# Patient Record
Sex: MALE | Race: WHITE | NOT HISPANIC OR LATINO | Employment: PART TIME | URBAN - METROPOLITAN AREA
[De-identification: names, ages, dates, MRNs, and addresses within clinical notes are randomized per-mention and may not be internally consistent; named-entity substitution may affect disease eponyms.]

---

## 2017-01-04 ENCOUNTER — PATIENT MESSAGE (OUTPATIENT)
Dept: FAMILY MEDICINE | Facility: CLINIC | Age: 67
End: 2017-01-04

## 2017-01-04 ENCOUNTER — LAB VISIT (OUTPATIENT)
Dept: LAB | Facility: HOSPITAL | Age: 67
End: 2017-01-04
Attending: FAMILY MEDICINE
Payer: MEDICARE

## 2017-01-04 DIAGNOSIS — R60.0 LOCALIZED EDEMA: ICD-10-CM

## 2017-01-04 DIAGNOSIS — Z12.5 PROSTATE CANCER SCREENING: ICD-10-CM

## 2017-01-04 DIAGNOSIS — I83.029 VENOUS STASIS ULCERS OF BOTH LOWER EXTREMITIES: ICD-10-CM

## 2017-01-04 DIAGNOSIS — E11.9 CONTROLLED TYPE 2 DIABETES MELLITUS WITHOUT COMPLICATION, WITHOUT LONG-TERM CURRENT USE OF INSULIN: ICD-10-CM

## 2017-01-04 DIAGNOSIS — L97.919 VENOUS STASIS ULCERS OF BOTH LOWER EXTREMITIES: ICD-10-CM

## 2017-01-04 DIAGNOSIS — L97.929 VENOUS STASIS ULCERS OF BOTH LOWER EXTREMITIES: ICD-10-CM

## 2017-01-04 DIAGNOSIS — I10 GOOD HYPERTENSION CONTROL: ICD-10-CM

## 2017-01-04 DIAGNOSIS — E78.2 MIXED HYPERLIPIDEMIA: ICD-10-CM

## 2017-01-04 DIAGNOSIS — I83.019 VENOUS STASIS ULCERS OF BOTH LOWER EXTREMITIES: ICD-10-CM

## 2017-01-04 LAB
ALBUMIN SERPL BCP-MCNC: 3 G/DL
ALP SERPL-CCNC: 64 U/L
ALT SERPL W/O P-5'-P-CCNC: 19 U/L
ANION GAP SERPL CALC-SCNC: 6 MMOL/L
AST SERPL-CCNC: 21 U/L
BASOPHILS # BLD AUTO: 0.05 K/UL
BASOPHILS NFR BLD: 0.6 %
BILIRUB SERPL-MCNC: 0.5 MG/DL
BUN SERPL-MCNC: 28 MG/DL
CALCIUM SERPL-MCNC: 9.1 MG/DL
CHLORIDE SERPL-SCNC: 102 MMOL/L
CHOLEST/HDLC SERPL: 4 {RATIO}
CO2 SERPL-SCNC: 29 MMOL/L
COMPLEXED PSA SERPL-MCNC: 0.81 NG/ML
CREAT SERPL-MCNC: 1.2 MG/DL
DIFFERENTIAL METHOD: ABNORMAL
EOSINOPHIL # BLD AUTO: 0.4 K/UL
EOSINOPHIL NFR BLD: 4.8 %
ERYTHROCYTE [DISTWIDTH] IN BLOOD BY AUTOMATED COUNT: 14.1 %
EST. GFR  (AFRICAN AMERICAN): >60 ML/MIN/1.73 M^2
EST. GFR  (NON AFRICAN AMERICAN): >60 ML/MIN/1.73 M^2
GLUCOSE SERPL-MCNC: 111 MG/DL
HCT VFR BLD AUTO: 40.8 %
HDL/CHOLESTEROL RATIO: 25 %
HDLC SERPL-MCNC: 168 MG/DL
HDLC SERPL-MCNC: 42 MG/DL
HGB BLD-MCNC: 13.1 G/DL
LDLC SERPL CALC-MCNC: 108.2 MG/DL
LYMPHOCYTES # BLD AUTO: 1.4 K/UL
LYMPHOCYTES NFR BLD: 15.7 %
MCH RBC QN AUTO: 28.7 PG
MCHC RBC AUTO-ENTMCNC: 32.1 %
MCV RBC AUTO: 89 FL
MONOCYTES # BLD AUTO: 0.9 K/UL
MONOCYTES NFR BLD: 9.9 %
NEUTROPHILS # BLD AUTO: 6.2 K/UL
NEUTROPHILS NFR BLD: 68.8 %
NONHDLC SERPL-MCNC: 126 MG/DL
PLATELET # BLD AUTO: 268 K/UL
PMV BLD AUTO: 10 FL
POTASSIUM SERPL-SCNC: 5 MMOL/L
PROT SERPL-MCNC: 7.3 G/DL
RBC # BLD AUTO: 4.57 M/UL
SODIUM SERPL-SCNC: 137 MMOL/L
TRIGL SERPL-MCNC: 89 MG/DL
WBC # BLD AUTO: 9.02 K/UL

## 2017-01-04 PROCEDURE — 80053 COMPREHEN METABOLIC PANEL: CPT

## 2017-01-04 PROCEDURE — 80061 LIPID PANEL: CPT

## 2017-01-04 PROCEDURE — 83036 HEMOGLOBIN GLYCOSYLATED A1C: CPT

## 2017-01-04 PROCEDURE — 85025 COMPLETE CBC W/AUTO DIFF WBC: CPT

## 2017-01-04 PROCEDURE — 36415 COLL VENOUS BLD VENIPUNCTURE: CPT | Mod: PO

## 2017-01-04 PROCEDURE — 84153 ASSAY OF PSA TOTAL: CPT

## 2017-01-05 ENCOUNTER — PATIENT MESSAGE (OUTPATIENT)
Dept: FAMILY MEDICINE | Facility: CLINIC | Age: 67
End: 2017-01-05

## 2017-01-05 LAB
ESTIMATED AVG GLUCOSE: 131 MG/DL
HBA1C MFR BLD HPLC: 6.2 %

## 2017-01-11 ENCOUNTER — DOCUMENTATION ONLY (OUTPATIENT)
Dept: FAMILY MEDICINE | Facility: CLINIC | Age: 67
End: 2017-01-11

## 2017-01-11 NOTE — PROGRESS NOTES
Pre-Visit Chart Review  For Appointment Scheduled on 1-13-17    Health Maintenance Due   Topic Date Due    Pneumococcal (65+) (2 of 2 - PCV13) 10/18/2014    Eye Exam  02/18/2016    Colonoscopy  05/02/2016    Influenza Vaccine  08/01/2016    Foot Exam  01/13/2017

## 2017-01-13 ENCOUNTER — OFFICE VISIT (OUTPATIENT)
Dept: FAMILY MEDICINE | Facility: CLINIC | Age: 67
End: 2017-01-13
Payer: MEDICARE

## 2017-01-13 VITALS
DIASTOLIC BLOOD PRESSURE: 70 MMHG | SYSTOLIC BLOOD PRESSURE: 134 MMHG | RESPIRATION RATE: 20 BRPM | TEMPERATURE: 98 F | HEIGHT: 71 IN | HEART RATE: 70 BPM | OXYGEN SATURATION: 98 % | BODY MASS INDEX: 44.1 KG/M2 | WEIGHT: 315 LBS

## 2017-01-13 DIAGNOSIS — Z23 IMMUNIZATION DUE: ICD-10-CM

## 2017-01-13 DIAGNOSIS — I87.2 STASIS DERMATITIS OF BOTH LEGS: ICD-10-CM

## 2017-01-13 DIAGNOSIS — L97.309 VENOUS ULCER OF ANKLE, UNSPECIFIED LATERALITY: Primary | ICD-10-CM

## 2017-01-13 DIAGNOSIS — D64.9 ANEMIA, UNSPECIFIED TYPE: ICD-10-CM

## 2017-01-13 DIAGNOSIS — E11.9 CONTROLLED TYPE 2 DIABETES MELLITUS WITHOUT COMPLICATION, UNSPECIFIED LONG TERM INSULIN USE STATUS: ICD-10-CM

## 2017-01-13 DIAGNOSIS — I83.003 VENOUS ULCER OF ANKLE, UNSPECIFIED LATERALITY: Primary | ICD-10-CM

## 2017-01-13 DIAGNOSIS — F32.A DEPRESSION, UNSPECIFIED DEPRESSION TYPE: ICD-10-CM

## 2017-01-13 DIAGNOSIS — I83.003 VENOUS ULCER OF ANKLE, UNSPECIFIED LATERALITY: ICD-10-CM

## 2017-01-13 DIAGNOSIS — L97.309 VENOUS ULCER OF ANKLE, UNSPECIFIED LATERALITY: ICD-10-CM

## 2017-01-13 PROCEDURE — 99213 OFFICE O/P EST LOW 20 MIN: CPT | Mod: PBBFAC,PO,25 | Performed by: FAMILY MEDICINE

## 2017-01-13 PROCEDURE — 99214 OFFICE O/P EST MOD 30 MIN: CPT | Mod: S$PBB,,, | Performed by: FAMILY MEDICINE

## 2017-01-13 PROCEDURE — 99999 PR PBB SHADOW E&M-EST. PATIENT-LVL III: CPT | Mod: PBBFAC,,, | Performed by: FAMILY MEDICINE

## 2017-01-13 PROCEDURE — 90670 PCV13 VACCINE IM: CPT | Mod: PBBFAC,PO | Performed by: FAMILY MEDICINE

## 2017-01-13 RX ORDER — CIPROFLOXACIN 500 MG/1
500 TABLET ORAL 2 TIMES DAILY
Qty: 20 TABLET | Refills: 0 | Status: SHIPPED | OUTPATIENT
Start: 2017-01-13 | End: 2017-02-07

## 2017-01-13 RX ORDER — SPIRONOLACTONE 50 MG/1
50 TABLET, FILM COATED ORAL DAILY
Qty: 90 TABLET | Refills: 1 | Status: SHIPPED | OUTPATIENT
Start: 2017-01-13 | End: 2018-06-26 | Stop reason: SDUPTHER

## 2017-01-13 RX ORDER — BUPROPION HYDROCHLORIDE 300 MG/1
300 TABLET ORAL DAILY
Qty: 90 TABLET | Refills: 1 | Status: SHIPPED | OUTPATIENT
Start: 2017-01-13 | End: 2017-08-31

## 2017-01-13 RX ORDER — METOPROLOL SUCCINATE 100 MG/1
100 TABLET, EXTENDED RELEASE ORAL DAILY
COMMUNITY
End: 2017-04-13 | Stop reason: SDUPTHER

## 2017-01-13 RX ORDER — CIPROFLOXACIN 500 MG/1
500 TABLET ORAL 2 TIMES DAILY
Qty: 120 TABLET | Refills: 0 | Status: SHIPPED | OUTPATIENT
Start: 2017-01-13 | End: 2017-01-13 | Stop reason: SDUPTHER

## 2017-01-13 NOTE — MR AVS SNAPSHOT
[facility]  [address]  [place] [address]  Phone: [phone]  Fax: [phone]                  Lukas Chance   2017 9:20 AM   Office Visit    Description:  Male : 1950   Provider:  Solitario Mendez MD   Department:  [facility]           Reason for Visit     Medication Refill           Diagnoses this Visit        Comments    Venous ulcer of ankle, unspecified laterality    -  Primary     Stasis dermatitis of both legs         Depression, unspecified depression type         Controlled type 2 diabetes mellitus without complication, unspecified long term insulin use status         Anemia, unspecified type         Immunization due                To Do List           Future Appointments        Provider Department Dept Phone    4/10/2017 10:15 AM LAB, [facility] - Lab [phone]    2017 8:40 AM Solitario Mendez MD [facility] [phone]      Goals (5 Years of Data)     None      Follow-Up and Disposition     Return in about 3 months (around 2017) for diabetic and lab recheck one week before.    Follow-up and Disposition History       These Medications        Disp Refills Start End    ciprofloxacin HCl (CIPRO) 500 MG tablet 120 tablet 0 2017    Take 1 tablet (500 mg total) by mouth 2 (two) times daily. - Oral    Pharmacy: [address] [place] - [address] [facility] Ph #: [phone]       spironolactone (ALDACTONE) 50 MG tablet 90 tablet 1 2017    Take 1 tablet (50 mg total) by mouth once daily. - Oral    Pharmacy: [facility] [place] - [address] [facility] Ph #: [phone]       buPROPion (WELLBUTRIN XL) 300 MG 24 hr tablet 90 tablet 1 2017    Take 1 tablet (300 mg total) by mouth once daily. - Oral    Pharmacy: [facility] [place] - [address] [facility] Ph #: [phone]         [facility]     [facility] - 24/7 Assistance  Registered nurses in the [facility] provide clinical advisement, health education, appointment booking, and other advisory services.  Call for this free service at [phone].             Medications           CHANGE how you are taking these medications     Start Taking Instead of    spironolactone (ALDACTONE) 50 MG tablet spironolactone (ALDACTONE) 25 MG tablet    Dosage:  Take 1 tablet (50 mg total) by mouth once daily. Dosage:  Take 1 tablet (25 mg total) by mouth once daily.    Reason for Change:  Reorder     buPROPion (WELLBUTRIN XL) 300 MG 24 hr tablet buPROPion (WELLBUTRIN XL) 150 MG TB24 tablet  (Previously Discontinued)    Dosage:  Take 1 tablet (300 mg total) by mouth once daily. Dosage:  Take 2 tablets (300 mg total) by mouth once daily.    Reason for Change:  Patient no longer taking            Verify that the below list of medications is an accurate representation of the medications you are currently taking.  If none reported, the list may be blank. If incorrect, please contact your healthcare provider. Carry this list with you in case of emergency.           Current Medications     acetaminophen (TYLENOL EXTRA STRENGTH) 500 MG tablet Take 500 mg by mouth once a week.    hydrochlorothiazide (HYDRODIURIL) 25 MG tablet Take 1 tablet (25 mg total) by mouth once daily.    lisinopril (PRINIVIL,ZESTRIL) 40 MG tablet TAKE 1 TABLET ONE TIME DAILY    metoprolol succinate (TOPROL-XL) 100 MG 24 hr tablet Take 100 mg by mouth once daily.    multivitamin (THERAGRAN) per tablet Take 1 tablet by mouth once daily.    silver sulfADIAZINE 1% (SILVADENE) 1 % cream Apply topically daily as needed.     spironolactone (ALDACTONE) 50 MG tablet Take 1 tablet (50 mg total) by mouth once daily.    apixaban 5 mg Tab Take 1 tablet (5 mg total) by mouth 2 (two) times daily. Take two twice a day for one week then one twice a day    buPROPion (WELLBUTRIN XL)  "300 MG 24 hr tablet Take 1 tablet (300 mg total) by mouth once daily.    ciprofloxacin HCl (CIPRO) 500 MG tablet Take 1 tablet (500 mg total) by mouth 2 (two) times daily.    lorcaserin (BELVIQ) 10 mg Tab Take 10 mg by mouth 2 (two) times daily before meals.    metformin (GLUCOPHAGE) 500 MG tablet Take 1 tablet (500 mg total) by mouth daily with breakfast.    metoprolol succinate (TOPROL-XL) 100 MG 24 hr tablet Take 1 tablet (100 mg total) by mouth once daily.           Clinical Reference Information           Vital Signs - Last Recorded  Most recent update: 1/13/2017  9:36 AM by Maribeth Meeks MA    BP Pulse Temp Resp    134/70 (BP Location: Right arm, Patient Position: Sitting, BP Method: Manual) 70 97.8 °F (36.6 °C) (Oral) 20    Ht Wt SpO2 BMI    5' 11" (1.803 m) (!) 180.2 kg (397 lb 4.3 oz) 98% 55.41 kg/m2      Blood Pressure          Most Recent Value    BP  134/70      Allergies as of 1/13/2017     Bactroban [Mupirocin Calcium]      Immunizations Administered on Date of Encounter - 1/13/2017     Name Date Dose VIS Date Route    Pneumococcal Conjugate - 13 Valent 1/13/2017 0.5 mL 11/5/2015 Intramuscular      Orders Placed During Today's Visit      Normal Orders This Visit    Pneumococcal Conjugate Vaccine (13 Valent) (IM)     Future Labs/Procedures Expected by Expires    Basic metabolic panel  1/13/2017 1/14/2018    CBC auto differential  1/13/2017 1/14/2018    Hemoglobin A1c  1/13/2017 1/14/2018      Instructions      Controlling High Blood Pressure  High blood pressure (hypertension) is often called the silent killer. This is because many people who have it dont know it. High blood pressure is defined as 140/90 mm Hg or higher. Know your blood pressure and remember to check it regularly. Doing so can save your life. Here are some things you can do to help control your blood pressure.    Choose heart-healthy foods  · Select low-salt, low-fat foods. Limit sodium intake to 2,400 mg per day or the amount " suggested by your healthcare provider.  · Limit canned, dried, cured, packaged, and fast foods. These can contain a lot of salt.  · Eat 8 to 10 servings of fruits and vegetables every day.  · Choose lean meats, fish, or chicken.  · Eat whole-grain pasta, brown rice, and beans.  · Eat 2 to 3 servings of low-fat or fat-free dairy products.  · Ask your doctor about the DASH eating plan. This plan helps reduce blood pressure.  · When you go to a restaurant, ask that your meal be prepared with no added salt.  Maintain a healthy weight  · Ask your healthcare provider how many calories to eat a day. Then stick to that number.  · Ask your healthcare provider what weight range is healthiest for you. If you are overweight, a weight loss of only 3% to 5% of your body weight can help lower blood pressure. Generally, a good weight loss goal is to lose 10% of your body weight in a year.  · Limit snacks and sweets.  · Get regular exercise.  Get up and get active  · Choose activities you enjoy. Find ones you can do with friends or family. This includes bicycling, dancing, walking, and jogging.  · Park farther away from building entrances.  · Use stairs instead of the elevator.  · When you can, walk or bike instead of driving.  · [place] leaves, garden, or do household repairs.  · Be active at a moderate to vigorous level of physical activity for at least 40 minutes for a minimum of 3 to 4 days a week.   Manage stress  · Make time to relax and enjoy life. Find time to laugh.  · Communicate your concerns with your loved ones and your healthcare provider.  · Visit with family and friends, and keep up with hobbies.  Limit alcohol and quit smoking  · Men should have no more than 2 drinks per day.  · Women should have no more than 1 drink per day.  · Talk with your healthcare provider about quitting smoking. Smoking significantly increases your risk for heart disease and stroke. Ask your healthcare provider about community smoking cessation  programs and other options.  Medicines  If lifestyle changes arent enough, your healthcare provider may prescribe high blood pressure medicine. Take all medicines as prescribed. If you have any questions about your medicines, ask your healthcare provider before stopping or changing them.   © 1792-0578 [employer]. [address] [place] [address], [place] [address]. All rights reserved. This information is not intended as a substitute for professional medical care. Always follow your healthcare professional's instructions.

## 2017-01-13 NOTE — PATIENT INSTRUCTIONS
Controlling High Blood Pressure  High blood pressure (hypertension) is often called the silent killer. This is because many people who have it dont know it. High blood pressure is defined as 140/90 mm Hg or higher. Know your blood pressure and remember to check it regularly. Doing so can save your life. Here are some things you can do to help control your blood pressure.    Choose heart-healthy foods  · Select low-salt, low-fat foods. Limit sodium intake to 2,400 mg per day or the amount suggested by your healthcare provider.  · Limit canned, dried, cured, packaged, and fast foods. These can contain a lot of salt.  · Eat 8 to 10 servings of fruits and vegetables every day.  · Choose lean meats, fish, or chicken.  · Eat whole-grain pasta, brown rice, and beans.  · Eat 2 to 3 servings of low-fat or fat-free dairy products.  · Ask your doctor about the DASH eating plan. This plan helps reduce blood pressure.  · When you go to a restaurant, ask that your meal be prepared with no added salt.  Maintain a healthy weight  · Ask your healthcare provider how many calories to eat a day. Then stick to that number.  · Ask your healthcare provider what weight range is healthiest for you. If you are overweight, a weight loss of only 3% to 5% of your body weight can help lower blood pressure. Generally, a good weight loss goal is to lose 10% of your body weight in a year.  · Limit snacks and sweets.  · Get regular exercise.  Get up and get active  · Choose activities you enjoy. Find ones you can do with friends or family. This includes bicycling, dancing, walking, and jogging.  · Park farther away from building entrances.  · Use stairs instead of the elevator.  · When you can, walk or bike instead of driving.  · [place] leaves, garden, or do household repairs.  · Be active at a moderate to vigorous level of physical activity for at least 40 minutes for a minimum of 3 to 4 days a week.   Manage stress  · Make time to relax and enjoy  life. Find time to laugh.  · Communicate your concerns with your loved ones and your healthcare provider.  · Visit with family and friends, and keep up with hobbies.  Limit alcohol and quit smoking  · Men should have no more than 2 drinks per day.  · Women should have no more than 1 drink per day.  · Talk with your healthcare provider about quitting smoking. Smoking significantly increases your risk for heart disease and stroke. Ask your healthcare provider about community smoking cessation programs and other options.  Medicines  If lifestyle changes arent enough, your healthcare provider may prescribe high blood pressure medicine. Take all medicines as prescribed. If you have any questions about your medicines, ask your healthcare provider before stopping or changing them.   © 9132-3308 [employer] [place]. [address] [place] [address], [place] [address]. All rights reserved. This information is not intended as a substitute for professional medical care. Always follow your healthcare professional's instructions.

## 2017-01-13 NOTE — PROGRESS NOTES
Subjective:       Patient ID: Lukas Chance is a 66 y.o. male.    Chief Complaint: Medication Refill    HPI Comments: 66 year old male in for follow up of diabetes and chronic venous stasis ulcers of both legs.  His ulcers are weeping again and flare up of erythema but no fever or chills.  His cmp and a1c indicated no problems but potassium was upper end of normal on no potassium supplements.    Past Medical History:    Anticoagulant long-term use                     3/1/2013      Antithrombin 3 deficiency                                     Cellulitis                                                      Comment:lower legs, venous stasis    Coronary artery disease                                       Diabetes mellitus type 2 in obese               5/15/2013     Hypertension                                                  Obesity                                                       LAKHWINDER (obstructive sleep apnea)                                 Pulmonary embolism                                            Past Surgical History:    RADIOFREQUENCY ABLATION                          7/2015          Comment:bilateral     TONSILLECTOMY                                                  MULTIPLE TOOTH EXTRACTIONS                                     COLONOSCOPY                                      5/2/2011        Comment:Dr. Miller, 9 polyps, recheck 5 year    Pneumococcal (65+)(2 of 2 - PCV13) due on 10/18/2014  Eye Exam due on 02/18/2016  Colonoscopy due on 05/02/2016-DECLINES AT THIS TIME  Foot Exam due on 01/13/2017      Medication Refill   Associated symptoms include congestion, coughing and a rash. Pertinent negatives include no chest pain.     Review of Systems   HENT: Positive for congestion, postnasal drip and rhinorrhea.    Respiratory: Positive for cough. Negative for chest tightness and shortness of breath.    Cardiovascular: Positive for leg swelling. Negative for chest pain and palpitations.   Skin: Positive  for color change, rash and wound.   Psychiatric/Behavioral: Positive for dysphoric mood (he has been irritable lately and feels he needs to go back on wellbutrin).       Objective:      Physical Exam   Constitutional: He appears well-developed. No distress.   Good blood pressure control  Patient is morbidly obese with bmi of 55.4.   Weight is increased by 11.6lb since last visit of 10/13/16.     Cardiovascular:   Pulses:       Dorsalis pedis pulses are 1+ on the right side, and 1+ on the left side.        Posterior tibial pulses are 1+ on the right side, and 1+ on the left side.   Musculoskeletal: He exhibits edema (3+ edema both legs).        Right foot: There is normal range of motion and no deformity.        Left foot: There is normal range of motion and no deformity.   Feet:   Right Foot:   Protective Sensation: 10 sites tested. 10 sites sensed.   Skin Integrity: Positive for callus and dry skin. Negative for ulcer, blister, skin breakdown, erythema or warmth.   Left Foot:   Protective Sensation: 10 sites tested. 10 sites sensed.   Skin Integrity: Positive for callus and dry skin. Negative for ulcer, blister, skin breakdown, erythema or warmth.   Skin: He is not diaphoretic. There is erythema.        Psychiatric: He has a normal mood and affect. His behavior is normal. Judgment and thought content normal.   Nursing note and vitals reviewed.      Assessment:       1. Venous ulcer of ankle, unspecified laterality    2. Stasis dermatitis of both legs    3. Depression, unspecified depression type    4. Controlled type 2 diabetes mellitus without complication, unspecified long term insulin use status    5. Anemia, unspecified type    6. Immunization due        Plan:       1. Venous ulcer of ankle, unspecified laterality  Increase spironolactone to 50mg daily and recheck lab in three months with office visit  - ciprofloxacin HCl (CIPRO) 500 MG tablet; Take 1 tablet (500 mg total) by mouth 2 (two) times daily.   Dispense: 120 tablet; Refill: 0  - spironolactone (ALDACTONE) 50 MG tablet; Take 1 tablet (50 mg total) by mouth once daily.  Dispense: 90 tablet; Refill: 1    2. Stasis dermatitis of both legs  - spironolactone (ALDACTONE) 50 MG tablet; Take 1 tablet (50 mg total) by mouth once daily.  Dispense: 90 tablet; Refill: 1    3. Depression, unspecified depression type  Resume wellbutrin as before  - buPROPion (WELLBUTRIN XL) 300 MG 24 hr tablet; Take 1 tablet (300 mg total) by mouth once daily.  Dispense: 90 tablet; Refill: 1    4. Controlled type 2 diabetes mellitus without complication, unspecified long term insulin use status  Lab Results   Component Value Date    HGBA1C 6.2 01/04/2017       - Basic metabolic panel; Future  - Hemoglobin A1c; Future    5. Anemia, unspecified type  followup in three months with lab  - CBC auto differential; Future    6. Immunization due  given  - Pneumococcal Conjugate Vaccine (13 Valent) (IM)

## 2017-01-13 NOTE — TELEPHONE ENCOUNTER
----- Message from RT Jax sent at 1/13/2017 10:03 AM CST -----  Contact: [place] [phone] [facility]  [place] [phone] [facility], requesting clarification on the pt's medication that was e scribed, thanks.

## 2017-01-26 ENCOUNTER — TELEPHONE (OUTPATIENT)
Dept: FAMILY MEDICINE | Facility: CLINIC | Age: 67
End: 2017-01-26

## 2017-01-26 ENCOUNTER — PATIENT MESSAGE (OUTPATIENT)
Dept: FAMILY MEDICINE | Facility: CLINIC | Age: 67
End: 2017-01-26

## 2017-01-26 DIAGNOSIS — I83.003 VENOUS ULCER OF ANKLE, UNSPECIFIED LATERALITY: ICD-10-CM

## 2017-01-26 DIAGNOSIS — L97.309 VENOUS ULCER OF ANKLE, UNSPECIFIED LATERALITY: ICD-10-CM

## 2017-01-26 RX ORDER — CIPROFLOXACIN 500 MG/1
TABLET ORAL
Qty: 20 TABLET | Refills: 0 | OUTPATIENT
Start: 2017-01-26

## 2017-01-26 NOTE — TELEPHONE ENCOUNTER
----- Message from Erick Gill sent at 1/26/2017  4:29 PM CST -----  Patient needs a refill on Ziprofloxacin 500 mg [facility].  Please call patient at [phone] if you have any questions. Thanks!

## 2017-02-02 DIAGNOSIS — L97.309 VENOUS ULCER OF ANKLE, UNSPECIFIED LATERALITY: ICD-10-CM

## 2017-02-02 DIAGNOSIS — I83.003 VENOUS ULCER OF ANKLE, UNSPECIFIED LATERALITY: ICD-10-CM

## 2017-02-03 ENCOUNTER — TELEPHONE (OUTPATIENT)
Dept: FAMILY MEDICINE | Facility: CLINIC | Age: 67
End: 2017-02-03

## 2017-02-03 RX ORDER — CIPROFLOXACIN 500 MG/1
TABLET ORAL
Qty: 20 TABLET | Refills: 0 | OUTPATIENT
Start: 2017-02-03

## 2017-02-03 NOTE — TELEPHONE ENCOUNTER
----- Message from Maribeth Hernandez sent at 2/3/2017  1:39 PM CST -----  Contact:  call //[phone]    Calling   To   Get a  Refill cipro 500  Mg /bactrium   Cream// please call     [facility] [address]  [place] [address]  Phone: [phone] Fax: [phone]

## 2017-02-07 ENCOUNTER — OFFICE VISIT (OUTPATIENT)
Dept: FAMILY MEDICINE | Facility: CLINIC | Age: 67
End: 2017-02-07
Payer: MEDICARE

## 2017-02-07 ENCOUNTER — DOCUMENTATION ONLY (OUTPATIENT)
Dept: FAMILY MEDICINE | Facility: CLINIC | Age: 67
End: 2017-02-07

## 2017-02-07 VITALS
WEIGHT: 315 LBS | SYSTOLIC BLOOD PRESSURE: 135 MMHG | BODY MASS INDEX: 44.1 KG/M2 | DIASTOLIC BLOOD PRESSURE: 75 MMHG | HEART RATE: 72 BPM | OXYGEN SATURATION: 98 % | TEMPERATURE: 98 F | HEIGHT: 71 IN | RESPIRATION RATE: 20 BRPM

## 2017-02-07 DIAGNOSIS — I87.2 STASIS DERMATITIS OF BOTH LEGS: ICD-10-CM

## 2017-02-07 DIAGNOSIS — L03.119 CELLULITIS OF LOWER EXTREMITY, UNSPECIFIED LATERALITY: Primary | ICD-10-CM

## 2017-02-07 PROCEDURE — 99213 OFFICE O/P EST LOW 20 MIN: CPT | Mod: S$PBB,,, | Performed by: FAMILY MEDICINE

## 2017-02-07 PROCEDURE — 99999 PR PBB SHADOW E&M-EST. PATIENT-LVL III: CPT | Mod: PBBFAC,,, | Performed by: FAMILY MEDICINE

## 2017-02-07 PROCEDURE — 99213 OFFICE O/P EST LOW 20 MIN: CPT | Mod: PBBFAC,PO | Performed by: FAMILY MEDICINE

## 2017-02-07 RX ORDER — MUPIROCIN 20 MG/G
OINTMENT TOPICAL
COMMUNITY
Start: 2017-02-02 | End: 2017-04-17

## 2017-02-07 RX ORDER — LEVOFLOXACIN 750 MG/1
750 TABLET ORAL DAILY
Qty: 30 TABLET | Refills: 0 | Status: SHIPPED | OUTPATIENT
Start: 2017-02-07 | End: 2017-02-10

## 2017-02-07 NOTE — PROGRESS NOTES
Pre-Visit Chart Review  For Appointment Scheduled on 2-7-17    Health Maintenance Due   Topic Date Due    Eye Exam  02/18/2016    Colonoscopy  05/02/2016

## 2017-02-07 NOTE — PROGRESS NOTES
Subjective:       Patient ID: Lukas Chance is a 67 y.o. male.    Chief Complaint: Medication Refill    HPI Comments: 67 year old male with antithrombin three deficiency, blood clots and history of pulmonary embolism with chronic stasis dermatitis and recurrent cellulitis of both legs comes in for recheck.  He was using cipro for cellulitis and it was working well for a time but he had a flare up and went to 500mg tid running out quicker.  He is out of it now, still using silvadene and the wounds are starting to weep and flare again.    Past Medical History:    Anticoagulant long-term use                     3/1/2013      Antithrombin 3 deficiency                                     Cellulitis                                                      Comment:lower legs, venous stasis    Coronary artery disease                                       Diabetes mellitus type 2 in obese               5/15/2013     Hypertension                                                  Obesity                                                       LAKHWINDER (obstructive sleep apnea)                                 Pulmonary embolism                                        \    Past Surgical History:    RADIOFREQUENCY ABLATION                          7/2015          Comment:bilateral     TONSILLECTOMY                                                  MULTIPLE TOOTH EXTRACTIONS                                     COLONOSCOPY                                      5/2/2011        Comment:Dr. Miller, 9 polyps, recheck 5 year        Medication Refill   Pertinent negatives include no chills or fever.     Review of Systems   Constitutional: Negative for chills and fever.   Skin: Positive for color change and wound.       Objective:      Physical Exam   Constitutional: He appears well-developed. No distress.   Good blood pressure control  Patient is morbidly obese with bmi of 54.6.   Weight is decreased by 6.1lb since last visit of 1/13/17.     Skin:  He is not diaphoretic.        Nursing note and vitals reviewed.      Assessment:       1. Cellulitis of lower extremity, unspecified laterality    2. Stasis dermatitis of both legs        Plan:       1. Cellulitis of lower extremity, unspecified laterality  - levoFLOXacin (LEVAQUIN) 750 MG tablet; Take 1 tablet (750 mg total) by mouth once daily.  Dispense: 30 tablet; Refill: 0    2. Stasis dermatitis of both legs

## 2017-02-10 ENCOUNTER — TELEPHONE (OUTPATIENT)
Dept: FAMILY MEDICINE | Facility: CLINIC | Age: 67
End: 2017-02-10

## 2017-02-10 DIAGNOSIS — L03.119 CELLULITIS OF LOWER EXTREMITY, UNSPECIFIED LATERALITY: ICD-10-CM

## 2017-02-10 RX ORDER — CIPROFLOXACIN 750 MG/1
750 TABLET, FILM COATED ORAL 2 TIMES DAILY
Qty: 60 TABLET | Refills: 0 | Status: SHIPPED | OUTPATIENT
Start: 2017-02-10 | End: 2017-03-12

## 2017-02-10 NOTE — TELEPHONE ENCOUNTER
Patient says the cellulitis of lower legs is not responding to the Levaquin- draining and swelling-afebrile-he is asking for change to Cipro.

## 2017-02-10 NOTE — TELEPHONE ENCOUNTER
----- Message from Gina Mace sent at 2/10/2017 10:14 AM CST -----  Contact: Lukas  Was given an antibiotic last week. Wants to see if he can get medication prior to this time. Please call back [phone] office and cell [phone].     [facility] [address] [place] - [address]   [place] [address]  Phone: [phone] Fax: [phone]    Thank you.!!

## 2017-04-10 ENCOUNTER — LAB VISIT (OUTPATIENT)
Dept: LAB | Facility: HOSPITAL | Age: 67
End: 2017-04-10
Attending: FAMILY MEDICINE
Payer: MEDICARE

## 2017-04-10 DIAGNOSIS — D64.9 ANEMIA, UNSPECIFIED TYPE: ICD-10-CM

## 2017-04-10 DIAGNOSIS — E11.9 CONTROLLED TYPE 2 DIABETES MELLITUS WITHOUT COMPLICATION, UNSPECIFIED LONG TERM INSULIN USE STATUS: ICD-10-CM

## 2017-04-10 LAB
ANION GAP SERPL CALC-SCNC: 10 MMOL/L
BASOPHILS # BLD AUTO: 0.06 K/UL
BASOPHILS NFR BLD: 0.5 %
BUN SERPL-MCNC: 39 MG/DL
CALCIUM SERPL-MCNC: 9 MG/DL
CHLORIDE SERPL-SCNC: 103 MMOL/L
CO2 SERPL-SCNC: 25 MMOL/L
CREAT SERPL-MCNC: 1.5 MG/DL
DIFFERENTIAL METHOD: ABNORMAL
EOSINOPHIL # BLD AUTO: 0.4 K/UL
EOSINOPHIL NFR BLD: 3.3 %
ERYTHROCYTE [DISTWIDTH] IN BLOOD BY AUTOMATED COUNT: 14.7 %
EST. GFR  (AFRICAN AMERICAN): 54.1 ML/MIN/1.73 M^2
EST. GFR  (NON AFRICAN AMERICAN): 46.8 ML/MIN/1.73 M^2
GLUCOSE SERPL-MCNC: 108 MG/DL
HCT VFR BLD AUTO: 37.3 %
HGB BLD-MCNC: 12.2 G/DL
LYMPHOCYTES # BLD AUTO: 2.2 K/UL
LYMPHOCYTES NFR BLD: 18.9 %
MCH RBC QN AUTO: 29.6 PG
MCHC RBC AUTO-ENTMCNC: 32.7 %
MCV RBC AUTO: 91 FL
MONOCYTES # BLD AUTO: 1.1 K/UL
MONOCYTES NFR BLD: 9.7 %
NEUTROPHILS # BLD AUTO: 7.9 K/UL
NEUTROPHILS NFR BLD: 67.3 %
PLATELET # BLD AUTO: 258 K/UL
PMV BLD AUTO: 10.3 FL
POTASSIUM SERPL-SCNC: 4.2 MMOL/L
RBC # BLD AUTO: 4.12 M/UL
SODIUM SERPL-SCNC: 138 MMOL/L
WBC # BLD AUTO: 11.7 K/UL

## 2017-04-10 PROCEDURE — 85025 COMPLETE CBC W/AUTO DIFF WBC: CPT

## 2017-04-10 PROCEDURE — 36415 COLL VENOUS BLD VENIPUNCTURE: CPT | Mod: PO

## 2017-04-10 PROCEDURE — 83036 HEMOGLOBIN GLYCOSYLATED A1C: CPT

## 2017-04-10 PROCEDURE — 80048 BASIC METABOLIC PNL TOTAL CA: CPT

## 2017-04-11 ENCOUNTER — PATIENT MESSAGE (OUTPATIENT)
Dept: FAMILY MEDICINE | Facility: CLINIC | Age: 67
End: 2017-04-11

## 2017-04-11 DIAGNOSIS — N18.3 CKD (CHRONIC KIDNEY DISEASE), STAGE 3 (MODERATE): Primary | ICD-10-CM

## 2017-04-11 LAB
ESTIMATED AVG GLUCOSE: 131 MG/DL
HBA1C MFR BLD HPLC: 6.2 %

## 2017-04-12 ENCOUNTER — DOCUMENTATION ONLY (OUTPATIENT)
Dept: FAMILY MEDICINE | Facility: CLINIC | Age: 67
End: 2017-04-12

## 2017-04-12 NOTE — PROGRESS NOTES
Pre-Visit Chart Review  For Appointment Scheduled on 4-17-17    Health Maintenance Due   Topic Date Due    TETANUS VACCINE  01/24/1966    Zoster Vaccine  01/24/2008    Eye Exam  02/18/2016    Colonoscopy  05/02/2016

## 2017-04-13 DIAGNOSIS — I10 ESSENTIAL HYPERTENSION: ICD-10-CM

## 2017-04-13 RX ORDER — METOPROLOL SUCCINATE 100 MG/1
TABLET, EXTENDED RELEASE ORAL
Qty: 90 TABLET | Refills: 3 | Status: SHIPPED | OUTPATIENT
Start: 2017-04-13 | End: 2017-04-17 | Stop reason: SDUPTHER

## 2017-04-17 ENCOUNTER — OFFICE VISIT (OUTPATIENT)
Dept: FAMILY MEDICINE | Facility: CLINIC | Age: 67
End: 2017-04-17
Payer: MEDICARE

## 2017-04-17 VITALS
DIASTOLIC BLOOD PRESSURE: 60 MMHG | OXYGEN SATURATION: 99 % | SYSTOLIC BLOOD PRESSURE: 118 MMHG | BODY MASS INDEX: 44.1 KG/M2 | HEIGHT: 71 IN | HEART RATE: 97 BPM | RESPIRATION RATE: 16 BRPM | TEMPERATURE: 98 F | WEIGHT: 315 LBS

## 2017-04-17 DIAGNOSIS — I10 ESSENTIAL HYPERTENSION: ICD-10-CM

## 2017-04-17 DIAGNOSIS — I15.2 HYPERTENSION ASSOCIATED WITH DIABETES: ICD-10-CM

## 2017-04-17 DIAGNOSIS — L97.909 VENOUS STASIS ULCER: Primary | ICD-10-CM

## 2017-04-17 DIAGNOSIS — I10 GOOD HYPERTENSION CONTROL: ICD-10-CM

## 2017-04-17 DIAGNOSIS — I83.009 VENOUS STASIS ULCER: Primary | ICD-10-CM

## 2017-04-17 DIAGNOSIS — E11.40 TYPE 2 DIABETES, CONTROLLED, WITH NEUROPATHY: ICD-10-CM

## 2017-04-17 DIAGNOSIS — E11.59 HYPERTENSION ASSOCIATED WITH DIABETES: ICD-10-CM

## 2017-04-17 PROCEDURE — 99214 OFFICE O/P EST MOD 30 MIN: CPT | Mod: PBBFAC,PO | Performed by: FAMILY MEDICINE

## 2017-04-17 PROCEDURE — 99999 PR PBB SHADOW E&M-EST. PATIENT-LVL IV: CPT | Mod: PBBFAC,,, | Performed by: FAMILY MEDICINE

## 2017-04-17 PROCEDURE — 99214 OFFICE O/P EST MOD 30 MIN: CPT | Mod: S$PBB,,, | Performed by: FAMILY MEDICINE

## 2017-04-17 RX ORDER — METOPROLOL SUCCINATE 100 MG/1
100 TABLET, EXTENDED RELEASE ORAL DAILY
Qty: 90 TABLET | Refills: 3 | Status: SHIPPED | OUTPATIENT
Start: 2017-04-17 | End: 2018-05-22 | Stop reason: SDUPTHER

## 2017-04-17 RX ORDER — MUPIROCIN 20 MG/G
OINTMENT TOPICAL 2 TIMES DAILY
Qty: 90 G | Refills: 11 | Status: SHIPPED | OUTPATIENT
Start: 2017-04-17 | End: 2017-08-31 | Stop reason: ALTCHOICE

## 2017-04-17 NOTE — MR AVS SNAPSHOT
[facility]  [address] [place] [address]  Phone: [phone]  Fax: [phone]                  Lukas Cahnce   2017 8:40 AM   Office Visit    Description:  Male : 1950   Provider:  Solitario Mendez MD   Department:  [facility] [facility]           Reason for Visit     Follow-up           Diagnoses this Visit        Comments    Venous stasis ulcer    -  Primary     Essential hypertension         Type 2 diabetes, controlled, with neuropathy         Good hypertension control         Hypertension associated with diabetes         BMI 50.0-59.9, adult                To Do List           Future Appointments        Provider Department Dept Phone    2017 8:30 AM LAB, GEORGINA SAT [facility] - Lab [phone]    2017 1:20 PM Kendrick Mckoy MD [facility] General Surgery [phone]      Goals (5 Years of Data)     None       These Medications        Disp Refills Start End    metoprolol succinate (TOPROL-XL) 100 MG 24 hr tablet 90 tablet 3 2017     Take 1 tablet (100 mg total) by mouth once daily. - Oral    Pharmacy: [address] [place] Ph #: [phone]       mupirocin (BACTROBAN) 2 % ointment 90 g 11 2017     Apply topically 2 (two) times daily. - Topical (Top)    Pharmacy: [address] [place] Ph #: [phone]         [facility]     [facility] Nurse Care Line -  Assistance  Unless otherwise directed by your provider, please contact [facility], our nurse care line that is available for  assistance.     Registered nurses in the [facility] provide: appointment scheduling, clinical advisement, health education, and other advisory services.  Call: [phone] (toll free)               Medications           CHANGE how you are taking these medications     Start Taking Instead of    metoprolol succinate (TOPROL-XL)  100 MG 24 hr tablet metoprolol succinate (TOPROL-XL) 100 MG 24 hr tablet    Dosage:  Take 1 tablet (100 mg total) by mouth once daily. Dosage:  TAKE ONE TABLET BY MOUTH ONCE DAILY    Reason for Change:  Reorder     mupirocin (BACTROBAN) 2 % ointment mupirocin (BACTROBAN) 2 % ointment    Dosage:  Apply topically 2 (two) times daily.     Reason for Change:  Patient no longer taking       STOP taking these medications     apixaban 5 mg Tab Take 1 tablet (5 mg total) by mouth 2 (two) times daily. Take two twice a day for one week then one twice a day    lorcaserin (BELVIQ) 10 mg Tab Take 10 mg by mouth 2 (two) times daily before meals.    silver sulfADIAZINE 1% (SILVADENE) 1 % cream Apply topically daily as needed.            Verify that the below list of medications is an accurate representation of the medications you are currently taking.  If none reported, the list may be blank. If incorrect, please contact your healthcare provider. Carry this list with you in case of emergency.           Current Medications     acetaminophen (TYLENOL EXTRA STRENGTH) 500 MG tablet Take 500 mg by mouth once a week.    buPROPion (WELLBUTRIN XL) 300 MG 24 hr tablet Take 1 tablet (300 mg total) by mouth once daily.    hydrochlorothiazide (HYDRODIURIL) 25 MG tablet Take 1 tablet (25 mg total) by mouth once daily.    lisinopril (PRINIVIL,ZESTRIL) 40 MG tablet TAKE 1 TABLET ONE TIME DAILY    metoprolol succinate (TOPROL-XL) 100 MG 24 hr tablet Take 1 tablet (100 mg total) by mouth once daily.    multivitamin (THERAGRAN) per tablet Take 1 tablet by mouth once daily.    spironolactone (ALDACTONE) 50 MG tablet Take 1 tablet (50 mg total) by mouth once daily.    mupirocin (BACTROBAN) 2 % ointment Apply topically 2 (two) times daily.           Clinical Reference Information           Your Vitals Were     BP Pulse Temp Resp    118/60 (BP Location: Left arm, Patient Position: Sitting, BP Method: Manual) 97 98.3 °F (36.8 °C) (Oral) 16    Height  "Weight SpO2 BMI    5' 11" (1.803 m) 180.1 kg (397 lb 0.8 oz) 99% 55.38 kg/m2      Blood Pressure          Most Recent Value    BP  118/60 [faint]      Allergies as of 4/17/2017     Bactroban [Mupirocin Calcium]      Immunizations Administered on Date of Encounter - 4/17/2017     None      Orders Placed During Today's Visit      Normal Orders This Visit    Ambulatory referral to General Surgery       Instructions      Weight Management: Getting Started  Healthy bodies come in all shapes and sizes. Not all bodies are made to be thin. For some people, a healthy weight is higher than the average weight listed on weight charts. Your healthcare provider can help you decide on a healthy weight for you.    Reasons to lose weight  Losing weight can help with some health problems, such as high blood pressure, heart disease, diabetes, sleep apnea, and arthritis. You may also feel more energy.  Set your long-term goal  Your goal doesn't even have to be a specific weight. You may decide on a fitness goal (such as being able to walk 10 miles a week), or a health goal (such as lowering your blood pressure). Choose a goal that is measurable and reasonable, so you know when you've reached it. A goal of reaching a BMI of less than 25 is not always reasonable (or possible).   Make an action plan  Habits dont change overnight. Setting your goals too high can leave you feeling discouraged if you cant reach them. Be realistic. Choose one or two small changes you can make now. Set an action plan for how you are going to make these changes. When you can stick to this plan, keep making a few more small changes. Taking small steps will help you stay on the path to success.  Track your progress  Write down your goals. Then, keep a daily record of your progress. Write down what you eat and how active you are. This record lets you look back on how much youve done. It may also help when youre feeling frustrated. Reward yourself for success. " Even if you dont reach every goal, give yourself credit for what you do get done.  Get support  Encouragement from others can help make losing weight easier. Ask your family members and friends for support. They may even want to join you. Also look to your healthcare provider, registered dietitian, and  for help. Your local hospital can give you more information about nutrition, exercise, and weight loss.  Date Last Reviewed: 1/31/2016 © 2000-2016 [employer]. [address] [place] [address]. All rights reserved. This information is not intended as a substitute for professional medical care. Always follow your healthcare professional's instructions.        Controlling High Blood Pressure  High blood pressure (hypertension) is often called the silent killer. This is because many people who have it dont know it. High blood pressure is defined as 140/90 mm Hg or higher. Know your blood pressure and remember to check it regularly. Doing so can save your life. Here are some things you can do to help control your blood pressure.    Choose heart-healthy foods  · Select low-salt, low-fat foods. Limit sodium intake to 2,400 mg per day or the amount suggested by your healthcare provider.  · Limit canned, dried, cured, packaged, and fast foods. These can contain a lot of salt.  · Eat 8 to 10 servings of fruits and vegetables every day.  · Choose lean meats, fish, or chicken.  · Eat whole-grain pasta, brown rice, and beans.  · Eat 2 to 3 servings of low-fat or fat-free dairy products.  · Ask your doctor about the DASH eating plan. This plan helps reduce blood pressure.  · When you go to a restaurant, ask that your meal be prepared with no added salt.  Maintain a healthy weight  · Ask your healthcare provider how many calories to eat a day. Then stick to that number.  · Ask your healthcare provider what weight range is healthiest for you. If you are overweight, a weight loss of only  3% to 5% of your body weight can help lower blood pressure. Generally, a good weight loss goal is to lose 10% of your body weight in a year.  · Limit snacks and sweets.  · Get regular exercise.  Get up and get active  · Choose activities you enjoy. Find ones you can do with friends or family. This includes bicycling, dancing, walking, and jogging.  · Park farther away from building entrances.  · Use stairs instead of the elevator.  · When you can, walk or bike instead of driving.  · [place] leaves, garden, or do household repairs.  · Be active at a moderate to vigorous level of physical activity for at least 40 minutes for a minimum of 3 to 4 days a week.   Manage stress  · Make time to relax and enjoy life. Find time to laugh.  · Communicate your concerns with your loved ones and your healthcare provider.  · Visit with family and friends, and keep up with hobbies.  Limit alcohol and quit smoking  · Men should have no more than 2 drinks per day.  · Women should have no more than 1 drink per day.  · Talk with your healthcare provider about quitting smoking. Smoking significantly increases your risk for heart disease and stroke. Ask your healthcare provider about community smoking cessation programs and other options.  Medicines  If lifestyle changes arent enough, your healthcare provider may prescribe high blood pressure medicine. Take all medicines as prescribed. If you have any questions about your medicines, ask your healthcare provider before stopping or changing them.   Date Last Reviewed: 4/27/2016 © 2000-2016 [employer]. [address] [place] [address], [place] [address]. All rights reserved. This information is not intended as a substitute for professional medical care. Always follow your healthcare professional's instructions.             Language Assistance Services     ATTENTION: Language assistance services are available, free of charge. Please call [phone].      ATENCIÓN: Hayley oneil  tiene a rivera disposición servicios gratuitos de asistencia lingüística. Llyosvany al [phone].     KEHINDE Ý: N?u b?n nói Ti?ng Vi?t, có các d?ch v? h? tr? ngôn ng? mi?n phí dành cho b?n. G?i s? [phone].         [facility] complies with applicable Federal civil rights laws and does not discriminate on the basis of race, color, national origin, age, disability, or sex.

## 2017-04-17 NOTE — PATIENT INSTRUCTIONS
Weight Management: Getting Started  Healthy bodies come in all shapes and sizes. Not all bodies are made to be thin. For some people, a healthy weight is higher than the average weight listed on weight charts. Your healthcare provider can help you decide on a healthy weight for you.    Reasons to lose weight  Losing weight can help with some health problems, such as high blood pressure, heart disease, diabetes, sleep apnea, and arthritis. You may also feel more energy.  Set your long-term goal  Your goal doesn't even have to be a specific weight. You may decide on a fitness goal (such as being able to walk 10 miles a week), or a health goal (such as lowering your blood pressure). Choose a goal that is measurable and reasonable, so you know when you've reached it. A goal of reaching a BMI of less than 25 is not always reasonable (or possible).   Make an action plan  Habits dont change overnight. Setting your goals too high can leave you feeling discouraged if you cant reach them. Be realistic. Choose one or two small changes you can make now. Set an action plan for how you are going to make these changes. When you can stick to this plan, keep making a few more small changes. Taking small steps will help you stay on the path to success.  Track your progress  Write down your goals. Then, keep a daily record of your progress. Write down what you eat and how active you are. This record lets you look back on how much youve done. It may also help when youre feeling frustrated. Reward yourself for success. Even if you dont reach every goal, give yourself credit for what you do get done.  Get support  Encouragement from others can help make losing weight easier. Ask your family members and friends for support. They may even want to join you. Also look to your healthcare provider, registered dietitian, and  for help. Your local hospital can give you more information about nutrition, exercise, and  weight loss.  Date Last Reviewed: 1/31/2016 © 2000-2016 [employer]. [address] [place] [address], [place] [address]. All rights reserved. This information is not intended as a substitute for professional medical care. Always follow your healthcare professional's instructions.        Controlling High Blood Pressure  High blood pressure (hypertension) is often called the silent killer. This is because many people who have it dont know it. High blood pressure is defined as 140/90 mm Hg or higher. Know your blood pressure and remember to check it regularly. Doing so can save your life. Here are some things you can do to help control your blood pressure.    Choose heart-healthy foods  · Select low-salt, low-fat foods. Limit sodium intake to 2,400 mg per day or the amount suggested by your healthcare provider.  · Limit canned, dried, cured, packaged, and fast foods. These can contain a lot of salt.  · Eat 8 to 10 servings of fruits and vegetables every day.  · Choose lean meats, fish, or chicken.  · Eat whole-grain pasta, brown rice, and beans.  · Eat 2 to 3 servings of low-fat or fat-free dairy products.  · Ask your doctor about the DASH eating plan. This plan helps reduce blood pressure.  · When you go to a restaurant, ask that your meal be prepared with no added salt.  Maintain a healthy weight  · Ask your healthcare provider how many calories to eat a day. Then stick to that number.  · Ask your healthcare provider what weight range is healthiest for you. If you are overweight, a weight loss of only 3% to 5% of your body weight can help lower blood pressure. Generally, a good weight loss goal is to lose 10% of your body weight in a year.  · Limit snacks and sweets.  · Get regular exercise.  Get up and get active  · Choose activities you enjoy. Find ones you can do with friends or family. This includes bicycling, dancing, walking, and jogging.  · Park farther away from building entrances.  · Use stairs  instead of the elevator.  · When you can, walk or bike instead of driving.  · [place] leaves, garden, or do household repairs.  · Be active at a moderate to vigorous level of physical activity for at least 40 minutes for a minimum of 3 to 4 days a week.   Manage stress  · Make time to relax and enjoy life. Find time to laugh.  · Communicate your concerns with your loved ones and your healthcare provider.  · Visit with family and friends, and keep up with hobbies.  Limit alcohol and quit smoking  · Men should have no more than 2 drinks per day.  · Women should have no more than 1 drink per day.  · Talk with your healthcare provider about quitting smoking. Smoking significantly increases your risk for heart disease and stroke. Ask your healthcare provider about community smoking cessation programs and other options.  Medicines  If lifestyle changes arent enough, your healthcare provider may prescribe high blood pressure medicine. Take all medicines as prescribed. If you have any questions about your medicines, ask your healthcare provider before stopping or changing them.   Date Last Reviewed: 4/27/2016 © 2000-2016 [employer]. [address] [place] [address], [place] [address]. All rights reserved. This information is not intended as a substitute for professional medical care. Always follow your healthcare professional's instructions.

## 2017-04-17 NOTE — PROGRESS NOTES
Subjective:       Patient ID: Lukas Chance is a 67 y.o. male.    Chief Complaint: Follow-up (lab prior )    HPI Comments: 67 year old male presents for follow up of stasis ulcers of the legs.  He is not using compression dressings and edema has worsened.  He stopped using silvadene because it started to burn him.      He had decreased renal function on last lab, aldactone was reduced with a recheck planned next week.    Past Medical History:  3/1/2013: Anticoagulant long-term use  No date: Antithrombin 3 deficiency  No date: Cellulitis      Comment: lower legs, venous stasis  No date: Coronary artery disease  5/15/2013: Diabetes mellitus type 2 in obese  No date: Hypertension  No date: Obesity  No date: LAKHWINDER (obstructive sleep apnea)  No date: Pulmonary embolism    Past Surgical History:  5/2/2011: COLONOSCOPY      Comment: Dr. Miller, 9 polyps, recheck 5 year  No date: MULTIPLE TOOTH EXTRACTIONS  7/2015: RADIOFREQUENCY ABLATION      Comment: bilateral   No date: TONSILLECTOMY      Current Outpatient Prescriptions:     acetaminophen (TYLENOL EXTRA STRENGTH) 500 MG tablet, Take 500 mg by mouth once a week., Disp: , Rfl:     buPROPion (WELLBUTRIN XL) 300 MG 24 hr tablet, Take 1 tablet (300 mg total) by mouth once daily., Disp: 90 tablet, Rfl: 1    hydrochlorothiazide (HYDRODIURIL) 25 MG tablet, Take 1 tablet (25 mg total) by mouth once daily., Disp: 90 tablet, Rfl: 1    lisinopril (PRINIVIL,ZESTRIL) 40 MG tablet, TAKE 1 TABLET ONE TIME DAILY, Disp: 90 tablet, Rfl: 3    metoprolol succinate (TOPROL-XL) 100 MG 24 hr tablet, Take 1 tablet (100 mg total) by mouth once daily., Disp: 90 tablet, Rfl: 3    multivitamin (THERAGRAN) per tablet, Take 1 tablet by mouth once daily., Disp: , Rfl:     spironolactone (ALDACTONE) 50 MG tablet, Take 1 tablet (50 mg total) by mouth once daily. (Patient taking differently: Take 50 mg by mouth every Mon, Wed, Fri. ), Disp: 90 tablet, Rfl: 1    mupirocin (BACTROBAN) 2 %  ointment, Apply topically 2 (two) times daily., Disp: 90 g, Rfl: 11    TETANUS VACCINE due on 01/24/1966-DECLINES  Zoster Vaccine due on 01/24/2008-DECLINES  Eye Exam due on 02/18/2016  Colonoscopy due on 05/02/2016      Review of Systems   Respiratory: Negative for chest tightness and shortness of breath.    Cardiovascular: Positive for leg swelling. Negative for chest pain.   Skin: Positive for color change and wound.       Objective:      Physical Exam   Constitutional: He appears well-developed. No distress.   Good blood pressure control  Patient is morbidly obese with bmi of 55.4.   Weight is increased by 6lb since last visit of 2/7/17.     Skin: He is not diaphoretic.        Nursing note and vitals reviewed.      Assessment:       1. Venous stasis ulcer    2. Essential hypertension    3. Type 2 diabetes, controlled, with neuropathy    4. Good hypertension control    5. Hypertension associated with diabetes    6. BMI 50.0-59.9, adult        Plan:       1. Essential hypertension  No changes needed  - metoprolol succinate (TOPROL-XL) 100 MG 24 hr tablet; Take 1 tablet (100 mg total) by mouth once daily.  Dispense: 90 tablet; Refill: 3    2. Venous stasis ulcer  Looks like will need debridement, consult Dr. Mckoy  - mupirocin (BACTROBAN) 2 % ointment; Apply topically 2 (two) times daily.  Dispense: 90 g; Refill: 11  - Ambulatory referral to General Surgery    3. Type 2 diabetes, controlled, with neuropathy  Lab Results   Component Value Date    HGBA1C 6.2 04/10/2017         4. Good hypertension control  No changes needed    5. Hypertension associated with diabetes    6. BMI 50.0-59.9, adult         Patient readiness: acceptance and barriers:none    During the course of the visit the patient was educated and counseled about the following:     Diabetes:  Discussed general issues about diabetes pathophysiology and management.  Discussed foot care.  Reminded to get yearly retinal exam.  Hypertension:   Medication: no  change.  Dietary sodium restriction.  Regular aerobic exercise.  Obesity:   General weight loss/lifestyle modification strategies discussed (elicit support from others; identify saboteurs; non-food rewards, etc).  Informal exercise measures discussed, e.g. taking stairs instead of elevator.  Regular aerobic exercise program discussed.    Goals: Diabetes: Maintain Hemoglobin A1C below 7, Hypertension: Reduce Blood Pressure and Obesity: Reduce calorie intake and BMI    Did patient meet goals/outcomes: No    The following self management tools provided: blood pressure log  blood glucose log  excercise log    Patient Instructions (the written plan) was given to the patient/family.     Time spent with patient: 30 minutes

## 2017-04-26 ENCOUNTER — LAB VISIT (OUTPATIENT)
Dept: LAB | Facility: HOSPITAL | Age: 67
End: 2017-04-26
Attending: FAMILY MEDICINE
Payer: MEDICARE

## 2017-04-26 DIAGNOSIS — N18.3 CKD (CHRONIC KIDNEY DISEASE), STAGE 3 (MODERATE): ICD-10-CM

## 2017-04-26 LAB
ANION GAP SERPL CALC-SCNC: 11 MMOL/L
BUN SERPL-MCNC: 32 MG/DL
CALCIUM SERPL-MCNC: 9.3 MG/DL
CHLORIDE SERPL-SCNC: 102 MMOL/L
CO2 SERPL-SCNC: 23 MMOL/L
CREAT SERPL-MCNC: 1.5 MG/DL
EST. GFR  (AFRICAN AMERICAN): 54.1 ML/MIN/1.73 M^2
EST. GFR  (NON AFRICAN AMERICAN): 46.8 ML/MIN/1.73 M^2
GLUCOSE SERPL-MCNC: 170 MG/DL
POTASSIUM SERPL-SCNC: 4.5 MMOL/L
SODIUM SERPL-SCNC: 136 MMOL/L

## 2017-04-26 PROCEDURE — 80048 BASIC METABOLIC PNL TOTAL CA: CPT

## 2017-04-26 PROCEDURE — 36415 COLL VENOUS BLD VENIPUNCTURE: CPT | Mod: PO

## 2017-04-27 ENCOUNTER — PATIENT MESSAGE (OUTPATIENT)
Dept: FAMILY MEDICINE | Facility: CLINIC | Age: 67
End: 2017-04-27

## 2017-04-28 ENCOUNTER — TELEPHONE (OUTPATIENT)
Dept: FAMILY MEDICINE | Facility: CLINIC | Age: 67
End: 2017-04-28

## 2017-04-28 DIAGNOSIS — N18.9 CKD (CHRONIC KIDNEY DISEASE), UNSPECIFIED STAGE: Primary | ICD-10-CM

## 2017-05-09 ENCOUNTER — TELEPHONE (OUTPATIENT)
Dept: FAMILY MEDICINE | Facility: CLINIC | Age: 67
End: 2017-05-09

## 2017-05-09 DIAGNOSIS — I83.009 VENOUS STASIS ULCER: Primary | ICD-10-CM

## 2017-05-09 DIAGNOSIS — L97.909 VENOUS STASIS ULCER: Primary | ICD-10-CM

## 2017-05-09 NOTE — TELEPHONE ENCOUNTER
----- Message from Suzanna Key LPN sent at 5/9/2017  4:44 PM CDT -----  Dr Mckoy reviewed his record and feels that he does not have anything to offer this patient.  He thinks he would be best served at a specialty wound care center, either [facility] or [facility].

## 2017-05-10 ENCOUNTER — TELEPHONE (OUTPATIENT)
Dept: FAMILY MEDICINE | Facility: CLINIC | Age: 67
End: 2017-05-10

## 2017-05-10 ENCOUNTER — PATIENT MESSAGE (OUTPATIENT)
Dept: SURGERY | Facility: CLINIC | Age: 67
End: 2017-05-10

## 2017-05-10 NOTE — TELEPHONE ENCOUNTER
Left msg on home number 5/10 to call office-left message on cell 5/9- appt with dr. Mckoy 5/12 cancelled-patient needs to be referred to [facility] wound care for stasis ulcers.

## 2017-05-11 NOTE — TELEPHONE ENCOUNTER
Spoke to patient-orders for wound care faxed to [facility]. They will call him to schedule.

## 2017-05-30 DIAGNOSIS — I10 ESSENTIAL HYPERTENSION: ICD-10-CM

## 2017-05-30 RX ORDER — HYDROCHLOROTHIAZIDE 25 MG/1
25 TABLET ORAL DAILY
Qty: 90 TABLET | Refills: 1 | Status: SHIPPED | OUTPATIENT
Start: 2017-05-30 | End: 2017-08-31 | Stop reason: SDUPTHER

## 2017-05-30 NOTE — TELEPHONE ENCOUNTER
----- Message from Stan Combs sent at 5/30/2017  3:43 PM CDT -----  Contact: self   Patient need a refill onhydrochlorothiazide  please send [facility], any questions please call back at [phone] (home)     [facility] [place] [address] [place] [address]  [address] [address]  Phone: [phone] Fax: [phone]

## 2017-06-09 ENCOUNTER — LAB VISIT (OUTPATIENT)
Dept: LAB | Facility: HOSPITAL | Age: 67
End: 2017-06-09
Attending: INTERNAL MEDICINE
Payer: MEDICARE

## 2017-06-09 ENCOUNTER — PATIENT MESSAGE (OUTPATIENT)
Dept: FAMILY MEDICINE | Facility: CLINIC | Age: 67
End: 2017-06-09

## 2017-06-09 DIAGNOSIS — N18.9 CKD (CHRONIC KIDNEY DISEASE), UNSPECIFIED STAGE: ICD-10-CM

## 2017-06-09 DIAGNOSIS — L97.921 NON-PRESSURE CHRONIC ULCER LEFT LOWER LEG, LIMITED TO BREAKDOWN SKIN: Primary | ICD-10-CM

## 2017-06-09 LAB
ANION GAP SERPL CALC-SCNC: 9 MMOL/L
BUN SERPL-MCNC: 32 MG/DL
CALCIUM SERPL-MCNC: 9.4 MG/DL
CHLORIDE SERPL-SCNC: 103 MMOL/L
CO2 SERPL-SCNC: 27 MMOL/L
CREAT SERPL-MCNC: 1.4 MG/DL
EST. GFR  (AFRICAN AMERICAN): 58.8 ML/MIN/1.73 M^2
EST. GFR  (NON AFRICAN AMERICAN): 50.9 ML/MIN/1.73 M^2
GLUCOSE SERPL-MCNC: 109 MG/DL
POTASSIUM SERPL-SCNC: 4.9 MMOL/L
SODIUM SERPL-SCNC: 139 MMOL/L

## 2017-06-09 PROCEDURE — 80048 BASIC METABOLIC PNL TOTAL CA: CPT

## 2017-06-09 PROCEDURE — 36415 COLL VENOUS BLD VENIPUNCTURE: CPT | Mod: PO

## 2017-08-21 ENCOUNTER — TELEPHONE (OUTPATIENT)
Dept: FAMILY MEDICINE | Facility: CLINIC | Age: 67
End: 2017-08-21

## 2017-08-21 NOTE — TELEPHONE ENCOUNTER
----- Message from Caitie Mirza sent at 8/21/2017 11:08 AM CDT -----  Contact: patient  Patient called to advise that he was in the hospital at [facility] by [facility] and requesting  to request records from the hospital please call back at [phone] to discuss. Thanks,

## 2017-08-25 ENCOUNTER — DOCUMENTATION ONLY (OUTPATIENT)
Dept: FAMILY MEDICINE | Facility: CLINIC | Age: 67
End: 2017-08-25

## 2017-08-25 NOTE — PROGRESS NOTES
Pre-Visit Chart Review  For Appointment Scheduled on 8-31-17    Health Maintenance Due   Topic Date Due    TETANUS VACCINE  01/24/1966    Zoster Vaccine  01/24/2008    Eye Exam  02/18/2016    Colonoscopy  05/02/2016    Influenza Vaccine  08/01/2017

## 2017-08-31 ENCOUNTER — OFFICE VISIT (OUTPATIENT)
Dept: FAMILY MEDICINE | Facility: CLINIC | Age: 67
End: 2017-08-31
Payer: MEDICARE

## 2017-08-31 ENCOUNTER — LAB VISIT (OUTPATIENT)
Dept: LAB | Facility: HOSPITAL | Age: 67
End: 2017-08-31
Attending: FAMILY MEDICINE
Payer: MEDICARE

## 2017-08-31 VITALS
OXYGEN SATURATION: 96 % | HEIGHT: 71 IN | DIASTOLIC BLOOD PRESSURE: 60 MMHG | RESPIRATION RATE: 20 BRPM | HEART RATE: 96 BPM | BODY MASS INDEX: 44.1 KG/M2 | WEIGHT: 315 LBS | SYSTOLIC BLOOD PRESSURE: 104 MMHG | TEMPERATURE: 98 F

## 2017-08-31 DIAGNOSIS — N17.9 ACUTE KIDNEY INJURY: ICD-10-CM

## 2017-08-31 DIAGNOSIS — D68.59 PROTEIN C DEFICIENCY: ICD-10-CM

## 2017-08-31 DIAGNOSIS — I10 GOOD HYPERTENSION CONTROL: ICD-10-CM

## 2017-08-31 DIAGNOSIS — L03.116 CELLULITIS AND ABSCESS OF LEFT LEG: ICD-10-CM

## 2017-08-31 DIAGNOSIS — D68.59 ANTITHROMBIN 3 DEFICIENCY: ICD-10-CM

## 2017-08-31 DIAGNOSIS — E11.8 TYPE 2 DIABETES MELLITUS WITH COMPLICATION, UNSPECIFIED LONG TERM INSULIN USE STATUS: ICD-10-CM

## 2017-08-31 DIAGNOSIS — Z79.01 ANTICOAGULANT LONG-TERM USE: ICD-10-CM

## 2017-08-31 DIAGNOSIS — L02.416 CELLULITIS AND ABSCESS OF LEFT LEG: ICD-10-CM

## 2017-08-31 DIAGNOSIS — I82.4Z2 ACUTE DEEP VEIN THROMBOSIS (DVT) OF DISTAL END OF LEFT LOWER EXTREMITY: Primary | ICD-10-CM

## 2017-08-31 DIAGNOSIS — I10 ESSENTIAL HYPERTENSION: ICD-10-CM

## 2017-08-31 LAB
ANION GAP SERPL CALC-SCNC: 9 MMOL/L
BUN SERPL-MCNC: 37 MG/DL
CALCIUM SERPL-MCNC: 9.1 MG/DL
CHLORIDE SERPL-SCNC: 104 MMOL/L
CO2 SERPL-SCNC: 23 MMOL/L
CREAT SERPL-MCNC: 1.5 MG/DL
EST. GFR  (AFRICAN AMERICAN): 54.1 ML/MIN/1.73 M^2
EST. GFR  (NON AFRICAN AMERICAN): 46.8 ML/MIN/1.73 M^2
ESTIMATED AVG GLUCOSE: 126 MG/DL
GLUCOSE SERPL-MCNC: 121 MG/DL
HBA1C MFR BLD HPLC: 6 %
POTASSIUM SERPL-SCNC: 4.8 MMOL/L
SODIUM SERPL-SCNC: 136 MMOL/L

## 2017-08-31 PROCEDURE — 4010F ACE/ARB THERAPY RXD/TAKEN: CPT | Mod: ,,, | Performed by: FAMILY MEDICINE

## 2017-08-31 PROCEDURE — 99999 PR PBB SHADOW E&M-EST. PATIENT-LVL III: CPT | Mod: PBBFAC,,, | Performed by: FAMILY MEDICINE

## 2017-08-31 PROCEDURE — 3078F DIAST BP <80 MM HG: CPT | Mod: ,,, | Performed by: FAMILY MEDICINE

## 2017-08-31 PROCEDURE — 3044F HG A1C LEVEL LT 7.0%: CPT | Mod: ,,, | Performed by: FAMILY MEDICINE

## 2017-08-31 PROCEDURE — 99213 OFFICE O/P EST LOW 20 MIN: CPT | Mod: PBBFAC,PO | Performed by: FAMILY MEDICINE

## 2017-08-31 PROCEDURE — 3074F SYST BP LT 130 MM HG: CPT | Mod: ,,, | Performed by: FAMILY MEDICINE

## 2017-08-31 PROCEDURE — 36415 COLL VENOUS BLD VENIPUNCTURE: CPT | Mod: PO

## 2017-08-31 PROCEDURE — 1159F MED LIST DOCD IN RCRD: CPT | Mod: ,,, | Performed by: FAMILY MEDICINE

## 2017-08-31 PROCEDURE — 99214 OFFICE O/P EST MOD 30 MIN: CPT | Mod: S$PBB,,, | Performed by: FAMILY MEDICINE

## 2017-08-31 PROCEDURE — 80048 BASIC METABOLIC PNL TOTAL CA: CPT

## 2017-08-31 PROCEDURE — 1125F AMNT PAIN NOTED PAIN PRSNT: CPT | Mod: ,,, | Performed by: FAMILY MEDICINE

## 2017-08-31 PROCEDURE — 83036 HEMOGLOBIN GLYCOSYLATED A1C: CPT

## 2017-08-31 RX ORDER — LISINOPRIL 40 MG/1
TABLET ORAL
Qty: 90 TABLET | Refills: 3 | Status: SHIPPED | OUTPATIENT
Start: 2017-08-31 | End: 2018-11-02 | Stop reason: SDUPTHER

## 2017-08-31 RX ORDER — TRAMADOL HYDROCHLORIDE 50 MG/1
50 TABLET ORAL
COMMUNITY
Start: 2017-06-19 | End: 2017-08-31 | Stop reason: ALTCHOICE

## 2017-08-31 RX ORDER — ACETAMINOPHEN AND CODEINE PHOSPHATE 300; 30 MG/1; MG/1
1 TABLET ORAL DAILY PRN
COMMUNITY
Start: 2017-06-19 | End: 2019-01-22

## 2017-08-31 RX ORDER — APIXABAN 5 MG/1
5 TABLET, FILM COATED ORAL 2 TIMES DAILY
Qty: 180 TABLET | Refills: 1 | Status: SHIPPED | OUTPATIENT
Start: 2017-08-31 | End: 2018-03-21 | Stop reason: SDUPTHER

## 2017-08-31 RX ORDER — APIXABAN 5 MG/1
TABLET, FILM COATED ORAL
Refills: 0 | COMMUNITY
Start: 2017-08-17 | End: 2017-08-31 | Stop reason: SDUPTHER

## 2017-08-31 RX ORDER — L. ACIDOPHILUS/L.BULGARICUS 1MM CELL
TABLET ORAL
Refills: 0 | COMMUNITY
Start: 2017-08-17 | End: 2017-08-31 | Stop reason: ALTCHOICE

## 2017-08-31 RX ORDER — HYDROCHLOROTHIAZIDE 25 MG/1
25 TABLET ORAL DAILY
Qty: 90 TABLET | Refills: 1 | Status: SHIPPED | OUTPATIENT
Start: 2017-08-31 | End: 2018-06-06 | Stop reason: SDUPTHER

## 2017-08-31 RX ORDER — FLUCONAZOLE 100 MG/1
TABLET ORAL
COMMUNITY
Start: 2017-06-05 | End: 2017-08-31 | Stop reason: ALTCHOICE

## 2017-08-31 NOTE — PROGRESS NOTES
Subjective:       Patient ID: Lukas Chance is a 67 y.o. male.    Chief Complaint: Hospital Follow Up ([facility] dvt)    67-year-old male with a history of antithrombin III deficiency, protein C deficiency, long-term use of anticoagulation and remote history of pulmonary embolism is under the care of wound care at [facility] for chronic stasis ulcer and intermittent cellulitis of the left lower extremity.  He has had venous ablations on 2 occasions at least but was found to have increased swelling on her recent visit August 14.  On suspicion of deep vein thrombosis and ultrasound was done which confirmed a nonocclusive popliteal thrombosis.  He was sent to the emergency room where diagnosis of cellulitis of the leg was also found as well as acute kidney injury and decreased renal function.  He was admitted to [facility] from August 14-17 and was seen by nephrology, wound care, and Dr. Betancur for infectious disease.  There was a report of a Pseudomonas infection but apparently there was some conflict whether this was a surface colonization or not.  He is back in wound care and was in just this morning with a fresh dressing applied now.  The swelling in the leg is improving although not back to baseline and he has no tenderness in the calf or popliteal fossa.  He was having some shortness of breath and apparently testing was done for pulmonary embolism although I have not been able to obtain any of the imaging records from the admission as of yet.  Shortness of breath has improved now.  He is coming due for an A1c and we need to do a recheck on his renal function so we'll get them both today.  He is due for a colonoscopy once to defer that for later date and he has an upcoming appointment with Dr. aHrris in vascular surgery to consider further treatment of the chronic stasis.  He will need to be on anticoagulation continuously for the rest of his life and prefers the apixaban over warfarin    Past Medical History:  3/1/2013:  Anticoagulant long-term use  No date: Antithrombin 3 deficiency  No date: Cellulitis      Comment: lower legs, venous stasis  No date: Coronary artery disease  5/15/2013: Diabetes mellitus type 2 in obese  No date: Hypertension  No date: Obesity  No date: LAKHWINDER (obstructive sleep apnea)  No date: Pulmonary embolism    Past Surgical History:  5/2/2011: COLONOSCOPY      Comment: Dr. Miller, 9 polyps, recheck 5 year  No date: MULTIPLE TOOTH EXTRACTIONS  7/2015: RADIOFREQUENCY ABLATION      Comment: bilateral   No date: TONSILLECTOMY        Review of Systems   Constitutional: Negative for chills and fever.   Respiratory: Negative for chest tightness and shortness of breath.    Cardiovascular: Positive for leg swelling. Negative for chest pain and palpitations.   Endocrine: Negative for polydipsia and polyuria.   Skin: Positive for wound.       Objective:      Physical Exam   Constitutional: He is oriented to person, place, and time. He appears well-developed. No distress.   Good blood pressure control  Morbidly obese with a BMI of 53.9.  He is down 10.4 pounds from his last visit with me April 17 of 2017   Cardiovascular: Normal rate, regular rhythm and normal heart sounds.  Exam reveals no gallop and no friction rub.    No murmur heard.  Pulmonary/Chest: Effort normal and breath sounds normal. No respiratory distress. He has no wheezes. He has no rales. He exhibits no tenderness.   Musculoskeletal: He exhibits edema. He exhibits no tenderness (No popliteal or calf tenderness on the left leg).   Neurological: He is alert and oriented to person, place, and time.   Skin: He is not diaphoretic.   Dressing was not removed   Nursing note and vitals reviewed.      Assessment:       1. Acute deep vein thrombosis (DVT) of distal end of left lower extremity    2. Cellulitis and abscess of left leg    3. Acute kidney injury    4. Type 2 diabetes mellitus with complication, unspecified long term insulin use status    5. Good  hypertension control    6. Essential hypertension    7. Antithrombin 3 deficiency    8. Anticoagulant long-term use    9. Protein C deficiency        Plan:       1. Acute deep vein thrombosis (DVT) of distal end of left lower extremity  Improving  - ELIQUIS 5 mg Tab; Take 1 tablet (5 mg total) by mouth 2 (two) times daily.  Dispense: 180 tablet; Refill: 1    2. Cellulitis and abscess of left leg  Improving under the care of wound care and Dr. Sanchez    3. Acute kidney injury  Improved at hospital discharge will check stability today  - Basic metabolic panel; Future    4. Type 2 diabetes mellitus with complication, unspecified long term insulin use status  Lab Results   Component Value Date    HGBA1C 6.2 04/10/2017       - Basic metabolic panel; Future  - Hemoglobin A1c; Future    5. Good hypertension control  No changes needed    6. Essential hypertension  - hydrochlorothiazide (HYDRODIURIL) 25 MG tablet; Take 1 tablet (25 mg total) by mouth once daily.  Dispense: 90 tablet; Refill: 1  - lisinopril (PRINIVIL,ZESTRIL) 40 MG tablet; TAKE 1 TABLET ONE TIME DAILY  Dispense: 90 tablet; Refill: 3    7. Antithrombin 3 deficiency  Require lifelong anticoagulation    8. Anticoagulant long-term use    9. Protein C deficiency         Patient readiness: acceptance and barriers:none    During the course of the visit the patient was educated and counseled about the following:     Diabetes:  Discussed general issues about diabetes pathophysiology and management.  Hypertension:   Medication: no change.  Dietary sodium restriction.  Regular aerobic exercise.  Obesity:   General weight loss/lifestyle modification strategies discussed (elicit support from others; identify saboteurs; non-food rewards, etc).  Informal exercise measures discussed, e.g. taking stairs instead of elevator.  Regular aerobic exercise program discussed.    Goals: Diabetes: Maintain Hemoglobin A1C below 7, Hypertension: Reduce Blood Pressure and Obesity:  Reduce calorie intake and BMI    Did patient meet goals/outcomes: Yes    The following self management tools provided: blood pressure log  blood glucose log  excercise log    Patient Instructions (the written plan) was given to the patient/family.     Time spent with patient: 30 minutes

## 2017-08-31 NOTE — PATIENT INSTRUCTIONS
Weight Management: Getting Started  Healthy bodies come in all shapes and sizes. Not all bodies are made to be thin. For some people, a healthy weight is higher than the average weight listed on weight charts. Your healthcare provider can help you decide on a healthy weight for you.    Reasons to lose weight  Losing weight can help with some health problems, such as high blood pressure, heart disease, diabetes, sleep apnea, and arthritis. You may also feel more energy.  Set your long-term goal  Your goal doesn't even have to be a specific weight. You may decide on a fitness goal (such as being able to walk 10 miles a week), or a health goal (such as lowering your blood pressure). Choose a goal that is measurable and reasonable, so you know when you've reached it. A goal of reaching a BMI of less than 25 is not always reasonable (or possible).   Make an action plan  Habits dont change overnight. Setting your goals too high can leave you feeling discouraged if you cant reach them. Be realistic. Choose one or two small changes you can make now. Set an action plan for how you are going to make these changes. When you can stick to this plan, keep making a few more small changes. Taking small steps will help you stay on the path to success.  Track your progress  Write down your goals. Then, keep a daily record of your progress. Write down what you eat and how active you are. This record lets you look back on how much youve done. It may also help when youre feeling frustrated. Reward yourself for success. Even if you dont reach every goal, give yourself credit for what you do get done.  Get support  Encouragement from others can help make losing weight easier. Ask your family members and friends for support. They may even want to join you. Also look to your healthcare provider, registered dietitian, and  for help. Your local hospital can give you more information about nutrition, exercise, and  weight loss.  Date Last Reviewed: 1/31/2016  © 5863-6802 [employer], [facility]. [address] [place] [address], [place] [address]. All rights reserved. This information is not intended as a substitute for professional medical care. Always follow your healthcare professional's instructions.

## 2017-09-01 ENCOUNTER — PATIENT MESSAGE (OUTPATIENT)
Dept: FAMILY MEDICINE | Facility: CLINIC | Age: 67
End: 2017-09-01

## 2018-01-05 DIAGNOSIS — E11.9 TYPE 2 DIABETES MELLITUS WITHOUT COMPLICATION: ICD-10-CM

## 2018-01-15 ENCOUNTER — TELEPHONE (OUTPATIENT)
Dept: FAMILY MEDICINE | Facility: CLINIC | Age: 68
End: 2018-01-15

## 2018-01-15 NOTE — TELEPHONE ENCOUNTER
----- Message from Siddhartha MICHAEL Claudiadean sent at 1/15/2018  9:53 AM CST -----  Contact: same  Patient called in and wanted to see if a Rx could be called in for him for his head & chest congestion along with his cough.  Patient stated mucus is clear and not yellow.        [facility] [address]  [address] [place] [address]  Phone: [phone] Fax: [phone]    Patient call back number is [phone]

## 2018-03-21 DIAGNOSIS — I82.4Z2 ACUTE DEEP VEIN THROMBOSIS (DVT) OF DISTAL END OF LEFT LOWER EXTREMITY: ICD-10-CM

## 2018-03-21 RX ORDER — APIXABAN 5 MG/1
TABLET, FILM COATED ORAL
Qty: 180 TABLET | Refills: 1 | Status: SHIPPED | OUTPATIENT
Start: 2018-03-21 | End: 2018-09-17

## 2018-03-22 DIAGNOSIS — E11.9 TYPE 2 DIABETES MELLITUS WITHOUT COMPLICATION: ICD-10-CM

## 2018-03-29 ENCOUNTER — TELEPHONE (OUTPATIENT)
Dept: FAMILY MEDICINE | Facility: CLINIC | Age: 68
End: 2018-03-29

## 2018-03-29 NOTE — TELEPHONE ENCOUNTER
----- Message from Kristen Lincoln sent at 3/29/2018  8:00 AM CDT -----  Rahel with [employer] support program at [phone], Ref# [id number], Calling for the patient to get a tier execptation Request [facility]  [phone], for the Rx  Eliquis. Please advise. corina.

## 2018-05-22 DIAGNOSIS — I10 ESSENTIAL HYPERTENSION: ICD-10-CM

## 2018-05-22 RX ORDER — METOPROLOL SUCCINATE 100 MG/1
TABLET, EXTENDED RELEASE ORAL
Qty: 90 TABLET | Refills: 0 | Status: SHIPPED | OUTPATIENT
Start: 2018-05-22 | End: 2018-09-04 | Stop reason: SDUPTHER

## 2018-06-04 ENCOUNTER — TELEPHONE (OUTPATIENT)
Dept: FAMILY MEDICINE | Facility: CLINIC | Age: 68
End: 2018-06-04

## 2018-06-04 NOTE — TELEPHONE ENCOUNTER
----- Message from Joy Brewster sent at 6/4/2018  9:29 AM CDT -----  Contact: Self  Patient scheduled a f/u appt with Dr Mendez on 6/26 and is requesting that you order his labs so he can have them done prior to see the doctor.  Call back patient so he knows they are in the system, #[phone].  Thank you!

## 2018-06-06 DIAGNOSIS — I10 ESSENTIAL HYPERTENSION: ICD-10-CM

## 2018-06-06 RX ORDER — HYDROCHLOROTHIAZIDE 25 MG/1
TABLET ORAL
Qty: 90 TABLET | Refills: 0 | Status: SHIPPED | OUTPATIENT
Start: 2018-06-06 | End: 2018-06-26 | Stop reason: ALTCHOICE

## 2018-06-26 ENCOUNTER — LAB VISIT (OUTPATIENT)
Dept: LAB | Facility: HOSPITAL | Age: 68
End: 2018-06-26
Attending: FAMILY MEDICINE
Payer: MEDICARE

## 2018-06-26 ENCOUNTER — OFFICE VISIT (OUTPATIENT)
Dept: FAMILY MEDICINE | Facility: CLINIC | Age: 68
End: 2018-06-26
Attending: FAMILY MEDICINE
Payer: MEDICARE

## 2018-06-26 VITALS
RESPIRATION RATE: 17 BRPM | WEIGHT: 315 LBS | HEIGHT: 71 IN | OXYGEN SATURATION: 96 % | DIASTOLIC BLOOD PRESSURE: 68 MMHG | BODY MASS INDEX: 44.1 KG/M2 | HEART RATE: 78 BPM | SYSTOLIC BLOOD PRESSURE: 128 MMHG | TEMPERATURE: 98 F

## 2018-06-26 DIAGNOSIS — E11.49 DIABETES MELLITUS TYPE 2 WITH NEUROLOGICAL MANIFESTATIONS: ICD-10-CM

## 2018-06-26 DIAGNOSIS — E11.69 COMBINED HYPERLIPIDEMIA ASSOCIATED WITH TYPE 2 DIABETES MELLITUS: ICD-10-CM

## 2018-06-26 DIAGNOSIS — E78.2 COMBINED HYPERLIPIDEMIA ASSOCIATED WITH TYPE 2 DIABETES MELLITUS: ICD-10-CM

## 2018-06-26 DIAGNOSIS — I25.10 CORONARY ARTERY DISEASE, OCCLUSIVE: ICD-10-CM

## 2018-06-26 DIAGNOSIS — Z12.5 PROSTATE CANCER SCREENING: ICD-10-CM

## 2018-06-26 DIAGNOSIS — E11.59 HYPERTENSION ASSOCIATED WITH DIABETES: ICD-10-CM

## 2018-06-26 DIAGNOSIS — I15.2 HYPERTENSION ASSOCIATED WITH DIABETES: ICD-10-CM

## 2018-06-26 DIAGNOSIS — Z79.01 ANTICOAGULANT LONG-TERM USE: ICD-10-CM

## 2018-06-26 DIAGNOSIS — I87.2 VENOUS STASIS ULCER OF ANKLE WITH FAT LAYER EXPOSED WITHOUT VARICOSE VEINS, UNSPECIFIED LATERALITY: Primary | ICD-10-CM

## 2018-06-26 DIAGNOSIS — L97.309 VENOUS ULCER OF ANKLE, UNSPECIFIED LATERALITY: ICD-10-CM

## 2018-06-26 DIAGNOSIS — E66.01 MORBID OBESITY WITH BMI OF 50.0-59.9, ADULT: ICD-10-CM

## 2018-06-26 DIAGNOSIS — I87.2 STASIS DERMATITIS OF BOTH LEGS: ICD-10-CM

## 2018-06-26 DIAGNOSIS — G47.30 SLEEP APNEA, UNSPECIFIED TYPE: ICD-10-CM

## 2018-06-26 DIAGNOSIS — Z86.711 HISTORY OF PULMONARY EMBOLISM: ICD-10-CM

## 2018-06-26 DIAGNOSIS — I83.003 VENOUS ULCER OF ANKLE, UNSPECIFIED LATERALITY: ICD-10-CM

## 2018-06-26 DIAGNOSIS — D68.59 ANTITHROMBIN 3 DEFICIENCY: ICD-10-CM

## 2018-06-26 DIAGNOSIS — L97.302 VENOUS STASIS ULCER OF ANKLE WITH FAT LAYER EXPOSED WITHOUT VARICOSE VEINS, UNSPECIFIED LATERALITY: Primary | ICD-10-CM

## 2018-06-26 LAB
ALBUMIN SERPL BCP-MCNC: 3.1 G/DL
ALP SERPL-CCNC: 61 U/L
ALT SERPL W/O P-5'-P-CCNC: 17 U/L
ANION GAP SERPL CALC-SCNC: 6 MMOL/L
AST SERPL-CCNC: 19 U/L
BASOPHILS # BLD AUTO: 0.08 K/UL
BASOPHILS NFR BLD: 0.9 %
BILIRUB SERPL-MCNC: 0.4 MG/DL
BUN SERPL-MCNC: 42 MG/DL
CALCIUM SERPL-MCNC: 9.1 MG/DL
CHLORIDE SERPL-SCNC: 103 MMOL/L
CHOLEST SERPL-MCNC: 162 MG/DL
CHOLEST/HDLC SERPL: 4.3 {RATIO}
CO2 SERPL-SCNC: 29 MMOL/L
COMPLEXED PSA SERPL-MCNC: 1.5 NG/ML
CREAT SERPL-MCNC: 1.6 MG/DL
DIFFERENTIAL METHOD: ABNORMAL
EOSINOPHIL # BLD AUTO: 0.3 K/UL
EOSINOPHIL NFR BLD: 3.2 %
ERYTHROCYTE [DISTWIDTH] IN BLOOD BY AUTOMATED COUNT: 13.1 %
EST. GFR  (AFRICAN AMERICAN): 49.7 ML/MIN/1.73 M^2
EST. GFR  (NON AFRICAN AMERICAN): 43 ML/MIN/1.73 M^2
ESTIMATED AVG GLUCOSE: 123 MG/DL
GLUCOSE SERPL-MCNC: 92 MG/DL
HBA1C MFR BLD HPLC: 5.9 %
HCT VFR BLD AUTO: 40.7 %
HDLC SERPL-MCNC: 38 MG/DL
HDLC SERPL: 23.5 %
HGB BLD-MCNC: 12.9 G/DL
IMM GRANULOCYTES # BLD AUTO: 0.03 K/UL
IMM GRANULOCYTES NFR BLD AUTO: 0.3 %
LDLC SERPL CALC-MCNC: 105.4 MG/DL
LYMPHOCYTES # BLD AUTO: 1.5 K/UL
LYMPHOCYTES NFR BLD: 16.8 %
MCH RBC QN AUTO: 29.9 PG
MCHC RBC AUTO-ENTMCNC: 31.7 G/DL
MCV RBC AUTO: 94 FL
MONOCYTES # BLD AUTO: 1 K/UL
MONOCYTES NFR BLD: 11.2 %
NEUTROPHILS # BLD AUTO: 6.1 K/UL
NEUTROPHILS NFR BLD: 67.6 %
NONHDLC SERPL-MCNC: 124 MG/DL
NRBC BLD-RTO: 0 /100 WBC
PLATELET # BLD AUTO: 252 K/UL
PMV BLD AUTO: 10.5 FL
POTASSIUM SERPL-SCNC: 4.5 MMOL/L
PROT SERPL-MCNC: 7.2 G/DL
RBC # BLD AUTO: 4.31 M/UL
SODIUM SERPL-SCNC: 138 MMOL/L
TRIGL SERPL-MCNC: 93 MG/DL
WBC # BLD AUTO: 9.04 K/UL

## 2018-06-26 PROCEDURE — 36415 COLL VENOUS BLD VENIPUNCTURE: CPT | Mod: PO

## 2018-06-26 PROCEDURE — 99213 OFFICE O/P EST LOW 20 MIN: CPT | Mod: PBBFAC,PO | Performed by: FAMILY MEDICINE

## 2018-06-26 PROCEDURE — 80061 LIPID PANEL: CPT

## 2018-06-26 PROCEDURE — 84153 ASSAY OF PSA TOTAL: CPT

## 2018-06-26 PROCEDURE — 80053 COMPREHEN METABOLIC PANEL: CPT

## 2018-06-26 PROCEDURE — 99214 OFFICE O/P EST MOD 30 MIN: CPT | Mod: S$PBB,,, | Performed by: FAMILY MEDICINE

## 2018-06-26 PROCEDURE — 99999 PR PBB SHADOW E&M-EST. PATIENT-LVL III: CPT | Mod: PBBFAC,,, | Performed by: FAMILY MEDICINE

## 2018-06-26 PROCEDURE — 83036 HEMOGLOBIN GLYCOSYLATED A1C: CPT

## 2018-06-26 PROCEDURE — 85025 COMPLETE CBC W/AUTO DIFF WBC: CPT

## 2018-06-26 RX ORDER — SILVER SULFADIAZINE 10 G/1000G
CREAM TOPICAL DAILY
Qty: 85 G | Refills: 5 | Status: SHIPPED | OUTPATIENT
Start: 2018-06-26 | End: 2019-01-22

## 2018-06-26 RX ORDER — FUROSEMIDE 20 MG/1
20 TABLET ORAL DAILY
Qty: 90 TABLET | Refills: 1 | Status: ON HOLD | OUTPATIENT
Start: 2018-06-26 | End: 2019-03-01 | Stop reason: HOSPADM

## 2018-06-26 RX ORDER — FUROSEMIDE 20 MG/1
20 TABLET ORAL DAILY
COMMUNITY
End: 2018-06-26 | Stop reason: SDUPTHER

## 2018-06-26 RX ORDER — SPIRONOLACTONE 50 MG/1
50 TABLET, FILM COATED ORAL DAILY
Qty: 90 TABLET | Refills: 1 | Status: SHIPPED | OUTPATIENT
Start: 2018-06-26 | End: 2018-07-02 | Stop reason: SDUPTHER

## 2018-06-26 NOTE — PROGRESS NOTES
Subjective:       Patient ID: Lukas Chance is a 68 y.o. male.    Chief Complaint: Follow-up    68-year-old male coming in for follow-up of multiple medical problems.  He has a history of antithrombin III deficiency and protein C insufficiency and is required to remain on long-term anticoagulation currently using Eliquis.  He has coronary artery disease, peripheral arterial disease, hypertension, type 2 diabetes, a history of pulmonary embolism and obstructive sleep apnea not currently using CPAP.  He has chronic stasis dermatitis with chronic ulcerations chiefly of the left leg but occasionally on the right.  He has been under the care of Dr. Sanchez for long-term currently seeing her about every 3 weeks.  His wounds are closing slowly but he continues to have problems with stasis dermatitis and edema.  Dr. Harris did several procedures trying to improve his venous insufficiency but without any long-term benefit.  He was discussing doing an angiogram and possibly stents but the patient appears to have adequate inflow and venous procedures have not been of any use.  Currently he is taking HCTZ 25 daily, Lasix 20 mg daily, high-dose lisinopril 40 mg daily and metoprolol.  He has been on Aldactone in the past which did seem to help his chronic edema and stasis problems but he is no longer using it.  He is unclear of why it was discontinued, I think he just ran out of it.  Dr. Sanchez has him on a potassium supplement.    Past Medical History:  3/1/2013: Anticoagulant long-term use  No date: Antithrombin 3 deficiency  No date: Cellulitis      Comment: lower legs, venous stasis  No date: Coronary artery disease  5/15/2013: Diabetes mellitus type 2 in obese  No date: Hypertension  No date: Obesity  No date: LAKHWINDER (obstructive sleep apnea)  No date: Pulmonary embolism    Past Surgical History:  5/2/2011: COLONOSCOPY      Comment: Dr. Miller, 9 polyps, recheck 5 year  No date: MULTIPLE TOOTH EXTRACTIONS  7/2015:  RADIOFREQUENCY ABLATION      Comment: bilateral   No date: TONSILLECTOMY    Review of patient's family history indicates:  Problem: Heart disease      Relation: Mother       Age of Onset: (Not Specified)   Problem: Heart disease      Relation: Father       Age of Onset: (Not Specified)   Problem: Gallbladder disease      Relation: Sister       Age of Onset: (Not Specified)   Problem: Heart disease      Relation: Brother       Age of Onset: (Not Specified)   Problem: Stroke      Relation: Brother       Age of Onset: (Not Specified)   Problem: Emphysema      Relation: Maternal Uncle       Age of Onset: (Not Specified)     Social History    Marital status:              Spouse name:                       Years of education:                 Number of children:               Social History Main Topics    Smoking status: Former Smoker                                                                Packs/day: 1.00      Years: 20.00          Quit date: 1/3/2002    Smokeless tobacco: Never Used                        Alcohol use: Yes           0.6 oz/week       Cans of beer: 1 per week    Drug use: No              Sexual activity: Yes               Partners with: Female      Current Outpatient Prescriptions on File Prior to Visit:  acetaminophen (TYLENOL EXTRA STRENGTH) 500 MG tablet, Take 500 mg by mouth 2 (two) times daily as needed. , Disp: , Rfl:   acetaminophen-codeine 300-30mg (TYLENOL #3) 300-30 mg Tab, Take 1 tablet by mouth daily as needed. , Disp: , Rfl:   ELIQUIS 5 mg Tab, TAKE ONE TABLET BY MOUTH TWICE DAILY, Disp: 180 tablet, Rfl: 1  lisinopril (PRINIVIL,ZESTRIL) 40 MG tablet, TAKE 1 TABLET ONE TIME DAILY, Disp: 90 tablet, Rfl: 3  metoprolol succinate (TOPROL-XL) 100 MG 24 hr tablet, TAKE ONE TABLET BY MOUTH ONCE DAILY, Disp: 90 tablet, Rfl: 0  multivitamin (THERAGRAN) per tablet, Take 1 tablet by mouth once daily., Disp: , Rfl:   (DISCONTINUED) hydroCHLOROthiazide (HYDRODIURIL) 25 MG tablet, TAKE ONE  TABLET BY MOUTH ONCE DAILY, Disp: 90 tablet, Rfl: 0  (DISCONTINUED) spironolactone (ALDACTONE) 50 MG tablet, Take 1 tablet (50 mg total) by mouth once daily. (Patient taking differently: Take 50 mg by mouth every Mon, Wed, Fri. ), Disp: 90 tablet, Rfl: 1    No current facility-administered medications on file prior to visit.     High Dose Statin due on 01/24/1969  Zoster Vaccine due on 01/24/2008  Colonoscopy due on 05/02/2016  Lipid Panel due on 01/04/2018  Foot Exam due on 01/13/2018  Hemoglobin A1c due on 02/28/2018  Eye Exam due on 07/05/2018        Review of Systems   Constitutional: Negative for activity change, chills, fatigue and fever.   HENT: Negative for congestion, ear pain, rhinorrhea and sore throat.    Eyes: Negative for visual disturbance.   Respiratory: Negative for cough, chest tightness and shortness of breath.    Cardiovascular: Positive for leg swelling. Negative for chest pain and palpitations.   Gastrointestinal: Negative for abdominal pain, blood in stool, constipation, diarrhea, nausea and vomiting.   Genitourinary: Negative for difficulty urinating, dysuria and hematuria.   Musculoskeletal: Negative for arthralgias and neck pain.   Skin: Positive for color change and wound. Negative for pallor and rash.   Neurological: Negative for dizziness and headaches.   Psychiatric/Behavioral: Negative for dysphoric mood and sleep disturbance. The patient is not nervous/anxious.        Objective:      Physical Exam   Constitutional: He is oriented to person, place, and time. He appears well-developed. No distress.   Good blood pressure control  Morbidly obese with a BMI of 50 4. 70s of 5.2 pounds from his last visit with me August 31, 2017   HENT:   Head: Normocephalic and atraumatic.   Right Ear: External ear normal.   Left Ear: External ear normal.   Nose: Nose normal.   Mouth/Throat: Oropharynx is clear and moist. No oropharyngeal exudate.   Eyes: Conjunctivae and EOM are normal. Pupils are equal,  round, and reactive to light. No scleral icterus.   Neck: Normal range of motion. Neck supple. No JVD present. No tracheal deviation present. No thyromegaly present.   Cardiovascular: Normal rate, regular rhythm and normal heart sounds.  Exam reveals no gallop and no friction rub.    No murmur heard.  Pulses:       Dorsalis pedis pulses are 2+ on the right side, and 2+ on the left side.        Posterior tibial pulses are 2+ on the right side, and 2+ on the left side.   Carotid pulses 2+ no bruit   Pulmonary/Chest: Effort normal and breath sounds normal. No respiratory distress. He has no wheezes. He has no rales. He exhibits no tenderness.   Abdominal: Soft. Bowel sounds are normal. He exhibits no distension and no mass. There is no tenderness. There is no rebound and no guarding.   Musculoskeletal: Normal range of motion. He exhibits edema (2+ edema both legs to above). He exhibits no tenderness (No cords or calf tenderness) or deformity.        Right foot: There is normal range of motion and no deformity.        Left foot: There is normal range of motion and no deformity.   Feet:   Right Foot:   Protective Sensation: 8 sites tested. 8 sites sensed.   Skin Integrity: Positive for callus. Negative for ulcer, blister, skin breakdown, erythema, warmth or dry skin.   Left Foot:   Protective Sensation: 8 sites tested. 8 sites sensed.   Skin Integrity: Positive for callus. Negative for ulcer, blister, skin breakdown, erythema, warmth or dry skin.   Lymphadenopathy:     He has no cervical adenopathy.   Neurological: He is alert and oriented to person, place, and time. He has normal reflexes. No cranial nerve deficit.   Proprioception intact to all 10 toes   Skin: Skin is warm and dry. Capillary refill takes less than 2 seconds. No rash noted. He is not diaphoretic. There is erythema.        Psychiatric: He has a normal mood and affect. His behavior is normal. Judgment and thought content normal.   Nursing note and vitals  reviewed.      Assessment:       1. Venous stasis ulcer of ankle with fat layer exposed without varicose veins, unspecified laterality    2. Diabetes mellitus type 2 with neurological manifestations    3. Combined hyperlipidemia associated with type 2 diabetes mellitus    4. Hypertension associated with diabetes    5. Coronary artery disease, occlusive    6. Antithrombin 3 deficiency    7. Anticoagulant long-term use    8. History of pulmonary embolism    9. Sleep apnea, unspecified type    10. Prostate cancer screening    11. Stasis dermatitis of both legs    12. Venous ulcer of ankle, unspecified laterality    13. Morbid obesity with BMI of 50.0-59.9, adult        Plan:       1. Venous stasis ulcer of ankle with fat layer exposed without varicose veins, unspecified laterality  Slowly improving under care of wound care team and Dr. Sancehz    2. Diabetes mellitus type 2 with neurological manifestations  - Comprehensive metabolic panel; Future  - Lipid panel; Future  - Hemoglobin A1c; Future    3. Combined hyperlipidemia associated with type 2 diabetes mellitus  Declines statins to date.  Will reassess with lab today  - Comprehensive metabolic panel; Future  - Lipid panel; Future    4. Hypertension associated with diabetes  See below  - Comprehensive metabolic panel; Future  - CBC auto differential; Future    5. Coronary artery disease, occlusive  Asymptomatic    6. Antithrombin 3 deficiency  On Eliquis    7. Anticoagulant long-term use    8. History of pulmonary embolism  Asymptomatic on eloquence long-term    9. Sleep apnea, unspecified type  Not using CPAP by choice    10. Prostate cancer screening  - PSA, Screening; Future    11. Stasis dermatitis of both legs  - spironolactone (ALDACTONE) 50 MG tablet; Take 1 tablet (50 mg total) by mouth once daily.  Dispense: 90 tablet; Refill: 1    12. Venous ulcer of ankle, unspecified laterality  - spironolactone (ALDACTONE) 50 MG tablet; Take 1 tablet (50 mg total) by  mouth once daily.  Dispense: 90 tablet; Refill: 1  - silver sulfADIAZINE 1% (SILVADENE) 1 % cream; Apply topically once daily.  Dispense: 85 g; Refill: 5    13. Morbid obesity with BMI of 50.0-59.9, adult         Patient readiness: acceptance and barriers:readiness    During the course of the visit the patient was educated and counseled about the following:     Diabetes:  Addressed ADA diet.  Encouraged aerobic exercise.  Discussed foot care.  Reminded to get yearly retinal exam.  Hypertension:   Medication: Discontinue hydrochlorothiazide and resume spironolactone 50 mg daily.  Discontinue potassium.  Recheck blood pressure, edema, and will get labs to recheck potassium level in 4 weeks.  Dietary sodium restriction.  Regular aerobic exercise.  Obesity:   General weight loss/lifestyle modification strategies discussed (elicit support from others; identify saboteurs; non-food rewards, etc).  Informal exercise measures discussed, e.g. taking stairs instead of elevator.  Regular aerobic exercise program discussed.    Goals: Diabetes: Maintain Hemoglobin A1C below 7, Hypertension: Reduce Blood Pressure and Obesity: Reduce calorie intake and BMI    Did patient meet goals/outcomes: Yes    The following self management tools provided: blood pressure log  blood glucose log  excercise log    Patient Instructions (the written plan) was given to the patient/family.     Time spent with patient: 45 minutes

## 2018-06-28 ENCOUNTER — TELEPHONE (OUTPATIENT)
Dept: FAMILY MEDICINE | Facility: CLINIC | Age: 68
End: 2018-06-28

## 2018-06-28 DIAGNOSIS — E11.40 TYPE 2 DIABETES, CONTROLLED, WITH NEUROPATHY: ICD-10-CM

## 2018-06-28 DIAGNOSIS — E78.5 HYPERLIPIDEMIA, UNSPECIFIED HYPERLIPIDEMIA TYPE: Primary | ICD-10-CM

## 2018-06-28 RX ORDER — ROSUVASTATIN CALCIUM 20 MG/1
20 TABLET, COATED ORAL DAILY
Qty: 90 TABLET | Refills: 3 | Status: SHIPPED | OUTPATIENT
Start: 2018-06-28 | End: 2019-01-22

## 2018-06-28 NOTE — TELEPHONE ENCOUNTER
Patient agrees to begin a statin medication. Pharmacy verified.  [facility].       ----- Message from Solitario Mendez MD sent at 6/26/2018  7:51 PM CDT -----  Chemistry panel demonstrates stable chronic kidney disease with good blood sugar control and a low albumin that may be contributing to his edema.  The PSA looks normal.  Blood count is mildly anemic but stable.    His cholesterol levels are fair but his LDL is well above goal of less than 70.  Recommend he start on a statin.    Results sent by email

## 2018-07-02 DIAGNOSIS — I87.2 STASIS DERMATITIS OF BOTH LEGS: ICD-10-CM

## 2018-07-02 DIAGNOSIS — I83.003 VENOUS ULCER OF ANKLE, UNSPECIFIED LATERALITY: ICD-10-CM

## 2018-07-02 DIAGNOSIS — L97.309 VENOUS ULCER OF ANKLE, UNSPECIFIED LATERALITY: ICD-10-CM

## 2018-07-02 RX ORDER — SPIRONOLACTONE 50 MG/1
50 TABLET, FILM COATED ORAL DAILY
Qty: 30 TABLET | Refills: 1 | Status: SHIPPED | OUTPATIENT
Start: 2018-07-02 | End: 2018-07-17 | Stop reason: SDUPTHER

## 2018-07-02 NOTE — TELEPHONE ENCOUNTER
----- Message from Elsi Baron sent at 6/30/2018 11:39 AM CDT -----  Contact: [phone]  Patient requesting a refill on spironolactone 50mg tab, completely out.      Patient will be using   ][facility] [address]  [address] [place] [address]  Phone: [phone] Fax: [phone]    Please call patient at [phone]. Thanks!

## 2018-07-12 DIAGNOSIS — E11.8 TYPE 2 DIABETES MELLITUS WITH COMPLICATION, WITHOUT LONG-TERM CURRENT USE OF INSULIN: Primary | ICD-10-CM

## 2018-07-17 ENCOUNTER — LAB VISIT (OUTPATIENT)
Dept: LAB | Facility: HOSPITAL | Age: 68
End: 2018-07-17
Attending: FAMILY MEDICINE
Payer: MEDICARE

## 2018-07-17 ENCOUNTER — OFFICE VISIT (OUTPATIENT)
Dept: FAMILY MEDICINE | Facility: CLINIC | Age: 68
End: 2018-07-17
Attending: FAMILY MEDICINE
Payer: MEDICARE

## 2018-07-17 VITALS
RESPIRATION RATE: 17 BRPM | BODY MASS INDEX: 44.1 KG/M2 | SYSTOLIC BLOOD PRESSURE: 132 MMHG | HEIGHT: 71 IN | HEART RATE: 76 BPM | DIASTOLIC BLOOD PRESSURE: 66 MMHG | TEMPERATURE: 98 F | WEIGHT: 315 LBS | OXYGEN SATURATION: 96 %

## 2018-07-17 DIAGNOSIS — E11.49 DIABETES MELLITUS TYPE 2 WITH NEUROLOGICAL MANIFESTATIONS: ICD-10-CM

## 2018-07-17 DIAGNOSIS — I87.332 STASIS DERMATITIS OF LEFT LOWER EXTREMITY WITH VENOUS ULCER DUE TO CHRONIC PERIPHERAL VENOUS HYPERTENSION: Primary | ICD-10-CM

## 2018-07-17 DIAGNOSIS — I87.2 STASIS DERMATITIS OF BOTH LEGS: ICD-10-CM

## 2018-07-17 DIAGNOSIS — I83.003 VENOUS ULCER OF ANKLE, UNSPECIFIED LATERALITY: ICD-10-CM

## 2018-07-17 DIAGNOSIS — I10 GOOD HYPERTENSION CONTROL: ICD-10-CM

## 2018-07-17 DIAGNOSIS — L97.309 VENOUS ULCER OF ANKLE, UNSPECIFIED LATERALITY: ICD-10-CM

## 2018-07-17 DIAGNOSIS — E66.01 MORBID OBESITY WITH BMI OF 50.0-59.9, ADULT: ICD-10-CM

## 2018-07-17 LAB
ANION GAP SERPL CALC-SCNC: 9 MMOL/L
BUN SERPL-MCNC: 31 MG/DL
CALCIUM SERPL-MCNC: 9.1 MG/DL
CHLORIDE SERPL-SCNC: 99 MMOL/L
CO2 SERPL-SCNC: 26 MMOL/L
CREAT SERPL-MCNC: 1.5 MG/DL
EST. GFR  (AFRICAN AMERICAN): 53.8 ML/MIN/1.73 M^2
EST. GFR  (NON AFRICAN AMERICAN): 46.5 ML/MIN/1.73 M^2
GLUCOSE SERPL-MCNC: 111 MG/DL
POTASSIUM SERPL-SCNC: 4.9 MMOL/L
SODIUM SERPL-SCNC: 134 MMOL/L

## 2018-07-17 PROCEDURE — 36415 COLL VENOUS BLD VENIPUNCTURE: CPT | Mod: PO

## 2018-07-17 PROCEDURE — 99999 PR PBB SHADOW E&M-EST. PATIENT-LVL IV: CPT | Mod: PBBFAC,,, | Performed by: FAMILY MEDICINE

## 2018-07-17 PROCEDURE — 80048 BASIC METABOLIC PNL TOTAL CA: CPT

## 2018-07-17 PROCEDURE — 99214 OFFICE O/P EST MOD 30 MIN: CPT | Mod: S$PBB,,, | Performed by: FAMILY MEDICINE

## 2018-07-17 PROCEDURE — 99214 OFFICE O/P EST MOD 30 MIN: CPT | Mod: PBBFAC,PO | Performed by: FAMILY MEDICINE

## 2018-07-17 RX ORDER — AMOXICILLIN 875 MG/1
875 TABLET, FILM COATED ORAL 2 TIMES DAILY
Qty: 60 TABLET | Refills: 0 | Status: SHIPPED | OUTPATIENT
Start: 2018-07-17 | End: 2019-01-31 | Stop reason: SDUPTHER

## 2018-07-17 RX ORDER — SPIRONOLACTONE 100 MG/1
100 TABLET, FILM COATED ORAL DAILY
Qty: 30 TABLET | Refills: 1 | Status: SHIPPED | OUTPATIENT
Start: 2018-07-17 | End: 2018-12-07 | Stop reason: SDUPTHER

## 2018-07-17 NOTE — PROGRESS NOTES
Subjective:       Patient ID: Lukas Chance is a 68 y.o. male.    Chief Complaint: Follow-up (3 week follow up)    68-year-old male chronic venous insufficiency, stasis dermatitis, and chronic left sided venous ulcers comes in for follow-up.  He missed his follow-up with wound care at [facility] yesterday when they canceled the appointment and have not rescheduled but otherwise he's been following with him still about every 3 weeks.  He reports he did have some improvement in the edema when starting on the spironolactone but its limited on the 50 mg.  He noted improvement when he took a course of amoxicillin and Cipro for unknown reasons.  The edema actually improved while on the antibiotics and then recurred afterwards.  I cannot explain that.  He has a history of coronary disease, diabetes, hypertension, hyperlipidemia, saddle pulmonary embolism status post treatment with antithrombotic's, sleep apnea, antithrombin III and protein C deficiencies and the stasis dermatitis.  He has had 2 venous vascular procedures done by Dr. Harris neither of which gave him any lasting relief.  They have been discussing doing an angiogram and possibly arterial stents but have not done so yet.  He is due for a colonoscopy and declines to do it until after his wife has hers.  He is due for a diabetic eye exam and is due for the zoster vaccine which is on short supply now.    Past Medical History:  3/1/2013: Anticoagulant long-term use  No date: Antithrombin 3 deficiency  No date: Cellulitis      Comment: lower legs, venous stasis  No date: Coronary artery disease  5/15/2013: Diabetes mellitus type 2 in obese  No date: Hypertension  No date: Obesity  No date: LAKHWINDER (obstructive sleep apnea)  No date: Pulmonary embolism    Past Surgical History:  5/2/2011: COLONOSCOPY      Comment: Dr. Miller, 9 polyps, recheck 5 year  No date: MULTIPLE TOOTH EXTRACTIONS  7/2015: RADIOFREQUENCY ABLATION      Comment: bilateral   No date:  TONSILLECTOMY    Current Outpatient Prescriptions on File Prior to Visit:  acetaminophen (TYLENOL EXTRA STRENGTH) 500 MG tablet, Take 500 mg by mouth 2 (two) times daily as needed. , Disp: , Rfl:   acetaminophen-codeine 300-30mg (TYLENOL #3) 300-30 mg Tab, Take 1 tablet by mouth daily as needed. , Disp: , Rfl:   ELIQUIS 5 mg Tab, TAKE ONE TABLET BY MOUTH TWICE DAILY, Disp: 180 tablet, Rfl: 1  furosemide (LASIX) 20 MG tablet, Take 1 tablet (20 mg total) by mouth once daily., Disp: 90 tablet, Rfl: 1  lisinopril (PRINIVIL,ZESTRIL) 40 MG tablet, TAKE 1 TABLET ONE TIME DAILY, Disp: 90 tablet, Rfl: 3  metoprolol succinate (TOPROL-XL) 100 MG 24 hr tablet, TAKE ONE TABLET BY MOUTH ONCE DAILY, Disp: 90 tablet, Rfl: 0  multivitamin (THERAGRAN) per tablet, Take 1 tablet by mouth once daily., Disp: , Rfl:   rosuvastatin (CRESTOR) 20 MG tablet, Take 1 tablet (20 mg total) by mouth once daily., Disp: 90 tablet, Rfl: 3  silver sulfADIAZINE 1% (SILVADENE) 1 % cream, Apply topically once daily., Disp: 85 g, Rfl: 5  (DISCONTINUED) spironolactone (ALDACTONE) 50 MG tablet, Take 1 tablet (50 mg total) by mouth once daily., Disp: 30 tablet, Rfl: 1    No current facility-administered medications on file prior to visit.           Review of Systems   Constitutional: Negative for chills and fever.   Respiratory: Negative for chest tightness and shortness of breath.    Cardiovascular: Negative for chest pain and palpitations.   Skin: Positive for color change, rash and wound.       Objective:      Physical Exam   Constitutional: He is oriented to person, place, and time. He appears well-developed. No distress.   Good blood pressure control  Morbidly obesity with a BMI of 55.1 weight is up 3.2 pounds from his June 26, 2018 visit   Neurological: He is alert and oriented to person, place, and time.   Skin: Skin is warm and dry. He is not diaphoretic. There is erythema.        Psychiatric: He has a normal mood and affect. His behavior is normal.  Judgment and thought content normal.   Nursing note and vitals reviewed.      Assessment:       1. Stasis dermatitis of left lower extremity with venous ulcer due to chronic peripheral venous hypertension    2. Stasis dermatitis of both legs    3. Venous ulcer of ankle, unspecified laterality    4. Diabetes mellitus type 2 with neurological manifestations    5. Good hypertension control    6. Morbid obesity with BMI of 50.0-59.9, adult        Plan:       1. Stasis dermatitis of left lower extremity with venous ulcer due to chronic peripheral venous hypertension  Check BMP and if potassium and kidney function are satisfactory we will increase spironolactone to 100 mg daily.  Will give extended course of amoxicillin with the malodorous changes  - amoxicillin (AMOXIL) 875 MG tablet; Take 1 tablet (875 mg total) by mouth 2 (two) times daily.  Dispense: 60 tablet; Refill: 0    2. Stasis dermatitis of both legs  - spironolactone (ALDACTONE) 100 MG tablet; Take 1 tablet (100 mg total) by mouth once daily.  Dispense: 30 tablet; Refill: 1    3. Venous ulcer of ankle, unspecified laterality  Followed by Dr. Harris, failed 2 attempts at venous radiofrequency ablation  - Basic metabolic panel; Future  - spironolactone (ALDACTONE) 100 MG tablet; Take 1 tablet (100 mg total) by mouth once daily.  Dispense: 30 tablet; Refill: 1    4. Diabetes mellitus type 2 with neurological manifestations  Lab Results   Component Value Date    HGBA1C 5.9 (H) 06/26/2018     Well-controlled, repeat diabetic check in December    5. Good hypertension control  Hypertension well controlled, no changes needed    6. Morbid obesity with BMI of 50.0-59.9, adult

## 2018-07-30 ENCOUNTER — TELEPHONE (OUTPATIENT)
Dept: FAMILY MEDICINE | Facility: CLINIC | Age: 68
End: 2018-07-30

## 2018-09-04 DIAGNOSIS — I10 ESSENTIAL HYPERTENSION: ICD-10-CM

## 2018-09-04 RX ORDER — METOPROLOL SUCCINATE 100 MG/1
TABLET, EXTENDED RELEASE ORAL
Qty: 90 TABLET | Refills: 2 | Status: SHIPPED | OUTPATIENT
Start: 2018-09-04 | End: 2019-01-22 | Stop reason: SINTOL

## 2018-09-16 DIAGNOSIS — I82.4Z2 ACUTE DEEP VEIN THROMBOSIS (DVT) OF DISTAL END OF LEFT LOWER EXTREMITY: ICD-10-CM

## 2018-09-17 RX ORDER — APIXABAN 5 MG/1
TABLET, FILM COATED ORAL
Qty: 180 TABLET | Refills: 2 | Status: SHIPPED | OUTPATIENT
Start: 2018-09-17 | End: 2019-01-22 | Stop reason: SDUPTHER

## 2018-11-02 DIAGNOSIS — I10 ESSENTIAL HYPERTENSION: ICD-10-CM

## 2018-11-02 RX ORDER — LISINOPRIL 40 MG/1
TABLET ORAL
Qty: 90 TABLET | Refills: 1 | Status: SHIPPED | OUTPATIENT
Start: 2018-11-02 | End: 2019-01-22 | Stop reason: ALTCHOICE

## 2018-12-07 DIAGNOSIS — I87.2 STASIS DERMATITIS OF BOTH LEGS: ICD-10-CM

## 2018-12-07 DIAGNOSIS — L97.309 VENOUS ULCER OF ANKLE, UNSPECIFIED LATERALITY: ICD-10-CM

## 2018-12-07 DIAGNOSIS — I83.003 VENOUS ULCER OF ANKLE, UNSPECIFIED LATERALITY: ICD-10-CM

## 2018-12-07 RX ORDER — SPIRONOLACTONE 100 MG/1
100 TABLET, FILM COATED ORAL DAILY
Qty: 90 TABLET | Refills: 0 | Status: SHIPPED | OUTPATIENT
Start: 2018-12-07 | End: 2019-01-22 | Stop reason: SDUPTHER

## 2019-01-03 DIAGNOSIS — E11.9 TYPE 2 DIABETES MELLITUS WITHOUT COMPLICATION: ICD-10-CM

## 2019-01-17 ENCOUNTER — DOCUMENTATION ONLY (OUTPATIENT)
Dept: FAMILY MEDICINE | Facility: CLINIC | Age: 69
End: 2019-01-17

## 2019-01-17 NOTE — PROGRESS NOTES
Pre-Visit Chart Review  For Appointment Scheduled on 1-22-19    Health Maintenance Due   Topic Date Due    Zoster Vaccine  01/24/2008    Colonoscopy  05/02/2016    Eye Exam  07/05/2018    Influenza Vaccine  08/01/2018    Hemoglobin A1c  12/26/2018

## 2019-01-21 ENCOUNTER — LAB VISIT (OUTPATIENT)
Dept: LAB | Facility: HOSPITAL | Age: 69
End: 2019-01-21
Attending: FAMILY MEDICINE
Payer: MEDICARE

## 2019-01-21 DIAGNOSIS — E11.9 TYPE 2 DIABETES MELLITUS WITHOUT COMPLICATION: ICD-10-CM

## 2019-01-21 LAB
ESTIMATED AVG GLUCOSE: 120 MG/DL
HBA1C MFR BLD HPLC: 5.8 %

## 2019-01-21 PROCEDURE — 36415 COLL VENOUS BLD VENIPUNCTURE: CPT | Mod: PO

## 2019-01-21 PROCEDURE — 83036 HEMOGLOBIN GLYCOSYLATED A1C: CPT

## 2019-01-22 ENCOUNTER — OFFICE VISIT (OUTPATIENT)
Dept: FAMILY MEDICINE | Facility: CLINIC | Age: 69
End: 2019-01-22
Attending: FAMILY MEDICINE
Payer: MEDICARE

## 2019-01-22 ENCOUNTER — LAB VISIT (OUTPATIENT)
Dept: LAB | Facility: HOSPITAL | Age: 69
End: 2019-01-22
Attending: FAMILY MEDICINE
Payer: MEDICARE

## 2019-01-22 VITALS
SYSTOLIC BLOOD PRESSURE: 100 MMHG | TEMPERATURE: 98 F | BODY MASS INDEX: 44.1 KG/M2 | WEIGHT: 315 LBS | HEART RATE: 81 BPM | DIASTOLIC BLOOD PRESSURE: 66 MMHG | OXYGEN SATURATION: 99 % | RESPIRATION RATE: 20 BRPM | HEIGHT: 71 IN

## 2019-01-22 DIAGNOSIS — D68.9 COAGULOPATHY: ICD-10-CM

## 2019-01-22 DIAGNOSIS — E78.5 HYPERLIPIDEMIA, UNSPECIFIED HYPERLIPIDEMIA TYPE: ICD-10-CM

## 2019-01-22 DIAGNOSIS — L97.909 VENOUS STASIS ULCER, UNSPECIFIED SITE, UNSPECIFIED ULCER STAGE, UNSPECIFIED WHETHER VARICOSE VEINS PRESENT: Primary | ICD-10-CM

## 2019-01-22 DIAGNOSIS — E11.59 HYPERTENSION ASSOCIATED WITH DIABETES: ICD-10-CM

## 2019-01-22 DIAGNOSIS — I15.2 HYPERTENSION ASSOCIATED WITH DIABETES: ICD-10-CM

## 2019-01-22 DIAGNOSIS — D68.59 PROTEIN C DEFICIENCY: ICD-10-CM

## 2019-01-22 DIAGNOSIS — I10 HYPERTENSION, ESSENTIAL: ICD-10-CM

## 2019-01-22 DIAGNOSIS — E11.40 TYPE 2 DIABETES, CONTROLLED, WITH NEUROPATHY: ICD-10-CM

## 2019-01-22 DIAGNOSIS — I83.009 VENOUS STASIS ULCER, UNSPECIFIED SITE, UNSPECIFIED ULCER STAGE, UNSPECIFIED WHETHER VARICOSE VEINS PRESENT: Primary | ICD-10-CM

## 2019-01-22 DIAGNOSIS — E66.01 MORBID OBESITY WITH BMI OF 50.0-59.9, ADULT: ICD-10-CM

## 2019-01-22 DIAGNOSIS — D68.59 ANTITHROMBIN 3 DEFICIENCY: ICD-10-CM

## 2019-01-22 DIAGNOSIS — E78.2 COMBINED HYPERLIPIDEMIA ASSOCIATED WITH TYPE 2 DIABETES MELLITUS: ICD-10-CM

## 2019-01-22 DIAGNOSIS — E11.69 COMBINED HYPERLIPIDEMIA ASSOCIATED WITH TYPE 2 DIABETES MELLITUS: ICD-10-CM

## 2019-01-22 DIAGNOSIS — I87.2 STASIS DERMATITIS OF BOTH LEGS: ICD-10-CM

## 2019-01-22 LAB
ANION GAP SERPL CALC-SCNC: 8 MMOL/L
BUN SERPL-MCNC: 33 MG/DL
CALCIUM SERPL-MCNC: 9.5 MG/DL
CHLORIDE SERPL-SCNC: 102 MMOL/L
CO2 SERPL-SCNC: 23 MMOL/L
CREAT SERPL-MCNC: 1.4 MG/DL
EST. GFR  (AFRICAN AMERICAN): 58.4 ML/MIN/1.73 M^2
EST. GFR  (NON AFRICAN AMERICAN): 50.5 ML/MIN/1.73 M^2
GLUCOSE SERPL-MCNC: 81 MG/DL
POTASSIUM SERPL-SCNC: 5.2 MMOL/L
SODIUM SERPL-SCNC: 133 MMOL/L

## 2019-01-22 PROCEDURE — 99215 OFFICE O/P EST HI 40 MIN: CPT | Mod: PBBFAC,PO | Performed by: FAMILY MEDICINE

## 2019-01-22 PROCEDURE — 99214 PR OFFICE/OUTPT VISIT, EST, LEVL IV, 30-39 MIN: ICD-10-PCS | Mod: S$PBB,,, | Performed by: FAMILY MEDICINE

## 2019-01-22 PROCEDURE — 99214 OFFICE O/P EST MOD 30 MIN: CPT | Mod: S$PBB,,, | Performed by: FAMILY MEDICINE

## 2019-01-22 PROCEDURE — 36415 COLL VENOUS BLD VENIPUNCTURE: CPT | Mod: PO

## 2019-01-22 PROCEDURE — 99999 PR PBB SHADOW E&M-EST. PATIENT-LVL V: ICD-10-PCS | Mod: PBBFAC,,, | Performed by: FAMILY MEDICINE

## 2019-01-22 PROCEDURE — 80048 BASIC METABOLIC PNL TOTAL CA: CPT

## 2019-01-22 PROCEDURE — 99999 PR PBB SHADOW E&M-EST. PATIENT-LVL V: CPT | Mod: PBBFAC,,, | Performed by: FAMILY MEDICINE

## 2019-01-22 RX ORDER — APIXABAN 5 MG/1
5 TABLET, FILM COATED ORAL 2 TIMES DAILY
Qty: 180 TABLET | Refills: 3 | Status: SHIPPED | OUTPATIENT
Start: 2019-01-22 | End: 2019-12-03 | Stop reason: SDUPTHER

## 2019-01-22 RX ORDER — AMOXICILLIN 875 MG/1
875 TABLET, FILM COATED ORAL 2 TIMES DAILY
COMMUNITY
Start: 2018-07-17 | End: 2019-01-22 | Stop reason: SDUPTHER

## 2019-01-22 RX ORDER — AMOXICILLIN 875 MG/1
875 TABLET, FILM COATED ORAL 2 TIMES DAILY
Qty: 60 TABLET | Refills: 1 | Status: SHIPPED | OUTPATIENT
Start: 2019-01-22 | End: 2019-02-25 | Stop reason: CLARIF

## 2019-01-22 RX ORDER — LISINOPRIL AND HYDROCHLOROTHIAZIDE 20; 25 MG/1; MG/1
1 TABLET ORAL DAILY
Qty: 90 TABLET | Refills: 1 | Status: ON HOLD | OUTPATIENT
Start: 2019-01-22 | End: 2019-03-01 | Stop reason: HOSPADM

## 2019-01-22 RX ORDER — SPIRONOLACTONE 100 MG/1
100 TABLET, FILM COATED ORAL DAILY
Qty: 90 TABLET | Refills: 1 | Status: ON HOLD | OUTPATIENT
Start: 2019-01-22 | End: 2019-03-01 | Stop reason: HOSPADM

## 2019-01-22 RX ORDER — ROSUVASTATIN CALCIUM 20 MG/1
20 TABLET, COATED ORAL DAILY
Qty: 90 TABLET | Refills: 3 | Status: ON HOLD
Start: 2019-01-22 | End: 2019-03-01 | Stop reason: HOSPADM

## 2019-01-22 NOTE — PATIENT INSTRUCTIONS
Weight Management: Getting Started  Healthy bodies come in all shapes and sizes. Not all bodies are made to be thin. For some people, a healthy weight is higher than the average weight listed on weight charts. Your healthcare provider can help you decide on a healthy weight for you.    Reasons to lose weight  Losing weight can help with some health problems, such as high blood pressure, heart disease, diabetes, sleep apnea, and arthritis. You may also feel more energy.  Set your long-term goal  Your goal doesn't even have to be a specific weight. You may decide on a fitness goal (such as being able to walk 10 miles a week), or a health goal (such as lowering your blood pressure). Choose a goal that is measurable and reasonable, so you know when you've reached it. A goal of reaching a BMI of less than 25 is not always reasonable (or possible).   Make an action plan  Habits dont change overnight. Setting your goals too high can leave you feeling discouraged if you cant reach them. Be realistic. Choose one or two small changes you can make now. Set an action plan for how you are going to make these changes. When you can stick to this plan, keep making a few more small changes. Taking small steps will help you stay on the path to success.  Track your progress  Write down your goals. Then, keep a daily record of your progress. Write down what you eat and how active you are. This record lets you look back on how much youve done. It may also help when youre feeling frustrated. Reward yourself for success. Even if you dont reach every goal, give yourself credit for what you do get done.  Get support  Encouragement from others can help make losing weight easier. Ask your family members and friends for support. They may even want to join you. Also look to your healthcare provider, registered dietitian, and  for help. Your local hospital can give you more information about nutrition, exercise, and  weight loss.  Date Last Reviewed: 1/31/2016 © 2000-2017 [employer]. [address] [place] [address], [place] [address]. All rights reserved. This information is not intended as a substitute for professional medical care. Always follow your healthcare professional's instructions.        Walking for Fitness  Fitness walking has something for everyone, even people who are already fit. Walking is one of the safest ways to condition your body aerobically. It can boost energy, help you lose weight, and reduce stress.    Physical benefits  · Walking strengthens your heart and lungs, and tones your muscles.  · When walking, your feet land with less impact than in other sports. This reduces chances of muscle, bone, and joint injury.  · Regular walking improves your cholesterol levels and lowers your risk of heart disease. And it helps you control your blood sugar if you have diabetes.  · Walking is a weight-bearing activity, which helps maintain bone density. This can help prevent osteoporosis.  Personal rewards  · Taking walks can help you relax and manage stress. And fitness walking may make you feel better about yourself.  · Walking can help you sleep better at night and make you less likely to be depressed.  · Regular walking may help maintain your memory as you get older.  · Walking is a great way to spend extra time with friends and family members. Be sure to invite your dog along!  Q&A about fitness walking  Q: Will walking keep me fit?  A: Yes. Regular walking at the right pace gives you all the benefits of other aerobic activities, such as jogging and swimming.  Q: Will walking help me lose weight and keep it off?  A: Yes. Per mile, walking can burn as many calories as jogging. Your health care provider can help work walking into your weight-loss plan.  Q: Is walking safe for my health?  A: Yes. Walking is safe if you have high blood pressure, diabetes, heart disease, or other conditions. Talk to your  healthcare provider before you start.  Date Last Reviewed: 4/1/2017  © 3377-1959 [employer]. [address] [place] [address], [place] [address]. All rights reserved. This information is not intended as a substitute for professional medical care. Always follow your healthcare professional's instructions.

## 2019-01-31 DIAGNOSIS — I87.332 STASIS DERMATITIS OF LEFT LOWER EXTREMITY WITH VENOUS ULCER DUE TO CHRONIC PERIPHERAL VENOUS HYPERTENSION: ICD-10-CM

## 2019-01-31 RX ORDER — AMOXICILLIN 875 MG/1
TABLET, FILM COATED ORAL
Qty: 60 TABLET | Refills: 0 | Status: SHIPPED | OUTPATIENT
Start: 2019-01-31 | End: 2019-02-25 | Stop reason: CLARIF

## 2019-02-25 ENCOUNTER — HOSPITAL ENCOUNTER (INPATIENT)
Facility: HOSPITAL | Age: 69
LOS: 4 days | Discharge: HOME | DRG: 871 | End: 2019-03-01
Attending: EMERGENCY MEDICINE | Admitting: INTERNAL MEDICINE
Payer: MEDICARE

## 2019-02-25 DIAGNOSIS — I15.2 HYPERTENSION ASSOCIATED WITH DIABETES: ICD-10-CM

## 2019-02-25 DIAGNOSIS — D68.9 COAGULOPATHY: ICD-10-CM

## 2019-02-25 DIAGNOSIS — A41.9 SEPSIS: ICD-10-CM

## 2019-02-25 DIAGNOSIS — E11.59 HYPERTENSION ASSOCIATED WITH DIABETES: ICD-10-CM

## 2019-02-25 DIAGNOSIS — E11.69 COMBINED HYPERLIPIDEMIA ASSOCIATED WITH TYPE 2 DIABETES MELLITUS: ICD-10-CM

## 2019-02-25 DIAGNOSIS — I25.10 CORONARY ARTERY DISEASE, ANGINA PRESENCE UNSPECIFIED, UNSPECIFIED VESSEL OR LESION TYPE, UNSPECIFIED WHETHER NATIVE OR TRANSPLANTED HEART: ICD-10-CM

## 2019-02-25 DIAGNOSIS — R79.89 ELEVATED TROPONIN: ICD-10-CM

## 2019-02-25 DIAGNOSIS — I87.2 VENOUS (PERIPHERAL) INSUFFICIENCY: Primary | ICD-10-CM

## 2019-02-25 DIAGNOSIS — E78.2 COMBINED HYPERLIPIDEMIA ASSOCIATED WITH TYPE 2 DIABETES MELLITUS: ICD-10-CM

## 2019-02-25 DIAGNOSIS — N17.9 ACUTE RENAL FAILURE, UNSPECIFIED ACUTE RENAL FAILURE TYPE: ICD-10-CM

## 2019-02-25 DIAGNOSIS — G47.30 SLEEP APNEA, UNSPECIFIED TYPE: ICD-10-CM

## 2019-02-25 PROBLEM — R74.8 ABNORMAL LIVER ENZYMES: Status: ACTIVE | Noted: 2019-02-25

## 2019-02-25 PROBLEM — L03.116 LEFT LEG CELLULITIS: Status: ACTIVE | Noted: 2019-02-25

## 2019-02-25 PROBLEM — R65.20 SEVERE SEPSIS: Status: ACTIVE | Noted: 2019-02-25

## 2019-02-25 LAB
ALBUMIN SERPL BCP-MCNC: 2.9 G/DL
ALP SERPL-CCNC: 58 U/L
ALT SERPL W/O P-5'-P-CCNC: 130 U/L
ANION GAP SERPL CALC-SCNC: 11 MMOL/L
AST SERPL-CCNC: 610 U/L
BASOPHILS # BLD AUTO: 0 K/UL
BASOPHILS NFR BLD: 0.1 %
BILIRUB SERPL-MCNC: 0.5 MG/DL
BUN SERPL-MCNC: 87 MG/DL
CALCIUM SERPL-MCNC: 9.2 MG/DL
CHLORIDE SERPL-SCNC: 99 MMOL/L
CK MB SERPL-MCNC: 38.1 NG/ML
CK MB SERPL-RTO: 0.1 %
CK SERPL-CCNC: ABNORMAL U/L
CO2 SERPL-SCNC: 17 MMOL/L
CREAT SERPL-MCNC: 3.4 MG/DL
DIFFERENTIAL METHOD: ABNORMAL
EOSINOPHIL # BLD AUTO: 0 K/UL
EOSINOPHIL NFR BLD: 0 %
ERYTHROCYTE [DISTWIDTH] IN BLOOD BY AUTOMATED COUNT: 13.9 %
EST. GFR  (AFRICAN AMERICAN): 20 ML/MIN/1.73 M^2
EST. GFR  (NON AFRICAN AMERICAN): 17 ML/MIN/1.73 M^2
GLUCOSE SERPL-MCNC: 93 MG/DL
HCT VFR BLD AUTO: 36.3 %
HGB BLD-MCNC: 12.1 G/DL
LACTATE SERPL-SCNC: 1.1 MMOL/L
LACTATE SERPL-SCNC: 1.3 MMOL/L
LYMPHOCYTES # BLD AUTO: 1.2 K/UL
LYMPHOCYTES NFR BLD: 5.3 %
MCH RBC QN AUTO: 30 PG
MCHC RBC AUTO-ENTMCNC: 33.3 G/DL
MCV RBC AUTO: 90 FL
MONOCYTES # BLD AUTO: 1.4 K/UL
MONOCYTES NFR BLD: 6 %
NEUTROPHILS # BLD AUTO: 20.4 K/UL
NEUTROPHILS NFR BLD: 88.6 %
PLATELET # BLD AUTO: 246 K/UL
PMV BLD AUTO: 8.4 FL
POTASSIUM SERPL-SCNC: 5.3 MMOL/L
PROT SERPL-MCNC: 7.5 G/DL
RBC # BLD AUTO: 4.03 M/UL
SODIUM SERPL-SCNC: 127 MMOL/L
TROPONIN I SERPL DL<=0.01 NG/ML-MCNC: 2.32 NG/ML
TSH SERPL DL<=0.005 MIU/L-ACNC: 1.1 UIU/ML
WBC # BLD AUTO: 23 K/UL

## 2019-02-25 PROCEDURE — 25000003 PHARM REV CODE 250: Performed by: NURSE PRACTITIONER

## 2019-02-25 PROCEDURE — 96365 THER/PROPH/DIAG IV INF INIT: CPT

## 2019-02-25 PROCEDURE — 84484 ASSAY OF TROPONIN QUANT: CPT

## 2019-02-25 PROCEDURE — 80074 ACUTE HEPATITIS PANEL: CPT

## 2019-02-25 PROCEDURE — 25000003 PHARM REV CODE 250: Performed by: INTERNAL MEDICINE

## 2019-02-25 PROCEDURE — 25000003 PHARM REV CODE 250: Performed by: EMERGENCY MEDICINE

## 2019-02-25 PROCEDURE — 82553 CREATINE MB FRACTION: CPT

## 2019-02-25 PROCEDURE — 12000002 HC ACUTE/MED SURGE SEMI-PRIVATE ROOM

## 2019-02-25 PROCEDURE — 36415 COLL VENOUS BLD VENIPUNCTURE: CPT

## 2019-02-25 PROCEDURE — 80053 COMPREHEN METABOLIC PANEL: CPT

## 2019-02-25 PROCEDURE — 85025 COMPLETE CBC W/AUTO DIFF WBC: CPT

## 2019-02-25 PROCEDURE — 63600175 PHARM REV CODE 636 W HCPCS: Performed by: INTERNAL MEDICINE

## 2019-02-25 PROCEDURE — 83605 ASSAY OF LACTIC ACID: CPT

## 2019-02-25 PROCEDURE — 99285 EMERGENCY DEPT VISIT HI MDM: CPT | Mod: 25

## 2019-02-25 PROCEDURE — 87040 BLOOD CULTURE FOR BACTERIA: CPT | Mod: 59

## 2019-02-25 PROCEDURE — 82550 ASSAY OF CK (CPK): CPT

## 2019-02-25 PROCEDURE — 84443 ASSAY THYROID STIM HORMONE: CPT

## 2019-02-25 PROCEDURE — G0378 HOSPITAL OBSERVATION PER HR: HCPCS

## 2019-02-25 PROCEDURE — 96366 THER/PROPH/DIAG IV INF ADDON: CPT

## 2019-02-25 PROCEDURE — 63600175 PHARM REV CODE 636 W HCPCS: Performed by: EMERGENCY MEDICINE

## 2019-02-25 RX ORDER — SODIUM CHLORIDE 9 MG/ML
INJECTION, SOLUTION INTRAVENOUS CONTINUOUS
Status: DISCONTINUED | OUTPATIENT
Start: 2019-02-25 | End: 2019-03-01 | Stop reason: HOSPADM

## 2019-02-25 RX ORDER — ENOXAPARIN SODIUM 100 MG/ML
40 INJECTION SUBCUTANEOUS EVERY 24 HOURS
Status: CANCELLED | OUTPATIENT
Start: 2019-02-25

## 2019-02-25 RX ORDER — ACETAMINOPHEN 500 MG
1000 TABLET ORAL EVERY 6 HOURS PRN
Status: DISCONTINUED | OUTPATIENT
Start: 2019-02-25 | End: 2019-03-01 | Stop reason: HOSPADM

## 2019-02-25 RX ORDER — PANTOPRAZOLE SODIUM 40 MG/1
40 TABLET, DELAYED RELEASE ORAL DAILY
Status: DISCONTINUED | OUTPATIENT
Start: 2019-02-26 | End: 2019-03-01 | Stop reason: HOSPADM

## 2019-02-25 RX ORDER — ONDANSETRON 2 MG/ML
4 INJECTION INTRAMUSCULAR; INTRAVENOUS EVERY 8 HOURS PRN
Status: DISCONTINUED | OUTPATIENT
Start: 2019-02-25 | End: 2019-03-01 | Stop reason: HOSPADM

## 2019-02-25 RX ORDER — HYDROCODONE BITARTRATE AND ACETAMINOPHEN 5; 325 MG/1; MG/1
1 TABLET ORAL EVERY 6 HOURS PRN
Status: DISCONTINUED | OUTPATIENT
Start: 2019-02-25 | End: 2019-03-01 | Stop reason: HOSPADM

## 2019-02-25 RX ORDER — SODIUM CHLORIDE 0.9 % (FLUSH) 0.9 %
5 SYRINGE (ML) INJECTION
Status: DISCONTINUED | OUTPATIENT
Start: 2019-02-25 | End: 2019-03-01 | Stop reason: HOSPADM

## 2019-02-25 RX ORDER — ACETAMINOPHEN 325 MG/1
650 TABLET ORAL EVERY 6 HOURS PRN
Status: CANCELLED | OUTPATIENT
Start: 2019-02-25

## 2019-02-25 RX ORDER — AMOXICILLIN 250 MG
1 CAPSULE ORAL DAILY PRN
Status: DISCONTINUED | OUTPATIENT
Start: 2019-02-25 | End: 2019-03-01 | Stop reason: HOSPADM

## 2019-02-25 RX ADMIN — VANCOMYCIN HYDROCHLORIDE 2000 MG: 1 INJECTION, POWDER, LYOPHILIZED, FOR SOLUTION INTRAVENOUS at 05:02

## 2019-02-25 RX ADMIN — HYDROCODONE BITARTRATE AND ACETAMINOPHEN 1 TABLET: 5; 325 TABLET ORAL at 10:02

## 2019-02-25 RX ADMIN — SODIUM CHLORIDE 1000 ML: 0.9 INJECTION, SOLUTION INTRAVENOUS at 06:02

## 2019-02-25 RX ADMIN — CEFTAROLINE FOSAMIL 400 MG: 400 POWDER, FOR SOLUTION INTRAVENOUS at 09:02

## 2019-02-25 RX ADMIN — APIXABAN 2.5 MG: 2.5 TABLET, FILM COATED ORAL at 09:02

## 2019-02-25 RX ADMIN — SODIUM CHLORIDE: 0.9 INJECTION, SOLUTION INTRAVENOUS at 09:02

## 2019-02-25 NOTE — ED NOTES
"Presents to the ER with c/o weakness that started a few days ago. Patient reports a total of 3 falls secondary to bilateral lower extremity weakness. Patient states that he has difficulty sitting up and standing secondary to complaint. Denies hitting head with fall. Patient has a ulceration to left lower extremity that patient reports "Has been there for 4 years, but has worsened recently." Left lower extremity is + for erythema and warmth. Denies any fever at home and is afebrile upon arrival to the ED. Patient denies any decreased appetite. Mucous membranes are pink and moist. Skin is warm, dry and intact. Lungs are clear bilaterally, respirations are regular and unlabored. Denies cough, congestion, rhinorrhea or SOB. Patient has a large habitus  BS active x4, no tenderness with palpation, abd is soft and not distended. Denies any appetite or activity change. S1S2, capillary refill is < 2 seconds. Denies dysuria, difficulty urinating, frequency, numbness, tingling or weakness. JARRED LOMELIS    "

## 2019-02-25 NOTE — ED PROVIDER NOTES
"Encounter Date: 2/25/2019    SCRIBE #1 NOTE: I, Gifty Sneed, am scribing for, and in the presence of, Dr. Ramires.       History     Chief Complaint   Patient presents with    Weakness     x 2 days / recent falls     Dizziness     2/25/2019  4:31 PM     The patient is a 69 y.o. male  who presents with weakness. Patient c/o gradual onset of generalized weakness for the past couple of days. He reports he is not having room spinning dizziness but reports he is "just weak." He is having trouble sitting, standing or moving due to his weakness. Pt states he fell a few times due to the weakness in both legs. He denies any injuries to the legs but reports an area of redness to the left leg. He also reports increasing shortness of breath since onset. Patient is on eliquis. He lives with his wife. No cp, cough, headaches, extremity weakness, numbness, abdominal pain, nausea, vomiting, diarrhea, runny nose, sneezing, chills or fevers. Pt has chronic back pain and states his back is hurting currently. PMHx of HTN, DM, PE, CAD, LAKHWINDER and antithrombin 3 deficiency. SHx of radiofrequency ablation, colonoscopy.  No diplopia no difficulty speaking or swallowing.      The history is provided by the patient.     Review of patient's allergies indicates:   Allergen Reactions    Bactroban [mupirocin calcium] Other (See Comments)     Burning feeling on open sore     Metoprolol Other (See Comments)     dizzy     Past Medical History:   Diagnosis Date    Anticoagulant long-term use 3/1/2013    Antithrombin 3 deficiency     Cellulitis     lower legs, venous stasis    Coronary artery disease     Diabetes mellitus type 2 in obese 5/15/2013    Hypertension     Obesity     LAKHWINDER (obstructive sleep apnea)     Pulmonary embolism      Past Surgical History:   Procedure Laterality Date    COLONOSCOPY  5/2/2011    Dr. Miller, 9 polyps, recheck 5 year    MULTIPLE TOOTH EXTRACTIONS      RADIOFREQUENCY ABLATION  7/2015    bilateral     " TONSILLECTOMY       Family History   Problem Relation Age of Onset    Heart disease Mother     Heart disease Father     Gallbladder disease Sister     Heart disease Brother     Stroke Brother     Emphysema Maternal Uncle      Social History     Tobacco Use    Smoking status: Former Smoker     Packs/day: 1.00     Years: 20.00     Pack years: 20.00     Last attempt to quit: 1/3/2002     Years since quittin.1    Smokeless tobacco: Never Used   Substance Use Topics    Alcohol use: Yes     Alcohol/week: 0.6 oz     Types: 1 Cans of beer per week    Drug use: No     Review of Systems   Constitutional: Negative for chills and fever.   HENT: Negative for congestion and rhinorrhea.    Respiratory: Positive for shortness of breath. Negative for cough.    Cardiovascular: Negative for chest pain.   Gastrointestinal: Negative for abdominal pain, diarrhea and nausea.   Musculoskeletal: Positive for back pain (chronic).   Skin: Positive for color change (redness to lower left leg).   Neurological: Positive for weakness. Negative for numbness and headaches.   All other systems reviewed and are negative.      Physical Exam     Initial Vitals [19 1616]   BP Pulse Resp Temp SpO2   (!) 92/45 83 (!) 28 97.7 °F (36.5 °C) 100 %      MAP       --         Physical Exam    Nursing note and vitals reviewed.  Constitutional: He appears well-developed and well-nourished. He is not diaphoretic. He is Obese .  Non-toxic appearance. He does not have a sickly appearance. He does not appear ill. No distress.   Morbidly obese.  No distress not ill-appearing   HENT:   Head: Normocephalic and atraumatic.   Mouth/Throat: Mucous membranes are normal.   Eyes: EOM are normal. Pupils are equal, round, and reactive to light.   Neck: Normal range of motion.   Cardiovascular: Normal rate, regular rhythm, normal heart sounds and intact distal pulses. Exam reveals no gallop and no friction rub.    No murmur heard.  Pulmonary/Chest: Breath  sounds normal. He has no wheezes. He has no rhonchi. He has no rales.   Abdominal: Soft. He exhibits no distension. There is no tenderness.   Musculoskeletal: Normal range of motion. He exhibits no edema.   Neurological: He is alert and oriented to person, place, and time. He has normal strength.   Normal finger to nose bilaterally  No nystagmus  Able to stand very briefly   Skin: Skin is dry. There is erythema.   Large venous stasis ulcer outside of the lower left leg which is 15 x 15 CM. TTP, erythema and warmth to the superior aspect of the left lower leg extending almost to the knee.   Psychiatric: He has a normal mood and affect.         ED Course   Critical Care  Date/Time: 2/26/2019 7:32 AM  Performed by: Orlando Ramires MD  Authorized by: Tracy Carvajal MD   Direct patient critical care time: 25 minutes  Additional history critical care time: 9 minutes  Ordering / reviewing critical care time: 12 minutes  Documentation critical care time: 10 minutes  Consulting other physicians critical care time: 4 minutes  Consult with family critical care time: 5 minutes  Total critical care time (exclusive of procedural time) : 65 minutes  Critical care was necessary to treat or prevent imminent or life-threatening deterioration of the following conditions: sepsis, renal failure and dehydration.  Critical care was time spent personally by me on the following activities: review of old charts, pulse oximetry, ordering and review of laboratory studies, obtaining history from patient or surrogate, evaluation of patient's response to treatment, examination of patient, ordering and performing treatments and interventions, ordering and review of radiographic studies and re-evaluation of patient's condition.        Labs Reviewed - No data to display       Imaging Results    None          Medical Decision Making:   History:   Old Medical Records: I decided to obtain old medical records.  Clinical Tests:   Lab Tests: Reviewed and  Ordered            Scribe Attestation:   Scribe #1: I performed the above scribed service and the documentation accurately describes the services I performed. I attest to the accuracy of the note.    I, Dr. Ramires, personally performed the services described in this documentation. All medical record entries made by the scribe were at my direction and in my presence.  I have reviewed the chart and agree that the record reflects my personal performance and is accurate and complete.7:33 AM 02/26/2019            ED Course as of Feb 26 0731   Mon Feb 25, 2019   1624 BP: (!) 92/45 [EF]   1625 Temp: 97.7 °F (36.5 °C) [EF]   1625 Temp src: Oral [EF]   1625 Pulse: 83 [EF]   1625 Resp: (!) 28 [EF]   1625 SpO2: 100 % [EF]   1751 WBC: (!) 23.00 [EF]   1753 Sepsis orders added on due to wbc  [EF]   1811 Sodium: (!) 127 [EF]   1811 Potassium: (!) 5.3 [EF]   1811 Creatinine: (!) 3.4 [EF]   1833 Dr kendrick to admit  [EF]      ED Course User Index  [EF] Orlando Ramires MD     Clinical Impression:   No diagnosis found.              69-year-old male morbidly obese presents for general malaise.  During physical exam a large venous stasis ulcer was noticed with surrounding erythema consistent with cellulitis.  Patient meets sepsis criteria with leukocytosis tachypnea and source.  He meet severe sepsis criteria due to kidney dysfunction.  Patient was given IV fluids, lactic acid normal. Antibiotics ordered.  Admitted to Hospital Medicine.  Blood pressure improving in the ER after IV fluids.  BUN to creatinine ratio suggests significant dehydration.                   Orlando Ramires MD  02/26/19 [id number]

## 2019-02-25 NOTE — ED NOTES
Lab is aware that patient has decided to obtain blood cultures. Patient has been updated on change in plan of care. Wife is at the bedside without any needs.

## 2019-02-26 PROBLEM — I87.2 VENOUS INSUFFICIENCY OF LOWER EXTREMITY: Status: ACTIVE | Noted: 2019-02-26

## 2019-02-26 LAB
ALBUMIN SERPL BCP-MCNC: 2.5 G/DL
ALP SERPL-CCNC: 57 U/L
ALT SERPL W/O P-5'-P-CCNC: 141 U/L
AMORPH CRY URNS QL MICRO: ABNORMAL
ANION GAP SERPL CALC-SCNC: 10 MMOL/L
AST SERPL-CCNC: 618 U/L
BACTERIA #/AREA URNS HPF: ABNORMAL /HPF
BASOPHILS # BLD AUTO: 0.1 K/UL
BASOPHILS NFR BLD: 0.3 %
BILIRUB SERPL-MCNC: 0.4 MG/DL
BILIRUB UR QL STRIP: NEGATIVE
BUN SERPL-MCNC: 82 MG/DL
CALCIUM SERPL-MCNC: 8.5 MG/DL
CHLORIDE SERPL-SCNC: 102 MMOL/L
CHOLEST SERPL-MCNC: 128 MG/DL
CHOLEST/HDLC SERPL: 4.1 {RATIO}
CK MB SERPL-MCNC: 21.7 NG/ML
CK MB SERPL-MCNC: 33.1 NG/ML
CK MB SERPL-RTO: 0.1 %
CK MB SERPL-RTO: 0.1 %
CK SERPL-CCNC: ABNORMAL U/L
CK SERPL-CCNC: ABNORMAL U/L
CLARITY UR: CLEAR
CO2 SERPL-SCNC: 16 MMOL/L
COLOR UR: YELLOW
CREAT SERPL-MCNC: 2.8 MG/DL
CREAT UR-MCNC: 101 MG/DL
DIFFERENTIAL METHOD: ABNORMAL
EOSINOPHIL # BLD AUTO: 0 K/UL
EOSINOPHIL NFR BLD: 0.1 %
ERYTHROCYTE [DISTWIDTH] IN BLOOD BY AUTOMATED COUNT: 13.8 %
EST. GFR  (AFRICAN AMERICAN): 25 ML/MIN/1.73 M^2
EST. GFR  (NON AFRICAN AMERICAN): 22 ML/MIN/1.73 M^2
GLUCOSE SERPL-MCNC: 103 MG/DL
GLUCOSE UR QL STRIP: NEGATIVE
GRAN CASTS #/AREA URNS LPF: 7 /LPF
HAV IGM SERPL QL IA: NEGATIVE
HBV CORE IGM SERPL QL IA: NEGATIVE
HBV SURFACE AG SERPL QL IA: NEGATIVE
HCT VFR BLD AUTO: 34.2 %
HCV AB SERPL QL IA: NEGATIVE
HDLC SERPL-MCNC: 31 MG/DL
HDLC SERPL: 24.2 %
HGB BLD-MCNC: 11.4 G/DL
HGB UR QL STRIP: ABNORMAL
HYALINE CASTS #/AREA URNS LPF: 1 /LPF
INR PPP: 1.1
KETONES UR QL STRIP: NEGATIVE
LDLC SERPL CALC-MCNC: 78.4 MG/DL
LEUKOCYTE ESTERASE UR QL STRIP: NEGATIVE
LYMPHOCYTES # BLD AUTO: 1.1 K/UL
LYMPHOCYTES NFR BLD: 6.3 %
MAGNESIUM SERPL-MCNC: 2.2 MG/DL
MAGNESIUM SERPL-MCNC: 2.2 MG/DL
MCH RBC QN AUTO: 30 PG
MCHC RBC AUTO-ENTMCNC: 33.5 G/DL
MCV RBC AUTO: 90 FL
MICROSCOPIC COMMENT: ABNORMAL
MONOCYTES # BLD AUTO: 1 K/UL
MONOCYTES NFR BLD: 5.3 %
NEUTROPHILS # BLD AUTO: 15.9 K/UL
NEUTROPHILS NFR BLD: 88 %
NITRITE UR QL STRIP: NEGATIVE
NONHDLC SERPL-MCNC: 97 MG/DL
PH UR STRIP: 6 [PH] (ref 5–8)
PHOSPHATE SERPL-MCNC: 4.2 MG/DL
PLATELET # BLD AUTO: 220 K/UL
PMV BLD AUTO: 8.5 FL
POTASSIUM SERPL-SCNC: 4.6 MMOL/L
POTASSIUM UR-SCNC: 23 MMOL/L
PROT SERPL-MCNC: 6.5 G/DL
PROT UR QL STRIP: ABNORMAL
PROT UR-MCNC: 27 MG/DL
PROTHROMBIN TIME: 11 SEC
RBC # BLD AUTO: 3.81 M/UL
RBC #/AREA URNS HPF: 7 /HPF (ref 0–4)
SODIUM SERPL-SCNC: 128 MMOL/L
SODIUM UR-SCNC: 51 MMOL/L
SP GR UR STRIP: 1.02 (ref 1–1.03)
SQUAMOUS #/AREA URNS HPF: 3 /HPF
TRIGL SERPL-MCNC: 93 MG/DL
TROPONIN I SERPL DL<=0.01 NG/ML-MCNC: 0.89 NG/ML
TROPONIN I SERPL DL<=0.01 NG/ML-MCNC: 1.03 NG/ML
TROPONIN I SERPL DL<=0.01 NG/ML-MCNC: 1.63 NG/ML
URN SPEC COLLECT METH UR: ABNORMAL
UROBILINOGEN UR STRIP-ACNC: NEGATIVE EU/DL
WBC # BLD AUTO: 18.1 K/UL
WBC #/AREA URNS HPF: 4 /HPF (ref 0–5)

## 2019-02-26 PROCEDURE — 12000002 HC ACUTE/MED SURGE SEMI-PRIVATE ROOM

## 2019-02-26 PROCEDURE — 80061 LIPID PANEL: CPT

## 2019-02-26 PROCEDURE — 80053 COMPREHEN METABOLIC PANEL: CPT

## 2019-02-26 PROCEDURE — 25000003 PHARM REV CODE 250: Performed by: EMERGENCY MEDICINE

## 2019-02-26 PROCEDURE — 25000003 PHARM REV CODE 250: Performed by: NURSE PRACTITIONER

## 2019-02-26 PROCEDURE — 84133 ASSAY OF URINE POTASSIUM: CPT

## 2019-02-26 PROCEDURE — 84100 ASSAY OF PHOSPHORUS: CPT

## 2019-02-26 PROCEDURE — 82550 ASSAY OF CK (CPK): CPT | Mod: 91

## 2019-02-26 PROCEDURE — 99222 PR INITIAL HOSPITAL CARE,LEVL II: ICD-10-PCS | Mod: ,,, | Performed by: INTERNAL MEDICINE

## 2019-02-26 PROCEDURE — 99222 1ST HOSP IP/OBS MODERATE 55: CPT | Mod: ,,, | Performed by: INTERNAL MEDICINE

## 2019-02-26 PROCEDURE — 84166 PROTEIN E-PHORESIS/URINE/CSF: CPT | Mod: 26,,, | Performed by: PATHOLOGY

## 2019-02-26 PROCEDURE — 25500020 PHARM REV CODE 255: Performed by: SPECIALIST

## 2019-02-26 PROCEDURE — 84166 PROTEIN E-PHORESIS/URINE/CSF: CPT

## 2019-02-26 PROCEDURE — 83735 ASSAY OF MAGNESIUM: CPT

## 2019-02-26 PROCEDURE — 85025 COMPLETE CBC W/AUTO DIFF WBC: CPT

## 2019-02-26 PROCEDURE — 82570 ASSAY OF URINE CREATININE: CPT

## 2019-02-26 PROCEDURE — 84166 PATHOLOGIST INTERPRETATION UPE: ICD-10-PCS | Mod: 26,,, | Performed by: PATHOLOGY

## 2019-02-26 PROCEDURE — 84156 ASSAY OF PROTEIN URINE: CPT

## 2019-02-26 PROCEDURE — 63600175 PHARM REV CODE 636 W HCPCS: Performed by: INTERNAL MEDICINE

## 2019-02-26 PROCEDURE — 81000 URINALYSIS NONAUTO W/SCOPE: CPT

## 2019-02-26 PROCEDURE — 82553 CREATINE MB FRACTION: CPT | Mod: 91

## 2019-02-26 PROCEDURE — 85610 PROTHROMBIN TIME: CPT

## 2019-02-26 PROCEDURE — 25000003 PHARM REV CODE 250: Performed by: INTERNAL MEDICINE

## 2019-02-26 PROCEDURE — 93005 ELECTROCARDIOGRAM TRACING: CPT

## 2019-02-26 PROCEDURE — 84484 ASSAY OF TROPONIN QUANT: CPT | Mod: 91

## 2019-02-26 PROCEDURE — 36415 COLL VENOUS BLD VENIPUNCTURE: CPT

## 2019-02-26 PROCEDURE — 84300 ASSAY OF URINE SODIUM: CPT

## 2019-02-26 RX ADMIN — SODIUM CHLORIDE: 0.9 INJECTION, SOLUTION INTRAVENOUS at 06:02

## 2019-02-26 RX ADMIN — PANTOPRAZOLE SODIUM 40 MG: 40 TABLET, DELAYED RELEASE ORAL at 09:02

## 2019-02-26 RX ADMIN — APIXABAN 2.5 MG: 2.5 TABLET, FILM COATED ORAL at 08:02

## 2019-02-26 RX ADMIN — CEFTAROLINE FOSAMIL 400 MG: 400 POWDER, FOR SOLUTION INTRAVENOUS at 09:02

## 2019-02-26 RX ADMIN — HYDROCODONE BITARTRATE AND ACETAMINOPHEN 1 TABLET: 5; 325 TABLET ORAL at 09:02

## 2019-02-26 RX ADMIN — SULFUR HEXAFLUORIDE 2.4 ML: KIT at 03:02

## 2019-02-26 RX ADMIN — CEFTAROLINE FOSAMIL 400 MG: 400 POWDER, FOR SOLUTION INTRAVENOUS at 08:02

## 2019-02-26 RX ADMIN — APIXABAN 2.5 MG: 2.5 TABLET, FILM COATED ORAL at 09:02

## 2019-02-26 NOTE — CONSULTS
[facility]     CC: Elevated transaminases    HPI 69 y.o. male with elevated transaminases, severe, recent onset, with normal bilirubin and no evidence of compromised hepatic synthetic function, occurring after episode of marked hypotension, with no other alleviating/exacerbating factors.  Patient apparently has a new diagnosis of cirrhosis by imaging, likely secondary to POWERS with morbid obesity.  He denies abdominal pain or emesis.  No history of EGD.  He states that he has had colonoscopy in the past.  He was initially admitted for bilateral LE cellulitis and had episode of hypotension with dizziness and weakness.  He did not have LOC.  His family was present at bedside and they relate that he has not had previous diagnosis of cirrhosis.    Past Medical History:   Diagnosis Date    Anticoagulant long-term use 3/1/2013    Antithrombin 3 deficiency     Cellulitis     lower legs, venous stasis    Coronary artery disease     Diabetes mellitus type 2 in obese 5/15/2013    Hypertension     Obesity     LAKHWINDER (obstructive sleep apnea)     Pulmonary embolism        Past Surgical History:   Procedure Laterality Date    COLONOSCOPY  2011    Dr. Miller, 9 polyps, recheck 5 year    MULTIPLE TOOTH EXTRACTIONS      RADIOFREQUENCY ABLATION  2015    bilateral     TONSILLECTOMY         Social History     Tobacco Use    Smoking status: Former Smoker     Packs/day: 1.00     Years: 20.00     Pack years: 20.00     Last attempt to quit: 1/3/2002     Years since quittin.1    Smokeless tobacco: Never Used   Substance Use Topics    Alcohol use: Yes     Alcohol/week: 0.6 oz     Types: 1 Cans of beer per week     Comment: seldom    Drug use: No       Family History   Problem Relation Age of Onset    Heart disease Mother     Heart disease Father     Gallbladder disease Sister     Heart disease Brother     Stroke Brother     Emphysema Maternal Uncle        Review of Systems  General ROS: negative  "for - chills, fever or weight loss  Psychological ROS: negative for - hallucination, depression or suicidal ideation  Ophthalmic ROS: negative for - blurry vision, photophobia or eye pain  ENT ROS: negative for - epistaxis, sore throat or rhinorrhea  Respiratory ROS: no cough, shortness of breath, or wheezing  Cardiovascular ROS: no chest pain or dyspnea on exertion  Gastrointestinal ROS: no abdominal pain or bleeding  Genito-Urinary ROS: no dysuria, trouble voiding, or hematuria  Musculoskeletal ROS: negative for - arthralgia, myalgia, + weakness but now resolved  + bilateral LE edema  Neurological ROS: no syncope or seizures; no ataxia  Dermatological ROS: negative for pruritis, rash and jaundice    Physical Examination  BP (!) 115/56 (BP Location: Right arm, Patient Position: Lying)   Pulse 74   Temp 96.4 °F (35.8 °C) (Oral)   Resp 20   Ht 5' 11" (1.803 m)   Wt (!) 175.5 kg (386 lb 14.5 oz)   SpO2 96%   BMI 53.96 kg/m²   General appearance: alert, cooperative, no distress  HENT: Normocephalic, atraumatic, neck symmetrical, no nasal discharge   Eyes: conjunctivae/corneas clear, PERRL, EOM's intact, sclera anicteric  Lungs: clear to auscultation bilaterally, no dullness to percussion bilaterally, symmetric expansion, breathing unlabored  Heart: regular rate and rhythm without rub; no displacement of the PMI   Abdomen: morbidly obese + BS  Extremities: extremities symmetric; no clubbing, cyanosis, + 4+ bilateral LE edema with erythema  Integument: Skin color, texture, turgor normal; no rashes; hair distrubution normal, no jaundice except where noted above  Neurologic: Alert and oriented X 3, no focal sensory or motor neurologic deficits  Psychiatric: no pressured speech; normal affect; no evidence of impaired cognition, no anxiety/depression     Labs:  Lab Results   Component Value Date    WBC 18.10 (H) 02/26/2019    HGB 11.4 (L) 02/26/2019    HCT 34.2 (L) 02/26/2019    MCV 90 02/26/2019     02/26/2019 "       CMP  Sodium   Date Value Ref Range Status   02/26/2019 128 (L) 136 - 145 mmol/L Final     Potassium   Date Value Ref Range Status   02/26/2019 4.6 3.5 - 5.1 mmol/L Final     Chloride   Date Value Ref Range Status   02/26/2019 102 95 - 110 mmol/L Final     CO2   Date Value Ref Range Status   02/26/2019 16 (L) 23 - 29 mmol/L Final     Glucose   Date Value Ref Range Status   02/26/2019 103 70 - 110 mg/dL Final     BUN, Bld   Date Value Ref Range Status   02/26/2019 82 (H) 8 - 23 mg/dL Final     Creatinine   Date Value Ref Range Status   02/26/2019 2.8 (H) 0.5 - 1.4 mg/dL Final   02/18/2013 1.0 0.5 - 1.4 mg/dL Final     Calcium   Date Value Ref Range Status   02/26/2019 8.5 (L) 8.7 - 10.5 mg/dL Final   02/18/2013 8.9 8.7 - 10.5 mg/dL Final     Total Protein   Date Value Ref Range Status   02/26/2019 6.5 6.0 - 8.4 g/dL Final     Albumin   Date Value Ref Range Status   02/26/2019 2.5 (L) 3.5 - 5.2 g/dL Final     Total Bilirubin   Date Value Ref Range Status   02/26/2019 0.4 0.1 - 1.0 mg/dL Final     Comment:     For infants and newborns, interpretation of results should be based  on gestational age, weight and in agreement with clinical  observations.  Premature Infant recommended reference ranges:  Up to 24 hours.............<8.0 mg/dL  Up to 48 hours............<12.0 mg/dL  3-5 days..................<15.0 mg/dL  6-29 days.................<15.0 mg/dL       Alkaline Phosphatase   Date Value Ref Range Status   02/26/2019 57 55 - 135 U/L Final   02/14/2013 65 55 - 135 U/L Final     AST   Date Value Ref Range Status   02/26/2019 618 (H) 10 - 40 U/L Final   02/14/2013 24 10 - 40 U/L Final     ALT   Date Value Ref Range Status   02/26/2019 141 (H) 10 - 44 U/L Final     Anion Gap   Date Value Ref Range Status   02/26/2019 10 8 - 16 mmol/L Final   02/18/2013 10 5 - 15 meq/L Final     eGFR if    Date Value Ref Range Status   02/26/2019 25 (A) >60 mL/min/1.73 m^2 Final     eGFR if non    Date  Value Ref Range Status   02/26/2019 22 (A) >60 mL/min/1.73 m^2 Final     Comment:     Calculation used to obtain the estimated glomerular filtration  rate (eGFR) is the CKD-EPI equation.          Lab Results   Component Value Date    INR 1.1 02/26/2019    INR 2.6 04/21/2016    INR 2.3 04/04/2016         Imaging:  Ultrasound was independently visualized and reviewed by me and showed cirrhosis.    I have personally reviewed these images    Further history was obtained from the patient's family who was present throughout the interview and states that he has no known history of liver disease.  History is otherwise as above in the HPI.       Case discussed in person with Dr. Carvajal who feels presentation of liver enzymes due to shock liver.      Assessment:   1.  Elevated transaminases  2.  Cirrhosis, likely secondary to POWERS  3.  Morbid obesity  4.  Bilateral LE cellulitis  5.  Likely shock liver - improving    Plan:  1.  Monitor INR  2.  Trend LFTs  3.  Continue current care per primary team.  4.  Will need follow up after discharge for EGD for variceal screening and will need HCC screening q6 months with U/S and AFP.  5.  Will follow.  6.  Further recommendations to follow after above.  7.  Communication will be sent to the referring provider, Dr. Carvajal regarding my assessment and plan on this patient via EPIC.      Hitesh Herrera MD  [facility]  [address], [address]  [place] [address]  Office: [phone]  Fax: [phone]

## 2019-02-26 NOTE — PLAN OF CARE
The pt stated that he lives at home with his wife. He denies HH and stated that he has a home cane. He uses [facility]. Pt verified his PCP as Dr. Mendez and insurance as Medicare and [employer]. I educated the pt on the blue discharge folder and wrote my name and number on the pts white board. Eusebia Bhakta, [facility]     02/26/19 1205   Discharge Assessment   Assessment Type Discharge Planning Assessment   Confirmed/corrected address and phone number on facesheet? Yes   Assessment information obtained from? Patient   Communicated expected length of stay with patient/caregiver no   Prior to hospitilization cognitive status: Alert/Oriented   Prior to hospitalization functional status: Independent   Current cognitive status: Alert/Oriented   Current Functional Status: Independent   Lives With spouse   Able to Return to Prior Arrangements yes   Is patient able to care for self after discharge? Yes   Who are your caregiver(s) and their phone number(s)? Spouse Max Chance [phone]   Readmission Within the Last 30 Days no previous admission in last 30 days   Patient currently being followed by outpatient case management? No   Patient currently receives any other outside agency services? No   Equipment Currently Used at Home cane, straight   Do you have any problems affording any of your prescribed medications? No   Is the patient taking medications as prescribed? yes   Does the patient have transportation home? Yes   Transportation Anticipated family or friend will provide   Does the patient receive services at the [facility]? No   Discharge Plan A Home with family   Discharge Plan B Home   DME Needed Upon Discharge  none   Patient/Family in Agreement with Plan yes

## 2019-02-26 NOTE — PLAN OF CARE
Problem: Adult Inpatient Plan of Care  Goal: Patient-Specific Goal (Individualization)  POC reviewed with pt who verbalized understanding, pt AAOX4, PIV 22G to right upper arm, site clean dry and intact no redness or swelling noted, IVF as ordered, remains afebrile during shift, ryder in place to gravity, remains free of falls/injury during shift, pain controlled with pain meds, bed in low and locked position with side rails X2, safety maintained, personal items and call light in reach at bedside, will cont to monitor for safety

## 2019-02-26 NOTE — PROGRESS NOTES
Progress Note  Hospital Medicine  Patient Name:Lukas Chance  MRN:  [id number]  Patient Class: IP- Inpatient  Admit Date: 2/25/2019  Length of Stay: 1 days  Expected Discharge Date:   Attending Physician: Tracy Carvajal MD  Primary Care Provider:  Solitario Mendez MD    SUBJECTIVE:     Principal Problem: Severe sepsis  Initial history of present illness: Patient is a 69 y.o. male admitted to Hospitalist Service from [facility] with complaint of generalized weakness and frequent falls for 3 days. Patient reportedly has past medical history significant for morbid obesity, history of recurrent leg cellulitis/venous stasis, diabetes mellitus type 2, hypertension, history of pulmonary embolism (anti thrombin 3 deficiency, on Eliquis) and history of obstructive sleep apnea.  Patient presented to the emergency room for progressively worsening generalized weakness for 2-3 days.  Patient feels dizzy upon getting up.  Patient is not able to get up or sit due to generalized weakness.  Left leg appears to be red and hot.  No recent fevers or chills cough or shortness of breath reported patient reports compliance with Eliquis and other medications patient also reports he suffers with chronic low back pain. Patient denied chest pain, shortness of breath, abdominal pain, nausea, vomiting, headache, vision changes, focal neuro-deficits, cough or fever.    PMH/PSH/SH/FH/Meds: reviewed.    Symptoms/Review of Systems:  Afebrile, feeling better with intravenous fluid hydration, significant seepage noted from left lower extremity ulceration/cellulitis site. No shortness of breath, cough, chest pain or headache, fever or abdominal pain.     Diet:  Adequate intake.    Activity level:  Up with assist  Pain:  Patient reports no pain.       OBJECTIVE:   Vital Signs (Most Recent):      Temp: 98 °F (36.7 °C) (02/26/19 0717)  Pulse: 72 (02/26/19 0717)  Resp: (!) 22 (02/26/19 0717)  BP: 132/60 (02/26/19 0717)  SpO2: 99 %  (02/26/19 0717)       Vital Signs Range (Last 24H):  Temp:  [97.7 °F (36.5 °C)-98.8 °F (37.1 °C)]   Pulse:  [72-83]   Resp:  [20-28]   BP: ()/(45-61)   SpO2:  [94 %-100 %]     Weight: (!) 175.5 kg (386 lb 14.5 oz)  Body mass index is 53.96 kg/m².    Intake/Output Summary (Last 24 hours) at 2/26/2019 0934  Last data filed at 2/26/2019 0600  Gross per 24 hour   Intake 1453.75 ml   Output 500 ml   Net 953.75 ml     Physical Examination:  General appearance: well developed, appears stated age, morbidly obese male  Head: normocephalic, atraumatic  Eyes:  conjunctivae/corneas clear. PERRL.  Nose: Nares normal. Septum midline.  Throat: lips, mucosa, and tongue normal; teeth and gums normal, no throat erythema.  Neck: supple, symmetrical, trachea midline, no JVD and thyroid not enlarged, symmetric, no tenderness/mass/nodules  Lungs:  clear to auscultation bilaterally and normal respiratory effort  Chest wall: no tenderness  Heart: regular rate and rhythm, S1, S2 normal, no murmur, click, rub or gallop  Abdomen: soft, non-tender non-distented; bowel sounds normal; no masses,  no organomegaly  Extremities: no cyanosis, clubbing or edema. Large venous stasis ulcer outside the left lower leg.  Left lower leg is red hot and tender, [place] sign equivocal bilaterally.  Pulses: 2+ and symmetric  Skin: Skin color, texture, turgor normal. No rashes or lesions.  Lymph nodes: Cervical, supraclavicular, and axillary nodes normal.  Neurologic: Normal strength and tone. No focal numbness or weakness. CNII-XII intact.     CBC:  Recent Labs   Lab 02/25/19  1720 02/26/19  0152   WBC 23.00* 18.10*   RBC 4.03* 3.81*   HGB 12.1* 11.4*   HCT 36.3* 34.2*    220   MCV 90 90   MCH 30.0 30.0   MCHC 33.3 33.5   BMP  Recent Labs   Lab 02/25/19  1720 02/26/19  0152   GLU 93 103   * 128*   K 5.3* 4.6   CL 99 102   CO2 17* 16*   BUN 87* 82*   CREATININE 3.4* 2.8*   CALCIUM 9.2 8.5*   MG  --  2.2  2.2      Diagnostic  Results:  Microbiology Results (last 7 days)     Procedure Component Value Units Date/Time    Blood culture [[id number]] Collected:  02/25/19 1758    Order Status:  Completed Specimen:  Blood from Antecubital, Left Updated:  02/26/19 0515     Blood Culture, Routine No Growth to date    Narrative:       Aerobic and anaerobic    Blood culture [[id number]] Collected:  02/25/19 1812    Order Status:  Completed Specimen:  Blood from Antecubital, Right Updated:  02/26/19 0515     Blood Culture, Routine No Growth to date    Narrative:       Aerobic and anaerobic         Ultrasound abdomen:  Nodular hepatic contour raising consideration for cirrhosis.  Cholelithiasis.    Left leg venous Doppler ultrasound:  No evidence of deep venous thrombosis in the left lower extremity. Left inguinal lymphadenopathy.    Chest x-ray:  No acute process.    Assessment/Plan:      *Severe sepsis [A41.9, R65.20]  Likely due to infected venous ulcer of left lower extremity and cellulitis.  This patient does have evidence of infective focus  My overall impression is severe sepsis.  Antibiotics given- start intravenous Teflaro.  Lactate level within normal limits  Organ dysfunction indicated by acute kidney injury and abnormal liver enzymes  Source- left lower extremity infected venous stasis ulcer/cellulitis.  Use IV pressor agent to maintain map above 65 mm of mercury if needed.      Yes    Abnormal liver enzymes [R74.8]  Possible cirrhosis of liver, likely non steatohepatitis related  Possible ischemic hepatitis versus POWERS.  Trend LFTs,  follow acute hepatitis panel, avoid Tylenol based medications and hold statin therapy.  US abdomen results reviewed.  Will consult gastroenterologist for opinion.  Check PT and INR to assess liver synthetic function.      Yes    Antithrombin 3 deficiency [D68.59]   Yes    Protein C deficiency [D68.59]  On Eliquis, adjust does due to renal impairment.      Yes    Combined hyperlipidemia associated with type  2 diabetes mellitus [E11.69, E78.2]  Hold statin therapy and monitor liver enzymes.      Yes    Coronary artery disease [I25.10]  Elevated troponin level  Hold all antihypertensive agents.  Tele monitor.  Obtain 2D echocardiogram and consult cardiologist.      Yes    Hypertension associated with diabetes [E11.59, I10]  Hold all antihypertensive and diuretic therapy agents.      Yes    Sleep apnea [G47.30]  Use CPAP as needed.      Yes    ARF (acute renal failure) [N17.9] - improving  Acute tubular necrosis  Acute rhabdomyolysis  Continue IVF hydration. Trend total CPK level.  Hold all antihypertensive agents and diuretic therapy agents.  Follow renal panel and electrolytes closely.  Consult Nephrology.  No hydronephrosis or obstructive uropathy reported on abdominal ultrasound.  Adjust renal dose medications for creatinine clearance 10-50cc/min.  Avoid NSAIDs, Galaviz-II inhibitors, ACE-I, Angiotensin Receptor Blockers, or Aminoglycosides.         Discussed with family member.  Patient encouraged to get out of bed and increase ambulation.    VTE Risk Mitigation (From admission, onward)        Ordered     apixaban tablet 2.5 mg  2 times daily      02/25/19 1954     IP VTE HIGH RISK PATIENT  Once      02/25/19 1954        Tracy Carvajal MD  Department of Hospital Medicine   [facility]

## 2019-02-26 NOTE — PLAN OF CARE
Problem: Diabetes Comorbidity  Goal: Blood Glucose Level Within Desired Range  Blood glucose controlled

## 2019-02-26 NOTE — H&P
PCP: Solitario Mendez MD    History & Physical    Chief Complaint:  Generalized weakness and frequent falls    History of Present Illness:  Patient is a 69 y.o. male admitted to Hospitalist Service from [facility] Emergency Room with complaint of generalized weakness and frequent falls for 3 days. Patient reportedly has past medical history significant for morbid obesity, history of recurrent leg cellulitis/venous stasis, diabetes mellitus type 2, hypertension, history of pulmonary embolism (anti thrombin 3 deficiency, on Eliquis) and history of obstructive sleep apnea.  Patient presented to the emergency room for progressively worsening generalized weakness for 2-3 days.  Patient feels dizzy upon getting up.  Patient is not able to get up or sit due to generalized weakness.  Left leg appears to be red and hot.  No recent fevers or chills cough or shortness of breath reported patient reports compliance with Eliquis and other medications patient also reports he suffers with chronic low back pain. Patient denied chest pain, shortness of breath, abdominal pain, nausea, vomiting, headache, vision changes, focal neuro-deficits, cough or fever.    Past Medical History:   Diagnosis Date    Anticoagulant long-term use 3/1/2013    Antithrombin 3 deficiency     Cellulitis     lower legs, venous stasis    Coronary artery disease     Diabetes mellitus type 2 in obese 5/15/2013    Hypertension     Obesity     LAKHWINDER (obstructive sleep apnea)     Pulmonary embolism      Past Surgical History:   Procedure Laterality Date    COLONOSCOPY  5/2/2011    Dr. Miller, 9 polyps, recheck 5 year    MULTIPLE TOOTH EXTRACTIONS      RADIOFREQUENCY ABLATION  7/2015    bilateral     TONSILLECTOMY       Family History   Problem Relation Age of Onset    Heart disease Mother     Heart disease Father     Gallbladder disease Sister     Heart disease Brother     Stroke Brother     Emphysema Maternal Uncle      Social  History     Tobacco Use    Smoking status: Former Smoker     Packs/day: 1.00     Years: 20.00     Pack years: 20.00     Last attempt to quit: 1/3/2002     Years since quittin.1    Smokeless tobacco: Never Used   Substance Use Topics    Alcohol use: Yes     Alcohol/week: 0.6 oz     Types: 1 Cans of beer per week    Drug use: No      Review of patient's allergies indicates:   Allergen Reactions    Bactroban [mupirocin calcium] Other (See Comments)     Burning feeling on open sore     Metoprolol Other (See Comments)     dizzy       (Not in a hospital admission)  Review of Systems:  Constitutional: no fever or chills  Eyes: no visual changes  Ears, nose, mouth, throat, and face: no nasal congestion or sore throat  Respiratory: no cough + chronic shorness of breath  Cardiovascular: no chest pain or palpitations  Gastrointestinal: no nausea or vomiting, no abdominal pain or change in bowel habits  Genitourinary: no hematuria or dysuria  Integument/breast: no rash or pruritis  Hematologic/lymphatic: no easy bruising or lymphadenopathy  Musculoskeletal:+ chronic low back pain, left leg swelling and redness  Neurological: no seizures or tremors.  Behavioral/Psych: no auditory or visual hallucinations  Endocrine: no heat or cold intolerance     OBJECTIVE:     Vital Signs (Most Recent)  Temp: 97.7 °F (36.5 °C) (19)  Pulse: 78 (19 180)  Resp: (!) 28 (19)  BP: (!) 101/54 (19 180)  SpO2: 100 % (19)    Physical Exam:  General appearance: well developed, appears stated age, morbidly obese male  Head: normocephalic, atraumatic  Eyes:  conjunctivae/corneas clear. PERRL.  Nose: Nares normal. Septum midline.  Throat: lips, mucosa, and tongue normal; teeth and gums normal, no throat erythema.  Neck: supple, symmetrical, trachea midline, no JVD and thyroid not enlarged, symmetric, no tenderness/mass/nodules  Lungs:  clear to auscultation bilaterally and normal respiratory  effort  Chest wall: no tenderness  Heart: regular rate and rhythm, S1, S2 normal, no murmur, click, rub or gallop  Abdomen: soft, non-tender non-distented; bowel sounds normal; no masses,  no organomegaly  Extremities: no cyanosis, clubbing or edema. Large venous stasis ulcer outside the left lower leg.  Left lower leg is red hot and tender, [place] sign equivocal bilaterally.  Pulses: 2+ and symmetric  Skin: Skin color, texture, turgor normal. No rashes or lesions.  Lymph nodes: Cervical, supraclavicular, and axillary nodes normal.  Neurologic: Normal strength and tone. No focal numbness or weakness. CNII-XII intact.      Laboratory:   CBC:   Recent Labs   Lab 02/25/19  1720   WBC 23.00*   RBC 4.03*   HGB 12.1*   HCT 36.3*      MCV 90   MCH 30.0   MCHC 33.3     CMP:   Recent Labs   Lab 02/25/19  1720   GLU 93   CALCIUM 9.2   ALBUMIN 2.9*   PROT 7.5   *   K 5.3*   CO2 17*   CL 99   BUN 87*   CREATININE 3.4*   ALKPHOS 58   *   *   BILITOT 0.5     Hemoglobin A1C   Date Value Ref Range Status   01/21/2019 5.8 (H) 4.0 - 5.6 % Final     Comment:     ADA Screening Guidelines:  5.7-6.4%  Consistent with prediabetes  >or=6.5%  Consistent with diabetes  High levels of fetal hemoglobin interfere with the HbA1C  assay. Heterozygous hemoglobin variants (HbS, HgC, etc)do  not significantly interfere with this assay.   However, presence of multiple variants may affect accuracy.     06/26/2018 5.9 (H) 4.0 - 5.6 % Final     Comment:     ADA Screening Guidelines:  5.7-6.4%  Consistent with prediabetes  >or=6.5%  Consistent with diabetes  High levels of fetal hemoglobin interfere with the HbA1C  assay. Heterozygous hemoglobin variants (HbS, HgC, etc)do  not significantly interfere with this assay.   However, presence of multiple variants may affect accuracy.     08/31/2017 6.0 (H) 4.0 - 5.6 % Final     Comment:     According to ADA guidelines, hemoglobin A1c <7.0% represents  optimal control in non-pregnant  diabetic patients. Different  metrics may apply to specific patient populations.   Standards of Medical Care in Diabetes-2016.  For the purpose of screening for the presence of diabetes:  <5.7%     Consistent with the absence of diabetes  5.7-6.4%  Consistent with increasing risk for diabetes   (prediabetes)  >or=6.5%  Consistent with diabetes  Currently, no consensus exists for use of hemoglobin A1c  for diagnosis of diabetes for children.  This Hemoglobin A1c assay has significant interference with fetal   hemoglobin   (HbF). The results are invalid for patients with abnormal amounts of   HbF,   including those with known Hereditary Persistence   of Fetal Hemoglobin. Heterozygous hemoglobin variants (HbAS, HbAC,   HbAD, HbAE, HbA2) do not significantly interfere with this assay;   however, presence of multiple variants in a sample may impact the %   interference.     12/12/2012 6.4 (H) 4.8 - 5.6 % Final     Comment:              Increased risk for diabetes: 5.7 - 6.4           Diabetes: >6.4           Glycemic control for adults with diabetes: <7.0  **In order to standardize %HbA1c results worldwide, as of October 11, 2010,  the %HbA1c is being calculated using the master equation recommended in the  consensus statement adopted by the ADA (American Diabetes Assoc), EASD  (European Assoc for the Study of Diabetes), IFCC (International Federation  of Clinical Chemistry and Laboratory Medicine) and IDF (International  Diabetes Federation). Result units: %HgbA1c (DCCT/NGSP).  In common with other methods, Hb A1C values may not accurately reflect mean  blood glucose in patients with hemoglobin variants (HgbF, HgbS and HgbC).  Any cause of shortened erythrocyte survival will reduce exposure of  erythrocytes to glucose with a consequent decrease in HbA1c (%) values, even  though the time-averaged blood glucose level may be elevated. Causes of  shortened erythrocyte lifetime might be hemolytic anemia or other  hemolytic  diseases, homozygous sickle cell trait, pregnancy, recent significant or  chronic blood loss, etc. Caution should be used when interpreting the HbA1c  results from patients with these conditions.     Microbiology Results (last 7 days)     Procedure Component Value Units Date/Time    Blood culture [[id number]] Collected:  02/25/19 1812    Order Status:  Sent Specimen:  Blood Updated:  02/25/19 1813    Blood culture [[id number]] Collected:  02/25/19 1812    Order Status:  Sent Specimen:  Blood Updated:  02/25/19 1813        Diagnostic Results:  Chest X-Ray: Pending.    Assessment/Plan:     Active Hospital Problems    Diagnosis  POA    *Severe sepsis [A41.9, R65.20]  Likely due to infected venous ulcer of left lower extremity and cellulitis.  This patient does have evidence of infective focus  My overall impression is severe sepsis.  Antibiotics given- start intravenous Teflaro.  Antibiotics (From admission, onward)    Start     Stop Route Frequency Ordered    02/25/19 1700  vancomycin (VANCOCIN) 2,000 mg in dextrose 5 % 500 mL IVPB      02/26 0459 IV ED 1 Time 02/25/19 1649          Latest lactate reviewed, they are-  No results for input(s): LACTATE in the last 24 hours.  Waiting for lactic acid results.    Organ dysfunction indicated by acute kidney injury and abnormal liver enzymes  Source- left lower extremity infected venous stasis ulcer/cellulitis.  Obtain chest x-ray and UA.  Use IV pressor agent to maintain map above 65 mm of mercury if needed.    Yes    Abnormal liver enzymes [R74.8]  Likely ischemic hepatitis.  Trend LFTs, check acute hepatitis panel, avoid Tylenol based medications and hold statin therapy.  US abdomen.    Yes    Antithrombin 3 deficiency [D68.59]  Yes    Protein C deficiency [D68.59]  On Eliquis, adjust does due to renal impairment.    Yes    Combined hyperlipidemia associated with type 2 diabetes mellitus [E11.69, E78.2]  Hold statin therapy and monitor liver enzymes.    Yes     Coronary artery disease [I25.10]  Hold all antihypertensive agents.  Tele monitor.  Serial cardiac enzymes.    Yes    Hypertension associated with diabetes [E11.59, I10]  Hold all antihypertensive and diuretic therapy agents.    Yes    Sleep apnea [G47.30]  Use CPAP as needed.    Yes    ARF (acute renal failure) [N17.9]  Continue IVF hydration.  Discontinue vancomycin due to renal impairment.  Hold all antihypertensive agents and diuretic therapy agents.  Follow renal panel and electrolytes closely.  Consult Nephrology.  Check Renal Ultrasound.  Adjust renal dose medications for creatinine clearance 10-50cc/min.  Avoid NSAIDs, Galaviz-II inhibitors, ACE-I, Angiotensin Receptor Blockers, or Aminoglycosides.    Yes           DVT prophylaxis:  On Eliquis.    Discussed with patient's wife in detail, answered all questions.    Tracy Carvajal MD  Department of Hospital Medicine   [facility]

## 2019-02-26 NOTE — NURSING
Situation Principle Problem:  Severe sepsis      Reason for Calling: Pt has orders for a ryder    Provider Calling: Shirin Silveira Np   Background Vitals:    02/25/19 1802 02/25/19 1907 02/1950 02/25/19 2012   BP: (!) 101/54  110/61 (!) 112/54   Pulse: 78 77  77   Resp:    20   Temp:    97.9 °F (36.6 °C)   TempSrc:       SpO2: 100% 100%  99%   Weight:       Height:           No results found for: POCTGLUCOSE    Intake/Output:  No intake or output data in the 24 hours ending 02/25/19 2119     Assessment What is happening: Pt came onto unit with UTI/sepsis/ASHLEY.  He has orders for a ryder, but it states for ICU.  Was these orders a mistake?  Thank you!!!.     Response Provider Response: no ryder needed

## 2019-02-26 NOTE — PLAN OF CARE
Problem: Fall Injury Risk  Goal: Absence of Fall and Fall-Related Injury  Outcome: Ongoing (interventions implemented as appropriate)  Remains free of falls during shift

## 2019-02-26 NOTE — NURSING
Pt arrived onto unit via bed and transferred into room.  Call light within reach.  VS per chart.  No needs stated at this time.  Oriented to room.

## 2019-02-26 NOTE — CONSULTS
INPATIENT NEPHROLOGY CONSULT   [facility] NEPHROLOGY    Lukas Chance  02/26/2019    Reason for consultation:    Acute kidney injury    Chief Complaint:   Chief Complaint   Patient presents with    Weakness     x 2 days / recent falls     Dizziness          History of Present Illness:     Per H and P  Patient is a 69 y.o. male admitted to Hospitalist Service from [facility] Emergency Room with complaint of generalized weakness and frequent falls for 3 days. Patient reportedly has past medical history significant for morbid obesity, history of recurrent leg cellulitis/venous stasis, diabetes mellitus type 2, hypertension, history of pulmonary embolism (anti thrombin 3 deficiency, on Eliquis) and history of obstructive sleep apnea.  Patient presented to the emergency room for progressively worsening generalized weakness for 2-3 days.  Patient feels dizzy upon getting up.  Patient is not able to get up or sit due to generalized weakness.  Left leg appears to be red and hot.  No recent fevers or chills cough or shortness of breath reported patient reports compliance with Eliquis and other medications patient also reports he suffers with chronic low back pain. Patient denied chest pain, shortness of breath, abdominal pain, nausea, vomiting, headache, vision changes, focal neuro-deficits, cough or fever.      2/26  Pt feels good this am.  No nausea, bowel complaints, sob, chest pain.  He has a good appetite      Plan of Care:       Assessment:    Acute kidney injury likely hemodynamically mediated.  He may have acute tubular necrosis given presence of granular casts  Metabolic acidosis-non gap.  Base ph on ua implicates possible type I RTA  Hypotension  anemia  ckd III    Plan:    Renal Ultrasound  IV Fluid resuscitation  Renal medications for crcl 10-50cc/min  No Nsaids, CoxII inhibitors, Ace-I, Angiotensin Receptor Blockers, or Aminoglycosides  Urinalysis with micro  GATO, C3, C4, ESR  Urine Na, Cr,  Protein, urine anion gap  Keep mean arterial pressure greater than 60  Iron stores  Trend bun/creatine and hemoglobin  Monitor serum potassium  Strict I/Os         Thank you for allowing us to participate in this patient's care. We will continue to follow.    Vital Signs:  Temp Readings from Last 3 Encounters:   02/26/19 98 °F (36.7 °C) (Oral)   01/22/19 98.1 °F (36.7 °C) (Oral)   07/17/18 98.2 °F (36.8 °C) (Oral)       Pulse Readings from Last 3 Encounters:   02/26/19 72   01/22/19 81   07/17/18 76       BP Readings from Last 3 Encounters:   02/26/19 132/60   01/22/19 100/66   07/17/18 132/66       Weight:  Wt Readings from Last 3 Encounters:   02/25/19 (!) 175.5 kg (386 lb 14.5 oz)   01/22/19 (!) 175 kg (385 lb 12.9 oz)   07/17/18 (!) 179.2 kg (395 lb 1 oz)       Past Medical & Surgical History:  Past Medical History:   Diagnosis Date    Anticoagulant long-term use 3/1/2013    Antithrombin 3 deficiency     Cellulitis     lower legs, venous stasis    Coronary artery disease     Diabetes mellitus type 2 in obese 5/15/2013    Hypertension     Obesity     LAKHWINDER (obstructive sleep apnea)     Pulmonary embolism        Past Surgical History:   Procedure Laterality Date    COLONOSCOPY  5/2/2011    Dr. Miller, 9 polyps, recheck 5 year    MULTIPLE TOOTH EXTRACTIONS      RADIOFREQUENCY ABLATION  7/2015    bilateral     TONSILLECTOMY         Past Social History:  Social History     Socioeconomic History    Marital status:      Spouse name: None    Number of children: None    Years of education: None    Highest education level: None   Social Needs    Financial resource strain: None    Food insecurity - worry: None    Food insecurity - inability: None    Transportation needs - medical: None    Transportation needs - non-medical: None   Occupational History    None   Tobacco Use    Smoking status: Former Smoker     Packs/day: 1.00     Years: 20.00     Pack years: 20.00     Last attempt to quit:  "1/3/2002     Years since quittin.1    Smokeless tobacco: Never Used   Substance and Sexual Activity    Alcohol use: Yes     Alcohol/week: 0.6 oz     Types: 1 Cans of beer per week     Comment: seldom    Drug use: No    Sexual activity: Yes     Partners: Female   Other Topics Concern    None   Social History Narrative    None       Medications:  No current facility-administered medications on file prior to encounter.      Current Outpatient Medications on File Prior to Encounter   Medication Sig Dispense Refill    acetaminophen (TYLENOL EXTRA STRENGTH) 500 MG tablet Take 500 mg by mouth 2 (two) times daily as needed.       ELIQUIS 5 mg Tab Take 1 tablet (5 mg total) by mouth 2 (two) times daily. 180 tablet 3    furosemide (LASIX) 20 MG tablet Take 1 tablet (20 mg total) by mouth once daily. (Patient taking differently: Take 20 mg by mouth daily as needed. ) 90 tablet 1    lisinopril-hydrochlorothiazide (PRINZIDE,ZESTORETIC) 20-25 mg Tab Take 1 tablet by mouth once daily. 90 tablet 1    multivitamin (THERAGRAN) per tablet Take 1 tablet by mouth once daily.      pva-gentian violet-methyl blue (HYDROFERA BLUE) 6 X 6 " Bndg Change dressing daily 30 each prn    rosuvastatin (CRESTOR) 20 MG tablet Take 1 tablet (20 mg total) by mouth once daily. 90 tablet 3    salmon oil/omega-3 fatty acids (SALMON OIL-1000 ORAL) Take 1 capsule by mouth once daily.      spironolactone (ALDACTONE) 100 MG tablet Take 1 tablet (100 mg total) by mouth once daily. 90 tablet 1     Scheduled Meds:   apixaban  2.5 mg Oral BID    ceftaroline (TEFLARO) IVPB  400 mg Intravenous Q12H    pantoprazole  40 mg Oral Daily     Continuous Infusions:   sodium chloride 0.9% 125 mL/hr at 19 0620     PRN Meds:.acetaminophen, HYDROcodone-acetaminophen, ondansetron, senna-docusate 8.6-50 mg, sodium chloride 0.9%    Allergies:  Bactroban [mupirocin calcium] and Metoprolol    Past Family History:  Reviewed; refer to Hospitalist " "Admission Note    Review of Systems:  Review of Systems - All 14 systems reviewed and negative, except as noted in HPI    Physical Exam:    /60 (Patient Position: Lying)   Pulse 72   Temp 98 °F (36.7 °C) (Oral)   Resp (!) 22   Ht 5' 11" (1.803 m)   Wt (!) 175.5 kg (386 lb 14.5 oz)   SpO2 99%   BMI 53.96 kg/m²     General Appearance:    Alert, cooperative, no distress, appears stated age   Head:    Normocephalic, without obvious abnormality, atraumatic   Eyes:    PER, conjunctiva/corneas clear, EOM's intact in both eyes        Throat:   Lips, mucosa, and tongue normal; teeth and gums normal   Back:     Symmetric, no curvature, ROM normal, no CVA tenderness   Lungs:     Clear to auscultation bilaterally, respirations unlabored   Chest wall:    No tenderness or deformity   Heart:    Regular rate and rhythm, S1 and S2 normal, no murmur, rub   or gallop   Abdomen:     Soft, non-tender, bowel sounds active all four quadrants,     no masses, no organomegaly   Extremities:   Extremities normal, atraumatic, no cyanosis or edema   Pulses:   2+ and symmetric all extremities   MSK:   No joint or muscle swelling, tenderness or deformity   Skin:  venous stasis changes in legs   Neurologic:   CNII-XII intact, normal strength and sensation       Throughout.  No flap     Results:  Lab Results   Component Value Date     (L) 02/26/2019    K 4.6 02/26/2019     02/26/2019    CO2 16 (L) 02/26/2019    BUN 82 (H) 02/26/2019    CREATININE 2.8 (H) 02/26/2019    CALCIUM 8.5 (L) 02/26/2019    ANIONGAP 10 02/26/2019    ESTGFRAFRICA 25 (A) 02/26/2019    EGFRNONAA 22 (A) 02/26/2019       Lab Results   Component Value Date    CALCIUM 8.5 (L) 02/26/2019    PHOS 4.2 02/26/2019       Recent Labs   Lab 02/26/19  0152   WBC 18.10*   RBC 3.81*   HGB 11.4*   HCT 34.2*      MCV 90   MCH 30.0   MCHC 33.5       I have personally reviewed pertinent radiological imaging and reports.       "

## 2019-02-27 LAB
ALBUMIN SERPL BCP-MCNC: 2.4 G/DL
ALP SERPL-CCNC: 56 U/L
ALT SERPL W/O P-5'-P-CCNC: 140 U/L
ANION GAP SERPL CALC-SCNC: 6 MMOL/L
AORTIC ROOT ANNULUS: 3.05 CM
AORTIC VALVE CUSP SEPERATION: 2.34 CM
AST SERPL-CCNC: 354 U/L
AV INDEX (PROSTH): 0.9
AV MEAN GRADIENT: 8.07 MMHG
AV PEAK GRADIENT: 12.96 MMHG
AV VALVE AREA: 3.31 CM2
AV VELOCITY RATIO: 0.74
BASOPHILS # BLD AUTO: 0 K/UL
BASOPHILS NFR BLD: 0.3 %
BILIRUB SERPL-MCNC: 0.3 MG/DL
BNP SERPL-MCNC: 47 PG/ML
BSA FOR ECHO PROCEDURE: 2.96 M2
BUN SERPL-MCNC: 51 MG/DL
C3 SERPL-MCNC: 146 MG/DL
C4 SERPL-MCNC: 34 MG/DL
CALCIUM SERPL-MCNC: 8.4 MG/DL
CHLORIDE SERPL-SCNC: 108 MMOL/L
CK SERPL-CCNC: 9119 U/L
CO2 SERPL-SCNC: 20 MMOL/L
CREAT SERPL-MCNC: 1.8 MG/DL
CV ECHO LV RWT: 0.44 CM
DIFFERENTIAL METHOD: ABNORMAL
DOP CALC AO PEAK VEL: 1.8 M/S
DOP CALC AO VTI: 30.03 CM
DOP CALC LVOT AREA: 3.66 CM2
DOP CALC LVOT DIAMETER: 2.16 CM
DOP CALC LVOT PEAK VEL: 1.33 M/S
DOP CALC LVOT STROKE VOLUME: 99.33 CM3
DOP CALCLVOT PEAK VEL VTI: 27.12 CM
E WAVE DECELERATION TIME: 308.64 MSEC
E/A RATIO: 0.8
E/E' RATIO: 8.89
ECHO LV POSTERIOR WALL: 0.98 CM (ref 0.6–1.1)
EOSINOPHIL # BLD AUTO: 0.1 K/UL
EOSINOPHIL NFR BLD: 1 %
ERYTHROCYTE [DISTWIDTH] IN BLOOD BY AUTOMATED COUNT: 13.7 %
EST. GFR  (AFRICAN AMERICAN): 43 ML/MIN/1.73 M^2
EST. GFR  (NON AFRICAN AMERICAN): 37 ML/MIN/1.73 M^2
FRACTIONAL SHORTENING: 22 % (ref 28–44)
GLUCOSE SERPL-MCNC: 105 MG/DL
HCT VFR BLD AUTO: 33 %
HGB BLD-MCNC: 10.9 G/DL
INTERVENTRICULAR SEPTUM: 0.95 CM (ref 0.6–1.1)
IVRT: 0.1 MSEC
LEFT ATRIUM SIZE: 3.35 CM
LEFT INTERNAL DIMENSION IN SYSTOLE: 3.5 CM (ref 2.1–4)
LEFT VENTRICLE DIASTOLIC VOLUME INDEX: 32.68 ML/M2
LEFT VENTRICLE DIASTOLIC VOLUME: 91.14 ML
LEFT VENTRICLE MASS INDEX: 51.8 G/M2
LEFT VENTRICLE SYSTOLIC VOLUME INDEX: 18.3 ML/M2
LEFT VENTRICLE SYSTOLIC VOLUME: 50.95 ML
LEFT VENTRICULAR INTERNAL DIMENSION IN DIASTOLE: 4.47 CM (ref 3.5–6)
LEFT VENTRICULAR MASS: 144.39 G
LV LATERAL E/E' RATIO: 8.89
LV SEPTAL E/E' RATIO: 8.89
LYMPHOCYTES # BLD AUTO: 1.2 K/UL
LYMPHOCYTES NFR BLD: 9.7 %
MAGNESIUM SERPL-MCNC: 2.1 MG/DL
MAGNESIUM SERPL-MCNC: 2.1 MG/DL
MCH RBC QN AUTO: 29.8 PG
MCHC RBC AUTO-ENTMCNC: 33.1 G/DL
MCV RBC AUTO: 90 FL
MONOCYTES # BLD AUTO: 1 K/UL
MONOCYTES NFR BLD: 7.7 %
MV PEAK A VEL: 1 M/S
MV PEAK E VEL: 0.8 M/S
NEUTROPHILS # BLD AUTO: 10.4 K/UL
NEUTROPHILS NFR BLD: 81.3 %
PHOSPHATE SERPL-MCNC: 3.4 MG/DL
PLATELET # BLD AUTO: 228 K/UL
PMV BLD AUTO: 7.9 FL
POTASSIUM SERPL-SCNC: 4.9 MMOL/L
PROT SERPL-MCNC: 6.6 G/DL
PULM VEIN A" WAVE DURATION": 92 MSEC
PV PEAK VELOCITY: 0.97 CM/S
RBC # BLD AUTO: 3.67 M/UL
RIGHT VENTRICULAR END-DIASTOLIC DIMENSION: 3.24 CM
SODIUM SERPL-SCNC: 134 MMOL/L
TDI LATERAL: 0.09
TDI SEPTAL: 0.09
TDI: 0.09
TRICUSPID ANNULAR PLANE SYSTOLIC EXCURSION: 2.99 CM
URATE SERPL-MCNC: 9.9 MG/DL
WBC # BLD AUTO: 12.8 K/UL

## 2019-02-27 PROCEDURE — G8978 MOBILITY CURRENT STATUS: HCPCS | Mod: CK | Performed by: PHYSICAL THERAPIST

## 2019-02-27 PROCEDURE — 86038 ANTINUCLEAR ANTIBODIES: CPT

## 2019-02-27 PROCEDURE — 86160 COMPLEMENT ANTIGEN: CPT | Mod: 59

## 2019-02-27 PROCEDURE — 86039 ANTINUCLEAR ANTIBODIES (ANA): CPT

## 2019-02-27 PROCEDURE — 25000003 PHARM REV CODE 250: Performed by: NURSE PRACTITIONER

## 2019-02-27 PROCEDURE — 86235 NUCLEAR ANTIGEN ANTIBODY: CPT | Mod: 59

## 2019-02-27 PROCEDURE — 97161 PT EVAL LOW COMPLEX 20 MIN: CPT | Performed by: PHYSICAL THERAPIST

## 2019-02-27 PROCEDURE — G8979 MOBILITY GOAL STATUS: HCPCS | Mod: CI | Performed by: PHYSICAL THERAPIST

## 2019-02-27 PROCEDURE — 83880 ASSAY OF NATRIURETIC PEPTIDE: CPT

## 2019-02-27 PROCEDURE — 25000003 PHARM REV CODE 250: Performed by: INTERNAL MEDICINE

## 2019-02-27 PROCEDURE — 83735 ASSAY OF MAGNESIUM: CPT

## 2019-02-27 PROCEDURE — 80053 COMPREHEN METABOLIC PANEL: CPT

## 2019-02-27 PROCEDURE — 99232 PR SUBSEQUENT HOSPITAL CARE,LEVL II: ICD-10-PCS | Mod: ,,, | Performed by: INTERNAL MEDICINE

## 2019-02-27 PROCEDURE — 12000002 HC ACUTE/MED SURGE SEMI-PRIVATE ROOM

## 2019-02-27 PROCEDURE — 86334 IMMUNOFIX E-PHORESIS SERUM: CPT | Mod: 26,,, | Performed by: PATHOLOGY

## 2019-02-27 PROCEDURE — 25000003 PHARM REV CODE 250: Performed by: EMERGENCY MEDICINE

## 2019-02-27 PROCEDURE — 82550 ASSAY OF CK (CPK): CPT

## 2019-02-27 PROCEDURE — 36415 COLL VENOUS BLD VENIPUNCTURE: CPT

## 2019-02-27 PROCEDURE — 86334 IMMUNOFIX E-PHORESIS SERUM: CPT

## 2019-02-27 PROCEDURE — 84165 PROTEIN E-PHORESIS SERUM: CPT | Mod: 26,,, | Performed by: PATHOLOGY

## 2019-02-27 PROCEDURE — 86160 COMPLEMENT ANTIGEN: CPT

## 2019-02-27 PROCEDURE — 85025 COMPLETE CBC W/AUTO DIFF WBC: CPT

## 2019-02-27 PROCEDURE — 86235 NUCLEAR ANTIGEN ANTIBODY: CPT

## 2019-02-27 PROCEDURE — 84550 ASSAY OF BLOOD/URIC ACID: CPT

## 2019-02-27 PROCEDURE — 84165 PATHOLOGIST INTERPRETATION SPE: ICD-10-PCS | Mod: 26,,, | Performed by: PATHOLOGY

## 2019-02-27 PROCEDURE — 99232 SBSQ HOSP IP/OBS MODERATE 35: CPT | Mod: ,,, | Performed by: INTERNAL MEDICINE

## 2019-02-27 PROCEDURE — 84165 PROTEIN E-PHORESIS SERUM: CPT

## 2019-02-27 PROCEDURE — 86334 PATHOLOGIST INTERPRETATION IFE: ICD-10-PCS | Mod: 26,,, | Performed by: PATHOLOGY

## 2019-02-27 PROCEDURE — 63600175 PHARM REV CODE 636 W HCPCS: Performed by: INTERNAL MEDICINE

## 2019-02-27 PROCEDURE — 84100 ASSAY OF PHOSPHORUS: CPT

## 2019-02-27 RX ORDER — MICONAZOLE NITRATE 2 %
POWDER (GRAM) TOPICAL 2 TIMES DAILY
Status: DISCONTINUED | OUTPATIENT
Start: 2019-02-27 | End: 2019-03-01 | Stop reason: HOSPADM

## 2019-02-27 RX ADMIN — APIXABAN 2.5 MG: 2.5 TABLET, FILM COATED ORAL at 08:02

## 2019-02-27 RX ADMIN — PANTOPRAZOLE SODIUM 40 MG: 40 TABLET, DELAYED RELEASE ORAL at 09:02

## 2019-02-27 RX ADMIN — HYDROCODONE BITARTRATE AND ACETAMINOPHEN 1 TABLET: 5; 325 TABLET ORAL at 04:02

## 2019-02-27 RX ADMIN — APIXABAN 2.5 MG: 2.5 TABLET, FILM COATED ORAL at 09:02

## 2019-02-27 RX ADMIN — CEFTAROLINE FOSAMIL 400 MG: 400 POWDER, FOR SOLUTION INTRAVENOUS at 09:02

## 2019-02-27 RX ADMIN — HYDROCODONE BITARTRATE AND ACETAMINOPHEN 1 TABLET: 5; 325 TABLET ORAL at 10:02

## 2019-02-27 RX ADMIN — CEFTAROLINE FOSAMIL 600 MG: 600 POWDER, FOR SOLUTION INTRAVENOUS at 08:02

## 2019-02-27 RX ADMIN — Medication: at 08:02

## 2019-02-27 RX ADMIN — HYDROCODONE BITARTRATE AND ACETAMINOPHEN 1 TABLET: 5; 325 TABLET ORAL at 12:02

## 2019-02-27 RX ADMIN — SODIUM CHLORIDE: 0.9 INJECTION, SOLUTION INTRAVENOUS at 09:02

## 2019-02-27 NOTE — PLAN OF CARE
Chronic venous stasis ulcers LLE. 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  Discussed plan of care with patient: Recommend he see Citlali Harrison OT as outpatient, and also f/u Dr Harris.  Wound care orders written .

## 2019-02-27 NOTE — PROGRESS NOTES
Pharmacist Renal Dose Adjustment Note    Lukas Chance is a 69 y.o. male being treated with the medication ceftaroline    Patient Data:    Vital Signs (Most Recent):  Temp: 98.8 °F (37.1 °C) (02/27/19 1224)  Pulse: 96 (02/27/19 1224)  Resp: 20 (02/27/19 1224)  BP: (!) 125/59 (02/27/19 1224)  SpO2: (!) 93 % (02/27/19 1224)   Vital Signs (72h Range):  Temp:  [96.4 °F (35.8 °C)-99.1 °F (37.3 °C)]   Pulse:  [72-96]   Resp:  [16-28]   BP: ()/(45-61)   SpO2:  [93 %-100 %]      Recent Labs   Lab 02/25/19  1720 02/26/19  0152 02/27/19  0618   CREATININE 3.4* 2.8* 1.8*     Serum creatinine: 1.8 mg/dL (H) 02/27/19 0618  Estimated creatinine clearance: 61.3 mL/min (A)    Medication:ceftaroline 400mg Q12H will be changed to medication:ceftaroline 600mg q12h    Pharmacist's Name: Amelia Larios

## 2019-02-27 NOTE — PROGRESS NOTES
Progress Note  Hospital Medicine  Patient Name:Lukas Chance  MRN:  [id number]  Patient Class: IP- Inpatient  Admit Date: 2/25/2019  Length of Stay: 2 days  Expected Discharge Date:   Attending Physician: Tracy Carvajal MD  Primary Care Provider:  Solitario Mendez MD    SUBJECTIVE:     Principal Problem: Severe sepsis  Initial history of present illness: Patient is a 69 y.o. male admitted to Hospitalist Service from [facility] Emergency Room with complaint of generalized weakness and frequent falls for 3 days. Patient reportedly has past medical history significant for morbid obesity, history of recurrent leg cellulitis/venous stasis, diabetes mellitus type 2, hypertension, history of pulmonary embolism (anti thrombin 3 deficiency, on Eliquis) and history of obstructive sleep apnea.  Patient presented to the emergency room for progressively worsening generalized weakness for 2-3 days.  Patient feels dizzy upon getting up.  Patient is not able to get up or sit due to generalized weakness.  Left leg appears to be red and hot.  No recent fevers or chills cough or shortness of breath reported patient reports compliance with Eliquis and other medications patient also reports he suffers with chronic low back pain. Patient denied chest pain, shortness of breath, abdominal pain, nausea, vomiting, headache, vision changes, focal neuro-deficits, cough or fever.    PMH/PSH/SH/FH/Meds: reviewed.    Symptoms/Review of Systems:  Afebrile, feeling better with intravenous fluid hydration, significant seepage noted from left lower extremity ulceration/cellulitis site. No shortness of breath, cough, chest pain or headache, fever or abdominal pain.     Diet:  Adequate intake.    Activity level:  Up with assist  Pain:  Patient reports no pain.       OBJECTIVE:   Vital Signs (Most Recent):      Temp: 97.9 °F (36.6 °C) (02/27/19 0702)  Pulse: 79 (02/27/19 0702)  Resp: 16 (02/27/19 0702)  BP: (!) 126/58 (02/27/19 0702)  SpO2: 97  % (02/27/19 0702)       Vital Signs Range (Last 24H):  Temp:  [96.4 °F (35.8 °C)-99.1 °F (37.3 °C)]   Pulse:  [73-87]   Resp:  [16-20]   BP: ()/(46-58)   SpO2:  [95 %-98 %]     Weight: (!) 175.1 kg (386 lb)  Body mass index is 53.84 kg/m².    Intake/Output Summary (Last 24 hours) at 2/27/2019 0907  Last data filed at 2/27/2019 0600  Gross per 24 hour   Intake 2590 ml   Output 4150 ml   Net -1560 ml     Physical Examination:  General appearance: well developed, appears stated age, morbidly obese male  Head: normocephalic, atraumatic  Eyes:  conjunctivae/corneas clear. PERRL.  Nose: Nares normal. Septum midline.  Throat: lips, mucosa, and tongue normal; teeth and gums normal, no throat erythema.  Neck: supple, symmetrical, trachea midline, no JVD and thyroid not enlarged, symmetric, no tenderness/mass/nodules  Lungs:  clear to auscultation bilaterally and normal respiratory effort  Chest wall: no tenderness  Heart: regular rate and rhythm, S1, S2 normal, no murmur, click, rub or gallop  Abdomen: soft, non-tender non-distented; bowel sounds normal; no masses,  no organomegaly  Extremities: no cyanosis, clubbing or edema. Large venous stasis ulcer outside the left lower leg.  Left lower leg is red hot and tender, [place] sign equivocal bilaterally.  Pulses: 2+ and symmetric  Skin: Skin color, texture, turgor normal. No rashes or lesions.  Lymph nodes: Cervical, supraclavicular, and axillary nodes normal.  Neurologic: Normal strength and tone. No focal numbness or weakness. CNII-XII intact.     CBC:  Recent Labs   Lab 02/25/19  1720 02/26/19  0152 02/27/19  0618   WBC 23.00* 18.10* 12.80*   RBC 4.03* 3.81* 3.67*   HGB 12.1* 11.4* 10.9*   HCT 36.3* 34.2* 33.0*    220 228   MCV 90 90 90   MCH 30.0 30.0 29.8   MCHC 33.3 33.5 33.1   BMP  Recent Labs   Lab 02/25/19  1720 02/26/19  0152 02/27/19  0618   GLU 93 103 105   * 128* 134*   K 5.3* 4.6 4.9   CL 99 102 108   CO2 17* 16* 20*   BUN 87* 82* 51*    CREATININE 3.4* 2.8* 1.8*   CALCIUM 9.2 8.5* 8.4*   MG  --  2.2  2.2 2.1  2.1      Diagnostic Results:  Microbiology Results (last 7 days)     Procedure Component Value Units Date/Time    Blood culture [[id number]] Collected:  02/25/19 1758    Order Status:  Completed Specimen:  Blood from Antecubital, Left Updated:  02/26/19 2312     Blood Culture, Routine No Growth to date     Blood Culture, Routine No Growth to date    Narrative:       Aerobic and anaerobic    Blood culture [[id number]] Collected:  02/25/19 1812    Order Status:  Completed Specimen:  Blood from Antecubital, Right Updated:  02/26/19 2312     Blood Culture, Routine No Growth to date     Blood Culture, Routine No Growth to date    Narrative:       Aerobic and anaerobic         Ultrasound abdomen:  Nodular hepatic contour raising consideration for cirrhosis.  Cholelithiasis.    Left leg venous Doppler ultrasound:  No evidence of deep venous thrombosis in the left lower extremity. Left inguinal lymphadenopathy.    Chest x-ray:  No acute process.    Assessment/Plan:      *Severe sepsis [A41.9, R65.20]  Likely due to infected venous ulcer of left lower extremity and cellulitis.  This patient does have evidence of infective focus  My overall impression is severe sepsis.  Antibiotics given- continue intravenous Teflaro.  Lactate level within normal limits  Organ dysfunction indicated by acute kidney injury and abnormal liver enzymes  Source- left lower extremity infected venous stasis ulcer/cellulitis.      Yes    Abnormal liver enzymes [R74.8]  Possible cirrhosis of liver, likely non steatohepatitis related  Possible ischemic hepatitis versus POWERS.  Trend LFTs,  follow acute hepatitis panel, avoid Tylenol based medications and hold statin therapy. Follow Dr. Bishop his recommendation.  Patient to follow up as outpatient with him.      Yes    Antithrombin 3 deficiency [D68.59]   Yes    Protein C deficiency [D68.59]  On Eliquis, adjust does due to  renal impairment.      Yes    Combined hyperlipidemia associated with type 2 diabetes mellitus [E11.69, E78.2]  Hold statin therapy and monitor liver enzymes.      Yes    Coronary artery disease [I25.10]  Elevated troponin level  Hold all antihypertensive agents.  Tele monitor.  Follow 2D echocardiogram and cardiologist recommendations.      Yes    Hypertension associated with diabetes [E11.59, I10]  Hold all antihypertensive and diuretic therapy agents.      Yes    Sleep apnea [G47.30]  Use CPAP as needed.      Yes    ARF (acute renal failure) [N17.9] - improving  Acute tubular necrosis  Acute rhabdomyolysis  Continue IVF hydration.   Hold all antihypertensive agents and diuretic therapy agents.  Follow renal panel and electrolytes closely.  Follow Nephrology recommendation. No hydronephrosis or obstructive uropathy reported on abdominal ultrasound.  Adjust renal dose medications for creatinine clearance 10-50cc/min.  Avoid NSAIDs, Galaviz-II inhibitors, ACE-I, Angiotensin Receptor Blockers, or Aminoglycosides.         Discussed with family member.  Patient encouraged to get out of bed and increase ambulation.    VTE Risk Mitigation (From admission, onward)        Ordered     apixaban tablet 2.5 mg  2 times daily      02/25/19 1954     IP VTE HIGH RISK PATIENT  Once      02/25/19 1954        Tracy Carvajal MD  Department of Hospital Medicine   [facility]

## 2019-02-27 NOTE — PT/OT/SLP EVAL
Physical Therapy Evaluation    Patient Name:  Lukas Chance   MRN:  [id number]    Recommendations:     Discharge Recommendations:  other (see comments)(HHPT)   Discharge Equipment Recommendations: other (see comments)(Bariatric RW)   Barriers to discharge: None    Assessment:     Lukas Chance is a 69 y.o. male admitted with a medical diagnosis of Severe sepsis, with generalized weakness, frequent falls which he feels are due to drop in BP, Infected L LE ulcer and cellulitis. PMH: recurrent cellulitis, venous stasis, PM, DM2.  He presents with the following impairments/functional limitations:  weakness, impaired functional mobilty, decreased safety awareness, impaired cardiopulmonary response to activity, impaired endurance, gait instability, impaired balance, impaired self care skills, decreased lower extremity function.  During PT evaluation, he required CGA for sit<>Stand and CGA for gait x 60ft with RW.  Pt was notably SOB during ambulation- SpO2 remained >90%, however HR elevated to 130bpm.  Recommend HHPT, and bariatric RW upon d/c.     Rehab Prognosis: Fair; patient would benefit from acute skilled PT services to address these deficits and reach maximum level of function.    Recent Surgery: * No surgery found *      Plan:     During this hospitalization, patient to be seen 6 x/week to address the identified rehab impairments via gait training, therapeutic activities, therapeutic exercises and progress toward the following goals:    · Plan of Care Expires:  03/13/19    Subjective     Chief Complaint: that he has not been able to get out of the bed  Patient/Family Comments/goals: none stated  Pain/Comfort:  · Pain Rating 1: 0/10    Patients cultural, spiritual, Amish conflicts given the current situation: no    Living Environment:  Pt lives in a 2 story home with his wife. He is still working, but is sitting down most of the day.  Pt's bedroom is on the 2nd level, and he has 2 steps to enter the home.  He  reports 2 falls recently where he became lightheaded/dizzy and his legs gave out.   Prior to admission, patients level of function was independent.  Equipment used at home: cane, straight, standard walker.  DME owned (not currently used): none.  Upon discharge, patient will have assistance from wife.    Objective:     Communicated with nurse Garcia prior to session.  Patient found sitting up on the EOB, friend present peripheral IV, telemetry, ryder catheter  upon PT entry to room.    General Precautions: Standard,     Orthopedic Precautions:N/A   Braces: N/A     Exams:  · Cognitive Exam:  Patient is oriented to Person, Place, Time and Situation  · Postural Exam:  Patient presented with the following abnormalities:    · -       Rounded shoulders  · -       Forward head  · -       Abnormal trunk flexion  · Skin Integrity/Edema:      · -       Skin integrity: L LE with bandage from the knee down to the foot.   · RLE ROM: Deficits: limited due to body habitus, but grossly WFL  · RLE Strength: WFL  · LLE ROM: Deficits: limited due to bandage on L LE, and body habitus, but grossly WFL  · LLE Strength: WFL    Functional Mobility:  · Transfers:     · Sit to Stand:  contact guard assistance with rolling walker  · Gait: 60ft with RW and CGA.  Pt was notably SOB during ambulation with SpO2 >90% on RA, but HR up to 130bpm  · Balance: fair -      AM-PAC 6 CLICK MOBILITY  Total Score:17     Patient left up in chair with all lines intact, call button in reach and nurse Radha notified.    GOALS:   Multidisciplinary Problems     Physical Therapy Goals        Problem: Physical Therapy Goal    Goal Priority Disciplines Outcome Goal Variances Interventions   Physical Therapy Goal     PT, PT/OT Ongoing (interventions implemented as appropriate)     Description:  Goals to be met by: 3/12/2019     Patient will increase functional independence with mobility by performin. Supine to sit with Modified [place]  2. Sit to supine  with Modified [place]  3. Sit to stand transfer with Modified [place]  4. Bed to chair transfer with Modified [place] using Rolling Walker  5. Gait  x 150 feet with Supervision using Rolling Walker.   6. Ascend/descend 5 stair with bilateral Handrails Contact Guard Assistance.   7. Lower extremity exercise program x10-20 reps per handout, with independence                      History:     Past Medical History:   Diagnosis Date    Anticoagulant long-term use 3/1/2013    Antithrombin 3 deficiency     Cellulitis     lower legs, venous stasis    Coronary artery disease     Diabetes mellitus type 2 in obese 5/15/2013    Hypertension     Obesity     LAKHWINDER (obstructive sleep apnea)     Pulmonary embolism        Past Surgical History:   Procedure Laterality Date    COLONOSCOPY  5/2/2011    Dr. Miller, 9 polyps, recheck 5 year    MULTIPLE TOOTH EXTRACTIONS      RADIOFREQUENCY ABLATION  7/2015    bilateral     TONSILLECTOMY         Time Tracking:     PT Received On: 02/27/19  PT Start Time: 1429     PT Stop Time: 1443  PT Total Time (min): 14 min     Billable Minutes: Evaluation 14      Ginny Farmer, PT  02/27/2019

## 2019-02-27 NOTE — PLAN OF CARE
Problem: Adult Inpatient Plan of Care  Goal: Plan of Care Review  POC reviewed with pt who verbalized understanding, pt AAOX4, PIV 22G to right upper arm and left forearm, sites clean dry and intact no redness or swelling noted, IVF as ordered, remains afebrile during shift no s/s of infection noted, ryder in place to gravity draining clear yellow urine, remains free of falls/injury during shift, pain controlled with pain meds, bed in low and locked position with side rails X2, safety maintained, personal items and call light in reach at bedside, will cont to monitor for safety

## 2019-02-27 NOTE — PLAN OF CARE
Problem: Adult Inpatient Plan of Care  Goal: Plan of Care Review  Plan of care reviewed with patient. Patient verbalized understanding. AAO x 4. VSS/NADN. IV fluids/antibiotics infusing. PRN pain meds given with relief noted. Villalpando catheter intact draining clear yellow urine. Dsg to LLE CDI. SR up x 2, bed in lowest position, call light in reach. Will continue to monitor.

## 2019-02-27 NOTE — CONSULTS
Consult Note  Infectious Disease    Reason for Consult:  Cellulitis with ulcerations    HPI: Lukas Chance is a  69 y.o. male who sustained a couple of falls a week or two ago. Since then he has had progressive swelling and redness LLE. He became too weak to move about and came to the ED. He had elevated WBC but no fever. He was found to have extensive venous stasis ulcerations of the LLE. He states that these have been present a long time. He was attending [facility] outpatient wound care but his provider left and he has been doing his own care at home. He spends all day at a desk with feet dependent and tells me that one of is employees wraps his legs every day. He was admitted and placed on Teflaro. He was found to have rhabdomyolysis , ASHLEY and was seen by nephrology. Crestor was held. Last antibiotics were about a month ago from PCP for leg.     Review of patient's allergies indicates:   Allergen Reactions    Bactroban [mupirocin calcium] Other (See Comments)     Burning feeling on open sore     Metoprolol Other (See Comments)     dizzy     Past Medical History:   Diagnosis Date    Anticoagulant long-term use 3/1/2013    Antithrombin 3 deficiency     Cellulitis     lower legs, venous stasis    Coronary artery disease     Diabetes mellitus type 2 in obese 5/15/2013    Hypertension     Obesity     LAKHWINDER (obstructive sleep apnea)     Pulmonary embolism    prior DVT    Past Surgical History:   Procedure Laterality Date    COLONOSCOPY  5/2/2011    Dr. Miller, 9 polyps, recheck 5 year    MULTIPLE TOOTH EXTRACTIONS      RADIOFREQUENCY ABLATION  7/2015    bilateral     TONSILLECTOMY     laser venoplasty of the LLE     Social History     Socioeconomic History    Marital status:      Spouse name: None    Number of children: None    Years of education: None    Highest education level: None   Social Needs    Financial resource strain: None    Food insecurity - worry: None    Food insecurity -  inability: None    Transportation needs - medical: None    Transportation needs - non-medical: None   Occupational History    None   Tobacco Use    Smoking status: Former Smoker     Packs/day: 1.00     Years: 20.00     Pack years: 20.00     Last attempt to quit: 1/3/2002     Years since quittin.1    Smokeless tobacco: Never Used   Substance and Sexual Activity    Alcohol use: Yes     Alcohol/week: 0.6 oz     Types: 1 Cans of beer per week     Comment: seldom    Drug use: No    Sexual activity: Yes     Partners: Female   Other Topics Concern    None   Social History Narrative    None     Family History   Problem Relation Age of Onset    Heart disease Mother     Heart disease Father     Gallbladder disease Sister     Heart disease Brother     Stroke Brother     Emphysema Maternal Uncle        Pertinent medications noted:     Review of Systems:   No chills, fever, sweats  No pain in mouth or throat.  No chest pain, palpitations, syncope  No cough, sputum production,shortness of breath, No nausea, vomiting, diarrhea,   No swelling of joints, redness of joints,  No unusual headaches, but did have 2 falls recently  Tells me he was tested for sleep apnea 5 years ago and it was negative. He was at least 30lbs lighter then  No diabetes,   No bleeding, lymphadenopathy, anemia, malignancy, unusual bruising    EXAM & DIAGNOSTICS REVIEWED:   Vitals:     Temp:  [96.4 °F (35.8 °C)-98.8 °F (37.1 °C)]   Temp: 97.8 °F (36.6 °C) (19)  Pulse: 85 (19)  Resp: 20 (19)  BP: (!) 125/58 (19)  SpO2: 96 % (19)    Intake/Output Summary (Last 24 hours) at 2019  Last data filed at 2019 1800  Gross per 24 hour   Intake 3963.75 ml   Output 2950 ml   Net 1013.75 ml       General:  In NAD. Looks non toxic. Alert and attentive, cooperative  Eyes:  Anicteric, PERRL, EOMI  ENT:  Mouth w/ pink MMM, no lesions/exudate,   Neck:  Trachea midline, supple, no adenopathy  appreciated  Lungs: clear  Heart:  RRR, no gallop/murmur noted  Abd:  soft, NT, ND, normal BS, no masses/organomegaly appreciated.  :  Villalpando draining yellow urine, clear  Musc:  Joints without effusion, swelling,  erythema, synovitis,   Skin:  Chronic venous stasis BLE, hyperpigmented right lower leg. Very poor foot hygiene. Dystrophic nails. Candida intertrigo left infra-abdominal folds.   Wound: Extensive LLE ulcerations from venous stasis posterior left lower leg  Neuro: AAOx3, speech clear, moves all extrems equally  Extrem: Chronic BLE edema, L>R, bright erythema from left ankle to knee with streak running transverse medial mid thigh. Faint cellulitis left proximal thigh.    VAD:            General Labs reviewed:  Recent Labs   Lab 02/26/19 0152   WBC 18.10*   RBC 3.81*   HGB 11.4*   HCT 34.2*      MCV 90   MCH 30.0   MCHC 33.5     Recent Labs   Lab 02/26/19 0152   CALCIUM 8.5*   PROT 6.5   *   K 4.6   CO2 16*      BUN 82*   CREATININE 2.8*   ALKPHOS 57   *   *   BILITOT 0.4     Micro:  Microbiology Results (last 7 days)     Procedure Component Value Units Date/Time    Blood culture [[id number]] Collected:  02/25/19 1758    Order Status:  Completed Specimen:  Blood from Antecubital, Left Updated:  02/26/19 0515     Blood Culture, Routine No Growth to date    Narrative:       Aerobic and anaerobic    Blood culture [[id number]] Collected:  02/25/19 1812    Order Status:  Completed Specimen:  Blood from Antecubital, Right Updated:  02/26/19 0515     Blood Culture, Routine No Growth to date    Narrative:       Aerobic and anaerobic        Imaging Reviewed:   US of the legs for DVT, negative      IMPRESSION & PLAN   1. Cellulitis, infectious complicating venous stasis  2. Severe venous stasis ulcerations left lower leg (and poor hygiene)  3. supermorbid obesity  4. ASHLEY on CKD, rhabdomyolysis with CPK over [id number]  5. Suspect LAKHWINDER(negative sleep study 30 + lbs ago)  6. Candida  intertrigo  7.     Recommendation:   Teflaro is appropriate for his condition, followed by oral clindamycin  (zyvox or clindamycin would be as well)  Elevation(23 hours a day), wound care, compression  He will need to return to a formal wound care for management of his left leg wounds.  Topical antifungal to skin folds  Stop crestor(not currently on it)  He needs another outpatient sleep study

## 2019-02-27 NOTE — PROGRESS NOTES
[facility] NEPHROLOGY                                                                      PROGRESS NOTE    Lukas Chance  02/27/2019    Reason for consultation:    Acute kidney injury    Chief Complaint:   Chief Complaint   Patient presents with    Weakness     x 2 days / recent falls     Dizziness      History of Present Illness:     Per H and P  Patient is a 69 y.o. male admitted to Hospitalist Service from [facility] Emergency Room with complaint of generalized weakness and frequent falls for 3 days. Patient reportedly has past medical history significant for morbid obesity, history of recurrent leg cellulitis/venous stasis, diabetes mellitus type 2, hypertension, history of pulmonary embolism (anti thrombin 3 deficiency, on Eliquis) and history of obstructive sleep apnea.  Patient presented to the emergency room for progressively worsening generalized weakness for 2-3 days.  Patient feels dizzy upon getting up.  Patient is not able to get up or sit due to generalized weakness.  Left leg appears to be red and hot.  No recent fevers or chills cough or shortness of breath reported patient reports compliance with Eliquis and other medications patient also reports he suffers with chronic low back pain. Patient denied chest pain, shortness of breath, abdominal pain, nausea, vomiting, headache, vision changes, focal neuro-deficits, cough or fever.      2/26  Pt feels good this am.  No nausea, bowel complaints, sob, chest pain.  He has a good appetite  2/27  BUN/Scr improving, now 51/1.8.  Na+ improved to 134.  Acidosis improving as well.  Pt states feeling better, no c/o pain or SOB.    Plan of Care:       Assessment:    Acute kidney injury likely hemodynamically mediated.  He may have acute tubular necrosis given presence of granular casts  Metabolic acidosis-non gap.  Base ph on ua implicates possible type I RTA  Hypotension  anemia  ckd  III    Plan:    Renal Ultrasound done  IV Fluid resuscitation  Renal medications for crcl 10-50cc/min  No Nsaids, CoxII inhibitors, Ace-I, Angiotensin Receptor Blockers, or Aminoglycosides  GATO, C3, C4, ESR  Urine Na, Cr, Protein, urine anion gap--waiting on urine chloride  Keep mean arterial pressure greater than 60  Iron stores  Trend bun/creatine and hemoglobin  Monitor serum potassium  Strict I/Os         Thank you for allowing us to participate in this patient's care. We will continue to follow.    Vital Signs:  Temp Readings from Last 3 Encounters:   02/27/19 97.9 °F (36.6 °C) (Oral)   01/22/19 98.1 °F (36.7 °C) (Oral)   07/17/18 98.2 °F (36.8 °C) (Oral)       Pulse Readings from Last 3 Encounters:   02/27/19 79   01/22/19 81   07/17/18 76       BP Readings from Last 3 Encounters:   02/27/19 (!) 126/58   01/22/19 100/66   07/17/18 132/66       Weight:  Wt Readings from Last 3 Encounters:   02/26/19 (!) 175.1 kg (386 lb)   01/22/19 (!) 175 kg (385 lb 12.9 oz)   07/17/18 (!) 179.2 kg (395 lb 1 oz)       Past Medical & Surgical History:  Past Medical History:   Diagnosis Date    Anticoagulant long-term use 3/1/2013    Antithrombin 3 deficiency     Cellulitis     lower legs, venous stasis    Coronary artery disease     Diabetes mellitus type 2 in obese 5/15/2013    Hypertension     Obesity     LAKHWINDER (obstructive sleep apnea)     Pulmonary embolism        Past Surgical History:   Procedure Laterality Date    COLONOSCOPY  5/2/2011    Dr. Miller, 9 polyps, recheck 5 year    MULTIPLE TOOTH EXTRACTIONS      RADIOFREQUENCY ABLATION  7/2015    bilateral     TONSILLECTOMY         Past Social History:  Social History     Socioeconomic History    Marital status:      Spouse name: None    Number of children: None    Years of education: None    Highest education level: None   Social Needs    Financial resource strain: None    Food insecurity - worry: None    Food insecurity - inability: None     "Transportation needs - medical: None    Transportation needs - non-medical: None   Occupational History    None   Tobacco Use    Smoking status: Former Smoker     Packs/day: 1.00     Years: 20.00     Pack years: 20.00     Last attempt to quit: 1/3/2002     Years since quittin.1    Smokeless tobacco: Never Used   Substance and Sexual Activity    Alcohol use: Yes     Alcohol/week: 0.6 oz     Types: 1 Cans of beer per week     Comment: seldom    Drug use: No    Sexual activity: Yes     Partners: Female   Other Topics Concern    None   Social History Narrative    None       Medications:  No current facility-administered medications on file prior to encounter.      Current Outpatient Medications on File Prior to Encounter   Medication Sig Dispense Refill    acetaminophen (TYLENOL EXTRA STRENGTH) 500 MG tablet Take 500 mg by mouth 2 (two) times daily as needed.       ELIQUIS 5 mg Tab Take 1 tablet (5 mg total) by mouth 2 (two) times daily. 180 tablet 3    furosemide (LASIX) 20 MG tablet Take 1 tablet (20 mg total) by mouth once daily. (Patient taking differently: Take 20 mg by mouth daily as needed. ) 90 tablet 1    lisinopril-hydrochlorothiazide (PRINZIDE,ZESTORETIC) 20-25 mg Tab Take 1 tablet by mouth once daily. 90 tablet 1    multivitamin (THERAGRAN) per tablet Take 1 tablet by mouth once daily.      pva-gentian violet-methyl blue (HYDROFERA BLUE) 6 X 6 " Bndg Change dressing daily 30 each prn    rosuvastatin (CRESTOR) 20 MG tablet Take 1 tablet (20 mg total) by mouth once daily. 90 tablet 3    salmon oil/omega-3 fatty acids (SALMON OIL-1000 ORAL) Take 1 capsule by mouth once daily.      spironolactone (ALDACTONE) 100 MG tablet Take 1 tablet (100 mg total) by mouth once daily. 90 tablet 1     Scheduled Meds:   apixaban  2.5 mg Oral BID    ceftaroline (TEFLARO) IVPB  400 mg Intravenous Q12H    pantoprazole  40 mg Oral Daily     Continuous Infusions:   sodium chloride 0.9% 125 mL/hr at " "02/26/19 0620     PRN Meds:.acetaminophen, HYDROcodone-acetaminophen, ondansetron, senna-docusate 8.6-50 mg, sodium chloride 0.9%    Allergies:  Bactroban [mupirocin calcium] and Metoprolol    Past Family History:  Reviewed; refer to Hospitalist Admission Note    Review of Systems:  Review of Systems - All 14 systems reviewed and negative, except as noted in HPI    Physical Exam:    BP (!) 126/58 (BP Location: Right arm, Patient Position: Lying)   Pulse 79   Temp 97.9 °F (36.6 °C) (Oral)   Resp 16   Ht 5' 11" (1.803 m)   Wt (!) 175.1 kg (386 lb)   SpO2 97%   BMI 53.84 kg/m²     General Appearance:    Alert, cooperative, no distress, appears stated age   Head:    Normocephalic, without obvious abnormality, atraumatic   Eyes:    PER, conjunctiva/corneas clear, EOM's intact in both eyes        Throat:   Lips, mucosa, and tongue normal; teeth and gums normal   Back:     Symmetric, no curvature, ROM normal, no CVA tenderness   Lungs:     Clear to auscultation bilaterally, respirations unlabored   Chest wall:    No tenderness or deformity   Heart:    Regular rate and rhythm, S1 and S2 normal, no murmur, rub   or gallop   Abdomen:     Soft, non-tender, bowel sounds active all four quadrants,     no masses, no organomegaly   Extremities:   Extremities normal, atraumatic, no cyanosis or edema   Pulses:   2+ and symmetric all extremities   MSK:   No joint or muscle swelling, tenderness or deformity   Skin:  venous stasis changes in legs   Neurologic:   CNII-XII intact, normal strength and sensation       Throughout.  No flap     Results:  Lab Results   Component Value Date     (L) 02/27/2019    K 4.9 02/27/2019     02/27/2019    CO2 20 (L) 02/27/2019    BUN 51 (H) 02/27/2019    CREATININE 1.8 (H) 02/27/2019    CALCIUM 8.4 (L) 02/27/2019    ANIONGAP 6 (L) 02/27/2019    ESTGFRAFRICA 43 (A) 02/27/2019    EGFRNONAA 37 (A) 02/27/2019       Lab Results   Component Value Date    CALCIUM 8.4 (L) 02/27/2019    PHOS " 3.4 02/27/2019       Recent Labs   Lab 02/27/19  0618   WBC 12.80*   RBC 3.67*   HGB 10.9*   HCT 33.0*      MCV 90   MCH 29.8   MCHC 33.1       I have personally reviewed pertinent radiological imaging and reports.

## 2019-02-27 NOTE — CONSULTS
HISTORY OF PRESENT ILLNESS:  This 69-year-old patient was admitted with acute   exacerbation of chronic cellulitis of his left lower extremity.  He has been   having problems with his left leg in the past couple of years.  He is also   having sepsis and was worked up for that.  Cardiology is consulted due to   elevation of troponin levels.  The patient denies any prior history of heart   disease.  He denies any symptoms of chest pain, shortness of breath, paroxysmal   nocturnal dyspnea, palpitation or syncope.  He had pulmonary embolism a couple   of years ago and has been treated with Eliquis.    PAST MEDICAL HISTORY:  Chronic venous stasis, diabetes, hypertension, obesity,   sleep apnea and prior history of pulmonary embolism.    FAMILY HISTORY:  Positive for stroke and heart disease.    SOCIAL HISTORY:  He drinks one can of beer weekly.  He is a former smoker.    ALLERGIES:  BACTROBAN.    HOME MEDICATIONS:  Include Extra Strength Tylenol, furosemide 20 mg a day,   lisinopril/HCTZ 20/12.5 daily, Crestor 20 mg daily and spironolactone 100 mg   daily.  In the hospital, he has been only on Eliquis and antibiotics.    PHYSICAL EXAMINATION:  GENERAL:  Shows an obese male patient lying flat in bed, in no acute distress.  VITAL SIGNS:  Temperature 97, pulse 80 per minute and regular, blood pressure   126/58, O2 sats in room air was 97%, approximate weight is 175 kg.  HEENT:  Normocephalic, sclerae nonicteric.  NECK:  Supple.  There is no obvious jugular venous distention.  Carotids 1+   without bruit.  CARDIAC:  Regular rhythm.  No murmur, gallop or rub.  Heart sounds are distant  LUNGS:  Reveal diminished breath sounds due to poor respiratory effort.  ABDOMEN:  Markedly obese, soft, nontender.  Bowel sounds present.  EXTREMITIES:  Left leg is wrapped in a bandage up to the knee.  The right leg   has mild edema.    EKG on 02/26/2019 shows sinus rhythm, low QRS voltage, otherwise not abnormal.    Chest x-ray report not  available.    LABORATORY DATA:  WBC 12.8, hemoglobin 10.9, hematocrit 33, platelet count is   228.  PT 11.  Sodium 134, potassium 4.9, chloride 108, BUN 51, creatinine 1.8,   albumin 2.4, total protein 6.6, uric acid 9.9, AST, ALT ____ and 140   respectively.  CPK 39323, 38952 and 9119.  CK-MB 38 and ____, troponin 2.3 and   0.89.  BNP 47.    IMPRESSION:  1.  Probably sepsis.  2.  Acute-on-chronic cellulitis of the left leg.  3.  Markedly elevated CPK, rule out rhabdomyolysis.  4.  Abnormal liver function tests.  5.  Chronic kidney disease.  6.  Morbid obesity and hypoventilation, sleep apnea syndrome.    From a cardiac standpoint, the patient does not seem to have any acute coronary   event to account for elevation in troponin levels.  CPK is markedly elevated and   mild troponin elevation is probably due to underlying sepsis.  Echocardiogram   is pending.  Thank you very much.  We will follow with you.      /IN  dd: 02/27/2019 10:52:54 (CST)  td: 02/27/2019 16:19:54 (CST)  Doc ID   #[id number]  Job ID #[id number]    CC: Tracy Carvajal M.D.

## 2019-02-27 NOTE — PLAN OF CARE
Problem: Physical Therapy Goal  Goal: Physical Therapy Goal  Goals to be met by: 3/12/2019     Patient will increase functional independence with mobility by performin. Supine to sit with Modified [place]  2. Sit to supine with Modified [place]  3. Sit to stand transfer with Modified [place]  4. Bed to chair transfer with Modified [place] using Rolling Walker  5. Gait  x 150 feet with Supervision using Rolling Walker.   6. Ascend/descend 5 stair with bilateral Handrails Contact Guard Assistance.   7. Lower extremity exercise program x10-20 reps per handout, with independence    Outcome: Ongoing (interventions implemented as appropriate)  PT goals established and pt is progressing with gait

## 2019-02-27 NOTE — PROGRESS NOTES
"[facility] Note    CC: Elevated transaminases    HPI 69 y.o. male with elevated transaminases, severe, recent onset, with normal bilirubin and no evidence of compromised hepatic synthetic function, occurring after episode of marked hypotension, with no other alleviating/exacerbating factors.  Patient apparently has a new diagnosis of cirrhosis by imaging, likely secondary to POWERS with morbid obesity.  He denies abdominal pain or emesis.  No history of EGD.  He states that he has had colonoscopy in the past.  He was initially admitted for bilateral LE cellulitis and had episode of hypotension with dizziness and weakness.  He did not have LOC.  His family was present at bedside and they relate that he has not had previous diagnosis of cirrhosis.    INTERVAL HISTORY:  Since yesterday, patient reports no new GI symptoms.  He is tolerating PO.  Liver enzymes are improved, as is his blood pressure.  No other acute issues.  Chronic liver disease was discussed with him as well as recommended follow up.    Past Medical History:   Diagnosis Date    Anticoagulant long-term use 3/1/2013    Antithrombin 3 deficiency     Cellulitis     lower legs, venous stasis    Coronary artery disease     Diabetes mellitus type 2 in obese 5/15/2013    Hypertension     Obesity     LAKHWINDER (obstructive sleep apnea)     Pulmonary embolism          Review of Systems  General ROS: negative for - chills, fever or weight loss  Cardiovascular ROS: no chest pain or dyspnea on exertion  Gastrointestinal ROS: no abdominal pain.    Physical Examination  BP (!) 125/59 (BP Location: Right arm, Patient Position: Lying)   Pulse 96   Temp 98.8 °F (37.1 °C) (Oral)   Resp 20   Ht 5' 11" (1.803 m)   Wt (!) 175.1 kg (386 lb)   SpO2 (!) 93%   BMI 53.84 kg/m²   General appearance: alert, cooperative, no distress  HENT: Normocephalic, atraumatic, neck symmetrical, no nasal discharge, sclera anicteric   Lungs: clear to auscultation " bilaterally, symmetric chest wall expansion bilaterally  Heart: regular rate and rhythm without rub; no displacement of the PMI   Abdomen: morbidly obese, NT  Extremities: extremities symmetric; no clubbing, cyanosis, + 4+ edema  Neurologic: Alert and oriented X 3, no sensory or motor neurologic deficits      Labs:  CMP  Sodium   Date Value Ref Range Status   02/27/2019 134 (L) 136 - 145 mmol/L Final     Potassium   Date Value Ref Range Status   02/27/2019 4.9 3.5 - 5.1 mmol/L Final     Chloride   Date Value Ref Range Status   02/27/2019 108 95 - 110 mmol/L Final     CO2   Date Value Ref Range Status   02/27/2019 20 (L) 23 - 29 mmol/L Final     Glucose   Date Value Ref Range Status   02/27/2019 105 70 - 110 mg/dL Final     BUN, Bld   Date Value Ref Range Status   02/27/2019 51 (H) 8 - 23 mg/dL Final     Creatinine   Date Value Ref Range Status   02/27/2019 1.8 (H) 0.5 - 1.4 mg/dL Final   02/18/2013 1.0 0.5 - 1.4 mg/dL Final     Calcium   Date Value Ref Range Status   02/27/2019 8.4 (L) 8.7 - 10.5 mg/dL Final   02/18/2013 8.9 8.7 - 10.5 mg/dL Final     Total Protein   Date Value Ref Range Status   02/27/2019 6.6 6.0 - 8.4 g/dL Final     Albumin   Date Value Ref Range Status   02/27/2019 2.4 (L) 3.5 - 5.2 g/dL Final     Total Bilirubin   Date Value Ref Range Status   02/27/2019 0.3 0.1 - 1.0 mg/dL Final     Comment:     For infants and newborns, interpretation of results should be based  on gestational age, weight and in agreement with clinical  observations.  Premature Infant recommended reference ranges:  Up to 24 hours.............<8.0 mg/dL  Up to 48 hours............<12.0 mg/dL  3-5 days..................<15.0 mg/dL  6-29 days.................<15.0 mg/dL       Alkaline Phosphatase   Date Value Ref Range Status   02/27/2019 56 55 - 135 U/L Final   02/14/2013 65 55 - 135 U/L Final     AST   Date Value Ref Range Status   02/27/2019 354 (H) 10 - 40 U/L Final   02/14/2013 24 10 - 40 U/L Final     ALT   Date Value Ref  Range Status   02/27/2019 140 (H) 10 - 44 U/L Final     Anion Gap   Date Value Ref Range Status   02/27/2019 6 (L) 8 - 16 mmol/L Final   02/18/2013 10 5 - 15 meq/L Final     eGFR if    Date Value Ref Range Status   02/27/2019 43 (A) >60 mL/min/1.73 m^2 Final     eGFR if non    Date Value Ref Range Status   02/27/2019 37 (A) >60 mL/min/1.73 m^2 Final     Comment:     Calculation used to obtain the estimated glomerular filtration  rate (eGFR) is the CKD-EPI equation.              Assessment:   1.  Elevated transaminases - improving  2.  Cirrhosis, likely secondary to POWERS  3.  Morbid obesity  4.  Bilateral LE cellulitis  5.  Likely shock liver - improving        Plan:  1.  Recommended diet and weight loss  2.  Continue current care per primary team  3.  Follow up as outpatient for HCC screening/surveillance as well as EGD for variceal screening  4.  No further inpatient GI workup indicated at this time.  Will sign off.  Call for questions.    Hitesh Herrera MD  [facility]  [address], [address]  [place] [address]  Office: [phone]  Fax: [phone]

## 2019-02-28 LAB
ALBUMIN SERPL BCP-MCNC: 2.3 G/DL
ALBUMIN SERPL ELPH-MCNC: 2.59 G/DL
ALP SERPL-CCNC: 70 U/L
ALPHA1 GLOB SERPL ELPH-MCNC: 0.37 G/DL
ALPHA2 GLOB SERPL ELPH-MCNC: 0.79 G/DL
ALT SERPL W/O P-5'-P-CCNC: 128 U/L
ANA SER QL IF: POSITIVE
ANA TITR SER IF: NORMAL {TITER}
ANION GAP SERPL CALC-SCNC: 5 MMOL/L
ANTI-SSA ANTIBODY: 1.69 EU
ANTI-SSA INTERPRETATION: NEGATIVE
ANTI-SSB ANTIBODY: 1.07 EU
ANTI-SSB INTERPRETATION: NEGATIVE
AST SERPL-CCNC: 243 U/L
B-GLOBULIN SERPL ELPH-MCNC: 0.93 G/DL
BASOPHILS # BLD AUTO: 0 K/UL
BASOPHILS NFR BLD: 0.3 %
BILIRUB SERPL-MCNC: 0.4 MG/DL
BUN SERPL-MCNC: 32 MG/DL
CALCIUM SERPL-MCNC: 8.3 MG/DL
CHLORIDE SERPL-SCNC: 107 MMOL/L
CK SERPL-CCNC: 4990 U/L
CO2 SERPL-SCNC: 22 MMOL/L
CREAT SERPL-MCNC: 1.5 MG/DL
DIFFERENTIAL METHOD: ABNORMAL
EOSINOPHIL # BLD AUTO: 0.2 K/UL
EOSINOPHIL NFR BLD: 1.5 %
ERYTHROCYTE [DISTWIDTH] IN BLOOD BY AUTOMATED COUNT: 13.8 %
EST. GFR  (AFRICAN AMERICAN): 53 ML/MIN/1.73 M^2
EST. GFR  (NON AFRICAN AMERICAN): 46 ML/MIN/1.73 M^2
GAMMA GLOB SERPL ELPH-MCNC: 1.43 G/DL
GLUCOSE SERPL-MCNC: 103 MG/DL
HCT VFR BLD AUTO: 32.9 %
HGB BLD-MCNC: 11 G/DL
LYMPHOCYTES # BLD AUTO: 1.3 K/UL
LYMPHOCYTES NFR BLD: 8.8 %
MAGNESIUM SERPL-MCNC: 1.8 MG/DL
MAGNESIUM SERPL-MCNC: 1.8 MG/DL
MCH RBC QN AUTO: 30.3 PG
MCHC RBC AUTO-ENTMCNC: 33.5 G/DL
MCV RBC AUTO: 90 FL
MONOCYTES # BLD AUTO: 1.4 K/UL
MONOCYTES NFR BLD: 9.4 %
NEUTROPHILS # BLD AUTO: 11.8 K/UL
NEUTROPHILS NFR BLD: 80 %
PHOSPHATE SERPL-MCNC: 3 MG/DL
PLATELET # BLD AUTO: 233 K/UL
PMV BLD AUTO: 8 FL
POTASSIUM SERPL-SCNC: 4.8 MMOL/L
PROT PATTERN UR ELPH-IMP: NORMAL
PROT SERPL-MCNC: 6.1 G/DL
PROT SERPL-MCNC: 6.7 G/DL
RBC # BLD AUTO: 3.65 M/UL
SODIUM SERPL-SCNC: 134 MMOL/L
WBC # BLD AUTO: 14.7 K/UL

## 2019-02-28 PROCEDURE — 80053 COMPREHEN METABOLIC PANEL: CPT

## 2019-02-28 PROCEDURE — 25000003 PHARM REV CODE 250: Performed by: NURSE PRACTITIONER

## 2019-02-28 PROCEDURE — 82550 ASSAY OF CK (CPK): CPT

## 2019-02-28 PROCEDURE — 83735 ASSAY OF MAGNESIUM: CPT

## 2019-02-28 PROCEDURE — 63600175 PHARM REV CODE 636 W HCPCS: Performed by: INTERNAL MEDICINE

## 2019-02-28 PROCEDURE — 85025 COMPLETE CBC W/AUTO DIFF WBC: CPT

## 2019-02-28 PROCEDURE — 97116 GAIT TRAINING THERAPY: CPT | Performed by: PHYSICAL THERAPIST

## 2019-02-28 PROCEDURE — 25000003 PHARM REV CODE 250: Performed by: INTERNAL MEDICINE

## 2019-02-28 PROCEDURE — 84100 ASSAY OF PHOSPHORUS: CPT

## 2019-02-28 PROCEDURE — 36415 COLL VENOUS BLD VENIPUNCTURE: CPT

## 2019-02-28 PROCEDURE — 25000003 PHARM REV CODE 250: Performed by: EMERGENCY MEDICINE

## 2019-02-28 PROCEDURE — 12000002 HC ACUTE/MED SURGE SEMI-PRIVATE ROOM

## 2019-02-28 RX ADMIN — SODIUM CHLORIDE: 0.9 INJECTION, SOLUTION INTRAVENOUS at 09:02

## 2019-02-28 RX ADMIN — CEFTAROLINE FOSAMIL 600 MG: 600 POWDER, FOR SOLUTION INTRAVENOUS at 08:02

## 2019-02-28 RX ADMIN — HYDROCODONE BITARTRATE AND ACETAMINOPHEN 1 TABLET: 5; 325 TABLET ORAL at 09:02

## 2019-02-28 RX ADMIN — PANTOPRAZOLE SODIUM 40 MG: 40 TABLET, DELAYED RELEASE ORAL at 08:02

## 2019-02-28 RX ADMIN — APIXABAN 2.5 MG: 2.5 TABLET, FILM COATED ORAL at 08:02

## 2019-02-28 RX ADMIN — Medication: at 08:02

## 2019-02-28 RX ADMIN — SODIUM CHLORIDE: 0.9 INJECTION, SOLUTION INTRAVENOUS at 12:02

## 2019-02-28 RX ADMIN — SODIUM CHLORIDE: 0.9 INJECTION, SOLUTION INTRAVENOUS at 03:02

## 2019-02-28 NOTE — PROGRESS NOTES
Progress Note  Infectious Disease    Follow-up For:  cellulitis    SUBJECTIVE:   ROS:  In bed with feet elevated. Wrap with ACE did result in reduction of swelling and erythema. Redness of upper leg is improved. Bleeding from ulcerations. Kidney function and CPK are improved.     Antibiotics (From admission, onward)    Start     Stop Route Frequency Ordered    02/27/19 2100  ceftaroline (TEFLARO) 600 mg in dextrose 5 % 50 mL IVPB (ready to mix system)      -- IV Every 12 hours (non-standard times) 02/27/19 1518          Pertinent Medications noted:    EXAM & DIAGNOSTICS REVIEWED:   Vitals:     Temp:  [97.9 °F (36.6 °C)-99.1 °F (37.3 °C)]   Temp: 98.8 °F (37.1 °C) (02/27/19 1548)  Pulse: 91 (02/27/19 1548)  Resp: 20 (02/27/19 1548)  BP: (!) 124/58 (02/27/19 1548)  SpO2: 95 % (02/27/19 1548)    Intake/Output Summary (Last 24 hours) at 2/27/2019 2015  Last data filed at 2/27/2019 1800  Gross per 24 hour   Intake 2140 ml   Output 3600 ml   Net -1460 ml       General:  In NAD. Looks non toxic  Eyes:  Anicteric,  EOMI  ENT:    Neck:    Lungs:    Heart:    Abd:    :  [place] draining yellow urine, clear  Musc:  Joints without effusion, erythema, synovitis,   Skin:               Chronic venous stasis BLE, hyperpigmented right lower leg. Very poor foot hygiene. Dystrophic nails. Candida intertrigo left infra-abdominal folds.   Wound:           Extensive LLE ulcerations from venous stasis posterior left lower leg, beefy red and bleeding  Neuro:  AAOx3, speech clear, moves all extrems equally  Extrem:           Chronic BLE edema, L>R, bright erythema from left ankle to knee with streak running transverse medial mid thigh. Faint cellulitis left proximal thigh, improved, and partially with compression of ACE wrap        Lines/Tubes/Drains:    General Labs reviewed:  Recent Labs   Lab 02/27/19 0618   WBC 12.80*   RBC 3.67*   HGB 10.9*   HCT 33.0*      MCV 90   MCH 29.8   MCHC 33.1     Recent Labs   Lab 02/27/19 0618    CALCIUM 8.4*   PROT 6.6   *   K 4.9   CO2 20*      BUN 51*   CREATININE 1.8*   ALKPHOS 56   *   *   BILITOT 0.3       Micro:    Imaging Reviewed:    ASSESSMENT & PLAN      Patient Active Problem List   Diagnosis    Venous (peripheral) insufficiency    ARF (acute renal failure)    Sleep apnea    Anticoagulant long-term use    Diabetes mellitus type 2 with neurological manifestations    Combined hyperlipidemia associated with type 2 diabetes mellitus    Hypertension associated with diabetes    Coronary artery disease    Body mass index 50.0-59.9, adult    Hypogonadism male    Venous ulcer of ankle    History of pulmonary embolism    Antithrombin 3 deficiency    Protein C deficiency    Drug-induced anemia    Coagulopathy    Severe sepsis    Abnormal liver enzymes    Left leg cellulitis    Venous insufficiency of lower extremity       1.         Cellulitis, infectious complicating venous stasis  2.         Severe venous stasis ulcerations left lower leg (and poor hygiene)  3.         supermorbid obesity  4.         ASHLEY on CKD, rhabdomyolysis with CPK over 41649  5.         Suspect LAKHWINDER(negative sleep study 30 + lbs ago)  6.         Candida intertrigo  7.              Recommendation:      Teflaro is appropriate for his condition, followed by oral clindamycin  (zyvox or clindamycin would be as well)  Elevation(23 hours a day), wound care, compression  He will need to return to a formal wound care for management of his left leg wounds.  Topical antifungal to skin folds  Stop crestor(not currently on it)  He needs another outpatient sleep study

## 2019-02-28 NOTE — PROGRESS NOTES
[facility] NEPHROLOGY                                                                      PROGRESS NOTE    Lukas Chance  02/28/2019    Reason for consultation:    Acute kidney injury    Chief Complaint:   Chief Complaint   Patient presents with    Weakness     x 2 days / recent falls     Dizziness      History of Present Illness:     Per H and P  Patient is a 69 y.o. male admitted to Hospitalist Service from [facility] Emergency Room with complaint of generalized weakness and frequent falls for 3 days. Patient reportedly has past medical history significant for morbid obesity, history of recurrent leg cellulitis/venous stasis, diabetes mellitus type 2, hypertension, history of pulmonary embolism (anti thrombin 3 deficiency, on Eliquis) and history of obstructive sleep apnea.  Patient presented to the emergency room for progressively worsening generalized weakness for 2-3 days.  Patient feels dizzy upon getting up.  Patient is not able to get up or sit due to generalized weakness.  Left leg appears to be red and hot.  No recent fevers or chills cough or shortness of breath reported patient reports compliance with Eliquis and other medications patient also reports he suffers with chronic low back pain. Patient denied chest pain, shortness of breath, abdominal pain, nausea, vomiting, headache, vision changes, focal neuro-deficits, cough or fever.      2/26  Pt feels good this am.  No nausea, bowel complaints, sob, chest pain.  He has a good appetite  2/27  BUN/Scr improving, now 51/1.8.  Na+ improved to 134.  Acidosis improving as well.  Pt states feeling better, no c/o pain or SOB.  2/28  No chest pain.  No sob, nausea.        Plan of Care:       Assessment:    Acute kidney injury likely hemodynamically mediated.  He may have acute tubular necrosis given presence of granular casts  Metabolic acidosis-better  Hypotension  anemia  ckd  III  Rhabdomyolysis--better    Plan:    Renal Ultrasound done  IV Fluid resuscitation--trend cpk  Renal medications for crcl 10-50cc/min  No Nsaids, CoxII inhibitors, Ace-I, Angiotensin Receptor Blockers, or Aminoglycosides  Keep mean arterial pressure greater than 60  Hold statin  Trend bun/creatine and hemoglobin  Monitor serum potassium  Strict I/Os  Clear for d/c from renal standpoint         Thank you for allowing us to participate in this patient's care. We will continue to follow.    Vital Signs:  Temp Readings from Last 3 Encounters:   02/28/19 98 °F (36.7 °C) (Oral)   01/22/19 98.1 °F (36.7 °C) (Oral)   07/17/18 98.2 °F (36.8 °C) (Oral)       Pulse Readings from Last 3 Encounters:   02/28/19 84   01/22/19 81   07/17/18 76       BP Readings from Last 3 Encounters:   02/28/19 (!) 148/67   01/22/19 100/66   07/17/18 132/66       Weight:  Wt Readings from Last 3 Encounters:   02/26/19 (!) 175.1 kg (386 lb)   01/22/19 (!) 175 kg (385 lb 12.9 oz)   07/17/18 (!) 179.2 kg (395 lb 1 oz)       Past Medical & Surgical History:  Past Medical History:   Diagnosis Date    Anticoagulant long-term use 3/1/2013    Antithrombin 3 deficiency     Cellulitis     lower legs, venous stasis    Coronary artery disease     Diabetes mellitus type 2 in obese 5/15/2013    Hypertension     Obesity     LAKHWINDER (obstructive sleep apnea)     Pulmonary embolism        Past Surgical History:   Procedure Laterality Date    COLONOSCOPY  5/2/2011    Dr. Miller, 9 polyps, recheck 5 year    MULTIPLE TOOTH EXTRACTIONS      RADIOFREQUENCY ABLATION  7/2015    bilateral     TONSILLECTOMY         Past Social History:  Social History     Socioeconomic History    Marital status:      Spouse name: None    Number of children: None    Years of education: None    Highest education level: None   Social Needs    Financial resource strain: None    Food insecurity - worry: None    Food insecurity - inability: None    Transportation  "needs - medical: None    Transportation needs - non-medical: None   Occupational History    None   Tobacco Use    Smoking status: Former Smoker     Packs/day: 1.00     Years: 20.00     Pack years: 20.00     Last attempt to quit: 1/3/2002     Years since quittin.1    Smokeless tobacco: Never Used   Substance and Sexual Activity    Alcohol use: Yes     Alcohol/week: 0.6 oz     Types: 1 Cans of beer per week     Comment: seldom    Drug use: No    Sexual activity: Yes     Partners: Female   Other Topics Concern    None   Social History Narrative    None       Medications:  No current facility-administered medications on file prior to encounter.      Current Outpatient Medications on File Prior to Encounter   Medication Sig Dispense Refill    acetaminophen (TYLENOL EXTRA STRENGTH) 500 MG tablet Take 500 mg by mouth 2 (two) times daily as needed.       ELIQUIS 5 mg Tab Take 1 tablet (5 mg total) by mouth 2 (two) times daily. 180 tablet 3    furosemide (LASIX) 20 MG tablet Take 1 tablet (20 mg total) by mouth once daily. (Patient taking differently: Take 20 mg by mouth daily as needed. ) 90 tablet 1    lisinopril-hydrochlorothiazide (PRINZIDE,ZESTORETIC) 20-25 mg Tab Take 1 tablet by mouth once daily. 90 tablet 1    multivitamin (THERAGRAN) per tablet Take 1 tablet by mouth once daily.      pva-gentian violet-methyl blue (HYDROFERA BLUE) 6 X 6 " Bndg Change dressing daily 30 each prn    rosuvastatin (CRESTOR) 20 MG tablet Take 1 tablet (20 mg total) by mouth once daily. 90 tablet 3    salmon oil/omega-3 fatty acids (SALMON OIL-1000 ORAL) Take 1 capsule by mouth once daily.      spironolactone (ALDACTONE) 100 MG tablet Take 1 tablet (100 mg total) by mouth once daily. 90 tablet 1     Scheduled Meds:   apixaban  2.5 mg Oral BID    ceftaroline (TEFLARO) IVPB  600 mg Intravenous Q12H    miconazole NITRATE 2 %   Topical (Top) BID    pantoprazole  40 mg Oral Daily     Continuous Infusions:   sodium " "chloride 0.9% 125 mL/hr at 02/28/19 0344     PRN Meds:.acetaminophen, HYDROcodone-acetaminophen, ondansetron, senna-docusate 8.6-50 mg, sodium chloride 0.9%    Allergies:  Bactroban [mupirocin calcium] and Metoprolol    Past Family History:  Reviewed; refer to Hospitalist Admission Note    Review of Systems:  Review of Systems - All 14 systems reviewed and negative, except as noted in HPI    Physical Exam:    BP (!) 148/67 (BP Location: Right arm, Patient Position: Lying)   Pulse 84   Temp 98 °F (36.7 °C) (Oral)   Resp 18   Ht 5' 11" (1.803 m)   Wt (!) 175.1 kg (386 lb)   SpO2 95%   BMI 53.84 kg/m²     General Appearance:    Alert, cooperative, no distress, appears stated age   Head:    Normocephalic, without obvious abnormality, atraumatic   Eyes:    PER, conjunctiva/corneas clear, EOM's intact in both eyes        Throat:   Lips, mucosa, and tongue normal; teeth and gums normal   Back:     Symmetric, no curvature, ROM normal, no CVA tenderness   Lungs:     Clear to auscultation bilaterally, respirations unlabored   Chest wall:    No tenderness or deformity   Heart:    Regular rate and rhythm, S1 and S2 normal, no murmur, rub   or gallop   Abdomen:     Soft, non-tender, bowel sounds active all four quadrants,     no masses, no organomegaly   Extremities:   Extremities normal, atraumatic, no cyanosis or edema   Pulses:   2+ and symmetric all extremities   MSK:   No joint or muscle swelling, tenderness or deformity   Skin:  venous stasis changes in legs   Neurologic:   CNII-XII intact, normal strength and sensation       Throughout.  No flap     Results:  Lab Results   Component Value Date     (L) 02/28/2019    K 4.8 02/28/2019     02/28/2019    CO2 22 (L) 02/28/2019    BUN 32 (H) 02/28/2019    CREATININE 1.5 (H) 02/28/2019    CALCIUM 8.3 (L) 02/28/2019    ANIONGAP 5 (L) 02/28/2019    ESTGFRAFRICA 53 (A) 02/28/2019    EGFRNONAA 46 (A) 02/28/2019       Lab Results   Component Value Date    CALCIUM " 8.3 (L) 02/28/2019    PHOS 3.0 02/28/2019       Recent Labs   Lab 02/28/19  0516   WBC 14.70*   RBC 3.65*   HGB 11.0*   HCT 32.9*      MCV 90   MCH 30.3   MCHC 33.5       I have personally reviewed pertinent radiological imaging and reports.     Dakota Claudio MD  Nephrology  [facility] [place]  [phone]

## 2019-02-28 NOTE — PLAN OF CARE
Problem: Adult Inpatient Plan of Care  Goal: Plan of Care Review  Outcome: Ongoing (interventions implemented as appropriate)  Plan of care reviewed with patient. Patient verbalized understanding. AAO x 4. VSS/NADN. IV fluids/antibiotics infusing. PRN pain meds given with relief noted. Villalpando catheter intact draining clear yellow urine. Dsg to LLE CDI. SR up x 2, bed in lowest position, call light in reach. Will continue to monitor.

## 2019-02-28 NOTE — PLAN OF CARE
Problem: Physical Therapy Goal  Goal: Physical Therapy Goal  Goals to be met by: 3/12/2019     Patient will increase functional independence with mobility by performin. Supine to sit with Modified [place]  2. Sit to supine with Modified [place]  3. Sit to stand transfer with Modified [place]  4. Bed to chair transfer with Modified [place] using Rolling Walker  5. Gait  x 150 feet with Supervision using Rolling Walker.   6. Ascend/descend 5 stair with bilateral Handrails Contact Guard Assistance.   7. Lower extremity exercise program x10-20 reps per handout, with independence     Outcome: Ongoing (interventions implemented as appropriate)  Progressing with gait.

## 2019-02-28 NOTE — PT/OT/SLP PROGRESS
Physical Therapy Treatment    Patient Name:  Lukas Chance   MRN:  [id number]    Recommendations:     Discharge Recommendations:  other (see comments)(HHPT)   Discharge Equipment Recommendations: (Bariatric RW)   Barriers to discharge: None    Assessment:     Lukas Chance is a 69 y.o. male admitted with a medical diagnosis of Severe sepsis.  He presents with the following impairments/functional limitations:  weakness, impaired functional mobilty, decreased safety awareness, impaired cardiopulmonary response to activity, impaired endurance, gait instability, impaired balance, decreased upper extremity function, impaired self care skills, decreased lower extremity function, edema, impaired skin.  During PT tx he demonstrated supine<>sit independently, and sit<>stand with CGA and RW.  He ambulated 80ft with RW and CGA, however he becomes increasingly SOB and required 4 short standing rest breaks, one with his side resting against the wall.  SpO2 on RA remains >97%, however, per the pulse oximeter, his HR was 48 - 65bpm - but unsure how accurate this reading was.  Upon return to the room, he was assisted to the chair, and vitals per dynamap were: /72, HR: 100bpm, and SpO2: 96% on RA.  Pt did express feeling like his heart was racing during ambulation.  Nurse Radha and Blanca informed of this information.  Continue to recommend HHPT and bariatric RW upon d/c.     Rehab Prognosis: Fair; patient would benefit from acute skilled PT services to address these deficits and reach maximum level of function.    Recent Surgery: * No surgery found *      Plan:     During this hospitalization, patient to be seen 6 x/week to address the identified rehab impairments via gait training, therapeutic activities, therapeutic exercises and progress toward the following goals:    · Plan of Care Expires:  03/13/19    Subjective     Chief Complaint: fatigue  Patient/Family Comments/goals: to go home  Pain/Comfort:  · Pain Rating 1:  0/10      Objective:     Communicated with nurse Rios prior to session.  Patient found supine in bed, friend present ryder catheter, telemetry, peripheral IV  upon PT entry to room.     General Precautions: Standard, fall   Orthopedic Precautions:N/A   Braces: N/A     Functional Mobility:  · Bed Mobility:     · Supine to Sit: independence  · Transfers:     · Sit to Stand:  contact guard assistance with rolling walker  · Gait: 80ft with RW and CGA.  Pt required 4 short standing rest breaks due to increasing SOB with SpO2 maintainng >97% on RA, but questionable HR reading between 48-65bpm per pulse oximeter.    · Balance: fair -      AM-PAC 6 CLICK MOBILITY  Turning over in bed (including adjusting bedclothes, sheets and blankets)?: 4  Sitting down on and standing up from a chair with arms (e.g., wheelchair, bedside commode, etc.): 3  Moving from lying on back to sitting on the side of the bed?: 3  Moving to and from a bed to a chair (including a wheelchair)?: 3  Need to walk in hospital room?: 3  Climbing 3-5 steps with a railing?: 3  Basic Mobility Total Score: 19       Patient left up in chair with all lines intact, call button in reach, nurse Radha and Blanca notified and friend present..    GOALS:   Multidisciplinary Problems     Physical Therapy Goals        Problem: Physical Therapy Goal    Goal Priority Disciplines Outcome Goal Variances Interventions   Physical Therapy Goal     PT, PT/OT Ongoing (interventions implemented as appropriate)     Description:  Goals to be met by: 3/12/2019     Patient will increase functional independence with mobility by performin. Supine to sit with Modified [place]  2. Sit to supine with Modified [place]  3. Sit to stand transfer with Modified [place]  4. Bed to chair transfer with Modified [place] using Rolling Walker  5. Gait  x 150 feet with Supervision using Rolling Walker.   6. Ascend/descend 5 stair with bilateral Handrails Contact Guard  Assistance.   7. Lower extremity exercise program x10-20 reps per handout, with independence                      Time Tracking:     PT Received On: 02/28/19  PT Start Time: 0942     PT Stop Time: 1000  PT Total Time (min): 18 min     Billable Minutes: Gait Training 18    Treatment Type: Treatment  PT/PTA: PT           Ginny Farmer, PT  02/28/2019

## 2019-02-28 NOTE — PROGRESS NOTES
Progress Note  Hospital Medicine  Patient Name:Lukas Chance  MRN:  [id number]  Patient Class: IP- Inpatient  Admit Date: 2/25/2019  Length of Stay: 3 days  Expected Discharge Date:   Attending Physician: Tracy Carvajal MD  Primary Care Provider:  Solitario Mendez MD    SUBJECTIVE:     Principal Problem: Severe sepsis  Initial history of present illness: Patient is a 69 y.o. male admitted to Hospitalist Service from [facility] Emergency Room with complaint of generalized weakness and frequent falls for 3 days. Patient reportedly has past medical history significant for morbid obesity, history of recurrent leg cellulitis/venous stasis, diabetes mellitus type 2, hypertension, history of pulmonary embolism (anti thrombin 3 deficiency, on Eliquis) and history of obstructive sleep apnea.  Patient presented to the emergency room for progressively worsening generalized weakness for 2-3 days.  Patient feels dizzy upon getting up.  Patient is not able to get up or sit due to generalized weakness.  Left leg appears to be red and hot.  No recent fevers or chills cough or shortness of breath reported patient reports compliance with Eliquis and other medications patient also reports he suffers with chronic low back pain. Patient denied chest pain, shortness of breath, abdominal pain, nausea, vomiting, headache, vision changes, focal neuro-deficits, cough or fever.    PMH/PSH/SH/FH/Meds: reviewed.    Symptoms/Review of Systems:  Afebrile, feeling better with intravenous fluid hydration, significant seepage noted from left lower extremity ulceration/cellulitis site. No shortness of breath, cough, chest pain or headache, fever or abdominal pain.     Diet:  Adequate intake.    Activity level:  Up with assist  Pain:  Patient reports no pain.       OBJECTIVE:   Vital Signs (Most Recent):      Temp: 98 °F (36.7 °C) (02/28/19 0752)  Pulse: 84 (02/28/19 0752)  Resp: 18 (02/28/19 0752)  BP: (!) 148/67 (02/28/19 0752)  SpO2: 95 %  (02/28/19 0752)       Vital Signs Range (Last 24H):  Temp:  [97.4 °F (36.3 °C)-98.8 °F (37.1 °C)]   Pulse:  [84-96]   Resp:  [18-20]   BP: (116-148)/(55-67)   SpO2:  [93 %-96 %]     Weight: (!) 175.1 kg (386 lb)  Body mass index is 53.84 kg/m².    Intake/Output Summary (Last 24 hours) at 2/28/2019 0920  Last data filed at 2/28/2019 0600  Gross per 24 hour   Intake 3040 ml   Output 3075 ml   Net -35 ml     Physical Examination:  General appearance: well developed, appears stated age, morbidly obese male  Head: normocephalic, atraumatic  Eyes:  conjunctivae/corneas clear. PERRL.  Nose: Nares normal. Septum midline.  Throat: lips, mucosa, and tongue normal; teeth and gums normal, no throat erythema.  Neck: supple, symmetrical, trachea midline, no JVD and thyroid not enlarged, symmetric, no tenderness/mass/nodules  Lungs:  clear to auscultation bilaterally and normal respiratory effort  Chest wall: no tenderness  Heart: regular rate and rhythm, S1, S2 normal, no murmur, click, rub or gallop  Abdomen: soft, non-tender non-distented; bowel sounds normal; no masses,  no organomegaly  Extremities: no cyanosis, clubbing or edema. Large venous stasis ulcer outside the left lower leg.  Left lower leg is red hot and tender, [place] sign equivocal bilaterally.  Pulses: 2+ and symmetric  Skin: Skin color, texture, turgor normal. No rashes or lesions.  Lymph nodes: Cervical, supraclavicular, and axillary nodes normal.  Neurologic: Normal strength and tone. No focal numbness or weakness. CNII-XII intact.     CBC:  Recent Labs   Lab 02/26/19  0152 02/27/19  0618 02/28/19  0516   WBC 18.10* 12.80* 14.70*   RBC 3.81* 3.67* 3.65*   HGB 11.4* 10.9* 11.0*   HCT 34.2* 33.0* 32.9*    228 233   MCV 90 90 90   MCH 30.0 29.8 30.3   MCHC 33.5 33.1 33.5   BMP  Recent Labs   Lab 02/26/19  0152 02/27/19  0618 02/28/19  0516    105 103   * 134* 134*   K 4.6 4.9 4.8    108 107   CO2 16* 20* 22*   BUN 82* 51* 32*   CREATININE  2.8* 1.8* 1.5*   CALCIUM 8.5* 8.4* 8.3*   MG 2.2  2.2 2.1  2.1 1.8  1.8      Diagnostic Results:  Microbiology Results (last 7 days)     Procedure Component Value Units Date/Time    Blood culture [[id number]] Collected:  02/25/19 1758    Order Status:  Completed Specimen:  Blood from Antecubital, Left Updated:  02/27/19 2312     Blood Culture, Routine No Growth to date     Blood Culture, Routine No Growth to date     Blood Culture, Routine No Growth to date    Narrative:       Aerobic and anaerobic    Blood culture [[id number]] Collected:  02/25/19 1812    Order Status:  Completed Specimen:  Blood from Antecubital, Right Updated:  02/27/19 2312     Blood Culture, Routine No Growth to date     Blood Culture, Routine No Growth to date     Blood Culture, Routine No Growth to date    Narrative:       Aerobic and anaerobic         Ultrasound abdomen:  Nodular hepatic contour raising consideration for cirrhosis.  Cholelithiasis.    Left leg venous Doppler ultrasound:  No evidence of deep venous thrombosis in the left lower extremity. Left inguinal lymphadenopathy.    Chest x-ray:  No acute process.    2D echocardiogram:  · Technically difficult study.  · Cardiac chambers size and function could not be determined.  · Post Lumason injection, the left ventricular function is hyperdynamic with estimated ejection fraction is 65%.  · There is no obvious wall motion abnormalities.  ·   Assessment/Plan:      *Severe sepsis [A41.9, R65.20]  Likely due to infected venous ulcer of left lower extremity and cellulitis.  This patient does have evidence of infective focus  My overall impression is severe sepsis.  Antibiotics given- continue intravenous Teflaro.  Lactate level within normal limits  Organ dysfunction indicated by acute kidney injury and abnormal liver enzymes  Source- left lower extremity infected venous stasis ulcer/cellulitis.      Yes    Abnormal liver enzymes [R74.8]  Possible cirrhosis of liver, likely non  steatohepatitis related  Possible ischemic hepatitis versus POWERS.  Trend LFTs,  follow acute hepatitis panel, avoid Tylenol based medications and hold statin therapy. Follow Dr. Bishop his recommendation.  Patient to follow up as outpatient with him.      Yes    Antithrombin 3 deficiency [D68.59]   Yes    Protein C deficiency [D68.59]  On Eliquis, adjust does due to renal impairment.      Yes    Combined hyperlipidemia associated with type 2 diabetes mellitus [E11.69, E78.2]  Hold statin therapy and monitor liver enzymes.      Yes    Coronary artery disease [I25.10]  Elevated troponin level likely related to sepsis  Hold all antihypertensive agents.  Tele monitor.  2D echocardiogram results reviewed and follow cardiologist recommendations.      Yes    Hypertension associated with diabetes [E11.59, I10]  Hold all antihypertensive and diuretic therapy agents.      Yes    Sleep apnea [G47.30]  Use CPAP as needed.      Yes    ARF (acute renal failure) [N17.9] - improving  Acute tubular necrosis  Acute rhabdomyolysis  Continue IVF hydration.   Hold all antihypertensive agents and diuretic therapy agents.  Follow renal panel and electrolytes closely.  Follow Nephrology recommendation. No hydronephrosis or obstructive uropathy reported on abdominal ultrasound.  Adjust renal dose medications for creatinine clearance 10-50cc/min.  Avoid NSAIDs, Galaviz-II inhibitors, ACE-I, Angiotensin Receptor Blockers, or Aminoglycosides.         Discussed with family member.  Patient is working with physical therapy.  Likely discharge home in a.m.    VTE Risk Mitigation (From admission, onward)        Ordered     apixaban tablet 2.5 mg  2 times daily      02/25/19 1954     IP VTE HIGH RISK PATIENT  Once      02/25/19 1954        Tracy Carvajal MD  Department of Hospital Medicine   [facility]

## 2019-03-01 VITALS
OXYGEN SATURATION: 97 % | HEIGHT: 71 IN | SYSTOLIC BLOOD PRESSURE: 140 MMHG | BODY MASS INDEX: 44.1 KG/M2 | TEMPERATURE: 97 F | DIASTOLIC BLOOD PRESSURE: 72 MMHG | HEART RATE: 85 BPM | RESPIRATION RATE: 18 BRPM | WEIGHT: 315 LBS

## 2019-03-01 LAB
ALBUMIN SERPL BCP-MCNC: 2.2 G/DL
ALP SERPL-CCNC: 74 U/L
ALT SERPL W/O P-5'-P-CCNC: 116 U/L
ANION GAP SERPL CALC-SCNC: 6 MMOL/L
ANTI SM ANTIBODY: 2.4 EU
ANTI SM/RNP ANTIBODY: 2.67 EU
ANTI-SM INTERPRETATION: NEGATIVE
ANTI-SM/RNP INTERPRETATION: NEGATIVE
ANTI-SSA ANTIBODY: 1.69 EU
ANTI-SSA INTERPRETATION: NEGATIVE
ANTI-SSB ANTIBODY: 1.07 EU
ANTI-SSB INTERPRETATION: NEGATIVE
AST SERPL-CCNC: 155 U/L
BASOPHILS # BLD AUTO: 0.1 K/UL
BASOPHILS NFR BLD: 0.4 %
BILIRUB SERPL-MCNC: 0.5 MG/DL
BUN SERPL-MCNC: 21 MG/DL
CALCIUM SERPL-MCNC: 8.4 MG/DL
CHLORIDE SERPL-SCNC: 105 MMOL/L
CK SERPL-CCNC: 2189 U/L
CO2 SERPL-SCNC: 24 MMOL/L
CREAT SERPL-MCNC: 1.2 MG/DL
DIFFERENTIAL METHOD: ABNORMAL
DSDNA AB SER-ACNC: NORMAL [IU]/ML
EOSINOPHIL # BLD AUTO: 0.3 K/UL
EOSINOPHIL NFR BLD: 1.9 %
ERYTHROCYTE [DISTWIDTH] IN BLOOD BY AUTOMATED COUNT: 13.5 %
EST. GFR  (AFRICAN AMERICAN): >60 ML/MIN/1.73 M^2
EST. GFR  (NON AFRICAN AMERICAN): >60 ML/MIN/1.73 M^2
GLUCOSE SERPL-MCNC: 103 MG/DL
HCT VFR BLD AUTO: 33.7 %
HGB BLD-MCNC: 11.1 G/DL
LYMPHOCYTES # BLD AUTO: 1.2 K/UL
LYMPHOCYTES NFR BLD: 8.2 %
MAGNESIUM SERPL-MCNC: 1.6 MG/DL
MCH RBC QN AUTO: 29.7 PG
MCHC RBC AUTO-ENTMCNC: 32.8 G/DL
MCV RBC AUTO: 90 FL
MONOCYTES # BLD AUTO: 1.1 K/UL
MONOCYTES NFR BLD: 7.4 %
NEUTROPHILS # BLD AUTO: 12.1 K/UL
NEUTROPHILS NFR BLD: 82.1 %
PATHOLOGIST INTERPRETATION SPE: NORMAL
PATHOLOGIST INTERPRETATION UPE: NORMAL
PHOSPHATE SERPL-MCNC: 2.4 MG/DL
PLATELET # BLD AUTO: 232 K/UL
PMV BLD AUTO: 8.1 FL
POTASSIUM SERPL-SCNC: 4.9 MMOL/L
PROT SERPL-MCNC: 6.7 G/DL
RBC # BLD AUTO: 3.72 M/UL
SODIUM SERPL-SCNC: 135 MMOL/L
WBC # BLD AUTO: 14.8 K/UL

## 2019-03-01 PROCEDURE — 84100 ASSAY OF PHOSPHORUS: CPT

## 2019-03-01 PROCEDURE — 83735 ASSAY OF MAGNESIUM: CPT

## 2019-03-01 PROCEDURE — 97116 GAIT TRAINING THERAPY: CPT

## 2019-03-01 PROCEDURE — 25000003 PHARM REV CODE 250: Performed by: INTERNAL MEDICINE

## 2019-03-01 PROCEDURE — 85025 COMPLETE CBC W/AUTO DIFF WBC: CPT

## 2019-03-01 PROCEDURE — 25000003 PHARM REV CODE 250: Performed by: EMERGENCY MEDICINE

## 2019-03-01 PROCEDURE — 63600175 PHARM REV CODE 636 W HCPCS: Performed by: INTERNAL MEDICINE

## 2019-03-01 PROCEDURE — 80053 COMPREHEN METABOLIC PANEL: CPT

## 2019-03-01 PROCEDURE — 36415 COLL VENOUS BLD VENIPUNCTURE: CPT

## 2019-03-01 PROCEDURE — 82550 ASSAY OF CK (CPK): CPT

## 2019-03-01 RX ORDER — SPIRONOLACTONE 25 MG/1
25 TABLET ORAL DAILY
Qty: 30 TABLET | Refills: 0 | Status: SHIPPED | OUTPATIENT
Start: 2019-03-04 | End: 2019-03-25 | Stop reason: SDUPTHER

## 2019-03-01 RX ORDER — SODIUM,POTASSIUM PHOSPHATES 280-250MG
1 POWDER IN PACKET (EA) ORAL
Status: DISCONTINUED | OUTPATIENT
Start: 2019-03-01 | End: 2019-03-01 | Stop reason: HOSPADM

## 2019-03-01 RX ORDER — HYDROCODONE BITARTRATE AND ACETAMINOPHEN 5; 325 MG/1; MG/1
1 TABLET ORAL EVERY 6 HOURS PRN
Qty: 15 TABLET | Refills: 0 | Status: SHIPPED | OUTPATIENT
Start: 2019-03-01 | End: 2019-03-14

## 2019-03-01 RX ORDER — CLINDAMYCIN HYDROCHLORIDE 300 MG/1
300 CAPSULE ORAL 3 TIMES DAILY
Qty: 30 CAPSULE | Refills: 0 | Status: SHIPPED | OUTPATIENT
Start: 2019-03-01 | End: 2019-03-11

## 2019-03-01 RX ORDER — NYSTATIN 100000 [USP'U]/G
POWDER TOPICAL 2 TIMES DAILY
Qty: 60 G | Refills: 0 | Status: SHIPPED | OUTPATIENT
Start: 2019-03-01 | End: 2019-10-25 | Stop reason: CLARIF

## 2019-03-01 RX ADMIN — POTASSIUM & SODIUM PHOSPHATES POWDER PACK 280-160-250 MG 1 PACKET: 280-160-250 PACK at 01:03

## 2019-03-01 RX ADMIN — SODIUM CHLORIDE: 0.9 INJECTION, SOLUTION INTRAVENOUS at 05:03

## 2019-03-01 RX ADMIN — APIXABAN 2.5 MG: 2.5 TABLET, FILM COATED ORAL at 09:03

## 2019-03-01 RX ADMIN — Medication: at 09:03

## 2019-03-01 RX ADMIN — CEFTAROLINE FOSAMIL 600 MG: 600 POWDER, FOR SOLUTION INTRAVENOUS at 09:03

## 2019-03-01 RX ADMIN — PANTOPRAZOLE SODIUM 40 MG: 40 TABLET, DELAYED RELEASE ORAL at 09:03

## 2019-03-01 NOTE — PLAN OF CARE
Problem: Physical Therapy Goal  Goal: Physical Therapy Goal  Goals to be met by: 3/12/2019     Patient will increase functional independence with mobility by performin. Supine to sit with Modified [place]  2. Sit to supine with Modified [place]  3. Sit to stand transfer with Modified [place]  4. Bed to chair transfer with Modified [place] using Rolling Walker  5. Gait  x 150 feet with Supervision using Rolling Walker.   6. Ascend/descend 5 stair with bilateral Handrails Contact Guard Assistance.   7. Lower extremity exercise program x10-20 reps per handout, with independence     Outcome: Ongoing (interventions implemented as appropriate)  PT for gait training

## 2019-03-01 NOTE — NURSING
Discharge instructions given to patient and brother in law, verbalized understanding. Peripheral IV removed, tolerated well. Patient stated he voided without difficult post ryder removal. Unable to measure, bowel movement noted with urine output. Left floor via wheelchair. Remained free from fall and injury.

## 2019-03-01 NOTE — PT/OT/SLP PROGRESS
Physical Therapy Treatment    Patient Name:  Lukas Chance   MRN:  [id number]    Recommendations:     Discharge Recommendations:  other (see comments)(HHPT)   Discharge Equipment Recommendations: (Bariatric RW)       Assessment:     Lukas Chance is a 69 y.o. male admitted with a medical diagnosis of Severe sepsis.  He presents with the following impairments/functional limitations:  weakness, impaired endurance, impaired functional mobilty, gait instability, impaired balance, decreased upper extremity function, decreased lower extremity function, impaired skin, edema, impaired cardiopulmonary response to activity .    Rehab Prognosis: Good; patient would benefit from acute skilled PT services to address these deficits and reach maximum level of function.    Recent Surgery: * No surgery found *      Plan:     During this hospitalization, patient to be seen 6 x/week to address the identified rehab impairments via gait training, therapeutic activities, therapeutic exercises and progress toward the following goals:    · Plan of Care Expires:  03/13/19    Subjective     Chief Complaint: none expressed  Patient/Family Comments/goals: eager to ambulate    Pain/Comfort:  · Pain Rating 1: 0/10      Objective:     Communicated with nurse Coelho prior to session.  Patient found seated in chair ryder catheter, telemetry, peripheral IV  upon PT entry to room.     General Precautions: Standard, fall   Orthopedic Precautions:N/A   Braces: N/A     Functional Mobility:  · Transfers:     · Sit to Stand:  minimum assistance with rolling walker    · Gait: 150' with CGA using RW. SOB noted, pt instructed in energy conservation and deep breathing tech        Therapeutic Activities and Exercises:   pt assisted back to bedside chair with CGA post gait    Patient left up in chair with all lines intact, call button in reach and nurse Laquita present..    GOALS:   Multidisciplinary Problems     Physical Therapy Goals        Problem: Physical  Therapy Goal    Goal Priority Disciplines Outcome Goal Variances Interventions   Physical Therapy Goal     PT, PT/OT Ongoing (interventions implemented as appropriate)     Description:  Goals to be met by: 3/12/2019     Patient will increase functional independence with mobility by performin. Supine to sit with Modified Pre[place]  2. Sit to supine with Modified [place]  3. Sit to stand transfer with Modified Pre[place]  4. Bed to chair transfer with Modified Preble using Rolling Walker  5. Gait  x 150 feet with Supervision using Rolling Walker.   6. Ascend/descend 5 stair with bilateral Handrails Contact Guard Assistance.   7. Lower extremity exercise program x10-20 reps per handout, with independence                      Time Tracking:     PT Received On: 19  PT Start Time: 0944     PT Stop Time: 1000  PT Total Time (min): 16 min     Billable Minutes: Gait Training 16    Treatment Type: Treatment  PT/PTA: PTA     PTA Visit Number: 1     Donya Dacosta PTA  2019

## 2019-03-01 NOTE — PLAN OF CARE
Problem: Adult Inpatient Plan of Care  Goal: Plan of Care Review  Patient AAO, VSS. Pt verbalized understanding of POC. Purposeful hourly/q2hr rounding done during shift to promote patient safety. Patient free from falls and injury during shift.  Safety maintained. Telemetry monitoring continued. Dressing remains dry/intact. Bed in lowest position, brakes locked, and call light within reach.  Will continue to monitor.

## 2019-03-01 NOTE — PLAN OF CARE
Faxed over request for outpt wound care to [facility] (ph#[phone], fax#[phone]); they will contact the pt with an appointment.   [facility] contact information placed on pt's AVS....REGI Cates CM   2288- Received call from Kristen with [facility] verifying they received the referral and will call the pt with an appt....REGI Cates CM      03/01/19 1244   Post-Acute Status   Post-Acute Authorization Other   Other Status Community Services

## 2019-03-01 NOTE — PROGRESS NOTES
campbell resolved    Keep euvolemic  Keep sbp greater than 90  No nsaids for a couple of weeks    Will sign off, call if further assistance needed

## 2019-03-01 NOTE — DISCHARGE SUMMARY
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 male admitted to Hospitalist Service from [facility] Emergency Room with complaint of generalized weakness and frequent falls for 3 days. Patient reportedly has past medical history significant for morbid obesity, history of recurrent leg cellulitis/venous stasis, diabetes mellitus type 2, hypertension, history of pulmonary embolism (anti thrombin 3 deficiency, on Eliquis) and history of obstructive sleep apnea. Patient presented to the emergency room for progressively worsening generalized weakness for 2-3 days.  Patient felt dizzy upon getting up.  Patient was not able to get up or sit due to generalized weakness.  Left leg appeared to be red and hot. No recent fevers or chills cough or shortness of breath reported patient reported compliance with Eliquis and other medications patient also reports he suffers with chronic low back pain. Patient denied chest pain, shortness of breath, abdominal pain, nausea, vomiting, headache, vision changes, focal neuro-deficits, cough or fever. Patient was admitted to Hospitalist  medicine service. Patient was evaluated by Dr. Betancur.  Patient was treated with intravenous antibiotics. Patient was started on IV antibiotics. Patient encouraged to elevate extremity. Routine wound care continued.  Ace wrap bandage continued.  Patient was followed by wound care nurse.  Patient's symptoms improved and patient transitioned to PO antibiotics.  Abnormal liver enzymes improved with hydration and statin therapy was discontinued.  Patient was supervised by Dr. Bishop.  Renal functions improved to baseline with IV fluid hydration.  Diuretic therapy has been discontinued along with Ace inhibitor.  Patient will follow up with primary care physician next week to discuss resumption of prior cardiac medications depending upon follow-up complete metabolic panel results.  Patient advised to keep left leg elevated.  Patient also encouraged to have a polysomnogram completed for evaluation of obstructive sleep apnea.  Patient to follow up at outpatient wound clinic for left lower extremity wound management.  Patient instructed to avoid use of non steroidal anti-inflammatory medications in future.  Patient voiced understanding.  Patient will also have a follow-up a and a in 4 weeks which was tested positive during hospital stay.  Patient to continue close follow-up with his doctors.  Patient's serum troponin levels were noted to be elevated which were thought to be related to sepsis which was supervised by Dr. Velazquez.  Patient will follow up with him in 2 weeks in his clinic. Patient was discharged home in stable condition with following discharge plan of care. Total time with the patient was 30 minutes and greater than 50% was spent in counseling and coordination of care. The assessment and plan have been discussed at length. Physicians' notes reviewed. Labs and procedure reviewed.     Consults: Dr. Bishop, Dr. Betancur, Dr. Velazquez, Dr. Claudio    Significant Diagnostic Studies:   Ultrasound abdomen:  Nodular hepatic  contour raising consideration for cirrhosis.  Cholelithiasis.     Left leg venous Doppler ultrasound:  No evidence of deep venous thrombosis in the left lower extremity. Left inguinal lymphadenopathy.     Chest x-ray:  No acute process.     2D echocardiogram:  · Technically difficult study.  · Cardiac chambers size and function could not be determined.  · Post Lumason injection, the left ventricular function is hyperdynamic with estimated ejection fraction is 65%.  · There is no obvious wall motion abnormalities.    Microbiology Results (last 7 days)     Procedure Component Value Units Date/Time    Blood culture [[id number]] Collected:  02/25/19 1758    Order Status:  Completed Specimen:  Blood from Antecubital, Left Updated:  02/28/19 2312     Blood Culture, Routine No Growth to date     Blood Culture, Routine No Growth to date     Blood Culture, Routine No Growth to date     Blood Culture, Routine No Growth to date    Narrative:       Aerobic and anaerobic    Blood culture [[id number]] Collected:  02/25/19 1812    Order Status:  Completed Specimen:  Blood from Antecubital, Right Updated:  02/28/19 2312     Blood Culture, Routine No Growth to date     Blood Culture, Routine No Growth to date     Blood Culture, Routine No Growth to date     Blood Culture, Routine No Growth to date    Narrative:       Aerobic and anaerobic        Special Treatments/Procedures: None  Disposition: Home or Self Care    Medications:  Reconciled Home Medications:   Current Discharge Medication List      START taking these medications    Details   clindamycin (CLEOCIN) 300 MG capsule Take 1 capsule (300 mg total) by mouth 3 (three) times daily. for 10 days  Qty: 30 capsule, Refills: 0      HYDROcodone-acetaminophen (NORCO) 5-325 mg per tablet Take 1 tablet by mouth every 6 (six) hours as needed.  Qty: 15 tablet, Refills: 0      nystatin (MYCOSTATIN) powder Apply topically 2 (two) times daily. Apply nystatin powder to abdominal folds rash and  "groin folds twice daily  Qty: 60 g, Refills: 0         CONTINUE these medications which have CHANGED    Details   spironolactone (ALDACTONE) 25 MG tablet Take 1 tablet (25 mg total) by mouth once daily.  Qty: 30 tablet, Refills: 0         CONTINUE these medications which have NOT CHANGED    Details   acetaminophen (TYLENOL EXTRA STRENGTH) 500 MG tablet Take 500 mg by mouth 2 (two) times daily as needed.       ELIQUIS 5 mg Tab Take 1 tablet (5 mg total) by mouth 2 (two) times daily.  Qty: 180 tablet, Refills: 3    Associated Diagnoses: Coagulopathy      multivitamin (THERAGRAN) per tablet Take 1 tablet by mouth once daily.      pva-gentian violet-methyl blue (HYDROFERA BLUE) 6 X 6 " Bndg Change dressing daily  Qty: 30 each, Refills: prn    Associated Diagnoses: Venous stasis ulcer, unspecified site, unspecified ulcer stage, unspecified whether varicose veins present      salmon oil/omega-3 fatty acids (SALMON OIL-1000 ORAL) Take 1 capsule by mouth once daily.         STOP taking these medications       furosemide (LASIX) 20 MG tablet Comments:   Reason for Stopping:         lisinopril-hydrochlorothiazide (PRINZIDE,ZESTORETIC) 20-25 mg Tab Comments:   Reason for Stopping:         rosuvastatin (CRESTOR) 20 MG tablet Comments:   Reason for Stopping:             Discharge Procedure Orders   Diet Cardiac     Diet diabetic     Other restrictions (specify):   Order Comments: PLEASE OBSERVE FALL PRECAUTIONS.  Please keep left leg elevated all the times.     Call MD for:   Order Comments: For worsening symptoms, chest pain, shortness of breath, increased abdominal pain, high grade fever, stroke or stroke like symptoms, immediately go to the nearest Emergency Room or call 911 as soon as possible.     Change dressing (specify)   Order Comments: Dressing change:  Continue daily Ace wrap dressing after washing left leg wound with soap and water every day.  Please follow up with outpatient wound clinic at [facility] " as directed.     Follow-up Information     Solitario Mendez MD In 1 week.    Specialty:  Family Medicine  Contact information:  [address]  [place] [address]  [phone]             Krystal Betancur MD In 1 week.    Specialty:  Infectious Diseases  Contact information:  [address] [facility]  [address]  [place] [address]  [phone]             Please follow up.    Contact information:  Please have a sleep study/polysomnogram completed as soon as possible for evaluation of obstructive sleep apnea.    Lisinopril hydrochlorothiazide and Crestor your cholesterol medicine are being held due to lower BP, abnormal kidney and liver results.           Please follow up.    Contact information:  Have comprehensive metabolic panel completed in 7 days.    Also have a and a level checked in 4 weeks since it was tested positive and discussed with Dr. Mendez.           Please follow up.    Contact information:  Please avoid use of any nonsteroidal anti-inflammatory medications such as ibuprofen, Motrin, Aleve, Naprosyn or Celebrex.           Leno Velazquez MD In 2 weeks.    Specialty:  Cardiology  Contact information:  [address]  [place] [address] [phone]

## 2019-03-02 LAB
BACTERIA BLD CULT: NORMAL
BACTERIA BLD CULT: NORMAL

## 2019-03-02 NOTE — PLAN OF CARE
03/02/19 1329   Final Note   Assessment Type Final Discharge Note   Anticipated Discharge Disposition Home

## 2019-03-04 ENCOUNTER — PATIENT OUTREACH (OUTPATIENT)
Dept: ADMINISTRATIVE | Facility: CLINIC | Age: 69
End: 2019-03-04

## 2019-03-04 LAB
INTERPRETATION SERPL IFE-IMP: NORMAL
PATHOLOGIST INTERPRETATION IFE: NORMAL

## 2019-03-04 NOTE — PATIENT INSTRUCTIONS
Sepsis  Sepsis occurs when your body responds to bacteremia - the presence of bacteria in your bloodstream. Sepsis can be deadly. Blood pressure may drop and the lungs and kidneys may start to fail. Emergency care for sepsis is crucial.  Risk Factors  Those most at risk for sepsis are:  Infants or older adults  People who have an illness such as cancer, AIDS, or diabetes  People being treated with chemotherapy medications or radiation  People who have had a transplant  People with an infection such as pneumonia, meningitis, or a urinary tract infection  When to Go to the Emergency Department (ED)  Sepsis is a medical emergency. Go to the nearest emergency department if a fever is present with any of these symptoms:  Chills and shaking  Fever or low body temperature (hypothermia)  Rapid heartbeat and breathing; shortness of breath  Nausea or vomiting  Confusion, dizziness  Skin rash  Decreased urination  What to Expect in the ED  Blood and urine tests are done to look for the presence of bacteria.  A blood culture may be done. In this test, a blood sample is sent to a lab, where its placed in a special container. Any bacteria in the blood should grow in 24 hours.  X-rays may be taken or other imaging tests may be done.  A person with sepsis will be admitted to the hospital and treated with antibiotics. Treatment may also include oxygen and intravenous fluids.  © 4848-2030 [employer] [facility], [address] [place] [address], [place] [address]. All rights reserved. This information is not intended as a substitute for professional medical care. Always follow your healthcare professional's instructions.

## 2019-03-11 ENCOUNTER — DOCUMENTATION ONLY (OUTPATIENT)
Dept: FAMILY MEDICINE | Facility: CLINIC | Age: 69
End: 2019-03-11

## 2019-03-11 NOTE — PROGRESS NOTES
Pre-Visit Chart Review  For Appointment Scheduled on 3-14-19    Health Maintenance Due   Topic Date Due    High Dose Statin  01/24/1969    Zoster Vaccine  01/24/2008    Colonoscopy  05/02/2016    Urine Microalbumin  01/04/2018    Eye Exam  07/05/2018

## 2019-03-14 ENCOUNTER — OFFICE VISIT (OUTPATIENT)
Dept: FAMILY MEDICINE | Facility: CLINIC | Age: 69
End: 2019-03-14
Attending: FAMILY MEDICINE
Payer: MEDICARE

## 2019-03-14 VITALS
TEMPERATURE: 98 F | HEART RATE: 119 BPM | BODY MASS INDEX: 44.1 KG/M2 | DIASTOLIC BLOOD PRESSURE: 70 MMHG | SYSTOLIC BLOOD PRESSURE: 134 MMHG | OXYGEN SATURATION: 96 % | HEIGHT: 71 IN | WEIGHT: 315 LBS | RESPIRATION RATE: 32 BRPM

## 2019-03-14 DIAGNOSIS — G47.33 OSA (OBSTRUCTIVE SLEEP APNEA): ICD-10-CM

## 2019-03-14 DIAGNOSIS — R74.8 ABNORMAL LIVER ENZYMES: ICD-10-CM

## 2019-03-14 DIAGNOSIS — N17.9 AKI (ACUTE KIDNEY INJURY): ICD-10-CM

## 2019-03-14 DIAGNOSIS — E11.49 DIABETES MELLITUS TYPE 2 WITH NEUROLOGICAL MANIFESTATIONS: ICD-10-CM

## 2019-03-14 DIAGNOSIS — M62.82 NON-TRAUMATIC RHABDOMYOLYSIS: ICD-10-CM

## 2019-03-14 DIAGNOSIS — L03.116 CELLULITIS OF LEFT LOWER LEG: Primary | ICD-10-CM

## 2019-03-14 DIAGNOSIS — A41.9 SEPSIS, DUE TO UNSPECIFIED ORGANISM: ICD-10-CM

## 2019-03-14 PROCEDURE — 99999 PR PBB SHADOW E&M-EST. PATIENT-LVL IV: CPT | Mod: PBBFAC,,, | Performed by: FAMILY MEDICINE

## 2019-03-14 PROCEDURE — 99214 OFFICE O/P EST MOD 30 MIN: CPT | Mod: PBBFAC,PO | Performed by: FAMILY MEDICINE

## 2019-03-14 PROCEDURE — 99999 PR PBB SHADOW E&M-EST. PATIENT-LVL IV: ICD-10-PCS | Mod: PBBFAC,,, | Performed by: FAMILY MEDICINE

## 2019-03-14 PROCEDURE — 99214 OFFICE O/P EST MOD 30 MIN: CPT | Mod: S$PBB,,, | Performed by: FAMILY MEDICINE

## 2019-03-14 PROCEDURE — 99214 PR OFFICE/OUTPT VISIT, EST, LEVL IV, 30-39 MIN: ICD-10-PCS | Mod: S$PBB,,, | Performed by: FAMILY MEDICINE

## 2019-03-14 NOTE — PROGRESS NOTES
Subjective:       Patient ID: Lukas Chance is a 69 y.o. male.    Chief Complaint: Transitional Care    69-year-old male with a history of chronic stasis dermatitis failed bilateral venous ablation and recurrent episodes of cellulitis developed some cellulitis of his left lower extremity and began experiencing profound weakness secondary to dehydration.  He went to the emergency room at [facility] on February 25th and was found to have an acute kidney injury with severe dehydration and cellulitis.  Blood cultures were taken x2 both of which revealed no growth.  He was started on IV antibiotics and IV fluids and followed by Dr. Betancur eventually being changed to oral clindamycin.  His CPK was over 17,000 and his Crestor was discontinued as was his diuretics.  An echocardiogram showed preserved ejection fraction although it was a somewhat difficult study.  Ultrasound of the abdomen showed a nodular liver suspicious for cirrhosis and his liver enzymes remained elevated although declining through the entire hospital stay.  The patient has been going to the wound Care Center since discharge on March 1st and the cellulitis is resolving.  He took his last clindamycin last night.  He should have finished on or about March 11 so he probably was not taking them as ordered.  He is still with holding his diuretics and his Crestor and has an upcoming appointment with Dr. Velazquez in Cardiology in about a week.  He declines to do his follow-up lab today because he has been taking an over-the-counter anti-inflammatory against orders.  He also needs to have a sleep study done and is due for a colonoscopy but is declining to do that until after his wife complete hers which is scheduled in the near future.  He was actually due for a microalbumin but were going to hold off on that until kidneys stabilize.    Past Medical History:  3/1/2013: Anticoagulant long-term use  No date: Antithrombin 3 deficiency  No date: Cellulitis    "   Comment:  lower legs, venous stasis  No date: Coronary artery disease  5/15/2013: Diabetes mellitus type 2 in obese  No date: Hypertension  No date: Obesity  No date: LAKHWINDER (obstructive sleep apnea)  No date: Pulmonary embolism    Past Surgical History:  5/2/2011: COLONOSCOPY      Comment:  Dr. Miller, 9 polyps, recheck 5 year  No date: MULTIPLE TOOTH EXTRACTIONS  7/2015: RADIOFREQUENCY ABLATION      Comment:  bilateral   No date: TONSILLECTOMY    Current Outpatient Medications on File Prior to Visit:  acetaminophen (TYLENOL EXTRA STRENGTH) 500 MG tablet, Take 500 mg by mouth 2 (two) times daily as needed. , Disp: , Rfl:   ELIQUIS 5 mg Tab, Take 1 tablet (5 mg total) by mouth 2 (two) times daily., Disp: 180 tablet, Rfl: 3  multivitamin (THERAGRAN) per tablet, Take 1 tablet by mouth once daily., Disp: , Rfl:   nystatin (MYCOSTATIN) powder, Apply topically 2 (two) times daily. Apply nystatin powder to abdominal folds rash and groin folds twice daily, Disp: 60 g, Rfl: 0  pva-gentian violet-methyl blue (HYDROFERA BLUE) 6 X 6 " Bndg, Change dressing daily, Disp: 30 each, Rfl: prn  salmon oil/omega-3 fatty acids (SALMON OIL-1000 ORAL), Take 1 capsule by mouth once daily., Disp: , Rfl:   spironolactone (ALDACTONE) 25 MG tablet, Take 1 tablet (25 mg total) by mouth once daily., Disp: 30 tablet, Rfl: 0  (DISCONTINUED) HYDROcodone-acetaminophen (NORCO) 5-325 mg per tablet, Take 1 tablet by mouth every 6 (six) hours as needed., Disp: 15 tablet, Rfl: 0    No current facility-administered medications on file prior to visit.           Review of Systems   Constitutional: Negative for chills and fever.   Respiratory: Positive for apnea (Wife reports witnessed apnea in addition to loud snoring). Negative for cough, choking, chest tightness and shortness of breath.    Cardiovascular: Negative for chest pain and palpitations.   Gastrointestinal: Negative for constipation, diarrhea, nausea and vomiting.   Genitourinary: Positive for " frequency (Increase frequency with small volumes since catheter removed but no dysuria). Negative for dysuria and hematuria.   Skin: Positive for color change and wound (Multiple small wounds left leg). Negative for rash.       Objective:      Physical Exam   Constitutional: He is oriented to person, place, and time. He appears well-developed. No distress.   Good blood pressure control  Morbidly obese with a BMI of 53.5 is down 2.2 lb from his last visit with me January 22, 2019   HENT:   Head: Normocephalic and atraumatic.   Cardiovascular: Normal rate, regular rhythm and normal heart sounds.   Pulmonary/Chest: Effort normal and breath sounds normal.   Neurological: He is alert and oriented to person, place, and time.   Skin: He is not diaphoretic.        Psychiatric: He has a normal mood and affect. His behavior is normal. Judgment and thought content normal.   Nursing note and vitals reviewed.      Assessment:       1. Cellulitis of left lower leg    2. ASHLEY (acute kidney injury)    3. Non-traumatic rhabdomyolysis    4. Sepsis, due to unspecified organism    5. LAKHWINDER (obstructive sleep apnea)    6. Diabetes mellitus type 2 with neurological manifestations    7. Body mass index 50.0-59.9, adult    8. Abnormal liver enzymes        Plan:       1. Cellulitis of left lower leg  Improving, followed by Dr. Betancur and Wound Care Clinic    2. ASHLEY (acute kidney injury)  Improving as of discharge from hospital.  Patient subsequently has been taking NSAIDs even though he had been explicitly ordered not too.  He is not taking them now and declines to do lab today but agrees to do them Monday  - Comprehensive metabolic panel; Future    3. Non-traumatic rhabdomyolysis  Probably due to sepsis and dehydration as well as acute kidney injury but Crestor has been withheld for the time being  - Comprehensive metabolic panel; Future  - CK; Future    4. Sepsis, due to unspecified organism  No organism recovered on blood cultures  - CBC  auto differential; Future    5. LAKHWINDER (obstructive sleep apnea)  External referral to [facility]  - Ambulatory referral to Sleep Disorders    6. Diabetes mellitus type 2 with neurological manifestations  Lab Results   Component Value Date    HGBA1C 5.8 (H) 01/21/2019         7. Body mass index 50.0-59.9, adult    8. Abnormal liver enzymes  Probably secondary to non alcoholic fatty liver disease compounded by dehydration and acute renal failure

## 2019-03-14 NOTE — PATIENT INSTRUCTIONS
Weight Management: Getting Started  Healthy bodies come in all shapes and sizes. Not all bodies are made to be thin. For some people, a healthy weight is higher than the average weight listed on weight charts. Your healthcare provider can help you decide on a healthy weight for you.    Reasons to lose weight  Losing weight can help with some health problems, such as high blood pressure, heart disease, diabetes, sleep apnea, and arthritis. You may also feel more energy.  Set your long-term goal  Your goal doesn't even have to be a specific weight. You may decide on a fitness goal (such as being able to walk 10 miles a week), or a health goal (such as lowering your blood pressure). Choose a goal that is measurable and reasonable, so you know when you've reached it. A goal of reaching a BMI of less than 25 is not always reasonable (or possible).   Make an action plan  Habits dont change overnight. Setting your goals too high can leave you feeling discouraged if you cant reach them. Be realistic. Choose one or two small changes you can make now. Set an action plan for how you are going to make these changes. When you can stick to this plan, keep making a few more small changes. Taking small steps will help you stay on the path to success.  Track your progress  Write down your goals. Then, keep a daily record of your progress. Write down what you eat and how active you are. This record lets you look back on how much youve done. It may also help when youre feeling frustrated. Reward yourself for success. Even if you dont reach every goal, give yourself credit for what you do get done.  Get support  Encouragement from others can help make losing weight easier. Ask your family members and friends for support. They may even want to join you. Also look to your healthcare provider, registered dietitian, and  for help. Your local hospital can give you more information about nutrition, exercise, and  weight loss.  Date Last Reviewed: 1/31/2016  © 8308-4986 [employer], [employer]. [address] [place] [address], [place] [address]. All rights reserved. This information is not intended as a substitute for professional medical care. Always follow your healthcare professional's instructions.

## 2019-03-18 ENCOUNTER — LAB VISIT (OUTPATIENT)
Dept: LAB | Facility: HOSPITAL | Age: 69
End: 2019-03-18
Attending: FAMILY MEDICINE
Payer: MEDICARE

## 2019-03-18 DIAGNOSIS — N17.9 AKI (ACUTE KIDNEY INJURY): ICD-10-CM

## 2019-03-18 DIAGNOSIS — M62.82 NON-TRAUMATIC RHABDOMYOLYSIS: ICD-10-CM

## 2019-03-18 DIAGNOSIS — D75.839 THROMBOCYTOSIS: Primary | ICD-10-CM

## 2019-03-18 DIAGNOSIS — E87.5 HYPERKALEMIA: ICD-10-CM

## 2019-03-18 DIAGNOSIS — A41.9 SEPSIS, DUE TO UNSPECIFIED ORGANISM: ICD-10-CM

## 2019-03-18 LAB
ALBUMIN SERPL BCP-MCNC: 2.6 G/DL
ALP SERPL-CCNC: 75 U/L
ALT SERPL W/O P-5'-P-CCNC: 23 U/L
ANION GAP SERPL CALC-SCNC: 6 MMOL/L
AST SERPL-CCNC: 23 U/L
BASOPHILS # BLD AUTO: 0.1 K/UL
BASOPHILS NFR BLD: 1.1 %
BILIRUB SERPL-MCNC: 0.4 MG/DL
BUN SERPL-MCNC: 21 MG/DL
CALCIUM SERPL-MCNC: 9 MG/DL
CHLORIDE SERPL-SCNC: 102 MMOL/L
CK SERPL-CCNC: 88 U/L
CO2 SERPL-SCNC: 30 MMOL/L
CREAT SERPL-MCNC: 1.2 MG/DL
DIFFERENTIAL METHOD: ABNORMAL
EOSINOPHIL # BLD AUTO: 0.3 K/UL
EOSINOPHIL NFR BLD: 3 %
ERYTHROCYTE [DISTWIDTH] IN BLOOD BY AUTOMATED COUNT: 14.1 %
EST. GFR  (AFRICAN AMERICAN): >60 ML/MIN/1.73 M^2
EST. GFR  (NON AFRICAN AMERICAN): >60 ML/MIN/1.73 M^2
GLUCOSE SERPL-MCNC: 106 MG/DL
HCT VFR BLD AUTO: 39.1 %
HGB BLD-MCNC: 11.9 G/DL
IMM GRANULOCYTES # BLD AUTO: 0.06 K/UL
IMM GRANULOCYTES NFR BLD AUTO: 0.6 %
LYMPHOCYTES # BLD AUTO: 1.9 K/UL
LYMPHOCYTES NFR BLD: 20.1 %
MCH RBC QN AUTO: 29.8 PG
MCHC RBC AUTO-ENTMCNC: 30.4 G/DL
MCV RBC AUTO: 98 FL
MONOCYTES # BLD AUTO: 0.9 K/UL
MONOCYTES NFR BLD: 9.3 %
NEUTROPHILS # BLD AUTO: 6.2 K/UL
NEUTROPHILS NFR BLD: 65.9 %
NRBC BLD-RTO: 0 /100 WBC
PLATELET # BLD AUTO: 491 K/UL
PMV BLD AUTO: 9.4 FL
POTASSIUM SERPL-SCNC: 5.3 MMOL/L
PROT SERPL-MCNC: 7.9 G/DL
RBC # BLD AUTO: 3.99 M/UL
SODIUM SERPL-SCNC: 138 MMOL/L
WBC # BLD AUTO: 9.35 K/UL

## 2019-03-18 PROCEDURE — 36415 COLL VENOUS BLD VENIPUNCTURE: CPT | Mod: PO

## 2019-03-18 PROCEDURE — 82550 ASSAY OF CK (CPK): CPT

## 2019-03-18 PROCEDURE — 85025 COMPLETE CBC W/AUTO DIFF WBC: CPT

## 2019-03-18 PROCEDURE — 80053 COMPREHEN METABOLIC PANEL: CPT

## 2019-03-21 DIAGNOSIS — E11.9 TYPE 2 DIABETES MELLITUS WITHOUT COMPLICATION: ICD-10-CM

## 2019-03-25 RX ORDER — SPIRONOLACTONE 25 MG/1
25 TABLET ORAL DAILY
Qty: 90 TABLET | Refills: 1 | Status: SHIPPED | OUTPATIENT
Start: 2019-03-25 | End: 2019-04-15

## 2019-04-09 ENCOUNTER — LAB VISIT (OUTPATIENT)
Dept: LAB | Facility: HOSPITAL | Age: 69
End: 2019-04-09
Attending: FAMILY MEDICINE
Payer: MEDICARE

## 2019-04-09 DIAGNOSIS — D75.839 THROMBOCYTOSIS: ICD-10-CM

## 2019-04-09 DIAGNOSIS — E87.5 HYPERKALEMIA: ICD-10-CM

## 2019-04-09 LAB
BASOPHILS # BLD AUTO: 0.07 K/UL (ref 0–0.2)
BASOPHILS NFR BLD: 0.7 % (ref 0–1.9)
DIFFERENTIAL METHOD: ABNORMAL
EOSINOPHIL # BLD AUTO: 0.3 K/UL (ref 0–0.5)
EOSINOPHIL NFR BLD: 2.6 % (ref 0–8)
ERYTHROCYTE [DISTWIDTH] IN BLOOD BY AUTOMATED COUNT: 13.5 % (ref 11.5–14.5)
HCT VFR BLD AUTO: 41.1 % (ref 40–54)
HGB BLD-MCNC: 12.7 G/DL (ref 14–18)
IMM GRANULOCYTES # BLD AUTO: 0.04 K/UL (ref 0–0.04)
IMM GRANULOCYTES NFR BLD AUTO: 0.4 % (ref 0–0.5)
LYMPHOCYTES # BLD AUTO: 1.9 K/UL (ref 1–4.8)
LYMPHOCYTES NFR BLD: 17.8 % (ref 18–48)
MCH RBC QN AUTO: 29.8 PG (ref 27–31)
MCHC RBC AUTO-ENTMCNC: 30.9 G/DL (ref 32–36)
MCV RBC AUTO: 97 FL (ref 82–98)
MONOCYTES # BLD AUTO: 0.8 K/UL (ref 0.3–1)
MONOCYTES NFR BLD: 7.5 % (ref 4–15)
NEUTROPHILS # BLD AUTO: 7.5 K/UL (ref 1.8–7.7)
NEUTROPHILS NFR BLD: 71 % (ref 38–73)
NRBC BLD-RTO: 0 /100 WBC
PLATELET # BLD AUTO: 297 K/UL (ref 150–350)
PMV BLD AUTO: 9.6 FL (ref 9.2–12.9)
POTASSIUM SERPL-SCNC: 4.6 MMOL/L (ref 3.5–5.1)
RBC # BLD AUTO: 4.26 M/UL (ref 4.6–6.2)
WBC # BLD AUTO: 10.58 K/UL (ref 3.9–12.7)

## 2019-04-09 PROCEDURE — 84132 ASSAY OF SERUM POTASSIUM: CPT

## 2019-04-09 PROCEDURE — 85025 COMPLETE CBC W/AUTO DIFF WBC: CPT

## 2019-04-09 PROCEDURE — 36415 COLL VENOUS BLD VENIPUNCTURE: CPT | Mod: PO

## 2019-04-10 ENCOUNTER — TELEPHONE (OUTPATIENT)
Dept: FAMILY MEDICINE | Facility: CLINIC | Age: 69
End: 2019-04-10

## 2019-04-10 ENCOUNTER — PATIENT MESSAGE (OUTPATIENT)
Dept: FAMILY MEDICINE | Facility: CLINIC | Age: 69
End: 2019-04-10

## 2019-04-10 DIAGNOSIS — E11.49 DIABETES MELLITUS TYPE 2 WITH NEUROLOGICAL MANIFESTATIONS: ICD-10-CM

## 2019-04-10 DIAGNOSIS — D64.9 ANEMIA, UNSPECIFIED TYPE: Primary | ICD-10-CM

## 2019-04-10 NOTE — TELEPHONE ENCOUNTER
----- Message from Solitario Mendez MD sent at 4/9/2019  7:21 PM CDT -----  The blood count continues to improve slowly with normal white blood cells and normal platelets.  I would suggest a repeat in about three months.  The potassium level is still pending at this time.    Result sent by e-mail

## 2019-04-10 NOTE — TELEPHONE ENCOUNTER
Order in for CBC in three months.  He will be due for his next BMP and A1c about that time along with a microalbumin.  Orders are in for those also to do at the same time.

## 2019-04-15 DIAGNOSIS — Z51.81 THERAPEUTIC DRUG MONITORING: Primary | ICD-10-CM

## 2019-04-15 RX ORDER — SPIRONOLACTONE 50 MG/1
50 TABLET, FILM COATED ORAL DAILY
Qty: 90 TABLET | Refills: 1 | Status: SHIPPED | OUTPATIENT
Start: 2019-04-15 | End: 2019-10-29 | Stop reason: SDUPTHER

## 2019-04-15 RX ORDER — SPIRONOLACTONE 50 MG/1
50 TABLET, FILM COATED ORAL DAILY
Qty: 90 TABLET | Refills: 1 | Status: SHIPPED | OUTPATIENT
Start: 2019-04-15 | End: 2019-04-15 | Stop reason: SDUPTHER

## 2019-04-15 NOTE — TELEPHONE ENCOUNTER
We can go to 50mg daily.  Watch for orthostatic dizziness and check a bmp in one month to keep an eye on kidney function.  [facility] bmp order in  He can take two of the 25mg spironolactone at a time until the mail order arrives.

## 2019-04-15 NOTE — TELEPHONE ENCOUNTER
Send med to [facility]- cancelled at other pharmacy- appt made to recheck lab- patient knows to  med and can take two of the 25mg Spironolactone.

## 2019-04-15 NOTE — TELEPHONE ENCOUNTER
----- Message from Maribeth Hernandez sent at 4/15/2019  9:59 AM CDT -----   Type: Needs Medical Advice    Who Called: pt  Pharmacy name and phone #:  [facility] [phone]  Best Call Back Number: [phone]  Additional Information:   Calling  For  Advice  On a  Fluid    Pill call spironolactone   And pt   Wound  Care   wants to increase it   Pt needs  advice

## 2019-04-15 NOTE — TELEPHONE ENCOUNTER
Patient goes to wound care clinic at [facility] per Dr. Betancur- he is retaining fluid and it is draining- nurse advised him to see if the Spironolactone could be increased.

## 2019-05-08 LAB
LEFT EYE DM RETINOPATHY: NEGATIVE
RIGHT EYE DM RETINOPATHY: NEGATIVE

## 2019-05-15 ENCOUNTER — LAB VISIT (OUTPATIENT)
Dept: LAB | Facility: HOSPITAL | Age: 69
End: 2019-05-15
Attending: FAMILY MEDICINE
Payer: MEDICARE

## 2019-05-15 DIAGNOSIS — Z51.81 THERAPEUTIC DRUG MONITORING: ICD-10-CM

## 2019-05-15 LAB
ANION GAP SERPL CALC-SCNC: 6 MMOL/L (ref 8–16)
BUN SERPL-MCNC: 20 MG/DL (ref 8–23)
CALCIUM SERPL-MCNC: 9.4 MG/DL (ref 8.7–10.5)
CHLORIDE SERPL-SCNC: 100 MMOL/L (ref 95–110)
CO2 SERPL-SCNC: 31 MMOL/L (ref 23–29)
CREAT SERPL-MCNC: 1.3 MG/DL (ref 0.5–1.4)
EST. GFR  (AFRICAN AMERICAN): >60 ML/MIN/1.73 M^2
EST. GFR  (NON AFRICAN AMERICAN): 54.9 ML/MIN/1.73 M^2
GLUCOSE SERPL-MCNC: 144 MG/DL (ref 70–110)
POTASSIUM SERPL-SCNC: 5.2 MMOL/L (ref 3.5–5.1)
SODIUM SERPL-SCNC: 137 MMOL/L (ref 136–145)

## 2019-05-15 PROCEDURE — 36415 COLL VENOUS BLD VENIPUNCTURE: CPT | Mod: PO

## 2019-05-15 PROCEDURE — 80048 BASIC METABOLIC PNL TOTAL CA: CPT

## 2019-05-16 ENCOUNTER — TELEPHONE (OUTPATIENT)
Dept: FAMILY MEDICINE | Facility: CLINIC | Age: 69
End: 2019-05-16

## 2019-05-16 NOTE — TELEPHONE ENCOUNTER
Patient has read report. He was not fasting at the time of the testing. He will  go to every other day for the spironolactone. He has follow up lab 7-10-19    Med list changed to every other day for spironolactone.

## 2019-05-16 NOTE — TELEPHONE ENCOUNTER
----- Message from Solitario Mendez MD sent at 5/15/2019  8:50 PM CDT -----  The chemistry panel shows the potassium again running a little high at 5.2.  This is probably due to the spironolactone diuretic which causes the body to save potassium.  The blood glucose is elevated.  It is in the diabetic range but I do not know if this was a fasting test or not.  If it was not fasting it is normal.  The calculated kidney function has declined a little bit compared to the last two tests.  This could be caused by over diuresis.  I suspect we will need to reduce the spironolactone down to 25 mg a day or as an alternative go to every other day use.    Result sent by e-mail

## 2019-07-10 ENCOUNTER — LAB VISIT (OUTPATIENT)
Dept: LAB | Facility: HOSPITAL | Age: 69
End: 2019-07-10
Attending: FAMILY MEDICINE
Payer: MEDICARE

## 2019-07-10 DIAGNOSIS — D64.9 ANEMIA, UNSPECIFIED TYPE: ICD-10-CM

## 2019-07-10 DIAGNOSIS — E11.49 DIABETES MELLITUS TYPE 2 WITH NEUROLOGICAL MANIFESTATIONS: ICD-10-CM

## 2019-07-10 LAB
ANION GAP SERPL CALC-SCNC: 9 MMOL/L (ref 8–16)
BASOPHILS # BLD AUTO: 0.08 K/UL (ref 0–0.2)
BASOPHILS NFR BLD: 0.9 % (ref 0–1.9)
BUN SERPL-MCNC: 31 MG/DL (ref 8–23)
CALCIUM SERPL-MCNC: 9.7 MG/DL (ref 8.7–10.5)
CHLORIDE SERPL-SCNC: 99 MMOL/L (ref 95–110)
CO2 SERPL-SCNC: 28 MMOL/L (ref 23–29)
CREAT SERPL-MCNC: 1.3 MG/DL (ref 0.5–1.4)
DIFFERENTIAL METHOD: ABNORMAL
EOSINOPHIL # BLD AUTO: 0.3 K/UL (ref 0–0.5)
EOSINOPHIL NFR BLD: 3 % (ref 0–8)
ERYTHROCYTE [DISTWIDTH] IN BLOOD BY AUTOMATED COUNT: 13.7 % (ref 11.5–14.5)
EST. GFR  (AFRICAN AMERICAN): >60 ML/MIN/1.73 M^2
EST. GFR  (NON AFRICAN AMERICAN): 54.9 ML/MIN/1.73 M^2
ESTIMATED AVG GLUCOSE: 131 MG/DL (ref 68–131)
GLUCOSE SERPL-MCNC: 105 MG/DL (ref 70–110)
HBA1C MFR BLD HPLC: 6.2 % (ref 4–5.6)
HCT VFR BLD AUTO: 44.1 % (ref 40–54)
HGB BLD-MCNC: 13.5 G/DL (ref 14–18)
IMM GRANULOCYTES # BLD AUTO: 0.03 K/UL (ref 0–0.04)
IMM GRANULOCYTES NFR BLD AUTO: 0.3 % (ref 0–0.5)
LYMPHOCYTES # BLD AUTO: 1.6 K/UL (ref 1–4.8)
LYMPHOCYTES NFR BLD: 17.8 % (ref 18–48)
MCH RBC QN AUTO: 28 PG (ref 27–31)
MCHC RBC AUTO-ENTMCNC: 30.6 G/DL (ref 32–36)
MCV RBC AUTO: 92 FL (ref 82–98)
MONOCYTES # BLD AUTO: 0.8 K/UL (ref 0.3–1)
MONOCYTES NFR BLD: 9.6 % (ref 4–15)
NEUTROPHILS # BLD AUTO: 6 K/UL (ref 1.8–7.7)
NEUTROPHILS NFR BLD: 68.4 % (ref 38–73)
NRBC BLD-RTO: 0 /100 WBC
PLATELET # BLD AUTO: 274 K/UL (ref 150–350)
PMV BLD AUTO: 10 FL (ref 9.2–12.9)
POTASSIUM SERPL-SCNC: 4.8 MMOL/L (ref 3.5–5.1)
RBC # BLD AUTO: 4.82 M/UL (ref 4.6–6.2)
SODIUM SERPL-SCNC: 136 MMOL/L (ref 136–145)
WBC # BLD AUTO: 8.75 K/UL (ref 3.9–12.7)

## 2019-07-10 PROCEDURE — 85025 COMPLETE CBC W/AUTO DIFF WBC: CPT

## 2019-07-10 PROCEDURE — 80048 BASIC METABOLIC PNL TOTAL CA: CPT

## 2019-07-10 PROCEDURE — 36415 COLL VENOUS BLD VENIPUNCTURE: CPT | Mod: PO

## 2019-07-10 PROCEDURE — 83036 HEMOGLOBIN GLYCOSYLATED A1C: CPT

## 2019-08-19 DIAGNOSIS — E11.9 TYPE 2 DIABETES MELLITUS WITHOUT COMPLICATION, UNSPECIFIED WHETHER LONG TERM INSULIN USE: ICD-10-CM

## 2019-09-05 ENCOUNTER — PATIENT OUTREACH (OUTPATIENT)
Dept: ADMINISTRATIVE | Facility: HOSPITAL | Age: 69
End: 2019-09-05

## 2019-10-07 ENCOUNTER — PATIENT OUTREACH (OUTPATIENT)
Dept: ADMINISTRATIVE | Facility: HOSPITAL | Age: 69
End: 2019-10-07

## 2019-10-07 NOTE — LETTER
AUTHORIZATION FOR RELEASE OF   CONFIDENTIAL INFORMATION    Dr Reynaga    We are seeing Lukas Chance, date of birth 1950, in the clinic at [facility]. Solitario Mendez MD is the patient's PCP. Lukas Chance has an outstanding lab/procedure at the time we reviewed his chart. In order to help keep his health information updated, he has authorized us to request the following medical record(s):        (  )  MAMMOGRAM                                      (  )  COLONOSCOPY      (  )  PAP SMEAR                                          (  )  OUTSIDE LAB RESULTS     (  )  DEXA SCAN                                          ( X )  EYE EXAM            (  )  FOOT EXAM                                          (  )  ENTIRE RECORD     (  )  OUTSIDE IMMUNIZATIONS                 (  )  _______________         Please fax records to [facility], Robert W Taylor, MD, [phone]      Thank you in advance,      Tarsha GOETZ  Panel Care Coordinator  [facility] [facility] [address] [place] [address]  Phone [phone]  Fax [phone]          Patient Name: Lukas Chance  : 1950  Patient Phone #: [phone]      50

## 2019-10-25 ENCOUNTER — HOSPITAL ENCOUNTER (INPATIENT)
Facility: HOSPITAL | Age: 69
LOS: 3 days | Discharge: HOME HEALTH | DRG: 871 | End: 2019-10-28
Attending: EMERGENCY MEDICINE | Admitting: HOSPITALIST
Payer: MEDICARE

## 2019-10-25 DIAGNOSIS — A41.9 SEPSIS, DUE TO UNSPECIFIED ORGANISM, UNSPECIFIED WHETHER ACUTE ORGAN DYSFUNCTION PRESENT: ICD-10-CM

## 2019-10-25 DIAGNOSIS — J18.9 PNEUMONIA DUE TO INFECTIOUS ORGANISM, UNSPECIFIED LATERALITY, UNSPECIFIED PART OF LUNG: ICD-10-CM

## 2019-10-25 DIAGNOSIS — E11.49 DIABETES MELLITUS TYPE 2 WITH NEUROLOGICAL MANIFESTATIONS: ICD-10-CM

## 2019-10-25 DIAGNOSIS — R06.02 SOB (SHORTNESS OF BREATH): ICD-10-CM

## 2019-10-25 DIAGNOSIS — E11.59 HYPERTENSION ASSOCIATED WITH DIABETES: ICD-10-CM

## 2019-10-25 DIAGNOSIS — I15.2 HYPERTENSION ASSOCIATED WITH DIABETES: ICD-10-CM

## 2019-10-25 DIAGNOSIS — J44.1 COPD WITH EXACERBATION: ICD-10-CM

## 2019-10-25 DIAGNOSIS — E66.2 MORBID (SEVERE) OBESITY WITH ALVEOLAR HYPOVENTILATION: Primary | ICD-10-CM

## 2019-10-25 DIAGNOSIS — L03.116 CELLULITIS OF LEFT LOWER EXTREMITY: ICD-10-CM

## 2019-10-25 PROBLEM — J12.9 VIRAL PNEUMONITIS: Status: ACTIVE | Noted: 2019-10-25

## 2019-10-25 PROBLEM — N18.30 STAGE 3 CHRONIC KIDNEY DISEASE: Status: ACTIVE | Noted: 2019-10-25

## 2019-10-25 LAB
ANION GAP SERPL CALC-SCNC: 10 MMOL/L (ref 8–16)
BASOPHILS # BLD AUTO: 0.06 K/UL (ref 0–0.2)
BASOPHILS NFR BLD: 0.4 % (ref 0–1.9)
BNP SERPL-MCNC: 126 PG/ML (ref 0–99)
BUN SERPL-MCNC: 23 MG/DL (ref 8–23)
CALCIUM SERPL-MCNC: 8.6 MG/DL (ref 8.7–10.5)
CHLORIDE SERPL-SCNC: 101 MMOL/L (ref 95–110)
CO2 SERPL-SCNC: 23 MMOL/L (ref 23–29)
CREAT SERPL-MCNC: 1.4 MG/DL (ref 0.5–1.4)
DIFFERENTIAL METHOD: ABNORMAL
EOSINOPHIL # BLD AUTO: 0 K/UL (ref 0–0.5)
EOSINOPHIL NFR BLD: 0 % (ref 0–8)
ERYTHROCYTE [DISTWIDTH] IN BLOOD BY AUTOMATED COUNT: 13.7 % (ref 11.5–14.5)
EST. GFR  (AFRICAN AMERICAN): 58 ML/MIN/1.73 M^2
EST. GFR  (NON AFRICAN AMERICAN): 50 ML/MIN/1.73 M^2
GLUCOSE SERPL-MCNC: 104 MG/DL (ref 70–110)
HCT VFR BLD AUTO: 39.3 % (ref 40–54)
HGB BLD-MCNC: 12.5 G/DL (ref 14–18)
IMM GRANULOCYTES # BLD AUTO: 0.06 K/UL (ref 0–0.04)
LYMPHOCYTES # BLD AUTO: 0.8 K/UL (ref 1–4.8)
LYMPHOCYTES NFR BLD: 5.1 % (ref 18–48)
MCH RBC QN AUTO: 29.5 PG (ref 27–31)
MCHC RBC AUTO-ENTMCNC: 31.8 G/DL (ref 32–36)
MCV RBC AUTO: 93 FL (ref 82–98)
MONOCYTES # BLD AUTO: 1 K/UL (ref 0.3–1)
MONOCYTES NFR BLD: 6.7 % (ref 4–15)
NEUTROPHILS # BLD AUTO: 13.2 K/UL (ref 1.8–7.7)
NEUTROPHILS NFR BLD: 87.4 % (ref 38–73)
NRBC BLD-RTO: 0 /100 WBC
PLATELET # BLD AUTO: 184 K/UL (ref 150–350)
PMV BLD AUTO: 9.6 FL (ref 9.2–12.9)
POTASSIUM SERPL-SCNC: 4.1 MMOL/L (ref 3.5–5.1)
RBC # BLD AUTO: 4.24 M/UL (ref 4.6–6.2)
SODIUM SERPL-SCNC: 134 MMOL/L (ref 136–145)
WBC # BLD AUTO: 15.16 K/UL (ref 3.9–12.7)

## 2019-10-25 PROCEDURE — 87040 BLOOD CULTURE FOR BACTERIA: CPT | Mod: 59

## 2019-10-25 PROCEDURE — 85025 COMPLETE CBC W/AUTO DIFF WBC: CPT | Mod: 91

## 2019-10-25 PROCEDURE — 25000242 PHARM REV CODE 250 ALT 637 W/ HCPCS: Performed by: EMERGENCY MEDICINE

## 2019-10-25 PROCEDURE — 12000002 HC ACUTE/MED SURGE SEMI-PRIVATE ROOM

## 2019-10-25 PROCEDURE — 36415 COLL VENOUS BLD VENIPUNCTURE: CPT

## 2019-10-25 PROCEDURE — 94644 CONT INHLJ TX 1ST HOUR: CPT

## 2019-10-25 PROCEDURE — 80048 BASIC METABOLIC PNL TOTAL CA: CPT

## 2019-10-25 PROCEDURE — 83880 ASSAY OF NATRIURETIC PEPTIDE: CPT | Mod: 91

## 2019-10-25 PROCEDURE — 93005 ELECTROCARDIOGRAM TRACING: CPT

## 2019-10-25 PROCEDURE — 99285 EMERGENCY DEPT VISIT HI MDM: CPT | Mod: 25

## 2019-10-25 RX ORDER — IPRATROPIUM BROMIDE AND ALBUTEROL SULFATE 2.5; .5 MG/3ML; MG/3ML
9 SOLUTION RESPIRATORY (INHALATION) CONTINUOUS
Status: DISCONTINUED | OUTPATIENT
Start: 2019-10-25 | End: 2019-10-26

## 2019-10-25 RX ADMIN — IPRATROPIUM BROMIDE AND ALBUTEROL SULFATE 9 ML: .5; 3 SOLUTION RESPIRATORY (INHALATION) at 09:10

## 2019-10-25 RX ADMIN — CEFTRIAXONE 1 G: 1 INJECTION, SOLUTION INTRAVENOUS at 11:10

## 2019-10-26 PROBLEM — I25.2 OLD MI (MYOCARDIAL INFARCTION): Status: ACTIVE | Noted: 2019-10-26

## 2019-10-26 LAB
ANION GAP SERPL CALC-SCNC: 8 MMOL/L (ref 8–16)
BASOPHILS # BLD AUTO: 0.05 K/UL (ref 0–0.2)
BASOPHILS NFR BLD: 0.4 % (ref 0–1.9)
BUN SERPL-MCNC: 21 MG/DL (ref 8–23)
CALCIUM SERPL-MCNC: 8.6 MG/DL (ref 8.7–10.5)
CHLORIDE SERPL-SCNC: 100 MMOL/L (ref 95–110)
CO2 SERPL-SCNC: 27 MMOL/L (ref 23–29)
CREAT SERPL-MCNC: 1.2 MG/DL (ref 0.5–1.4)
DIFFERENTIAL METHOD: ABNORMAL
EOSINOPHIL # BLD AUTO: 0 K/UL (ref 0–0.5)
EOSINOPHIL NFR BLD: 0.2 % (ref 0–8)
ERYTHROCYTE [DISTWIDTH] IN BLOOD BY AUTOMATED COUNT: 13.8 % (ref 11.5–14.5)
EST. GFR  (AFRICAN AMERICAN): >60 ML/MIN/1.73 M^2
EST. GFR  (NON AFRICAN AMERICAN): >60 ML/MIN/1.73 M^2
ESTIMATED AVG GLUCOSE: 117 MG/DL (ref 68–131)
GLUCOSE SERPL-MCNC: 110 MG/DL (ref 70–110)
HBA1C MFR BLD HPLC: 5.7 % (ref 4–5.6)
HCT VFR BLD AUTO: 38.9 % (ref 40–54)
HGB BLD-MCNC: 12.3 G/DL (ref 14–18)
IMM GRANULOCYTES # BLD AUTO: 0.07 K/UL (ref 0–0.04)
LACTATE SERPL-SCNC: 1.1 MMOL/L (ref 0.5–2.2)
LYMPHOCYTES # BLD AUTO: 1.2 K/UL (ref 1–4.8)
LYMPHOCYTES NFR BLD: 9.9 % (ref 18–48)
MAGNESIUM SERPL-MCNC: 2 MG/DL (ref 1.6–2.6)
MCH RBC QN AUTO: 29.4 PG (ref 27–31)
MCHC RBC AUTO-ENTMCNC: 31.6 G/DL (ref 32–36)
MCV RBC AUTO: 93 FL (ref 82–98)
MONOCYTES # BLD AUTO: 1.3 K/UL (ref 0.3–1)
MONOCYTES NFR BLD: 10.4 % (ref 4–15)
NEUTROPHILS # BLD AUTO: 9.7 K/UL (ref 1.8–7.7)
NEUTROPHILS NFR BLD: 78.5 % (ref 38–73)
NRBC BLD-RTO: 0 /100 WBC
PHOSPHATE SERPL-MCNC: 3.2 MG/DL (ref 2.7–4.5)
PLATELET # BLD AUTO: 197 K/UL (ref 150–350)
PMV BLD AUTO: 9.6 FL (ref 9.2–12.9)
POCT GLUCOSE: 116 MG/DL (ref 70–110)
POCT GLUCOSE: 121 MG/DL (ref 70–110)
POTASSIUM SERPL-SCNC: 3.7 MMOL/L (ref 3.5–5.1)
PROCALCITONIN SERPL IA-MCNC: 0.35 NG/ML
RBC # BLD AUTO: 4.19 M/UL (ref 4.6–6.2)
SODIUM SERPL-SCNC: 135 MMOL/L (ref 136–145)
WBC # BLD AUTO: 12.4 K/UL (ref 3.9–12.7)

## 2019-10-26 PROCEDURE — 94761 N-INVAS EAR/PLS OXIMETRY MLT: CPT

## 2019-10-26 PROCEDURE — 83605 ASSAY OF LACTIC ACID: CPT

## 2019-10-26 PROCEDURE — 25000003 PHARM REV CODE 250: Performed by: INTERNAL MEDICINE

## 2019-10-26 PROCEDURE — 63600175 PHARM REV CODE 636 W HCPCS: Performed by: NURSE PRACTITIONER

## 2019-10-26 PROCEDURE — 27000221 HC OXYGEN, UP TO 24 HOURS

## 2019-10-26 PROCEDURE — 11000001 HC ACUTE MED/SURG PRIVATE ROOM

## 2019-10-26 PROCEDURE — 87077 CULTURE AEROBIC IDENTIFY: CPT

## 2019-10-26 PROCEDURE — 83036 HEMOGLOBIN GLYCOSYLATED A1C: CPT

## 2019-10-26 PROCEDURE — 87186 SC STD MICRODIL/AGAR DIL: CPT

## 2019-10-26 PROCEDURE — S0077 INJECTION, CLINDAMYCIN PHOSP: HCPCS | Performed by: NURSE PRACTITIONER

## 2019-10-26 PROCEDURE — 83735 ASSAY OF MAGNESIUM: CPT

## 2019-10-26 PROCEDURE — 84100 ASSAY OF PHOSPHORUS: CPT

## 2019-10-26 PROCEDURE — 25000242 PHARM REV CODE 250 ALT 637 W/ HCPCS: Performed by: NURSE PRACTITIONER

## 2019-10-26 PROCEDURE — 84145 PROCALCITONIN (PCT): CPT

## 2019-10-26 PROCEDURE — 94640 AIRWAY INHALATION TREATMENT: CPT

## 2019-10-26 PROCEDURE — 25000003 PHARM REV CODE 250: Performed by: NURSE PRACTITIONER

## 2019-10-26 PROCEDURE — 87070 CULTURE OTHR SPECIMN AEROBIC: CPT

## 2019-10-26 PROCEDURE — 85025 COMPLETE CBC W/AUTO DIFF WBC: CPT

## 2019-10-26 PROCEDURE — 36415 COLL VENOUS BLD VENIPUNCTURE: CPT

## 2019-10-26 PROCEDURE — 80048 BASIC METABOLIC PNL TOTAL CA: CPT

## 2019-10-26 RX ORDER — CLINDAMYCIN PHOSPHATE 600 MG/50ML
600 INJECTION, SOLUTION INTRAVENOUS
Status: DISCONTINUED | OUTPATIENT
Start: 2019-10-26 | End: 2019-10-28 | Stop reason: HOSPADM

## 2019-10-26 RX ORDER — ONDANSETRON 2 MG/ML
4 INJECTION INTRAMUSCULAR; INTRAVENOUS EVERY 6 HOURS PRN
Status: DISCONTINUED | OUTPATIENT
Start: 2019-10-26 | End: 2019-10-28 | Stop reason: HOSPADM

## 2019-10-26 RX ORDER — GLUCAGON 1 MG
1 KIT INJECTION
Status: DISCONTINUED | OUTPATIENT
Start: 2019-10-26 | End: 2019-10-28 | Stop reason: HOSPADM

## 2019-10-26 RX ORDER — IBUPROFEN 200 MG
16 TABLET ORAL
Status: DISCONTINUED | OUTPATIENT
Start: 2019-10-26 | End: 2019-10-28 | Stop reason: HOSPADM

## 2019-10-26 RX ORDER — LANOLIN ALCOHOL/MO/W.PET/CERES
800 CREAM (GRAM) TOPICAL
Status: DISCONTINUED | OUTPATIENT
Start: 2019-10-26 | End: 2019-10-28 | Stop reason: HOSPADM

## 2019-10-26 RX ORDER — POTASSIUM CHLORIDE 20 MEQ/15ML
60 SOLUTION ORAL
Status: DISCONTINUED | OUTPATIENT
Start: 2019-10-26 | End: 2019-10-28 | Stop reason: HOSPADM

## 2019-10-26 RX ORDER — INSULIN ASPART 100 [IU]/ML
0-5 INJECTION, SOLUTION INTRAVENOUS; SUBCUTANEOUS
Status: DISCONTINUED | OUTPATIENT
Start: 2019-10-26 | End: 2019-10-28 | Stop reason: HOSPADM

## 2019-10-26 RX ORDER — POTASSIUM CHLORIDE 20 MEQ/15ML
40 SOLUTION ORAL
Status: DISCONTINUED | OUTPATIENT
Start: 2019-10-26 | End: 2019-10-28 | Stop reason: HOSPADM

## 2019-10-26 RX ORDER — HYDROCODONE BITARTRATE AND ACETAMINOPHEN 5; 325 MG/1; MG/1
1 TABLET ORAL EVERY 6 HOURS PRN
Status: DISCONTINUED | OUTPATIENT
Start: 2019-10-26 | End: 2019-10-28 | Stop reason: HOSPADM

## 2019-10-26 RX ORDER — SODIUM CHLORIDE 0.9 % (FLUSH) 0.9 %
10 SYRINGE (ML) INJECTION
Status: DISCONTINUED | OUTPATIENT
Start: 2019-10-26 | End: 2019-10-28 | Stop reason: HOSPADM

## 2019-10-26 RX ORDER — IBUPROFEN 200 MG
24 TABLET ORAL
Status: DISCONTINUED | OUTPATIENT
Start: 2019-10-26 | End: 2019-10-28 | Stop reason: HOSPADM

## 2019-10-26 RX ORDER — FUROSEMIDE 10 MG/ML
60 INJECTION INTRAMUSCULAR; INTRAVENOUS 2 TIMES DAILY
Status: COMPLETED | OUTPATIENT
Start: 2019-10-26 | End: 2019-10-27

## 2019-10-26 RX ORDER — SODIUM CHLORIDE 9 MG/ML
INJECTION, SOLUTION INTRAVENOUS CONTINUOUS
Status: DISCONTINUED | OUTPATIENT
Start: 2019-10-26 | End: 2019-10-28 | Stop reason: HOSPADM

## 2019-10-26 RX ORDER — ACETAMINOPHEN 325 MG/1
650 TABLET ORAL EVERY 4 HOURS PRN
Status: DISCONTINUED | OUTPATIENT
Start: 2019-10-26 | End: 2019-10-28 | Stop reason: HOSPADM

## 2019-10-26 RX ORDER — IPRATROPIUM BROMIDE AND ALBUTEROL SULFATE 2.5; .5 MG/3ML; MG/3ML
3 SOLUTION RESPIRATORY (INHALATION) EVERY 4 HOURS
Status: DISCONTINUED | OUTPATIENT
Start: 2019-10-26 | End: 2019-10-28 | Stop reason: HOSPADM

## 2019-10-26 RX ADMIN — HYDROCODONE BITARTRATE AND ACETAMINOPHEN 1 TABLET: 5; 325 TABLET ORAL at 09:10

## 2019-10-26 RX ADMIN — CLINDAMYCIN IN 5 PERCENT DEXTROSE 600 MG: 12 INJECTION, SOLUTION INTRAVENOUS at 05:10

## 2019-10-26 RX ADMIN — FUROSEMIDE 60 MG: 10 INJECTION, SOLUTION INTRAMUSCULAR; INTRAVENOUS at 05:10

## 2019-10-26 RX ADMIN — IPRATROPIUM BROMIDE AND ALBUTEROL SULFATE 3 ML: .5; 3 SOLUTION RESPIRATORY (INHALATION) at 12:10

## 2019-10-26 RX ADMIN — APIXABAN 5 MG: 2.5 TABLET, FILM COATED ORAL at 09:10

## 2019-10-26 RX ADMIN — SODIUM CHLORIDE: 0.9 INJECTION, SOLUTION INTRAVENOUS at 12:10

## 2019-10-26 RX ADMIN — IPRATROPIUM BROMIDE AND ALBUTEROL SULFATE 3 ML: .5; 3 SOLUTION RESPIRATORY (INHALATION) at 11:10

## 2019-10-26 RX ADMIN — THERA TABS 1 TABLET: TAB at 09:10

## 2019-10-26 RX ADMIN — IPRATROPIUM BROMIDE AND ALBUTEROL SULFATE 3 ML: .5; 3 SOLUTION RESPIRATORY (INHALATION) at 07:10

## 2019-10-26 RX ADMIN — ACETAMINOPHEN 650 MG: 325 TABLET ORAL at 09:10

## 2019-10-26 RX ADMIN — SODIUM CHLORIDE: 0.9 INJECTION, SOLUTION INTRAVENOUS at 09:10

## 2019-10-26 RX ADMIN — IPRATROPIUM BROMIDE AND ALBUTEROL SULFATE 3 ML: .5; 3 SOLUTION RESPIRATORY (INHALATION) at 04:10

## 2019-10-26 RX ADMIN — IPRATROPIUM BROMIDE AND ALBUTEROL SULFATE 3 ML: .5; 3 SOLUTION RESPIRATORY (INHALATION) at 06:10

## 2019-10-26 RX ADMIN — AZITHROMYCIN MONOHYDRATE 500 MG: 500 INJECTION, POWDER, LYOPHILIZED, FOR SOLUTION INTRAVENOUS at 12:10

## 2019-10-26 NOTE — ASSESSMENT & PLAN NOTE
IV rocephin, IV azithromycin  Sputum culture and gram stain  Blood cultures  Supplemental oxygen as needed  Nebulizer tx  Check procalcitonin

## 2019-10-26 NOTE — ASSESSMENT & PLAN NOTE
Body mass index is 55.93 kg/m². Morbid obesity complicates all aspects of disease management from diagnostic modalities to treatment. Weight loss encouraged and health benefits explained to patient.

## 2019-10-26 NOTE — H&P
[facility] Medicine  History & Physical    Patient Name: Lukas Chance  MRN: [id number]  Admission Date: 10/25/2019  Attending Physician: Lance Poole MD   Primary Care Provider: Solitario Mendez MD         Patient information was obtained from patient, past medical records and ER records.     Subjective:     Principal Problem:Sepsis    Chief Complaint:   Chief Complaint   Patient presents with    Fall     slid out of chair at home. on floor for hours.  c/o lower back pain     Shortness of Breath     chronic but worse today        HPI: Lukas Chance is a 68 y/o M with a past medical history significant for  HTN, Type 2 DM, obesity, CAD, and PE who presented to the ED via EMS with c/o continued SOB and weakness.  Pt was seen in this ED earlier today for SOB which was determined to be a viral pneumonitis.  He was discharged home with nebulizer treatments and home health.  Reportedly, pt's SOB is about the same, but his weakness has progressed.  He was unable to rise out of he chair this evening and slid down to the floor.  CXR this evening is indicative of RLL pneumonia and he meets sepsis criteria.  He will be admitted to inpatient under the service of hospital medicine for further treatment.    Past Medical History:   Diagnosis Date    Anticoagulant long-term use 3/1/2013    Antithrombin 3 deficiency     Cellulitis     lower legs, venous stasis    Coronary artery disease     Diabetes mellitus type 2 in obese 5/15/2013    Hypertension     Obesity     LAKHWINDER (obstructive sleep apnea)     Pulmonary embolism        Past Surgical History:   Procedure Laterality Date    COLONOSCOPY  5/2/2011    Dr. Miller, 9 polyps, recheck 5 year    MULTIPLE TOOTH EXTRACTIONS      RADIOFREQUENCY ABLATION  7/2015    bilateral     TONSILLECTOMY         Review of patient's allergies indicates:   Allergen Reactions    Bactroban [mupirocin calcium] Other (See Comments)     Burning feeling on  open sore     Metoprolol Other (See Comments)     dizzy       Current Facility-Administered Medications on File Prior to Encounter   Medication    [COMPLETED] acetaminophen tablet 1,000 mg    [COMPLETED] piperacillin-tazobactam 4.5 g in dextrose 5 % 100 mL IVPB (ready to mix system)    [COMPLETED] vancomycin (VANCOCIN) 2,000 mg in dextrose 5 % 500 mL IVPB     Current Outpatient Medications on File Prior to Encounter   Medication Sig    acetaminophen (TYLENOL EXTRA STRENGTH) 500 MG tablet Take 500 mg by mouth 2 (two) times daily as needed for Pain.     albuterol-ipratropium (DUO-NEB) 2.5 mg-0.5 mg/3 mL nebulizer solution Take 3 mLs by nebulization every 6 (six) hours. And PRN for shortness of breath    ELIQUIS 5 mg Tab Take 1 tablet (5 mg total) by mouth 2 (two) times daily.    multivitamin (THERAGRAN) per tablet Take 1 tablet by mouth once daily.    salmon oil/omega-3 fatty acids (SALMON OIL-1000 ORAL) Take 1 capsule by mouth once daily.    spironolactone (ALDACTONE) 50 MG tablet Take 1 tablet (50 mg total) by mouth once daily. (Patient taking differently: Take 50 mg by mouth every other day. )     Family History     Problem Relation (Age of Onset)    Emphysema Maternal Uncle    Gallbladder disease Sister    Heart disease Mother, Father, Brother    Stroke Brother        Tobacco Use    Smoking status: Former Smoker     Packs/day: 1.00     Years: 20.00     Pack years: 20.00     Last attempt to quit: 1/3/2002     Years since quittin.8    Smokeless tobacco: Never Used   Substance and Sexual Activity    Alcohol use: Yes     Alcohol/week: 1.0 standard drinks     Types: 1 Cans of beer per week     Comment: seldom    Drug use: No    Sexual activity: Yes     Partners: Female     Review of Systems   Constitutional: Positive for fever. Negative for chills.   HENT: Positive for congestion. Negative for sore throat.    Eyes: Negative for photophobia and visual disturbance.   Respiratory: Positive for cough,  shortness of breath and wheezing.    Cardiovascular: Negative for chest pain and palpitations.   Gastrointestinal: Negative for abdominal pain, diarrhea, nausea and vomiting.   Endocrine: Negative for polydipsia and polyuria.   Genitourinary: Negative for dysuria and flank pain.   Musculoskeletal: Positive for back pain and gait problem.   Skin: Positive for wound (left ankle). Negative for pallor.   Neurological: Positive for weakness. Negative for headaches.   Hematological: Negative for adenopathy.   Psychiatric/Behavioral: Negative for agitation and confusion.   All other systems reviewed and are negative.    Objective:     Vital Signs (Most Recent):  Temp: 98.5 °F (36.9 °C) (10/26/19 0021)  Pulse: 104 (10/26/19 0021)  Resp: (!) 30 (10/25/19 2133)  BP: 128/63 (10/26/19 0021)  SpO2: 98 % (10/26/19 0021) Vital Signs (24h Range):  Temp:  [98.5 °F (36.9 °C)-102.3 °F (39.1 °C)] 98.5 °F (36.9 °C)  Pulse:  [] 104  Resp:  [22-30] 30  SpO2:  [95 %-100 %] 98 %  BP: (122-169)/(54-93) 128/63     Weight: (!) 158.8 kg (350 lb)  Body mass index is 48.82 kg/m².    Physical Exam   Constitutional: He is oriented to person, place, and time. He appears well-developed.   Morbidly obese   HENT:   Head: Normocephalic and atraumatic.   Eyes: Pupils are equal, round, and reactive to light. Conjunctivae and EOM are normal.   Neck: Normal range of motion. Neck supple. No JVD present.   Cardiovascular: Regular rhythm and intact distal pulses. Tachycardia present.   Pulmonary/Chest: Effort normal. He has wheezes.   Abdominal: Soft. Bowel sounds are normal. He exhibits no distension. There is no tenderness.   Genitourinary:   Genitourinary Comments: deferred   Musculoskeletal: Normal range of motion. He exhibits edema (BLE consistent with lymphedema).   Neurological: He is alert and oriented to person, place, and time.   Skin: Skin is warm and dry. There is erythema (BLE below the knees (chronic)).   Wound to L lateral ankle consistent  with venous stasis ulcer.   Psychiatric: He has a normal mood and affect. His behavior is normal. Judgment and thought content normal.   Vitals reviewed.        CRANIAL NERVES     CN III, IV, VI   Pupils are equal, round, and reactive to light.  Extraocular motions are normal.        Significant Labs:   CBC:   Recent Labs   Lab 10/25/19  0946 10/25/19  2145   WBC 19.31* 15.16*   HGB 13.1* 12.5*   HCT 40.7 39.3*    184     CMP:   Recent Labs   Lab 10/25/19  0946 10/25/19  2145   * 134*   K 4.5 4.1    101   CO2 26 23   * 104   BUN 20 23   CREATININE 1.3 1.4   CALCIUM 8.8 8.6*   PROT 7.7  --    ALBUMIN 3.4*  --    BILITOT 0.8  --    ALKPHOS 62  --    AST 36  --    ALT 20  --    ANIONGAP 9 10   EGFRNONAA 55* 50*     Lactic Acid:   Recent Labs   Lab 10/25/19  0946   LACTATE 1.4     Urine Studies:   Recent Labs   Lab 10/25/19  1009   COLORU Yellow   APPEARANCEUA Clear   PHUR 6.0   SPECGRAV >=1.030*   PROTEINUA 1+*   GLUCUA Negative   KETONESU Trace*   BILIRUBINUA Negative   OCCULTUA 2+*   NITRITE Negative   UROBILINOGEN Negative   LEUKOCYTESUR Negative   RBCUA 8*   WBCUA 0   BACTERIA Few*   HYALINECASTS 0       Significant Imaging: CXR: The cardiac silhouette is unchanged when compared to the previous study with mild cardiac enlargement noted.  There is atherosclerotic calcification present within the aortic arch.  There is central pulmonary vascular congestion and there is patchy segmental airspace opacity observed in the right lower lung zone.  Differential considerations include cardiogenic/noncardiogenic pulmonary edema, aspiration, and pneumonia.  Please correlate clinically.  No significant volume of pleural fluid or pneumothorax.    Assessment/Plan:     * Sepsis  This patient does have evidence of infective focus  My overall impression is sepsis.  Antibiotics given-   Antibiotics (From admission, onward)    Start     Stop Route Frequency Ordered    10/26/19 0015  cefTRIAXone (ROCEPHIN) 1 g  in dextrose 5 % 50 mL IVPB      -- IV Every 24 hours (non-standard times) 10/25/19 2305    10/26/19 0015  azithromycin 500 mg in dextrose 5 % 250 mL IVPB (ready to mix system)      -- IV Every 24 hours (non-standard times) 10/25/19 2305          Latest lactate reviewed, they are-  Recent Labs   Lab 10/25/19  0946   LACTATE 1.4       Organ dysfunction indicated by tachycardia and tachypnea  Source- respiratory  Source control Achieved by- IV antibiotics        Pneumonia due to infectious organism  IV rocephin, IV azithromycin  Sputum culture and gram stain  Blood cultures  Supplemental oxygen as needed  Nebulizer tx  Check procalcitonin      Stage 3 chronic kidney disease  Creatine stable for now. BMP reviewed- noted Estimated Creatinine Clearance: 74.4 mL/min (based on SCr of 1.4 mg/dL). according to latest data. Monitor UOP and serial BMP and adjust therapy as needed. Renally dose meds.            Venous insufficiency of lower extremity  Patient was severe insufficiency and ulcer development.  Consult wound care.      Body mass index 50.0-59.9, adult  Body mass index is 48.82 kg/m². Morbid obesity complicates all aspects of disease management from diagnostic modalities to treatment. Weight loss encouraged and health benefits explained to patient.        Hypertension associated with diabetes  Chronic; stable. Monitor BP and treat as indicated for sustained control.      Diabetes mellitus type 2 with neurological manifestations  Chronic; not on chronic medication.  Check hgb a1c to assess control.  Accuchecks ac/hs with ssi coverage as needed.    Anticoagulant long-term use  Unlikely SOB due to pulmonary embolism as pt is anticoagulated with apixaban and reports compliance.  Continue apixaban acutely.        VTE Risk Mitigation (From admission, onward)         Ordered     apixaban tablet 5 mg  2 times daily      10/26/19 0008     IP VTE HIGH RISK PATIENT  Once      10/26/19 0008     Place BLAYNE hose  Until discontinued       10/26/19 0008     Place sequential compression device  Until discontinued      10/26/19 0008     Reason for No Pharmacological VTE Prophylaxis  Once      10/26/19 0008            Time spent seeing patient( greater than 1/2 spent in direct contact) : 45 minutes       NARESH Rodriguez  Department of Hospital Medicine   [facility]

## 2019-10-26 NOTE — ASSESSMENT & PLAN NOTE
Wound care consulted. Daily dressing changes. Wound culture ordered. Change abx to clindamycin IV.

## 2019-10-26 NOTE — ASSESSMENT & PLAN NOTE
Creatine stable for now. BMP reviewed- noted Estimated Creatinine Clearance: 94.2 mL/min (based on SCr of 1.2 mg/dL). according to latest data. Monitor UOP and serial BMP and adjust therapy as needed. Renally dose meds.

## 2019-10-26 NOTE — PROGRESS NOTES
[facility]  Progress Note    Patient Name: Lukas Chance  MRN: [id number]  Patient Class: IP- Inpatient   Admission Date: 10/25/2019  Length of Stay: 1 days  Attending Physician: Laurence Wise MD  Primary Care Provider: Solitario Mendez MD        Subjective:     Principal Problem:Sepsis        HPI:  Lukas Chance is a 70 y/o M with a past medical history significant for  HTN, Type 2 DM, obesity, CAD, and PE who presented to the ED via EMS with c/o continued SOB and weakness.  Pt was seen in this ED earlier today for SOB which was determined to be a viral pneumonitis.  He was discharged home with nebulizer treatments and home health.  Reportedly, pt's SOB is about the same, but his weakness has progressed.  He was unable to rise out of he chair this evening and slid down to the floor.  CXR this evening is indicative of RLL pneumonia and he meets sepsis criteria.  He will be admitted to inpatient under the service of hospital medicine for further treatment.    Overview/Hospital Course:  Patient monitored closely during hospitalization. Patient noted to have possible pneumonia and was initiated on ceftriaxone and azithromycin.  Chest xray obtained.Wound care associates consulted for large wound to left lower extremity with cellulitis. Wound culture obtained. Abx changed to IV clindamycin.    Interval History: No acute events overnight. Patient with persistent shortness of breath and wheezing, worsens with minimal exertion. +LLE pain due to large wound, 6/10 pain that improves with pain medication.    Review of Systems   Constitutional: Positive for activity change. Negative for appetite change, chills and diaphoresis.   HENT: Negative for congestion, hearing loss and sore throat.    Respiratory: Positive for shortness of breath and wheezing. Negative for cough.    Cardiovascular: Positive for leg swelling. Negative for chest pain and palpitations.   Gastrointestinal: Negative  for abdominal pain, blood in stool, nausea and vomiting.   Genitourinary: Negative for dysuria, flank pain and hematuria.   Musculoskeletal: Negative for arthralgias, back pain and myalgias.   Skin: Positive for wound.        Reports chronic wound to LLE   Neurological: Positive for weakness. Negative for dizziness, syncope and light-headedness.   Psychiatric/Behavioral: Negative for agitation and confusion. The patient is not nervous/anxious.      Objective:     Vital Signs (Most Recent):  Temp: 98.5 °F (36.9 °C) (10/26/19 0721)  Pulse: 81 (10/26/19 1140)  Resp: 18 (10/26/19 1140)  BP: (!) 140/68 (10/26/19 0721)  SpO2: 99 % (10/26/19 1140) Vital Signs (24h Range):  Temp:  [98.5 °F (36.9 °C)-99.8 °F (37.7 °C)] 98.5 °F (36.9 °C)  Pulse:  [] 81  Resp:  [14-30] 18  SpO2:  [94 %-100 %] 99 %  BP: (122-169)/(58-93) 140/68     Weight: (!) 181.9 kg (401 lb 0.3 oz)  Body mass index is 55.93 kg/m².  No intake or output data in the 24 hours ending 10/26/19 1256   Physical Exam   Constitutional: He is oriented to person, place, and time. He appears well-developed and well-nourished.   HENT:   Head: Normocephalic and atraumatic.   Nose: Nose normal.   Eyes: Pupils are equal, round, and reactive to light. Conjunctivae and EOM are normal.   Neck: Normal range of motion. Neck supple.   Cardiovascular: Normal rate, regular rhythm, normal heart sounds and intact distal pulses.   No murmur heard.  +3 pitting edema to bilateral lower extremities   Pulmonary/Chest: Effort normal. Tachypnea noted. He has wheezes.   Diminished bilaterally, wheezing noted to upper airway   Abdominal: Soft. Bowel sounds are normal.   Morbidly obese   Musculoskeletal: Normal range of motion. He exhibits edema and tenderness.   Neurological: He is alert and oriented to person, place, and time.   Skin: Skin is warm and dry. There is erythema.   Large, open wound to left outer calf with surrounding warmth and erythema; dressing saturated with yellow  drainage; see pictures in chart     Psychiatric: He has a normal mood and affect. His behavior is normal.   Nursing note and vitals reviewed.      Significant Labs:   BMP:   Recent Labs   Lab 10/26/19  0427      *   K 3.7      CO2 27   BUN 21   CREATININE 1.2   CALCIUM 8.6*   MG 2.0     CBC:   Recent Labs   Lab 10/25/19  0946 10/25/19  2145 10/26/19  0427   WBC 19.31* 15.16* 12.40   HGB 13.1* 12.5* 12.3*   HCT 40.7 39.3* 38.9*    184 197     Lactic Acid:   Recent Labs   Lab 10/25/19  0946 10/26/19  0047   LACTATE 1.4 1.1       Significant Imaging: I have reviewed and interpreted all pertinent imaging results/findings within the past 24 hours.      Assessment/Plan:      * Sepsis  This patient does have evidence of infective focus  My overall impression is sepsis.  Antibiotics given-   Antibiotics (From admission, onward)    Start     Stop Route Frequency Ordered    10/26/19 0015  cefTRIAXone (ROCEPHIN) 1 g in dextrose 5 % 50 mL IVPB      -- IV Every 24 hours (non-standard times) 10/25/19 2305    10/26/19 0015  azithromycin 500 mg in dextrose 5 % 250 mL IVPB (ready to mix system)      -- IV Every 24 hours (non-standard times) 10/25/19 2305          Latest lactate reviewed, they are-  Recent Labs   Lab 10/25/19  0946 10/26/19  0047   LACTATE 1.4 1.1       Organ dysfunction indicated by tachycardia and tachypnea  Source- respiratory  Source control Achieved by- IV antibiotics        Cellulitis of left lower extremity  Wound care consulted. Daily dressing changes. Wound culture ordered. Change abx to clindamycin IV.      Old MI (myocardial infarction)  Continue OAC and statin      Stage 3 chronic kidney disease  Creatine stable for now. BMP reviewed- noted Estimated Creatinine Clearance: 94.2 mL/min (based on SCr of 1.2 mg/dL). according to latest data. Monitor UOP and serial BMP and adjust therapy as needed. Renally dose meds.            Pneumonia due to infectious organism  IV rocephin, IV  azithromycin  Sputum culture and gram stain  Blood cultures- negative day 1  Supplemental oxygen as needed  Nebulizer tx  Check procalcitonin- 0.38  D/C IV rocephin     Venous insufficiency of lower extremity  Patient was severe insufficiency and ulcer development.  Consult wound care.      Body mass index 50.0-59.9, adult  Body mass index is 55.93 kg/m². Morbid obesity complicates all aspects of disease management from diagnostic modalities to treatment. Weight loss encouraged and health benefits explained to patient.        Hypertension associated with diabetes  Chronic; stable. Monitor BP and treat as indicated for sustained control.      Diabetes mellitus type 2 with neurological manifestations  Chronic; not on chronic medication.  Check hgb a1c to assess control.  Accuchecks ac/hs with ssi coverage as needed.    Anticoagulant long-term use  Unlikely SOB due to pulmonary embolism as pt is anticoagulated with apixaban and reports compliance.  Continue apixaban acutely.        VTE Risk Mitigation (From admission, onward)         Ordered     apixaban tablet 5 mg  2 times daily      10/26/19 0008     IP VTE HIGH RISK PATIENT  Once      10/26/19 0008     Reason for No Pharmacological VTE Prophylaxis  Once      10/26/19 0008                      Caroline Bruno NP  Department of Hospital Medicine   [facility]

## 2019-10-26 NOTE — ED PROVIDER NOTES
"Encounter Date: 10/25/2019    SCRIBE #1 NOTE: I, Ann Bennett and am scribing for, and in the presence of, Tobias Aldridge III, MD.       History     Chief Complaint   Patient presents with    Fall     slid out of chair at home. on floor for hours.  c/o lower back pain     Shortness of Breath     chronic but worse today     Time seen by provider: 9:13 PM on 10/25/2019    Lukas Chance is a 69 y.o. male who presents to the ED via EMS s/p a fall. The patient reports that he "slid out of his chair." He is complaining of back pain as a result of this. He was seen in this ED earlier today for SOB, and he reports that he is still experiencing SOB that is approximately the same severity as it was earlier today. He also reports weakness, fever, and dry cough. The patient denies any other symptoms at this time. Patient's PMHx includes HTN, Type 2 DM, obesity, CAD, and PE. No pertinent PSHx. Patient is a former smoker. Drug allergies to Bactroban and Metoprolol.     The history is provided by the patient.     Review of patient's allergies indicates:   Allergen Reactions    Bactroban [mupirocin calcium] Other (See Comments)     Burning feeling on open sore     Metoprolol Other (See Comments)     dizzy     Past Medical History:   Diagnosis Date    Anticoagulant long-term use 3/1/2013    Antithrombin 3 deficiency     Cellulitis     lower legs, venous stasis    Coronary artery disease     Diabetes mellitus type 2 in obese 5/15/2013    Hypertension     Obesity     LAKHWINDER (obstructive sleep apnea)     Pulmonary embolism      Past Surgical History:   Procedure Laterality Date    COLONOSCOPY  5/2/2011    Dr. Miller, 9 polyps, recheck 5 year    MULTIPLE TOOTH EXTRACTIONS      RADIOFREQUENCY ABLATION  7/2015    bilateral     TONSILLECTOMY       Family History   Problem Relation Age of Onset    Heart disease Mother     Heart disease Father     Gallbladder disease Sister     Heart disease Brother     Stroke " Brother     Emphysema Maternal Uncle      Social History     Tobacco Use    Smoking status: Former Smoker     Packs/day: 1.00     Years: 20.00     Pack years: 20.00     Last attempt to quit: 1/3/2002     Years since quittin.8    Smokeless tobacco: Never Used   Substance Use Topics    Alcohol use: Yes     Alcohol/week: 1.0 standard drinks     Types: 1 Cans of beer per week     Comment: seldom    Drug use: No     Review of Systems   Constitutional: Positive for fever. Negative for activity change, appetite change, chills and fatigue.   Eyes: Negative for visual disturbance.   Respiratory: Positive for cough and shortness of breath. Negative for apnea.    Cardiovascular: Negative for chest pain and palpitations.   Gastrointestinal: Negative for abdominal distention and abdominal pain.   Genitourinary: Negative for difficulty urinating.   Musculoskeletal: Positive for back pain. Negative for neck pain.   Skin: Negative for pallor and rash.   Neurological: Positive for weakness. Negative for headaches.   Hematological: Does not bruise/bleed easily.   Psychiatric/Behavioral: Negative for agitation.       Physical Exam     Initial Vitals [10/25/19 2104]   BP Pulse Resp Temp SpO2   (!) 126/93 106 (!) 22 99.8 °F (37.7 °C) 95 %      MAP       --         Physical Exam    Nursing note and vitals reviewed.  Constitutional: He appears well-developed and well-nourished.   HENT:   Head: Normocephalic and atraumatic.   Eyes: Conjunctivae are normal.   Neck: Normal range of motion. Neck supple.   Cardiovascular: Regular rhythm and normal heart sounds. Tachycardia present.  Exam reveals no gallop and no friction rub.    No murmur heard.  2+ pitting edema.    Pulmonary/Chest: No respiratory distress. He has wheezes. He has no rhonchi. He has no rales.   Expiratory wheezes.    Abdominal: Soft. He exhibits no distension. There is no hepatomegaly. There is no tenderness.   Musculoskeletal: Normal range of motion.   Neurological:  He is alert and oriented to person, place, and time.   Skin: Skin is warm and dry.   Psychiatric: He has a normal mood and affect.         ED Course   Procedures  Labs Reviewed   B-TYPE NATRIURETIC PEPTIDE   BASIC METABOLIC PANEL   CBC W/ AUTO DIFFERENTIAL          Imaging Results    None          Medical Decision Making:   History:   Old Medical Records: I decided to obtain old medical records.  Independently Interpreted Test(s):   I have ordered and independently interpreted EKG Reading(s) - see prior notes  Clinical Tests:   Lab Tests: Ordered and Reviewed  Radiological Study: Ordered and Reviewed  Medical Tests: Ordered and Reviewed  ED Management:  69-year-old male presents with shortness of breath. He also reports that he slid out of his chair.  Chest x-ray independently interpreted by me suggest right lower lobe pneumonia.  He will be admitted for IV antibiotics.  Other:   I have discussed this case with another health care provider.       APC / Resident Notes:   I, Dr. Tobias Aldridge III, personally performed the services described in this documentation. All medical record entries made by the scribe were at my direction and in my presence.  I have reviewed the chart and agree that the record reflects my personal performance and is accurate and complete       Scribe Attestation:   Scribe #1: I performed the above scribed service and the documentation accurately describes the services I performed. I attest to the accuracy of the note.               Clinical Impression:       ICD-10-CM ICD-9-CM   1. Pneumonia due to infectious organism, unspecified laterality, unspecified part of lung J18.9 136.9     484.8   2. SOB (shortness of breath) R06.02 786.05   3. COPD with exacerbation J44.1 491.21                                Tobias Aldridge III, MD  10/27/19 1226

## 2019-10-26 NOTE — NURSING
Pt arrived to floor via stretcher. Able to make needs known. Pt on 2 L o2. POC reviewed with pt. Pt verbalized understanding. No complaints of pain or distress at this time. Free of falls. Bed low call light in reach will continue to monitor.

## 2019-10-26 NOTE — ED NOTES
Pt resting in bed comfortably receiving breathing treatment. No needs at this time. Wife at the bedside. Will continue to monitor.

## 2019-10-26 NOTE — HOSPITAL COURSE
Patient monitored closely during hospitalization. Patient noted to have possible pneumonia and was initiated on ceftriaxone and azithromycin.  Chest xray obtained.Wound care consulted for large wound to left lower extremity with cellulitis. Wound culture obtained. Abx changed to IV clindamycin.  He is noted have improvement of erythema to left lower extremity.  He was initiated on IV Lasix for peripheral edema and acute diastolic heart failure.  Responded well to diuretics and is feeling better.  Denies shortness of breath.  He had a home O2 evaluation which demonstrated hypoxemia with activity and qualified for home oxygen.  He is stable for discharge from a medical standpoint.

## 2019-10-26 NOTE — ASSESSMENT & PLAN NOTE
Creatine stable for now. BMP reviewed- noted Estimated Creatinine Clearance: 74.4 mL/min (based on SCr of 1.4 mg/dL). according to latest data. Monitor UOP and serial BMP and adjust therapy as needed. Renally dose meds.

## 2019-10-26 NOTE — PROGRESS NOTES
SW met with patient to complete a discharge planning assessment. Patient presents as alert and oriented x 4, pleasant, good eye contact, well groomed, recall good, concentration/judgement good, average intelligence, calm, communicative, cooperative and asking and answering questions appropriately. SW verified all information on demographic sheet is correct. Patient reports living with wife and that he is independent with ADLs at this time and does drive. Patient reports wife will transport pt home.    Patient reports does not have home health. Pt is requesting home health services and is agreeable to [facility]. Patient reports that he is not receiving outside resources.  Patient reports having grab bar, quad cane, and now a nebulizer from [facility] provided from in ER. Patient reports Solitario Mendez MD as pt's PCP and has Medicare and [employer] as their insurance. Patient reports receiving medications from [facility] on [address] and reports that he is able to afford medications at this time and at time of discharge. Patient reports that he is not on dialysis at this time.  Patient reports that he is not receiving services with the coumadin clinic. Patient reports has not been admitted to the hospital within the last 30 days.    Patient reports does not have a Living Will or Healthcare Power of . Patient denies mental health issues.    Patient expressed a request for home health and [facility] and requested a hospital bed. SW will relay this information to the attending physician/NP. SW remains available.         10/26/19 1125   Discharge Assessment   Assessment Type Discharge Planning Assessment   Confirmed/corrected address and phone number on facesheet? Yes   Assessment information obtained from? Patient   Prior to hospitilization cognitive status: Alert/Oriented   Prior to hospitalization functional status: Independent;Assistive Equipment   Current  cognitive status: Alert/Oriented   Current Functional Status: Independent;Assistive Equipment;Needs Assistance   Facility Arrived From: Home   Lives With spouse   Able to Return to Prior Arrangements yes   Is patient able to care for self after discharge? Unable to determine at this time (comments)   Who are your caregiver(s) and their phone number(s)?  Eliza Chance    Wife     [phone]   Patient's perception of discharge disposition home health   Readmission Within the Last 30 Days no previous admission in last 30 days   Patient currently being followed by outpatient case management? No   Patient currently receives any other outside agency services? No   Equipment Currently Used at Home cane, quad;grab bar  (Pt received a nebulizer from [facility] in the ER)   Do you have any problems affording any of your prescribed medications? No   Is the patient taking medications as prescribed? yes   Does the patient have transportation home? Yes   Transportation Anticipated car, drives self;family or friend will provide   Dialysis Name and Scheduled days N/A   Does the patient receive services at the Coumadin Clinic? No   Discharge Plan A Home with family;Home Health   DME Needed Upon Discharge  hospital bed   Patient/Family in Agreement with Plan yes

## 2019-10-26 NOTE — ASSESSMENT & PLAN NOTE
IV rocephin, IV azithromycin  Sputum culture and gram stain  Blood cultures- negative day 1  Supplemental oxygen as needed  Nebulizer tx  Check procalcitonin- 0.38  D/C IV rocephin

## 2019-10-26 NOTE — ASSESSMENT & PLAN NOTE
Chronic; not on chronic medication.  Check hgb a1c to assess control.  Accuchecks ac/hs with ssi coverage as needed.

## 2019-10-26 NOTE — ED NOTES
Pt presents to the ED after he attempted to get out of his chair but lowered himself to the ground as he had difficulty standing. Pt reports he is chronically weak and normally walks with assistance of a walker. He reports he was seen here in the ED previously for SOB and states his SOB is at baseline. Bed rails are up and call light is within patient reach. Will continue to monitor.

## 2019-10-26 NOTE — HPI
Lukas Chance is a 70 y/o M with a past medical history significant for  HTN, Type 2 DM, obesity, CAD, and PE who presented to the ED via EMS with c/o continued SOB and weakness.  Pt was seen in this ED earlier today for SOB which was determined to be a viral pneumonitis.  He was discharged home with nebulizer treatments and home health.  Reportedly, pt's SOB is about the same, but his weakness has progressed.  He was unable to rise out of he chair this evening and slid down to the floor.  CXR this evening is indicative of RLL pneumonia and he meets sepsis criteria.  He will be admitted to inpatient under the service of hospital medicine for further treatment.

## 2019-10-26 NOTE — ASSESSMENT & PLAN NOTE
This patient does have evidence of infective focus  My overall impression is sepsis.  Antibiotics given-   Antibiotics (From admission, onward)    Start     Stop Route Frequency Ordered    10/26/19 0015  cefTRIAXone (ROCEPHIN) 1 g in dextrose 5 % 50 mL IVPB      -- IV Every 24 hours (non-standard times) 10/25/19 2305    10/26/19 0015  azithromycin 500 mg in dextrose 5 % 250 mL IVPB (ready to mix system)      -- IV Every 24 hours (non-standard times) 10/25/19 2305          Latest lactate reviewed, they are-  Recent Labs   Lab 10/25/19  0946   LACTATE 1.4       Organ dysfunction indicated by tachycardia and tachypnea  Source- respiratory  Source control Achieved by- IV antibiotics

## 2019-10-26 NOTE — ASSESSMENT & PLAN NOTE
Unlikely SOB due to pulmonary embolism as pt is anticoagulated with apixaban and reports compliance.  Continue apixaban acutely.

## 2019-10-26 NOTE — PLAN OF CARE
NAD noted. POC reviewed w pt and they verbalized understanding. Tele-  SR   Pt free from falls, Pt voids per urinal Bed in low position, side rails up x 2. Call light in reach, bed alarm on and wheels locked. Will continue to monitor.

## 2019-10-26 NOTE — ASSESSMENT & PLAN NOTE
Body mass index is 48.82 kg/m². Morbid obesity complicates all aspects of disease management from diagnostic modalities to treatment. Weight loss encouraged and health benefits explained to patient.

## 2019-10-26 NOTE — SUBJECTIVE & OBJECTIVE
Past Medical History:   Diagnosis Date    Anticoagulant long-term use 3/1/2013    Antithrombin 3 deficiency     Cellulitis     lower legs, venous stasis    Coronary artery disease     Diabetes mellitus type 2 in obese 5/15/2013    Hypertension     Obesity     LAKHWINDER (obstructive sleep apnea)     Pulmonary embolism        Past Surgical History:   Procedure Laterality Date    COLONOSCOPY  5/2/2011    Dr. Miller, 9 polyps, recheck 5 year    MULTIPLE TOOTH EXTRACTIONS      RADIOFREQUENCY ABLATION  7/2015    bilateral     TONSILLECTOMY         Review of patient's allergies indicates:   Allergen Reactions    Bactroban [mupirocin calcium] Other (See Comments)     Burning feeling on open sore     Metoprolol Other (See Comments)     dizzy       Current Facility-Administered Medications on File Prior to Encounter   Medication    [COMPLETED] acetaminophen tablet 1,000 mg    [COMPLETED] piperacillin-tazobactam 4.5 g in dextrose 5 % 100 mL IVPB (ready to mix system)    [COMPLETED] vancomycin (VANCOCIN) 2,000 mg in dextrose 5 % 500 mL IVPB     Current Outpatient Medications on File Prior to Encounter   Medication Sig    acetaminophen (TYLENOL EXTRA STRENGTH) 500 MG tablet Take 500 mg by mouth 2 (two) times daily as needed for Pain.     albuterol-ipratropium (DUO-NEB) 2.5 mg-0.5 mg/3 mL nebulizer solution Take 3 mLs by nebulization every 6 (six) hours. And PRN for shortness of breath    ELIQUIS 5 mg Tab Take 1 tablet (5 mg total) by mouth 2 (two) times daily.    multivitamin (THERAGRAN) per tablet Take 1 tablet by mouth once daily.    salmon oil/omega-3 fatty acids (SALMON OIL-1000 ORAL) Take 1 capsule by mouth once daily.    spironolactone (ALDACTONE) 50 MG tablet Take 1 tablet (50 mg total) by mouth once daily. (Patient taking differently: Take 50 mg by mouth every other day. )     Family History     Problem Relation (Age of Onset)    Emphysema Maternal Uncle    Gallbladder disease Sister    Heart disease  Mother, Father, Brother    Stroke Brother        Tobacco Use    Smoking status: Former Smoker     Packs/day: 1.00     Years: 20.00     Pack years: 20.00     Last attempt to quit: 1/3/2002     Years since quittin.8    Smokeless tobacco: Never Used   Substance and Sexual Activity    Alcohol use: Yes     Alcohol/week: 1.0 standard drinks     Types: 1 Cans of beer per week     Comment: seldom    Drug use: No    Sexual activity: Yes     Partners: Female     Review of Systems   Constitutional: Positive for fever. Negative for chills.   HENT: Positive for congestion. Negative for sore throat.    Eyes: Negative for photophobia and visual disturbance.   Respiratory: Positive for cough, shortness of breath and wheezing.    Cardiovascular: Negative for chest pain and palpitations.   Gastrointestinal: Negative for abdominal pain, diarrhea, nausea and vomiting.   Endocrine: Negative for polydipsia and polyuria.   Genitourinary: Negative for dysuria and flank pain.   Musculoskeletal: Positive for back pain and gait problem.   Skin: Positive for wound (left ankle). Negative for pallor.   Neurological: Positive for weakness. Negative for headaches.   Hematological: Negative for adenopathy.   Psychiatric/Behavioral: Negative for agitation and confusion.   All other systems reviewed and are negative.    Objective:     Vital Signs (Most Recent):  Temp: 98.5 °F (36.9 °C) (10/26/19 0021)  Pulse: 104 (10/26/19 0021)  Resp: (!) 30 (10/25/19 2133)  BP: 128/63 (10/26/19 0021)  SpO2: 98 % (10/26/19 0021) Vital Signs (24h Range):  Temp:  [98.5 °F (36.9 °C)-102.3 °F (39.1 °C)] 98.5 °F (36.9 °C)  Pulse:  [] 104  Resp:  [22-30] 30  SpO2:  [95 %-100 %] 98 %  BP: (122-169)/(54-93) 128/63     Weight: (!) 158.8 kg (350 lb)  Body mass index is 48.82 kg/m².    Physical Exam   Constitutional: He is oriented to person, place, and time. He appears well-developed.   Morbidly obese   HENT:   Head: Normocephalic and atraumatic.   Eyes:  Pupils are equal, round, and reactive to light. Conjunctivae and EOM are normal.   Neck: Normal range of motion. Neck supple. No JVD present.   Cardiovascular: Regular rhythm and intact distal pulses. Tachycardia present.   Pulmonary/Chest: Effort normal. He has wheezes.   Abdominal: Soft. Bowel sounds are normal. He exhibits no distension. There is no tenderness.   Genitourinary:   Genitourinary Comments: deferred   Musculoskeletal: Normal range of motion. He exhibits edema (BLE consistent with lymphedema).   Neurological: He is alert and oriented to person, place, and time.   Skin: Skin is warm and dry. There is erythema (BLE below the knees (chronic)).   Wound to L lateral ankle consistent with venous stasis ulcer.   Psychiatric: He has a normal mood and affect. His behavior is normal. Judgment and thought content normal.   Vitals reviewed.        CRANIAL NERVES     CN III, IV, VI   Pupils are equal, round, and reactive to light.  Extraocular motions are normal.        Significant Labs:   CBC:   Recent Labs   Lab 10/25/19  0946 10/25/19  2145   WBC 19.31* 15.16*   HGB 13.1* 12.5*   HCT 40.7 39.3*    184     CMP:   Recent Labs   Lab 10/25/19  0946 10/25/19  2145   * 134*   K 4.5 4.1    101   CO2 26 23   * 104   BUN 20 23   CREATININE 1.3 1.4   CALCIUM 8.8 8.6*   PROT 7.7  --    ALBUMIN 3.4*  --    BILITOT 0.8  --    ALKPHOS 62  --    AST 36  --    ALT 20  --    ANIONGAP 9 10   EGFRNONAA 55* 50*     Lactic Acid:   Recent Labs   Lab 10/25/19  0946   LACTATE 1.4     Urine Studies:   Recent Labs   Lab 10/25/19  1009   COLORU Yellow   APPEARANCEUA Clear   PHUR 6.0   SPECGRAV >=1.030*   PROTEINUA 1+*   GLUCUA Negative   KETONESU Trace*   BILIRUBINUA Negative   OCCULTUA 2+*   NITRITE Negative   UROBILINOGEN Negative   LEUKOCYTESUR Negative   RBCUA 8*   WBCUA 0   BACTERIA Few*   HYALINECASTS 0       Significant Imaging: CXR: The cardiac silhouette is unchanged when compared to the previous study  with mild cardiac enlargement noted.  There is atherosclerotic calcification present within the aortic arch.  There is central pulmonary vascular congestion and there is patchy segmental airspace opacity observed in the right lower lung zone.  Differential considerations include cardiogenic/noncardiogenic pulmonary edema, aspiration, and pneumonia.  Please correlate clinically.  No significant volume of pleural fluid or pneumothorax.

## 2019-10-26 NOTE — SUBJECTIVE & OBJECTIVE
Interval History: No acute events overnight. Patient with persistent shortness of breath and wheezing, worsens with minimal exertion. +LLE pain due to large wound, 6/10 pain that improves with pain medication.    Review of Systems   Constitutional: Positive for activity change. Negative for appetite change, chills and diaphoresis.   HENT: Negative for congestion, hearing loss and sore throat.    Respiratory: Positive for shortness of breath and wheezing. Negative for cough.    Cardiovascular: Positive for leg swelling. Negative for chest pain and palpitations.   Gastrointestinal: Negative for abdominal pain, blood in stool, nausea and vomiting.   Genitourinary: Negative for dysuria, flank pain and hematuria.   Musculoskeletal: Negative for arthralgias, back pain and myalgias.   Skin: Positive for wound.        Reports chronic wound to LLE   Neurological: Positive for weakness. Negative for dizziness, syncope and light-headedness.   Psychiatric/Behavioral: Negative for agitation and confusion. The patient is not nervous/anxious.      Objective:     Vital Signs (Most Recent):  Temp: 98.5 °F (36.9 °C) (10/26/19 0721)  Pulse: 81 (10/26/19 1140)  Resp: 18 (10/26/19 1140)  BP: (!) 140/68 (10/26/19 0721)  SpO2: 99 % (10/26/19 1140) Vital Signs (24h Range):  Temp:  [98.5 °F (36.9 °C)-99.8 °F (37.7 °C)] 98.5 °F (36.9 °C)  Pulse:  [] 81  Resp:  [14-30] 18  SpO2:  [94 %-100 %] 99 %  BP: (122-169)/(58-93) 140/68     Weight: (!) 181.9 kg (401 lb 0.3 oz)  Body mass index is 55.93 kg/m².  No intake or output data in the 24 hours ending 10/26/19 1256   Physical Exam   Constitutional: He is oriented to person, place, and time. He appears well-developed and well-nourished.   HENT:   Head: Normocephalic and atraumatic.   Nose: Nose normal.   Eyes: Pupils are equal, round, and reactive to light. Conjunctivae and EOM are normal.   Neck: Normal range of motion. Neck supple.   Cardiovascular: Normal rate, regular rhythm, normal  heart sounds and intact distal pulses.   No murmur heard.  +3 pitting edema to bilateral lower extremities   Pulmonary/Chest: Effort normal. Tachypnea noted. He has wheezes.   Diminished bilaterally, wheezing noted to upper airway   Abdominal: Soft. Bowel sounds are normal.   Morbidly obese   Musculoskeletal: Normal range of motion. He exhibits edema and tenderness.   Neurological: He is alert and oriented to person, place, and time.   Skin: Skin is warm and dry. There is erythema.   Large, open wound to left outer calf with surrounding warmth and erythema; dressing saturated with yellow drainage; see pictures in chart     Psychiatric: He has a normal mood and affect. His behavior is normal.   Nursing note and vitals reviewed.      Significant Labs:   BMP:   Recent Labs   Lab 10/26/19  0427      *   K 3.7      CO2 27   BUN 21   CREATININE 1.2   CALCIUM 8.6*   MG 2.0     CBC:   Recent Labs   Lab 10/25/19  0946 10/25/19  2145 10/26/19  0427   WBC 19.31* 15.16* 12.40   HGB 13.1* 12.5* 12.3*   HCT 40.7 39.3* 38.9*    184 197     Lactic Acid:   Recent Labs   Lab 10/25/19  0946 10/26/19  0047   LACTATE 1.4 1.1       Significant Imaging: I have reviewed and interpreted all pertinent imaging results/findings within the past 24 hours.

## 2019-10-26 NOTE — ASSESSMENT & PLAN NOTE
This patient does have evidence of infective focus  My overall impression is sepsis.  Antibiotics given-   Antibiotics (From admission, onward)    Start     Stop Route Frequency Ordered    10/26/19 0015  cefTRIAXone (ROCEPHIN) 1 g in dextrose 5 % 50 mL IVPB      -- IV Every 24 hours (non-standard times) 10/25/19 2305    10/26/19 0015  azithromycin 500 mg in dextrose 5 % 250 mL IVPB (ready to mix system)      -- IV Every 24 hours (non-standard times) 10/25/19 2305          Latest lactate reviewed, they are-  Recent Labs   Lab 10/25/19  0946 10/26/19  0047   LACTATE 1.4 1.1       Organ dysfunction indicated by tachycardia and tachypnea  Source- respiratory  Source control Achieved by- IV antibiotics

## 2019-10-27 LAB
ANION GAP SERPL CALC-SCNC: 9 MMOL/L (ref 8–16)
BASOPHILS # BLD AUTO: 0.05 K/UL (ref 0–0.2)
BASOPHILS NFR BLD: 0.5 % (ref 0–1.9)
BILIRUB UR QL STRIP: NEGATIVE
BUN SERPL-MCNC: 20 MG/DL (ref 8–23)
CALCIUM SERPL-MCNC: 8.7 MG/DL (ref 8.7–10.5)
CHLORIDE SERPL-SCNC: 100 MMOL/L (ref 95–110)
CLARITY UR: CLEAR
CO2 SERPL-SCNC: 28 MMOL/L (ref 23–29)
COLOR UR: YELLOW
CREAT SERPL-MCNC: 1.1 MG/DL (ref 0.5–1.4)
DIFFERENTIAL METHOD: ABNORMAL
EOSINOPHIL # BLD AUTO: 0.2 K/UL (ref 0–0.5)
EOSINOPHIL NFR BLD: 1.7 % (ref 0–8)
ERYTHROCYTE [DISTWIDTH] IN BLOOD BY AUTOMATED COUNT: 13.6 % (ref 11.5–14.5)
EST. GFR  (AFRICAN AMERICAN): >60 ML/MIN/1.73 M^2
EST. GFR  (NON AFRICAN AMERICAN): >60 ML/MIN/1.73 M^2
GLUCOSE SERPL-MCNC: 119 MG/DL (ref 70–110)
GLUCOSE UR QL STRIP: NEGATIVE
HCT VFR BLD AUTO: 39.6 % (ref 40–54)
HGB BLD-MCNC: 12.3 G/DL (ref 14–18)
HGB UR QL STRIP: ABNORMAL
IMM GRANULOCYTES # BLD AUTO: 0.03 K/UL (ref 0–0.04)
KETONES UR QL STRIP: NEGATIVE
LEUKOCYTE ESTERASE UR QL STRIP: ABNORMAL
LYMPHOCYTES # BLD AUTO: 0.9 K/UL (ref 1–4.8)
LYMPHOCYTES NFR BLD: 9 % (ref 18–48)
MAGNESIUM SERPL-MCNC: 2 MG/DL (ref 1.6–2.6)
MCH RBC QN AUTO: 29 PG (ref 27–31)
MCHC RBC AUTO-ENTMCNC: 31.1 G/DL (ref 32–36)
MCV RBC AUTO: 93 FL (ref 82–98)
MICROSCOPIC COMMENT: NORMAL
MONOCYTES # BLD AUTO: 1.1 K/UL (ref 0.3–1)
MONOCYTES NFR BLD: 10.2 % (ref 4–15)
NEUTROPHILS # BLD AUTO: 8.1 K/UL (ref 1.8–7.7)
NEUTROPHILS NFR BLD: 78.3 % (ref 38–73)
NITRITE UR QL STRIP: NEGATIVE
NRBC BLD-RTO: 0 /100 WBC
PH UR STRIP: 5 [PH] (ref 5–8)
PHOSPHATE SERPL-MCNC: 3.1 MG/DL (ref 2.7–4.5)
PLATELET # BLD AUTO: 193 K/UL (ref 150–350)
PMV BLD AUTO: 9.9 FL (ref 9.2–12.9)
POCT GLUCOSE: 103 MG/DL (ref 70–110)
POCT GLUCOSE: 113 MG/DL (ref 70–110)
POCT GLUCOSE: 122 MG/DL (ref 70–110)
POCT GLUCOSE: 122 MG/DL (ref 70–110)
POTASSIUM SERPL-SCNC: 4 MMOL/L (ref 3.5–5.1)
PROT UR QL STRIP: NEGATIVE
RBC # BLD AUTO: 4.24 M/UL (ref 4.6–6.2)
RBC #/AREA URNS HPF: 1 /HPF (ref 0–4)
SODIUM SERPL-SCNC: 137 MMOL/L (ref 136–145)
SP GR UR STRIP: 1.01 (ref 1–1.03)
TROPONIN I SERPL DL<=0.01 NG/ML-MCNC: 0.03 NG/ML (ref 0–0.03)
URN SPEC COLLECT METH UR: ABNORMAL
UROBILINOGEN UR STRIP-ACNC: NEGATIVE EU/DL
WBC # BLD AUTO: 10.39 K/UL (ref 3.9–12.7)
WBC #/AREA URNS HPF: 1 /HPF (ref 0–5)

## 2019-10-27 PROCEDURE — S0077 INJECTION, CLINDAMYCIN PHOSP: HCPCS | Performed by: NURSE PRACTITIONER

## 2019-10-27 PROCEDURE — 11000001 HC ACUTE MED/SURG PRIVATE ROOM

## 2019-10-27 PROCEDURE — 80048 BASIC METABOLIC PNL TOTAL CA: CPT

## 2019-10-27 PROCEDURE — 94761 N-INVAS EAR/PLS OXIMETRY MLT: CPT

## 2019-10-27 PROCEDURE — 84100 ASSAY OF PHOSPHORUS: CPT

## 2019-10-27 PROCEDURE — 85025 COMPLETE CBC W/AUTO DIFF WBC: CPT

## 2019-10-27 PROCEDURE — 27100171 HC OXYGEN HIGH FLOW UP TO 24 HOURS

## 2019-10-27 PROCEDURE — 97161 PT EVAL LOW COMPLEX 20 MIN: CPT

## 2019-10-27 PROCEDURE — 25000003 PHARM REV CODE 250: Performed by: INTERNAL MEDICINE

## 2019-10-27 PROCEDURE — 63600175 PHARM REV CODE 636 W HCPCS: Performed by: NURSE PRACTITIONER

## 2019-10-27 PROCEDURE — 84484 ASSAY OF TROPONIN QUANT: CPT

## 2019-10-27 PROCEDURE — 25000003 PHARM REV CODE 250: Performed by: NURSE PRACTITIONER

## 2019-10-27 PROCEDURE — 25000242 PHARM REV CODE 250 ALT 637 W/ HCPCS: Performed by: NURSE PRACTITIONER

## 2019-10-27 PROCEDURE — 83735 ASSAY OF MAGNESIUM: CPT

## 2019-10-27 PROCEDURE — 36415 COLL VENOUS BLD VENIPUNCTURE: CPT

## 2019-10-27 PROCEDURE — 27000221 HC OXYGEN, UP TO 24 HOURS

## 2019-10-27 PROCEDURE — 94640 AIRWAY INHALATION TREATMENT: CPT

## 2019-10-27 PROCEDURE — 81000 URINALYSIS NONAUTO W/SCOPE: CPT

## 2019-10-27 RX ADMIN — IPRATROPIUM BROMIDE AND ALBUTEROL SULFATE 3 ML: .5; 3 SOLUTION RESPIRATORY (INHALATION) at 07:10

## 2019-10-27 RX ADMIN — FUROSEMIDE 60 MG: 10 INJECTION, SOLUTION INTRAMUSCULAR; INTRAVENOUS at 07:10

## 2019-10-27 RX ADMIN — APIXABAN 5 MG: 2.5 TABLET, FILM COATED ORAL at 07:10

## 2019-10-27 RX ADMIN — CLINDAMYCIN IN 5 PERCENT DEXTROSE 600 MG: 12 INJECTION, SOLUTION INTRAVENOUS at 02:10

## 2019-10-27 RX ADMIN — IPRATROPIUM BROMIDE AND ALBUTEROL SULFATE 3 ML: .5; 3 SOLUTION RESPIRATORY (INHALATION) at 03:10

## 2019-10-27 RX ADMIN — IPRATROPIUM BROMIDE AND ALBUTEROL SULFATE 3 ML: .5; 3 SOLUTION RESPIRATORY (INHALATION) at 11:10

## 2019-10-27 RX ADMIN — CLINDAMYCIN IN 5 PERCENT DEXTROSE 600 MG: 12 INJECTION, SOLUTION INTRAVENOUS at 10:10

## 2019-10-27 RX ADMIN — HYDROCODONE BITARTRATE AND ACETAMINOPHEN 1 TABLET: 5; 325 TABLET ORAL at 07:10

## 2019-10-27 RX ADMIN — FUROSEMIDE 60 MG: 10 INJECTION, SOLUTION INTRAMUSCULAR; INTRAVENOUS at 05:10

## 2019-10-27 RX ADMIN — SODIUM CHLORIDE: 0.9 INJECTION, SOLUTION INTRAVENOUS at 12:10

## 2019-10-27 RX ADMIN — CLINDAMYCIN IN 5 PERCENT DEXTROSE 600 MG: 12 INJECTION, SOLUTION INTRAVENOUS at 07:10

## 2019-10-27 RX ADMIN — AZITHROMYCIN MONOHYDRATE 500 MG: 500 INJECTION, POWDER, LYOPHILIZED, FOR SOLUTION INTRAVENOUS at 12:10

## 2019-10-27 RX ADMIN — HYDROCODONE BITARTRATE AND ACETAMINOPHEN 1 TABLET: 5; 325 TABLET ORAL at 02:10

## 2019-10-27 RX ADMIN — APIXABAN 5 MG: 2.5 TABLET, FILM COATED ORAL at 09:10

## 2019-10-27 RX ADMIN — THERA TABS 1 TABLET: TAB at 07:10

## 2019-10-27 RX ADMIN — ACETAMINOPHEN 650 MG: 325 TABLET ORAL at 10:10

## 2019-10-27 RX ADMIN — IPRATROPIUM BROMIDE AND ALBUTEROL SULFATE 3 ML: .5; 3 SOLUTION RESPIRATORY (INHALATION) at 06:10

## 2019-10-27 RX ADMIN — CLINDAMYCIN IN 5 PERCENT DEXTROSE 600 MG: 12 INJECTION, SOLUTION INTRAVENOUS at 12:10

## 2019-10-27 NOTE — PLAN OF CARE
Diuresing well with scheduled Lasix. Pain to left leg and back controlled with medication. Breathing still with audible wheezing/stridor. Unproductive dry cough with sputum specimen outstanding. Dressing to left leg still dry and intact. Safety maintained.

## 2019-10-27 NOTE — SUBJECTIVE & OBJECTIVE
Interval History: No acute events overnight. Patient reports feeling better overall. Still with significant peripheral edema and LLE pain due to wound. + Upper airway wheezing.   Awaiting wound care.        Review of Systems   Constitutional: Positive for activity change. Negative for appetite change, chills, diaphoresis and fever.   HENT: Negative for congestion, hearing loss and sore throat.    Respiratory: Positive for shortness of breath and wheezing. Negative for cough.    Cardiovascular: Positive for leg swelling. Negative for chest pain and palpitations.   Gastrointestinal: Positive for abdominal pain. Negative for blood in stool and nausea.   Genitourinary: Negative for dysuria, flank pain and hematuria.   Musculoskeletal: Positive for back pain. Negative for arthralgias and myalgias.   Skin: Positive for wound.   Neurological: Positive for weakness. Negative for dizziness, syncope and light-headedness.   Psychiatric/Behavioral: Negative for agitation and confusion. The patient is not nervous/anxious.      Objective:     Vital Signs (Most Recent):  Temp: 97.8 °F (36.6 °C) (10/27/19 0746)  Pulse: 102 (10/27/19 0746)  Resp: (!) 21 (10/27/19 0746)  BP: (!) 145/64 (10/27/19 0746)  SpO2: (!) 91 % (10/27/19 0746) Vital Signs (24h Range):  Temp:  [96.6 °F (35.9 °C)-98.5 °F (36.9 °C)] 97.8 °F (36.6 °C)  Pulse:  [] 102  Resp:  [14-28] 21  SpO2:  [91 %-99 %] 91 %  BP: (136-168)/(64-77) 145/64     Weight: (!) 178.5 kg (393 lb 8.3 oz)  Body mass index is 54.89 kg/m².    Intake/Output Summary (Last 24 hours) at 10/27/2019 0936  Last data filed at 10/27/2019 0600  Gross per 24 hour   Intake 3415.42 ml   Output 970 ml   Net 2445.42 ml      Physical Exam   Constitutional: He is oriented to person, place, and time. He appears well-developed and well-nourished. No distress.   HENT:   Head: Normocephalic and atraumatic.   Right Ear: External ear normal.   Left Ear: External ear normal.   Nose: Nose normal.   Eyes: Pupils  are equal, round, and reactive to light. Conjunctivae and EOM are normal.   Neck: Normal range of motion. Neck supple.   Cardiovascular: Normal rate, regular rhythm, normal heart sounds and intact distal pulses.   No murmur heard.  +2 pitting edema to RLE and +3 pitting edema to LLE   Pulmonary/Chest: Effort normal. Tachypnea noted. He has wheezes.   Diminished bilaterally, wheezing noted to upper airway    Abdominal: Soft. Bowel sounds are normal.   Morbidly obese     Musculoskeletal: Normal range of motion. He exhibits edema and tenderness.   Neurological: He is alert and oriented to person, place, and time.   Skin: Skin is warm and dry. There is erythema.   Large, open wound to left outer calf with surrounding warmth and erythema; dressing saturated with yellow drainage; see pictures in chart   Psychiatric: He has a normal mood and affect. His behavior is normal.   Nursing note and vitals reviewed.      Significant Labs:   CBC:   Recent Labs   Lab 10/25/19  2145 10/26/19  0427 10/27/19  0433   WBC 15.16* 12.40 10.39   HGB 12.5* 12.3* 12.3*   HCT 39.3* 38.9* 39.6*    197 193     CMP:   Recent Labs   Lab 10/25/19  2145 10/26/19  0427 10/27/19  0433   * 135* 137   K 4.1 3.7 4.0    100 100   CO2 23 27 28    110 119*   BUN 23 21 20   CREATININE 1.4 1.2 1.1   CALCIUM 8.6* 8.6* 8.7   ANIONGAP 10 8 9   EGFRNONAA 50* >60 >60     Significant Imaging: NA

## 2019-10-27 NOTE — PROGRESS NOTES
[facility] Medicine  Progress Note    Patient Name: Lukas Chance  MRN: [id number]  Patient Class: IP- Inpatient   Admission Date: 10/25/2019  Length of Stay: 2 days  Attending Physician: Laurence Wise MD  Primary Care Provider: Solitario Mendez MD        Subjective:     Principal Problem:Sepsis        HPI:  Lukas Chance is a 68 y/o M with a past medical history significant for  HTN, Type 2 DM, obesity, CAD, and PE who presented to the ED via EMS with c/o continued SOB and weakness.  Pt was seen in this ED earlier today for SOB which was determined to be a viral pneumonitis.  He was discharged home with nebulizer treatments and home health.  Reportedly, pt's SOB is about the same, but his weakness has progressed.  He was unable to rise out of he chair this evening and slid down to the floor.  CXR this evening is indicative of RLL pneumonia and he meets sepsis criteria.  He will be admitted to inpatient under the service of hospital medicine for further treatment.    Overview/Hospital Course:  Patient monitored closely during hospitalization. Patient noted to have possible pneumonia and was initiated on ceftriaxone and azithromycin.  Chest xray obtained.Wound care associates consulted for large wound to left lower extremity with cellulitis. Wound culture obtained. Abx changed to IV clindamycin.    Interval History: No acute events overnight. Patient reports feeling better overall. Still with significant peripheral edema and LLE pain due to wound. + Upper airway wheezing.   Awaiting wound care.        Review of Systems   Constitutional: Positive for activity change. Negative for appetite change, chills, diaphoresis and fever.   HENT: Negative for congestion, hearing loss and sore throat.    Respiratory: Positive for shortness of breath and wheezing. Negative for cough.    Cardiovascular: Positive for leg swelling. Negative for chest pain and palpitations.   Gastrointestinal:  Positive for abdominal pain. Negative for blood in stool and nausea.   Genitourinary: Negative for dysuria, flank pain and hematuria.   Musculoskeletal: Positive for back pain. Negative for arthralgias and myalgias.   Skin: Positive for wound.   Neurological: Positive for weakness. Negative for dizziness, syncope and light-headedness.   Psychiatric/Behavioral: Negative for agitation and confusion. The patient is not nervous/anxious.      Objective:     Vital Signs (Most Recent):  Temp: 97.8 °F (36.6 °C) (10/27/19 0746)  Pulse: 102 (10/27/19 0746)  Resp: (!) 21 (10/27/19 0746)  BP: (!) 145/64 (10/27/19 0746)  SpO2: (!) 91 % (10/27/19 0746) Vital Signs (24h Range):  Temp:  [96.6 °F (35.9 °C)-98.5 °F (36.9 °C)] 97.8 °F (36.6 °C)  Pulse:  [] 102  Resp:  [14-28] 21  SpO2:  [91 %-99 %] 91 %  BP: (136-168)/(64-77) 145/64     Weight: (!) 178.5 kg (393 lb 8.3 oz)  Body mass index is 54.89 kg/m².    Intake/Output Summary (Last 24 hours) at 10/27/2019 0936  Last data filed at 10/27/2019 0600  Gross per 24 hour   Intake 3415.42 ml   Output 970 ml   Net 2445.42 ml      Physical Exam   Constitutional: He is oriented to person, place, and time. He appears well-developed and well-nourished. No distress.   HENT:   Head: Normocephalic and atraumatic.   Right Ear: External ear normal.   Left Ear: External ear normal.   Nose: Nose normal.   Eyes: Pupils are equal, round, and reactive to light. Conjunctivae and EOM are normal.   Neck: Normal range of motion. Neck supple.   Cardiovascular: Normal rate, regular rhythm, normal heart sounds and intact distal pulses.   No murmur heard.  +2 pitting edema to RLE and +3 pitting edema to LLE   Pulmonary/Chest: Effort normal. Tachypnea noted. He has wheezes.   Diminished bilaterally, wheezing noted to upper airway    Abdominal: Soft. Bowel sounds are normal.   Morbidly obese     Musculoskeletal: Normal range of motion. He exhibits edema and tenderness.   Neurological: He is alert and  oriented to person, place, and time.   Skin: Skin is warm and dry. There is erythema.   Large, open wound to left outer calf with surrounding warmth and erythema; dressing saturated with yellow drainage; see pictures in chart   Psychiatric: He has a normal mood and affect. His behavior is normal.   Nursing note and vitals reviewed.      Significant Labs:   CBC:   Recent Labs   Lab 10/25/19  2145 10/26/19  0427 10/27/19  0433   WBC 15.16* 12.40 10.39   HGB 12.5* 12.3* 12.3*   HCT 39.3* 38.9* 39.6*    197 193     CMP:   Recent Labs   Lab 10/25/19  2145 10/26/19  0427 10/27/19  0433   * 135* 137   K 4.1 3.7 4.0    100 100   CO2 23 27 28    110 119*   BUN 23 21 20   CREATININE 1.4 1.2 1.1   CALCIUM 8.6* 8.6* 8.7   ANIONGAP 10 8 9   EGFRNONAA 50* >60 >60     Significant Imaging: NA      Assessment/Plan:      * Sepsis  This patient does have evidence of infective focus  My overall impression is sepsis.  Antibiotics given-   Antibiotics (From admission, onward)    Start     Stop Route Frequency Ordered    10/26/19 1530  clindamycin 600 MG/50 ML D5W 600 mg/50 mL IVPB 600 mg      -- IV Every 8 hours (non-standard times) 10/26/19 1420    10/26/19 0015  azithromycin 500 mg in dextrose 5 % 250 mL IVPB (ready to mix system)      -- IV Every 24 hours (non-standard times) 10/25/19 2305          Latest lactate reviewed, they are-  Recent Labs   Lab 10/25/19  0946 10/26/19  0047   LACTATE 1.4 1.1       Organ dysfunction indicated by tachycardia and tachypnea  Source- respiratory  Source control Achieved by- IV antibiotics        Cellulitis of left lower extremity  Wound care consulted. Daily dressing changes. Wound culture ordered. Change abx to clindamycin IV.      Old MI (myocardial infarction)  Continue OAC and statin      Stage 3 chronic kidney disease  Creatine stable for now. BMP reviewed- noted Estimated Creatinine Clearance: 101.6 mL/min (based on SCr of 1.1 mg/dL). according to latest data. Monitor  UOP and serial BMP and adjust therapy as needed. Renally dose meds.            Pneumonia due to infectious organism  IV rocephin, IV azithromycin  Sputum culture and gram stain  Blood cultures- negative day 2  Supplemental oxygen as needed  Nebulizer tx  Check procalcitonin- 0.38  D/C IV rocephin     Venous insufficiency of lower extremity  Patient was severe insufficiency and ulcer development.  Consult wound care.      Body mass index 50.0-59.9, adult  Body mass index is 54.89 kg/m². Morbid obesity complicates all aspects of disease management from diagnostic modalities to treatment. Weight loss encouraged and health benefits explained to patient.        Hypertension associated with diabetes  Chronic; stable. Monitor BP and treat as indicated for sustained control.      Diabetes mellitus type 2 with neurological manifestations  Chronic; not on chronic medication.  Check hgb a1c to assess control.  Accuchecks ac/hs with ssi coverage as needed.    Anticoagulant long-term use  Unlikely SOB due to pulmonary embolism as pt is anticoagulated with apixaban and reports compliance.  Continue apixaban acutely.        VTE Risk Mitigation (From admission, onward)         Ordered     apixaban tablet 5 mg  2 times daily      10/26/19 0008     IP VTE HIGH RISK PATIENT  Once      10/26/19 0008     Reason for No Pharmacological VTE Prophylaxis  Once      10/26/19 0008                      Caroline Bruno NP  Department of Hospital Medicine   [facility]

## 2019-10-27 NOTE — ASSESSMENT & PLAN NOTE
IV rocephin, IV azithromycin  Sputum culture and gram stain  Blood cultures- negative day 2  Supplemental oxygen as needed  Nebulizer tx  Check procalcitonin- 0.38  D/C IV rocephin

## 2019-10-27 NOTE — ASSESSMENT & PLAN NOTE
This patient does have evidence of infective focus  My overall impression is sepsis.  Antibiotics given-   Antibiotics (From admission, onward)    Start     Stop Route Frequency Ordered    10/26/19 1530  clindamycin 600 MG/50 ML D5W 600 mg/50 mL IVPB 600 mg      -- IV Every 8 hours (non-standard times) 10/26/19 1420    10/26/19 0015  azithromycin 500 mg in dextrose 5 % 250 mL IVPB (ready to mix system)      -- IV Every 24 hours (non-standard times) 10/25/19 2305          Latest lactate reviewed, they are-  Recent Labs   Lab 10/25/19  0946 10/26/19  0047   LACTATE 1.4 1.1       Organ dysfunction indicated by tachycardia and tachypnea  Source- respiratory  Source control Achieved by- IV antibiotics

## 2019-10-27 NOTE — PLAN OF CARE
Pt remained free from injury/falls this shift. VSS- no signs of any distress. Tele(box 9290)-NSR/ST. POC reviewed with pt. Pt repositioned independently. Pt refused wound care this shift. PRN pain medications given per orders- pt reports relief. IVF infusing per order. IVAB given per order. IV lasix given per order.  Bed locked in lowest position, SR upx2, call light within reach. Will continue to monitor.

## 2019-10-27 NOTE — PLAN OF CARE
10/27/19 2274   Patient Assessment/Suction   Level of Consciousness (AVPU) alert   Respiratory Effort Mild;Labored   Expansion/Accessory Muscles/Retractions expansion symmetric;no retractions;no use of accessory muscles   All Lung Fields Breath Sounds wheezes, expiratory   Cough Frequency infrequent   Cough Type nonproductive   PRE-TX-O2   O2 Device (Oxygen Therapy) room air   SpO2 (!) 91 %  (pt has just got back from restroom)   Pulse Oximetry Type Intermittent   Pulse 103   Resp (!) 21   Aerosol Therapy   $ Aerosol Therapy Charges Aerosol Treatment   Respiratory Treatment Status (SVN) given   Treatment Route (SVN) mask   Patient Position (SVN) HOB elevated   Signs of Intolerance (SVN) none   Breath Sounds Post-Respiratory Treatment   Throughout All Fields Post-Treatment All Fields   Throughout All Fields Post-Treatment no change   Post-treatment Heart Rate (beats/min) 97   Post-treatment Resp Rate (breaths/min) 20

## 2019-10-27 NOTE — ASSESSMENT & PLAN NOTE
Creatine stable for now. BMP reviewed- noted Estimated Creatinine Clearance: 101.6 mL/min (based on SCr of 1.1 mg/dL). according to latest data. Monitor UOP and serial BMP and adjust therapy as needed. Renally dose meds.

## 2019-10-27 NOTE — PLAN OF CARE
10/26/19 1943   Patient Assessment/Suction   Level of Consciousness (AVPU) alert   Respiratory Effort Slight;Labored   Expansion/Accessory Muscles/Retractions accessory muscle use   All Lung Fields Breath Sounds stridor, expiratory   PRE-TX-O2   O2 Device (Oxygen Therapy) room air   SpO2 96 %   Pulse 96   Resp (!) 22   Aerosol Therapy   $ Aerosol Therapy Charges Aerosol Treatment   Respiratory Treatment Status (SVN) given   Treatment Route (SVN) mask;oxygen   Patient Position (SVN) HOB elevated   Post Treatment Assessment (SVN) breath sounds unchanged   Signs of Intolerance (SVN) none   Breath Sounds Post-Respiratory Treatment   Throughout All Fields Post-Treatment All Fields   Throughout All Fields Post-Treatment no change   Post-treatment Heart Rate (beats/min) 98   Post-treatment Resp Rate (breaths/min) 22   Pt tolerates RA and treatments well.

## 2019-10-28 ENCOUNTER — TELEPHONE (OUTPATIENT)
Dept: FAMILY MEDICINE | Facility: CLINIC | Age: 69
End: 2019-10-28

## 2019-10-28 ENCOUNTER — DOCUMENTATION ONLY (OUTPATIENT)
Dept: FAMILY MEDICINE | Facility: CLINIC | Age: 69
End: 2019-10-28

## 2019-10-28 VITALS
DIASTOLIC BLOOD PRESSURE: 101 MMHG | BODY MASS INDEX: 44.1 KG/M2 | TEMPERATURE: 98 F | WEIGHT: 315 LBS | SYSTOLIC BLOOD PRESSURE: 143 MMHG | HEIGHT: 71 IN | HEART RATE: 85 BPM | RESPIRATION RATE: 18 BRPM | OXYGEN SATURATION: 94 %

## 2019-10-28 PROBLEM — J43.2 CENTRILOBULAR EMPHYSEMA: Status: ACTIVE | Noted: 2019-10-28

## 2019-10-28 PROBLEM — I50.33 ACUTE ON CHRONIC DIASTOLIC HEART FAILURE: Status: ACTIVE | Noted: 2019-10-28

## 2019-10-28 LAB
ANION GAP SERPL CALC-SCNC: 11 MMOL/L (ref 8–16)
BASOPHILS # BLD AUTO: 0.05 K/UL (ref 0–0.2)
BASOPHILS NFR BLD: 0.6 % (ref 0–1.9)
BUN SERPL-MCNC: 23 MG/DL (ref 8–23)
CALCIUM SERPL-MCNC: 8.8 MG/DL (ref 8.7–10.5)
CHLORIDE SERPL-SCNC: 98 MMOL/L (ref 95–110)
CO2 SERPL-SCNC: 29 MMOL/L (ref 23–29)
CREAT SERPL-MCNC: 1.2 MG/DL (ref 0.5–1.4)
DIFFERENTIAL METHOD: ABNORMAL
EOSINOPHIL # BLD AUTO: 0.3 K/UL (ref 0–0.5)
EOSINOPHIL NFR BLD: 3.7 % (ref 0–8)
ERYTHROCYTE [DISTWIDTH] IN BLOOD BY AUTOMATED COUNT: 13.4 % (ref 11.5–14.5)
EST. GFR  (AFRICAN AMERICAN): >60 ML/MIN/1.73 M^2
EST. GFR  (NON AFRICAN AMERICAN): >60 ML/MIN/1.73 M^2
GLUCOSE SERPL-MCNC: 106 MG/DL (ref 70–110)
HCT VFR BLD AUTO: 39.4 % (ref 40–54)
HGB BLD-MCNC: 12.2 G/DL (ref 14–18)
IMM GRANULOCYTES # BLD AUTO: 0.03 K/UL (ref 0–0.04)
LYMPHOCYTES # BLD AUTO: 1.2 K/UL (ref 1–4.8)
LYMPHOCYTES NFR BLD: 13 % (ref 18–48)
MAGNESIUM SERPL-MCNC: 2 MG/DL (ref 1.6–2.6)
MCH RBC QN AUTO: 28.5 PG (ref 27–31)
MCHC RBC AUTO-ENTMCNC: 31 G/DL (ref 32–36)
MCV RBC AUTO: 92 FL (ref 82–98)
MONOCYTES # BLD AUTO: 1.2 K/UL (ref 0.3–1)
MONOCYTES NFR BLD: 13.7 % (ref 4–15)
NEUTROPHILS # BLD AUTO: 6.2 K/UL (ref 1.8–7.7)
NEUTROPHILS NFR BLD: 68.7 % (ref 38–73)
NRBC BLD-RTO: 0 /100 WBC
PHOSPHATE SERPL-MCNC: 3.5 MG/DL (ref 2.7–4.5)
PLATELET # BLD AUTO: 210 K/UL (ref 150–350)
PMV BLD AUTO: 9.8 FL (ref 9.2–12.9)
POCT GLUCOSE: 108 MG/DL (ref 70–110)
POCT GLUCOSE: 115 MG/DL (ref 70–110)
POTASSIUM SERPL-SCNC: 3.6 MMOL/L (ref 3.5–5.1)
RBC # BLD AUTO: 4.28 M/UL (ref 4.6–6.2)
SODIUM SERPL-SCNC: 138 MMOL/L (ref 136–145)
WBC # BLD AUTO: 8.95 K/UL (ref 3.9–12.7)

## 2019-10-28 PROCEDURE — 63600175 PHARM REV CODE 636 W HCPCS: Performed by: NURSE PRACTITIONER

## 2019-10-28 PROCEDURE — 83735 ASSAY OF MAGNESIUM: CPT

## 2019-10-28 PROCEDURE — 94761 N-INVAS EAR/PLS OXIMETRY MLT: CPT

## 2019-10-28 PROCEDURE — S0077 INJECTION, CLINDAMYCIN PHOSP: HCPCS | Performed by: NURSE PRACTITIONER

## 2019-10-28 PROCEDURE — 25000242 PHARM REV CODE 250 ALT 637 W/ HCPCS: Performed by: NURSE PRACTITIONER

## 2019-10-28 PROCEDURE — 85025 COMPLETE CBC W/AUTO DIFF WBC: CPT

## 2019-10-28 PROCEDURE — 36415 COLL VENOUS BLD VENIPUNCTURE: CPT

## 2019-10-28 PROCEDURE — 27000221 HC OXYGEN, UP TO 24 HOURS

## 2019-10-28 PROCEDURE — 80048 BASIC METABOLIC PNL TOTAL CA: CPT

## 2019-10-28 PROCEDURE — 97116 GAIT TRAINING THERAPY: CPT

## 2019-10-28 PROCEDURE — 25000003 PHARM REV CODE 250: Performed by: NURSE PRACTITIONER

## 2019-10-28 PROCEDURE — 97530 THERAPEUTIC ACTIVITIES: CPT

## 2019-10-28 PROCEDURE — 94640 AIRWAY INHALATION TREATMENT: CPT

## 2019-10-28 PROCEDURE — 84100 ASSAY OF PHOSPHORUS: CPT

## 2019-10-28 RX ORDER — CLINDAMYCIN HYDROCHLORIDE 300 MG/1
300 CAPSULE ORAL EVERY 8 HOURS
Qty: 21 CAPSULE | Refills: 0 | Status: SHIPPED | OUTPATIENT
Start: 2019-10-28 | End: 2019-10-29 | Stop reason: SDUPTHER

## 2019-10-28 RX ORDER — POTASSIUM CHLORIDE 750 MG/1
10 TABLET, EXTENDED RELEASE ORAL DAILY PRN
Qty: 30 TABLET | Refills: 0 | Status: SHIPPED | OUTPATIENT
Start: 2019-10-28 | End: 2019-11-14

## 2019-10-28 RX ORDER — FUROSEMIDE 40 MG/1
40 TABLET ORAL DAILY PRN
Qty: 30 TABLET | Refills: 0 | Status: SHIPPED | OUTPATIENT
Start: 2019-10-28 | End: 2019-11-14 | Stop reason: ALTCHOICE

## 2019-10-28 RX ADMIN — IPRATROPIUM BROMIDE AND ALBUTEROL SULFATE 3 ML: .5; 3 SOLUTION RESPIRATORY (INHALATION) at 08:10

## 2019-10-28 RX ADMIN — THERA TABS 1 TABLET: TAB at 08:10

## 2019-10-28 RX ADMIN — CLINDAMYCIN IN 5 PERCENT DEXTROSE 600 MG: 12 INJECTION, SOLUTION INTRAVENOUS at 08:10

## 2019-10-28 RX ADMIN — IPRATROPIUM BROMIDE AND ALBUTEROL SULFATE 3 ML: .5; 3 SOLUTION RESPIRATORY (INHALATION) at 03:10

## 2019-10-28 RX ADMIN — APIXABAN 5 MG: 2.5 TABLET, FILM COATED ORAL at 08:10

## 2019-10-28 RX ADMIN — AZITHROMYCIN MONOHYDRATE 500 MG: 500 INJECTION, POWDER, LYOPHILIZED, FOR SOLUTION INTRAVENOUS at 01:10

## 2019-10-28 RX ADMIN — IPRATROPIUM BROMIDE AND ALBUTEROL SULFATE 3 ML: .5; 3 SOLUTION RESPIRATORY (INHALATION) at 12:10

## 2019-10-28 NOTE — PLAN OF CARE
CM called [facility] in [place]. Pt already has appt scheduled with them for tomorrow. SULLY updated AVS.        10/28/19 1029   Discharge Assessment   Assessment Type Discharge Planning Reassessment

## 2019-10-28 NOTE — CONSULTS
Chronic venous stasis ulcer left lateral lower extremity.  States he is supposed to see [facility].  Wound cleansed and wrapped.

## 2019-10-28 NOTE — PLAN OF CARE
PT evaluation completed.  Patient may benefit from acute PT to address noted impairments and improve functional mobility.

## 2019-10-28 NOTE — PT/OT/SLP PROGRESS
Physical Therapy Treatment    Patient Name:  Lukas Chance   MRN:  [id number]    Recommendations:     Discharge Recommendations:  home health PT, home   Discharge Equipment Recommendations: none   Barriers to discharge: None    Assessment:     Lukas Chance is a 69 y.o. male admitted with a medical diagnosis of Sepsis.  He presents with the following impairments/functional limitations:  weakness, impaired endurance, impaired balance, impaired functional mobilty, gait instability .  Patient readily agreeable to PT treatment and gait training this morning.  Patient was able to transfer supine to sit and sit to stand with supervision but did require minimal assistance to put on underwear and shorts due to having difficulty bending over to feet to get the clothes started.  Patient was then able to ambulate x 150 feet with RW with supervision but did have mod SOB.  O2 sats taken during gait and did go as low as 87% but usually stayed around 90%.    Rehab Prognosis: Good; patient would benefit from acute skilled PT services to address these deficits and reach maximum level of function.    Recent Surgery: * No surgery found *      Plan:     During this hospitalization, patient to be seen 6 x/week to address the identified rehab impairments via gait training, therapeutic activities, therapeutic exercises and progress toward the following goals:    · Plan of Care Expires:  11/10/19    Subjective     Chief Complaint: SOB  Patient/Family Comments/goals: go home  Pain/Comfort:  ·        Objective:     Communicated with nurse Santana prior to session.  Patient found supine with peripheral IV upon PT entry to room.     General Precautions: Standard, fall   Orthopedic Precautions:N/A   Braces:       Functional Mobility:  · Bed Mobility:     · Rolling Left:  supervision  · Supine to Sit: supervision  · Transfers:     · Sit to Stand:  contact guard assistance with rolling walker  · Gait: 150 feet with RW with SBA      AM-PAC 6 CLICK  MOBILITY  Turning over in bed (including adjusting bedclothes, sheets and blankets)?: 4  Sitting down on and standing up from a chair with arms (e.g., wheelchair, bedside commode, etc.): 4  Moving from lying on back to sitting on the side of the bed?: 4  Moving to and from a bed to a chair (including a wheelchair)?: 3  Need to walk in hospital room?: 3  Climbing 3-5 steps with a railing?: 1  Basic Mobility Total Score: 19       Therapeutic Activities and Exercises:   transfer training supine to sit with supervision, sit to stand with supervision.  Gait training x 150 feet with RW with supervision without O2.  O2 sats taken during gait and went as low as 87% but stayed around 90% most of the time.    Patient left sitting EOB with call button in reach and friend and respiratory therapist present..    GOALS:   Multidisciplinary Problems     Physical Therapy Goals        Problem: Physical Therapy Goal    Goal Priority Disciplines Outcome Goal Variances Interventions   Physical Therapy Goal     PT, PT/OT Ongoing, Progressing     Description:  Goals to be met by: 11/10/2019     Patient will increase functional independence with mobility by performin. Sit to stand transfer with Modified [place]  2. Gait  x 150 feet with [place].                       Time Tracking:     PT Received On: 10/28/19  PT Start Time: 0938     PT Stop Time: 1007  PT Total Time (min): 29 min     Billable Minutes: Gait Training 19 and Therapeutic Activity 10    Treatment Type: Treatment  PT/PTA: PT     PTA Visit Number: 0     Chris Megilligan, PT  10/28/2019

## 2019-10-28 NOTE — PLAN OF CARE
I sent the pts HH packet and HH orders to [facility] via [facility] and updated the pts AVS. Eusebia Bhakta, KENN     10/28/19 1124   Post-Acute Status   Post-Acute Authorization Home Health/Hospice   Home Health/Hospice Status Referrals Sent

## 2019-10-28 NOTE — PLAN OF CARE
Problem: Physical Therapy Goal  Goal: Physical Therapy Goal  Description  Goals to be met by: 11/10/2019     Patient will increase functional independence with mobility by performin. Sit to stand transfer with Modified [place]  2. Gait  x 150 feet with [place].      Outcome: Ongoing, Progressing

## 2019-10-28 NOTE — CARE UPDATE
10/28/19 1000   Patient Assessment/Suction   Level of Consciousness (AVPU) alert   Home Oxygen Qualification   Room Air SpO2 At Rest 90 %   Room Air SpO2 on Exertion (!) 87 %   SpO2 During Exertion on O2 94 %   Heart Rate on O2 92 bpm   SpO2 on Recovery 98 %   Recovery Heart Rate 92 bpm   Recovery O2 LPM 2 LPM   Home O2 Eval Comments needs home 02

## 2019-10-28 NOTE — PLAN OF CARE
Pt already has hospital follow up scheduled with PCP. AVS updated       10/28/19 1000   Discharge Assessment   Assessment Type Discharge Planning Reassessment

## 2019-10-28 NOTE — PLAN OF CARE
Date Status/Notes Created By   · 10/28/2019 11:58:04 AM Completed  Eusebia Bhakta  · 10/28/2019 11:36:15 AM Accepted  [email or web address] [email or web address]  · 10/28/2019 11:24:43 AM New: The pt is discharging today. Thanks !  Eusebia Bhakta  The pt is accepted by [facility]  via [facility]. PTS AVS updated and discharge destination booked. Eusebia Bhakta, [facility]     10/28/19 1158   Post-Acute Status   Post-Acute Authorization Home Health/Hospice   Home Health/Hospice Status Set-up Complete

## 2019-10-28 NOTE — PLAN OF CARE
10/28/19 1614   Final Note   Assessment Type Final Discharge Note   Anticipated Discharge Disposition Home-Health   Hospital Follow Up  Appt(s) scheduled? Yes

## 2019-10-28 NOTE — PLAN OF CARE
NAD noted. POC reviewed w pt and they verbalized understanding.  Tele [id number].  Pt free from falls.  Pt up to BSC.  Bed in low position, side rails up X2, bed alarm on, wheels locked, call light in reach. Will continue to monitor.

## 2019-10-28 NOTE — PLAN OF CARE
"I called [facility] at [phone] opt 1 and was updated that Padmini is at lunch. I inquired about the home O2 order and hospital bed and the representative stated, " it looks like she is working on it." CM to follow. Eusebia Bhakta LCSW     Secure chat sent to REGI Rutledge CM, Dr. Dubois and AGUILA Velazquez regarding Padmini needing more supporting documentation of the pts COPD diagnosis.     Per Padmini with [facility]- she can arrange for the hospital bed to be delivered to the pts home tomorrow. Still working on documentation needed for home O2 approval. Eusebia Bhakta LCSW       10/28/19 0480   Post-Acute Status   Post-Acute Authorization E   E Status Referrals Sent     "

## 2019-10-28 NOTE — SUBJECTIVE & OBJECTIVE
Interval History: No acute events overnight. Patient reports feeling better overall.  Patient with persistent dyspnea on exertion.  Peripheral edema has improved with IV Lasix schedule.        .Review of Systems   Constitutional: Positive for activity change. Negative for appetite change, chills, diaphoresis and fever.   HENT: Negative for congestion, hearing loss and sore throat.    Respiratory: Positive for shortness of breath. Negative for cough and wheezing.    Cardiovascular: Positive for leg swelling. Negative for chest pain and palpitations.   Gastrointestinal: Positive for abdominal pain. Negative for blood in stool and nausea.   Genitourinary: Negative for dysuria, flank pain and hematuria.   Musculoskeletal: Positive for back pain. Negative for arthralgias and myalgias.   Skin: Positive for wound.   Neurological: Positive for weakness. Negative for dizziness, syncope and light-headedness.   Psychiatric/Behavioral: Negative for agitation and confusion. The patient is not nervous/anxious.      Objective:     Vital Signs (Most Recent):  Temp: 97.8 °F (36.6 °C) (10/28/19 1134)  Pulse: 85 (10/28/19 1219)  Resp: 18 (10/28/19 1219)  BP: (!) 143/101 (10/28/19 1134)  SpO2: (!) 94 % (10/28/19 1219) Vital Signs (24h Range):  Temp:  [96.7 °F (35.9 °C)-98.3 °F (36.8 °C)] 97.8 °F (36.6 °C)  Pulse:  [] 85  Resp:  [15-22] 18  SpO2:  [90 %-99 %] 94 %  BP: (124-145)/() 143/101     Weight: (!) 178.5 kg (393 lb 8.3 oz)  Body mass index is 54.89 kg/m².    Intake/Output Summary (Last 24 hours) at 10/28/2019 1624  Last data filed at 10/28/2019 0446  Gross per 24 hour   Intake 308.75 ml   Output 825 ml   Net -516.25 ml      Physical Exam   Constitutional: He is oriented to person, place, and time. He appears well-developed and well-nourished. No distress.   HENT:   Head: Normocephalic and atraumatic.   Right Ear: External ear normal.   Left Ear: External ear normal.   Nose: Nose normal.   Eyes: Pupils are equal,  round, and reactive to light. Conjunctivae and EOM are normal.   Neck: Normal range of motion. Neck supple.   Cardiovascular: Normal rate, regular rhythm, normal heart sounds and intact distal pulses.   No murmur heard.  +2 pitting edema to RLE and +3 pitting edema to LLE   Pulmonary/Chest: Effort normal. Tachypnea noted. He has no wheezes.   Diminished bilaterally no rales   Abdominal: Soft. Bowel sounds are normal.   Morbidly obese     Musculoskeletal: Normal range of motion. He exhibits edema and tenderness.   Neurological: He is alert and oriented to person, place, and time.   Skin: Skin is warm and dry. There is erythema.   Large, open wound to left outer calf with surrounding warmth and erythema; dressing saturated with yellow drainage; see pictures in chart   Psychiatric: He has a normal mood and affect. His behavior is normal.   Nursing note and vitals reviewed.      Significant Labs:   CBC:   Recent Labs   Lab 10/27/19  0433 10/28/19  0437   WBC 10.39 8.95   HGB 12.3* 12.2*   HCT 39.6* 39.4*    210     CMP:   Recent Labs   Lab 10/27/19  0433 10/28/19  0437    138   K 4.0 3.6    98   CO2 28 29   * 106   BUN 20 23   CREATININE 1.1 1.2   CALCIUM 8.7 8.8   ANIONGAP 9 11   EGFRNONAA >60 >60     Significant Imaging: NA

## 2019-10-28 NOTE — H&P
[facility] Hospital Medicine  History & Physical    Patient Name: Lukas Chance  MRN: [id number]  Admission Date: 10/25/2019  Attending Physician: Caroline Bruno NP  Primary Care Provider: Solitario Mendez MD         Patient information was obtained from patient and ER records.     Subjective:     Principal Problem:Sepsis    Chief Complaint:   Chief Complaint   Patient presents with    Fall     slid out of chair at home. on floor for hours.  c/o lower back pain     Shortness of Breath     chronic but worse today        HPI: Lukas Chance is a 70 y/o M with a past medical history significant for  HTN, Type 2 DM, obesity, CAD, and PE who presented to the ED via EMS with c/o continued SOB and weakness.  Pt was seen in this ED earlier today for SOB which was determined to be a viral pneumonitis.  He was discharged home with nebulizer treatments and home health.  Reportedly, pt's SOB is about the same, but his weakness has progressed.  He was unable to rise out of he chair this evening and slid down to the floor.  CXR this evening is indicative of RLL pneumonia and he meets sepsis criteria.  He will be admitted to inpatient under the service of hospital medicine for further treatment.    Interval History: No acute events overnight. Patient reports feeling better overall.  Patient with persistent dyspnea on exertion.  Peripheral edema has improved with IV Lasix schedule.        .Review of Systems   Constitutional: Positive for activity change. Negative for appetite change, chills, diaphoresis and fever.   HENT: Negative for congestion, hearing loss and sore throat.    Respiratory: Positive for shortness of breath. Negative for cough and wheezing.    Cardiovascular: Positive for leg swelling. Negative for chest pain and palpitations.   Gastrointestinal: Positive for abdominal pain. Negative for blood in stool and nausea.   Genitourinary: Negative for dysuria, flank pain and hematuria.    Musculoskeletal: Positive for back pain. Negative for arthralgias and myalgias.   Skin: Positive for wound.   Neurological: Positive for weakness. Negative for dizziness, syncope and light-headedness.   Psychiatric/Behavioral: Negative for agitation and confusion. The patient is not nervous/anxious.      Objective:     Vital Signs (Most Recent):  Temp: 97.8 °F (36.6 °C) (10/28/19 1134)  Pulse: 85 (10/28/19 1219)  Resp: 18 (10/28/19 1219)  BP: (!) 143/101 (10/28/19 1134)  SpO2: (!) 94 % (10/28/19 1219) Vital Signs (24h Range):  Temp:  [96.7 °F (35.9 °C)-98.3 °F (36.8 °C)] 97.8 °F (36.6 °C)  Pulse:  [] 85  Resp:  [15-22] 18  SpO2:  [90 %-99 %] 94 %  BP: (124-145)/() 143/101     Weight: (!) 178.5 kg (393 lb 8.3 oz)  Body mass index is 54.89 kg/m².    Intake/Output Summary (Last 24 hours) at 10/28/2019 1624  Last data filed at 10/28/2019 0446  Gross per 24 hour   Intake 308.75 ml   Output 825 ml   Net -516.25 ml      Physical Exam   Constitutional: He is oriented to person, place, and time. He appears well-developed and well-nourished. No distress.   HENT:   Head: Normocephalic and atraumatic.   Right Ear: External ear normal.   Left Ear: External ear normal.   Nose: Nose normal.   Eyes: Pupils are equal, round, and reactive to light. Conjunctivae and EOM are normal.   Neck: Normal range of motion. Neck supple.   Cardiovascular: Normal rate, regular rhythm, normal heart sounds and intact distal pulses.   No murmur heard.  +2 pitting edema to RLE and +3 pitting edema to LLE   Pulmonary/Chest: Effort normal. Tachypnea noted. He has no wheezes.   Diminished bilaterally no rales   Abdominal: Soft. Bowel sounds are normal.   Morbidly obese     Musculoskeletal: Normal range of motion. He exhibits edema and tenderness.   Neurological: He is alert and oriented to person, place, and time.   Skin: Skin is warm and dry. There is erythema.   Large, open wound to left outer calf with surrounding warmth and erythema;  dressing saturated with yellow drainage; see pictures in chart   Psychiatric: He has a normal mood and affect. His behavior is normal.   Nursing note and vitals reviewed.      Significant Labs:   CBC:   Recent Labs   Lab 10/27/19  0433 10/28/19  0437   WBC 10.39 8.95   HGB 12.3* 12.2*   HCT 39.6* 39.4*    210     CMP:   Recent Labs   Lab 10/27/19  0433 10/28/19  0437    138   K 4.0 3.6    98   CO2 28 29   * 106   BUN 20 23   CREATININE 1.1 1.2   CALCIUM 8.7 8.8   ANIONGAP 9 11   EGFRNONAA >60 >60     Significant Imaging: NA    Assessment/Plan:     * Sepsis  This patient does have evidence of infective focus  My overall impression is sepsis.  Antibiotics given-   Antibiotics (From admission, onward)    Start     Stop Route Frequency Ordered    10/26/19 1530  clindamycin 600 MG/50 ML D5W 600 mg/50 mL IVPB 600 mg      -- IV Every 8 hours (non-standard times) 10/26/19 1420    10/26/19 0015  azithromycin 500 mg in dextrose 5 % 250 mL IVPB (ready to mix system)      -- IV Every 24 hours (non-standard times) 10/25/19 2305          Latest lactate reviewed, they are-  Recent Labs   Lab 10/25/19  0946 10/26/19  0047   LACTATE 1.4 1.1       Organ dysfunction indicated by tachycardia and tachypnea  Source- respiratory  Source control Achieved by- IV antibiotics        Cellulitis of left lower extremity  Wound care consulted. Daily dressing changes. Wound culture ordered. Change abx to clindamycin IV.      Centrilobular emphysema  Due to tobacco dependency in remission.  Continue home bronchodilator.      Acute on chronic diastolic heart failure  Scheduled lasix 60 mg bid. Last ef 55%      Old MI (myocardial infarction)  Continue OAC and statin      Stage 3 chronic kidney disease  Creatine stable for now. BMP reviewed- noted Estimated Creatinine Clearance: 101.6 mL/min (based on SCr of 1.1 mg/dL). according to latest data. Monitor UOP and serial BMP and adjust therapy as needed. Renally dose  meds.            Pneumonia due to infectious organism  IV rocephin, IV azithromycin  Sputum culture and gram stain  Blood cultures- negative day 2  Supplemental oxygen as needed  Nebulizer tx  Check procalcitonin- 0.38  D/C IV rocephin     Venous insufficiency of lower extremity  Patient was severe insufficiency and ulcer development.  Consult wound care.      Body mass index 50.0-59.9, adult  Body mass index is 54.89 kg/m². Morbid obesity complicates all aspects of disease management from diagnostic modalities to treatment. Weight loss encouraged and health benefits explained to patient.        Hypertension associated with diabetes  Chronic; stable. Monitor BP and treat as indicated for sustained control.      Diabetes mellitus type 2 with neurological manifestations  Chronic; not on chronic medication.  Check hgb a1c to assess control.  Accuchecks ac/hs with ssi coverage as needed.    Anticoagulant long-term use  Unlikely SOB due to pulmonary embolism as pt is anticoagulated with apixaban and reports compliance.  Continue apixaban acutely.        VTE Risk Mitigation (From admission, onward)         Ordered     apixaban tablet 5 mg  2 times daily      10/26/19 0008     IP VTE HIGH RISK PATIENT  Once      10/26/19 0008     Reason for No Pharmacological VTE Prophylaxis  Once      10/26/19 0008                   Caroline Bruno NP  Department of Hospital Medicine   [facility]

## 2019-10-28 NOTE — TELEPHONE ENCOUNTER
Pt is scheduled for hospital f/u on 10/30/19 with Dr. Adan. He does not do hospital f/u, PCP is Dr. Mendez. Is there a way to bring the patient in to be seen with Dr. Mendze ?

## 2019-10-28 NOTE — PROGRESS NOTES
Pre-Visit Chart Review  For Appointment Scheduled on 10-29-19    Health Maintenance Due   Topic Date Due    Colonoscopy  05/02/2016    Eye Exam  07/05/2018    Foot Exam  06/26/2019

## 2019-10-28 NOTE — PT/OT/SLP EVAL
Physical Therapy Evaluation    Patient Name:  Lukas Chance   MRN:  [id number]    Recommendations:     Discharge Recommendations:  Home vs Home Health PT  Discharge Equipment Recommendations: none   Barriers to discharge: Inaccessible home (bedroom is on 2nd floor); Decreased caregiver support (reports spouse is currently ill and may be unable to assist him)    Assessment:     Lukas Chance is a 69 y.o. male admitted with a medical diagnosis of Sepsis. Patient presents with functional mobility limitations secondary to decreased tolerance to activity, weakness, and balance deficits. Performed sit to stand transfer with min/mod A and tolerated standing with RW 2-3 minutes with SOB, 02 sats ranging 88% to 93% on room air. Ambulated 5 ft with RW at CGA/min A before needing to return to sitting due to persistent dizziness. Patient would benefit from acute PT to address noted impairments, reduce fall risk, and return to PLOF.    Patient presents with the following impairments/functional limitations:  weakness, impaired balance, pain, impaired endurance, impaired functional mobilty, impaired cardiopulmonary response to activity, gait instability, edema.    Rehab Prognosis: Good; patient would benefit from acute skilled PT services to address these deficits and reach maximum level of function.    Recent Surgery: * No surgery found *      Plan:     During this hospitalization, patient to be seen 6 x/week to address the identified rehab impairments via gait training, therapeutic activities, therapeutic exercises, therapeutic groups, canalith reposition procedure, wheelchair management/training, neuromuscular re-education and progress toward the following goals:    · Plan of Care Expires:  11/10/19    Subjective     Chief Complaint: Weakness in legs, dizziness with standing. Concerned he won't be able to go up the stairs to his bedroom at home.  Patient/Family Comments/goals: Feel better and be able to go home.    Pain/Comfort:  · Pain Rating 1: 5/10  · Location - Side 1: Bilateral  · Location - Orientation 1: generalized  · Location 1: back  · Pain Addressed 1: Nurse notified, Reposition    Patients cultural, spiritual, Worship conflicts given the current situation: no    Living Environment:  Pt lives with spouse in 2 story house with 4 steps to enter and 1 handrail. Bedroom is located on 2nd floor. Stairs have bilateral handrails.   Prior to admission, patients level of function was independent. Reports he uses his QC on occasion. Equipment used at home: grab bar, cane, quad.  DME owned (not currently used):  Upon discharge, patient will have assistance from spouse.    Objective:     Communicated with nurse Morgan prior to session, who reported patient transferred to bedside chair with assistance but wasn't able to stand due to leg weakness.  Patient found up in chair with telemetry  upon PT entry to room.     General Precautions: Standard, fall   Orthopedic Precautions:N/A   Braces: N/A     Exams:  · RLE ROM: WFL  · RLE Strength: Deficits: grossly 3+/5  · LLE ROM: WFL  · LLE Strength: grossly 3+/5    Functional Mobility:  · Transfers:     · Sit to Stand:  minimum assistance and moderate assistance with no AD  · Gait: 5 ft CGA/min A with RW      Therapeutic Activities and Exercises:    Sit to stand min/mod A, standing tolerance 2-3 minutes at CGA holding onto RW  Gait x 5 ft with RW at CGA/min A, difficulty with floor clearance and unsteady  Performing seated exercises including ankle pumps, LAQ's, hip marches x 15 reps  Pt SOB during standing and gait, unable to ambulate further due to persistent dizziness.  02 sats ranging 88% to 93% on room air.   Large wound to L ankle with dressing in place.    AM-PAC 6 CLICK MOBILITY  Total Score:      Patient left up in chair with call button in reach, chair alarm on and primary nurse Maty notified.    GOALS:   Multidisciplinary Problems     Physical Therapy Goals         Problem: Physical Therapy Goal    Goal Priority Disciplines Outcome Goal Variances Interventions   Physical Therapy Goal     PT, PT/OT Ongoing, Progressing     Description:  Goals to be met by: 11/10/2019     Patient will increase functional independence with mobility by performin. Sit to stand transfer with Modified Steger  2. Gait  x 150 feet with Steger.                       History:     Past Medical History:   Diagnosis Date    Anticoagulant long-term use 3/1/2013    Antithrombin 3 deficiency     Cellulitis     lower legs, venous stasis    Coronary artery disease     Diabetes mellitus type 2 in obese 5/15/2013    Hypertension     Obesity     LAKHWINDER (obstructive sleep apnea)     Pulmonary embolism        Past Surgical History:   Procedure Laterality Date    COLONOSCOPY  2011    Dr. Miller, 9 polyps, recheck 5 year    MULTIPLE TOOTH EXTRACTIONS      RADIOFREQUENCY ABLATION  2015    bilateral     TONSILLECTOMY         Time Tracking:     PT Received On: 10/27/19  PT Start Time: 1115     PT Stop Time: 1135  PT Total Time (min): 20 min     Billable Minutes: Evaluation 20      Rachel Sanders, PT  10/27/2019

## 2019-10-29 ENCOUNTER — OFFICE VISIT (OUTPATIENT)
Dept: FAMILY MEDICINE | Facility: CLINIC | Age: 69
End: 2019-10-29
Attending: FAMILY MEDICINE
Payer: MEDICARE

## 2019-10-29 ENCOUNTER — DOCUMENTATION ONLY (OUTPATIENT)
Dept: FAMILY MEDICINE | Facility: CLINIC | Age: 69
End: 2019-10-29

## 2019-10-29 VITALS
SYSTOLIC BLOOD PRESSURE: 148 MMHG | OXYGEN SATURATION: 97 % | HEIGHT: 71 IN | WEIGHT: 315 LBS | RESPIRATION RATE: 20 BRPM | TEMPERATURE: 98 F | DIASTOLIC BLOOD PRESSURE: 74 MMHG | HEART RATE: 109 BPM | BODY MASS INDEX: 44.1 KG/M2

## 2019-10-29 DIAGNOSIS — D68.9 COAGULOPATHY: ICD-10-CM

## 2019-10-29 DIAGNOSIS — D68.59 PROTEIN C DEFICIENCY: ICD-10-CM

## 2019-10-29 DIAGNOSIS — Z86.711 HISTORY OF PULMONARY EMBOLISM: ICD-10-CM

## 2019-10-29 DIAGNOSIS — J18.9 PNEUMONIA OF RIGHT LOWER LOBE DUE TO INFECTIOUS ORGANISM: ICD-10-CM

## 2019-10-29 DIAGNOSIS — L03.116 CELLULITIS OF LEFT LEG: Primary | ICD-10-CM

## 2019-10-29 DIAGNOSIS — I89.0 LYMPHEDEMA: ICD-10-CM

## 2019-10-29 DIAGNOSIS — I50.33 ACUTE ON CHRONIC DIASTOLIC HEART FAILURE: ICD-10-CM

## 2019-10-29 DIAGNOSIS — D68.59 ANTITHROMBIN 3 DEFICIENCY: ICD-10-CM

## 2019-10-29 PROBLEM — N18.30 STAGE 3 CHRONIC KIDNEY DISEASE: Status: RESOLVED | Noted: 2019-10-25 | Resolved: 2019-10-29

## 2019-10-29 LAB — BACTERIA SPEC AEROBE CULT: ABNORMAL

## 2019-10-29 PROCEDURE — 99214 OFFICE O/P EST MOD 30 MIN: CPT | Mod: PBBFAC,PO | Performed by: FAMILY MEDICINE

## 2019-10-29 PROCEDURE — 99214 PR OFFICE/OUTPT VISIT, EST, LEVL IV, 30-39 MIN: ICD-10-PCS | Mod: S$PBB,,, | Performed by: FAMILY MEDICINE

## 2019-10-29 PROCEDURE — 99999 PR PBB SHADOW E&M-EST. PATIENT-LVL IV: CPT | Mod: PBBFAC,,, | Performed by: FAMILY MEDICINE

## 2019-10-29 PROCEDURE — 99214 OFFICE O/P EST MOD 30 MIN: CPT | Mod: S$PBB,,, | Performed by: FAMILY MEDICINE

## 2019-10-29 PROCEDURE — 99999 PR PBB SHADOW E&M-EST. PATIENT-LVL IV: ICD-10-PCS | Mod: PBBFAC,,, | Performed by: FAMILY MEDICINE

## 2019-10-29 RX ORDER — IPRATROPIUM BROMIDE AND ALBUTEROL SULFATE 2.5; .5 MG/3ML; MG/3ML
3 SOLUTION RESPIRATORY (INHALATION) EVERY 6 HOURS
Qty: 1 BOX | Refills: 0 | Status: SHIPPED | OUTPATIENT
Start: 2019-10-29 | End: 2020-06-22

## 2019-10-29 RX ORDER — CLINDAMYCIN HYDROCHLORIDE 300 MG/1
300 CAPSULE ORAL EVERY 8 HOURS
Qty: 21 CAPSULE | Refills: 0 | Status: SHIPPED | OUTPATIENT
Start: 2019-10-29 | End: 2019-11-05

## 2019-10-29 RX ORDER — SPIRONOLACTONE 50 MG/1
50 TABLET, FILM COATED ORAL DAILY
Qty: 90 TABLET | Refills: 1 | Status: SHIPPED | OUTPATIENT
Start: 2019-10-29 | End: 2019-11-14 | Stop reason: SDUPTHER

## 2019-10-29 NOTE — PATIENT INSTRUCTIONS
Weight Management: Getting Started  Healthy bodies come in all shapes and sizes. Not all bodies are made to be thin. For some people, a healthy weight is higher than the average weight listed on weight charts. Your healthcare provider can help you decide on a healthy weight for you.    Reasons to lose weight  Losing weight can help with some health problems, such as high blood pressure, heart disease, diabetes, sleep apnea, and arthritis. You may also feel more energy.  Set your long-term goal  Your goal doesn't even have to be a specific weight. You may decide on a fitness goal (such as being able to walk 10 miles a week), or a health goal (such as lowering your blood pressure). Choose a goal that is measurable and reasonable, so you know when you've reached it. A goal of reaching a BMI of less than 25 is not always reasonable (or possible).   Make an action plan  Habits dont change overnight. Setting your goals too high can leave you feeling discouraged if you cant reach them. Be realistic. Choose one or two small changes you can make now. Set an action plan for how you are going to make these changes. When you can stick to this plan, keep making a few more small changes. Taking small steps will help you stay on the path to success.  Track your progress  Write down your goals. Then, keep a daily record of your progress. Write down what you eat and how active you are. This record lets you look back on how much youve done. It may also help when youre feeling frustrated. Reward yourself for success. Even if you dont reach every goal, give yourself credit for what you do get done.  Get support  Encouragement from others can help make losing weight easier. Ask your family members and friends for support. They may even want to join you. Also look to your healthcare provider, registered dietitian, and  for help. Your local hospital can give you more information about nutrition, exercise, and  weight loss.  Date Last Reviewed: 1/31/2016  © 9430-0865 [employer]. [address] [place] [address], [place] [address]. All rights reserved. This information is not intended as a substitute for professional medical care. Always follow your healthcare professional's instructions.        Controlling High Blood Pressure  High blood pressure (hypertension) is often called the silent killer. This is because many people who have it dont know it. High blood pressure is defined as 140/90 mm Hg or higher. Know your blood pressure and remember to check it regularly. Doing so can save your life. Here are some things you can do to help control your blood pressure.    Choose heart-healthy foods  · Select low-salt, low-fat foods. Limit sodium intake to 2,400 mg per day or the amount suggested by your healthcare provider.  · Limit canned, dried, cured, packaged, and fast foods. These can contain a lot of salt.  · Eat 8 to 10 servings of fruits and vegetables every day.  · Choose lean meats, fish, or chicken.  · Eat whole-grain pasta, brown rice, and beans.  · Eat 2 to 3 servings of low-fat or fat-free dairy products.  · Ask your doctor about the DASH eating plan. This plan helps reduce blood pressure.  · When you go to a restaurant, ask that your meal be prepared with no added salt.  Maintain a healthy weight  · Ask your healthcare provider how many calories to eat a day. Then stick to that number.  · Ask your healthcare provider what weight range is healthiest for you. If you are overweight, a weight loss of only 3% to 5% of your body weight can help lower blood pressure. Generally, a good weight loss goal is to lose 10% of your body weight in a year.  · Limit snacks and sweets.  · Get regular exercise.  Get up and get active  · Choose activities you enjoy. Find ones you can do with friends or family. This includes bicycling, dancing, walking, and jogging.  · Park farther away from building entrances.  · Use stairs  instead of the elevator.  · When you can, walk or bike instead of driving.  · Hopkins leaves, garden, or do household repairs.  · Be active at a moderate to vigorous level of physical activity for at least 40 minutes for a minimum of 3 to 4 days a week.   Manage stress  · Make time to relax and enjoy life. Find time to laugh.  · Communicate your concerns with your loved ones and your healthcare provider.  · Visit with family and friends, and keep up with hobbies.  Limit alcohol and quit smoking  · Men should have no more than 2 drinks per day.  · Women should have no more than 1 drink per day.  · Talk with your healthcare provider about quitting smoking. Smoking significantly increases your risk for heart disease and stroke. Ask your healthcare provider about community smoking cessation programs and other options.  Medicines  If lifestyle changes arent enough, your healthcare provider may prescribe high blood pressure medicine. Take all medicines as prescribed. If you have any questions about your medicines, ask your healthcare provider before stopping or changing them.   Date Last Reviewed: 4/27/2016 © 2000-2017 [employer]. [address] [place] [address], [place] [address]. All rights reserved. This information is not intended as a substitute for professional medical care. Always follow your healthcare professional's instructions.

## 2019-10-29 NOTE — PROGRESS NOTES
Pre-Visit Chart Review  For Appointment Scheduled on 11-7-19    Health Maintenance Due   Topic Date Due    Colonoscopy  05/02/2016    Eye Exam  07/05/2018    Foot Exam  06/26/2019

## 2019-10-29 NOTE — DISCHARGE SUMMARY
[facility] Hospital Medicine  Discharge Summary      Patient Name: Lukas Chance  MRN: [id number]  Admission Date: 10/25/2019  Hospital Length of Stay: 3 days  Discharge Date and Time: 10/28/2019  4:45 PM  Attending Physician: No att. providers found   Discharging Provider: Caroline Bruno NP  Primary Care Provider: Solitario Mendez MD      HPI:   Lukas Chance is a 68 y/o M with a past medical history significant for  HTN, Type 2 DM, obesity, CAD, and PE who presented to the ED via EMS with c/o continued SOB and weakness.  Pt was seen in this ED earlier today for SOB which was determined to be a viral pneumonitis.  He was discharged home with nebulizer treatments and home health.  Reportedly, pt's SOB is about the same, but his weakness has progressed.  He was unable to rise out of he chair this evening and slid down to the floor.  CXR this evening is indicative of RLL pneumonia and he meets sepsis criteria.  He will be admitted to inpatient under the service of hospital medicine for further treatment.    * No surgery found *      Hospital Course:   Patient monitored closely during hospitalization. Patient noted to have possible pneumonia and was initiated on ceftriaxone and azithromycin.  Chest xray obtained.Wound care consulted for large wound to left lower extremity with cellulitis. Wound culture obtained. Abx changed to IV clindamycin.  He is noted have improvement of erythema to left lower extremity.  He was initiated on IV Lasix for peripheral edema and acute diastolic heart failure.  Responded well to diuretics and is feeling better.  Denies shortness of breath.  He had a home O2 evaluation which demonstrated hypoxemia with activity and qualified for home oxygen.  He is stable for discharge from a medical standpoint.     Consults:     No new Assessment & Plan notes have been filed under this hospital service since the last note was generated.  Service: Hospital Medicine    Final  "Active Diagnoses:    Diagnosis Date Noted POA    PRINCIPAL PROBLEM:  Sepsis [A41.9] 02/25/2019 Yes    Cellulitis of left lower extremity [L03.116] 02/25/2019 Yes    Acute on chronic diastolic heart failure [I50.33] 10/28/2019 Yes    Centrilobular emphysema [J43.2] 10/28/2019 Yes    Old MI (myocardial infarction) [I25.2] 10/26/2019 Not Applicable    Pneumonia due to infectious organism [J18.9] 10/25/2019 Yes    Venous insufficiency of lower extremity [I87.2] 02/26/2019 Yes    Body mass index 50.0-59.9, adult [Z68.43] 01/16/2014 Not Applicable    Hypertension associated with diabetes [E11.59, I10] 10/18/2013 Yes    Diabetes mellitus type 2 with neurological manifestations [E11.49] 07/11/2013 Yes    Anticoagulant long-term use [Z79.01] 03/01/2013 Not Applicable      Problems Resolved During this Admission:    Diagnosis Date Noted Date Resolved POA    Stage 3 chronic kidney disease [N18.3] 10/25/2019 10/29/2019 Yes       Discharged Condition: stable    Disposition: Home-Health Care Svc    Follow Up:  Follow-up Information     [facility] On 10/29/2019.    Why:  at 8:00 AM. Please check email for link to patient portal so you can complete new pt paperwork.   Contact information:  [phone] [address] [address]               Patient Instructions:      HOSPITAL BED FOR HOME USE     Order Specific Question Answer Comments   Type: Heavy duty (350-500#)    Length of need (1-99 months): 99    Does patient have medical equipment at home? grab bar    Does patient have medical equipment at home? cane, quad    Height: 5' 11" (1.803 m)    Weight: 178.5 kg (393 lb 8.3 oz)    Please check all that apply: Patient requires the head of bed to be elevated more than 30 degrees most of the time due to congestive heart failure, chronic pulmonary disease, or aspiration.  Pillows and wedges have been considered and ruled out.    Please check all that apply: Patient requires positioning of the body in " "ways not feasible in an ordinary bed due to a medical condition which is expected to last at least one month.      OXYGEN FOR HOME USE     Order Specific Question Answer Comments   Liter Flow 2    Duration Continuous    Qualifying SpO2: 87    Testing done at: Exercise/Activity    Route nasal cannula    Portable mode: continuous    Device home concentrator portable tank   Length of need (in months): 99 mos    Patient condition with qualifying saturation COPD    Height: 5' 11" (1.803 m)    Weight: 178.5 kg (393 lb 8.3 oz)    Does patient have medical equipment at home? grab bar    Does patient have medical equipment at home? cane, quad    Alternative treatment measures have been tried or considered and deemed clinically ineffective. Yes      OXYGEN FOR HOME USE     Order Specific Question Answer Comments   Liter Flow 2    Duration Continuous    Qualifying SpO2: 87    Testing done at: Exercise/Activity    Route nasal cannula    Device home concentrator portable    Length of need (in months): 99 mos    Patient condition with qualifying saturation COPD    Height: 5' 11" (1.803 m)    Weight: 178.5 kg (393 lb 8.3 oz)    Does patient have medical equipment at home? grab bar    Does patient have medical equipment at home? cane, quad    Alternative treatment measures have been tried or considered and deemed clinically ineffective. Yes      Ambulatory referral to Home Health   Referral Priority: Routine Referral Type: Home Health   Referral Reason: Specialty Services Required   Requested Specialty: Home Health Services   Number of Visits Requested: 1     Diet Cardiac     Notify your health care provider if you experience any of the following:  increased confusion or weakness     Notify your health care provider if you experience any of the following:  persistent dizziness, light-headedness, or visual disturbances     Notify your health care provider if you experience any of the following:  worsening rash     Notify your health " care provider if you experience any of the following:  severe persistent headache     Notify your health care provider if you experience any of the following:  difficulty breathing or increased cough     Notify your health care provider if you experience any of the following:  redness, tenderness, or signs of infection (pain, swelling, redness, odor or green/yellow discharge around incision site)     Notify your health care provider if you experience any of the following:  severe uncontrolled pain     Notify your health care provider if you experience any of the following:  persistent nausea and vomiting or diarrhea     Notify your health care provider if you experience any of the following:  temperature >100.4     Activity as tolerated       Significant Diagnostic Studies: Labs:   BMP:   Recent Labs   Lab 10/28/19  0437         K 3.6   CL 98   CO2 29   BUN 23   CREATININE 1.2   CALCIUM 8.8   MG 2.0    and CMP   Recent Labs   Lab 10/28/19  0437      K 3.6   CL 98   CO2 29      BUN 23   CREATININE 1.2   CALCIUM 8.8   ANIONGAP 11   ESTGFRAFRICA >60   EGFRNONAA >60       Pending Diagnostic Studies:     None         Medications:  Reconciled Home Medications:      Medication List      START taking these medications    furosemide 40 MG tablet  Commonly known as:  LASIX  Take 1 tablet (40 mg total) by mouth daily as needed. For weight gain >5 pounds in 3 days     potassium chloride SA 10 MEQ tablet  Commonly known as:  K-DUR,KLOR-CON  Take 1 tablet (10 mEq total) by mouth daily as needed. Take as needed with lasix        CONTINUE taking these medications    ELIQUIS 5 mg Tab  Generic drug:  apixaban  Take 1 tablet (5 mg total) by mouth 2 (two) times daily.     multivitamin per tablet  Commonly known as:  THERAGRAN  Take 1 tablet by mouth once daily.     SALMON OIL-1000 ORAL  Take 1 capsule by mouth once daily.     TYLENOL EXTRA STRENGTH 500 MG tablet  Generic drug:  acetaminophen  Take 500 mg by  mouth 2 (two) times daily as needed for Pain.            Indwelling Lines/Drains at time of discharge:   Lines/Drains/Airways     None                 Time spent on the discharge of patient: 70 minutes  Patient was seen and examined on the date of discharge and determined to be suitable for discharge.         Caroline Bruno NP  Department of Hospital Medicine  [facility]

## 2019-10-29 NOTE — PHYSICIAN QUERY
PT Name: Lukas Chance  MR #: [id number]     Physician Query Form - Documentation Clarification      CDS/: NESS Park, RN, CDS               Contact information:[email or web address]    This form is a permanent document in the medical record.     Query Date: October 29, 2019    By submitting this query, we are merely seeking further clarification of documentation. Please utilize your independent clinical judgment when addressing the question(s) below.    The Medical record reflects the following:    Supporting Clinical Findings Location in Medical Record       Hypertension associated with diabetes  Chronic; stable. Monitor BP and treat as indicated for sustained control.         GWEN HILLS NP/ Dr. Sargent, 10/27                                                                            Doctor, Please specify diagnosis or diagnoses associated with above clinical findings.    Doctor, please clarify the meaning of Hypertension associated with diabetes.     Provider Use Only     [   ]  Hypertension is not a manifestation of diabetes.     [  x ]  Hypertension is a manifestation of diabetes. (Secondary Hypertension).      [   ]  Other, please specify:____________________________________                                                                                                               [  ] Clinically Undetermined

## 2019-10-29 NOTE — PROGRESS NOTES
Subjective:       Patient ID: Lukas Chance is a 69 y.o. male.    Chief Complaint: Hospital Follow Up    69-year-old male who have not seen in several months with a history of coronary disease, coagulopathy with anti thrombin three deficiency and protein C deficiency and a history of pulmonary embolism as well as borderline diabetes hypertension hyperlipidemia and a history of stage 3 chronic kidney disease since resolved who presented to the emergency room on the morning of October 25th with increasing shortness of breath.  He was found to be febrile although his fever resolved while he was being evaluated his white blood cell count was 18512 and he was told he had a viral pneumonitis and discharged home.  The patient continued to experience shortness of breath but without fever and without coughing but he became progressively weaker and returned to the emergency room about 12 hr later where he was found to have a right lower lobe pneumonia with evidence of pulmonary edema on chest x-ray.  His white blood cell count at that time had dropped 15,000 and he was admitted on Rocephin and Zithromax and IV for pneumonia.  During evaluation he was found to have a new onset of recurrent stasis cellulitis of the left lower leg which has been an on and off occurrence for years.  Clindamycin was used in place of the IV antibiotics and the patient was eventually discharged on October 28th.  Discharge instructions were to take clindamycin 3 times daily for seven days but he was not given a prescription.  He was told to continue taking his spironolactone and p.r.n. Lasix that he had not been taking prior to admission and did not have a supply of.  Prescription was sent to a [facility].  The pacing comes in today slowly improving but not on any treatment.  He was given a nebulizer but no nebulizer solution.  He still has no fever, no significant cough, no night sweats or chills.  He is following up in  [facility]hed[facility] clinic where efforts are underway to get what appears to be a Jobst pump for his left lower extremity.    Past Medical History:  3/1/2013: Anticoagulant long-term use  No date: Antithrombin 3 deficiency  No date: Cellulitis      Comment:  lower legs, venous stasis  No date: Coronary artery disease  5/15/2013: Diabetes mellitus type 2 in obese  No date: Hypertension  No date: Obesity  No date: LAKHWINDER (obstructive sleep apnea)  No date: Pulmonary embolism    Past Surgical History:  5/2/2011: COLONOSCOPY      Comment:  Dr. Miller, 9 polyps, recheck 5 year  No date: MULTIPLE TOOTH EXTRACTIONS  7/2015: RADIOFREQUENCY ABLATION      Comment:  bilateral   No date: TONSILLECTOMY    Current Outpatient Medications on File Prior to Visit:  acetaminophen (TYLENOL EXTRA STRENGTH) 500 MG tablet, Take 500 mg by mouth 2 (two) times daily as needed for Pain. , Disp: , Rfl:   ELIQUIS 5 mg Tab, Take 1 tablet (5 mg total) by mouth 2 (two) times daily., Disp: 180 tablet, Rfl: 3  multivitamin (THERAGRAN) per tablet, Take 1 tablet by mouth once daily., Disp: , Rfl:   salmon oil/omega-3 fatty acids (SALMON OIL-1000 ORAL), Take 1 capsule by mouth once daily., Disp: , Rfl:   (DISCONTINUED) albuterol-ipratropium (DUO-NEB) 2.5 mg-0.5 mg/3 mL nebulizer solution, Take 3 mLs by nebulization every 6 (six) hours. And PRN for shortness of breath, Disp: 1 Box, Rfl: 0  furosemide (LASIX) 40 MG tablet, Take 1 tablet (40 mg total) by mouth daily as needed. For weight gain >5 pounds in 3 days (Patient not taking: Reported on 10/29/2019), Disp: 30 tablet, Rfl: 0  potassium chloride SA (K-DUR,KLOR-CON) 10 MEQ tablet, Take 1 tablet (10 mEq total) by mouth daily as needed. Take as needed with lasix (Patient not taking: Reported on 10/29/2019), Disp: 30 tablet, Rfl: 0  (DISCONTINUED) clindamycin (CLEOCIN) 300 MG capsule, Take 1 capsule (300 mg total) by mouth every 8 (eight) hours. for 7 days (Patient not taking: Reported on 10/29/2019), Disp: 21  capsule, Rfl: 0  (DISCONTINUED) spironolactone (ALDACTONE) 50 MG tablet, Take 1 tablet (50 mg total) by mouth once daily. (Patient not taking: Reported on 10/29/2019), Disp: 90 tablet, Rfl: 1    Current Facility-Administered Medications on File Prior to Visit:  apixaban tablet 5 mg, 5 mg, Oral, BID, Debbie Gross, FNP, 5 mg at 10/28/19 0804        Review of Systems   Constitutional: Negative for chills, diaphoresis and fever.   HENT: Negative for congestion, postnasal drip, rhinorrhea, sinus pressure and sinus pain.    Respiratory: Positive for shortness of breath. Negative for cough and chest tightness.    Cardiovascular: Positive for leg swelling. Negative for chest pain and palpitations.   Gastrointestinal: Negative for abdominal pain, blood in stool, constipation, diarrhea, nausea and vomiting.   Genitourinary: Negative for dysuria, frequency and hematuria.   Skin: Positive for color change, rash and wound (Left lower leg anteriorly just above the ankle).       Objective:      Physical Exam   Constitutional: He appears well-developed. No distress.   Mildly elevated systolic blood pressure with mild tachycardia and mild tachypnea  Morbid obesity with a BMI of 54.0 he is up 3.7 lb from his last visit with me March 14, 2019   Cardiovascular: Normal rate, regular rhythm and normal heart sounds. Exam reveals no gallop and no friction rub.   No murmur heard.  Pulmonary/Chest: Tachypnea noted. No bradypnea. He has decreased breath sounds in the right lower field and the left lower field. He has wheezes in the right middle field, the right lower field, the left middle field and the left lower field. He has no rhonchi. He has no rales. Chest wall is not dull to percussion. He exhibits no crepitus and no deformity.   Musculoskeletal: He exhibits edema (Both lower extremities are markedly swollen but the left is much worse than the right).   Skin: He is not diaphoretic. There is erythema.        Nursing note and  vitals reviewed.      Assessment:       1. Cellulitis of left leg    2. Pneumonia of right lower lobe due to infectious organism    3. Lymphedema    4. Acute on chronic diastolic heart failure    5. History of pulmonary embolism    6. Antithrombin 3 deficiency    7. Protein C deficiency    8. Coagulopathy        Plan:       1. Cellulitis of left leg  Under care by wound care, resume clindamycin with local prescription.  Patient warned to watch for abdominal cramping or diarrhea particularly bloody diarrhea.  - clindamycin (CLEOCIN) 300 MG capsule; Take 1 capsule (300 mg total) by mouth every 8 (eight) hours. for 7 days  Dispense: 21 capsule; Refill: 0    2. Pneumonia of right lower lobe due to infectious organism  Improving clinically.  Will recheck in one week with a follow-up chest x-ray at that time  - albuterol-ipratropium (DUO-NEB) 2.5 mg-0.5 mg/3 mL nebulizer solution; Take 3 mLs by nebulization every 6 (six) hours. And PRN for shortness of breath  Dispense: 1 Box; Refill: 0  - clindamycin (CLEOCIN) 300 MG capsule; Take 1 capsule (300 mg total) by mouth every 8 (eight) hours. for 7 days  Dispense: 21 capsule; Refill: 0  - X-Ray Chest PA And Lateral; Future    3. Lymphedema  Continue to follow with wound care  - spironolactone (ALDACTONE) 50 MG tablet; Take 1 tablet (50 mg total) by mouth once daily.  Dispense: 90 tablet; Refill: 1    4. Acute on chronic diastolic heart failure  BMP  Lab Results   Component Value Date     10/28/2019    K 3.6 10/28/2019    CL 98 10/28/2019    CO2 29 10/28/2019    BUN 23 10/28/2019    CREATININE 1.2 10/28/2019    CALCIUM 8.8 10/28/2019    ANIONGAP 11 10/28/2019    ESTGFRAFRICA >60 10/28/2019    EGFRNONAA >60 10/28/2019     Had returned to normal/baseline levels by the time he was discharged from the hospital    5. History of pulmonary embolism    6. Antithrombin 3 deficiency    7. Protein C deficiency    8. Coagulopathy

## 2019-10-30 ENCOUNTER — PATIENT OUTREACH (OUTPATIENT)
Dept: ADMINISTRATIVE | Facility: HOSPITAL | Age: 69
End: 2019-10-30

## 2019-10-30 NOTE — LETTER
AUTHORIZATION FOR RELEASE OF   CONFIDENTIAL INFORMATION    Dr Reynaga,    We are seeing Lukas Chance, date of birth 1950, in the clinic at [facility]. Solitario Mendez MD is the patient's PCP. Lukas Chance has an outstanding lab/procedure at the time we reviewed his chart. In order to help keep his health information updated, he has authorized us to request the following medical record(s):        (  )  MAMMOGRAM                                      (  )  COLONOSCOPY      (  )  PAP SMEAR                                          (  )  OUTSIDE LAB RESULTS     (  )  DEXA SCAN                                          ( X )  EYE EXAM            (  )  FOOT EXAM                                          (  )  ENTIRE RECORD     (  )  OUTSIDE IMMUNIZATIONS                 (  )  _______________         Please fax records to [facility], Robert W Taylor, MD, [phone]      Thank you in advance,      Tarsha GOETZ  Panel Care Coordinator  [facility] [facility] [address] [place] [address]  Phone [phone]  Fax [phone]          Patient Name: Lukas Chance  : 1950  Patient Phone #: [phone]

## 2019-10-31 ENCOUNTER — TELEPHONE (OUTPATIENT)
Dept: MEDSURG UNIT | Facility: HOSPITAL | Age: 69
End: 2019-10-31

## 2019-10-31 LAB — BACTERIA BLD CULT: NORMAL

## 2019-11-07 ENCOUNTER — TELEPHONE (OUTPATIENT)
Dept: FAMILY MEDICINE | Facility: CLINIC | Age: 69
End: 2019-11-07

## 2019-11-07 ENCOUNTER — HOSPITAL ENCOUNTER (OUTPATIENT)
Dept: RADIOLOGY | Facility: CLINIC | Age: 69
Discharge: HOME | End: 2019-11-07
Attending: FAMILY MEDICINE
Payer: MEDICARE

## 2019-11-07 DIAGNOSIS — J18.9 PNEUMONIA OF RIGHT LOWER LOBE DUE TO INFECTIOUS ORGANISM: ICD-10-CM

## 2019-11-07 DIAGNOSIS — R93.89 ABNORMAL CXR: Primary | ICD-10-CM

## 2019-11-07 PROCEDURE — 71046 X-RAY EXAM CHEST 2 VIEWS: CPT | Mod: 26,,, | Performed by: RADIOLOGY

## 2019-11-07 PROCEDURE — 71046 X-RAY EXAM CHEST 2 VIEWS: CPT | Mod: TC,FY,PO

## 2019-11-07 PROCEDURE — 71046 XR CHEST PA AND LATERAL: ICD-10-PCS | Mod: 26,,, | Performed by: RADIOLOGY

## 2019-11-07 NOTE — TELEPHONE ENCOUNTER
His blood glucose is normal  Kidney function is decreased for [facility] standards.  It is essentially unchanged with suggestion of mild dehydration from our lab that was done the day prior.  Protein levels are a little low common with chronic illness and may contribute to edema. Mild elevation of one liver enzyme, the AST but it is not significant.  White blood cell count is normal with no anemia and normal platelets.  The C reactive protein is markedly elevated indicating significant inflammation.  Cultures are growing Pseudomonas from the left leg along with some skin streptococci.    If he is not seeing an infectious disease specialist already, and I do not know who ordered this lab in [place], I would recommend that we do so.

## 2019-11-11 NOTE — TELEPHONE ENCOUNTER
Patient informed. He is being followed by wound clinic. On medication. Will review with Dr Mendez on Wed apt.

## 2019-11-14 ENCOUNTER — OFFICE VISIT (OUTPATIENT)
Dept: FAMILY MEDICINE | Facility: CLINIC | Age: 69
End: 2019-11-14
Payer: MEDICARE

## 2019-11-14 VITALS
HEART RATE: 86 BPM | BODY MASS INDEX: 44.1 KG/M2 | WEIGHT: 315 LBS | OXYGEN SATURATION: 99 % | RESPIRATION RATE: 18 BRPM | TEMPERATURE: 98 F | SYSTOLIC BLOOD PRESSURE: 150 MMHG | HEIGHT: 71 IN | DIASTOLIC BLOOD PRESSURE: 86 MMHG

## 2019-11-14 DIAGNOSIS — Z79.899 HIGH RISK MEDICATION USE: ICD-10-CM

## 2019-11-14 DIAGNOSIS — E11.59 TYPE 2 DIABETES MELLITUS WITH OTHER CIRCULATORY COMPLICATION, WITHOUT LONG-TERM CURRENT USE OF INSULIN: ICD-10-CM

## 2019-11-14 DIAGNOSIS — I87.2 VENOUS (PERIPHERAL) INSUFFICIENCY: ICD-10-CM

## 2019-11-14 DIAGNOSIS — L97.322 VENOUS STASIS ULCER OF LEFT ANKLE WITH FAT LAYER EXPOSED WITHOUT VARICOSE VEINS: Primary | ICD-10-CM

## 2019-11-14 DIAGNOSIS — I15.2 HYPERTENSION ASSOCIATED WITH DIABETES: ICD-10-CM

## 2019-11-14 DIAGNOSIS — Z79.01 ANTICOAGULANT LONG-TERM USE: ICD-10-CM

## 2019-11-14 DIAGNOSIS — J43.2 CENTRILOBULAR EMPHYSEMA: ICD-10-CM

## 2019-11-14 DIAGNOSIS — R06.09 DYSPNEA ON EXERTION: ICD-10-CM

## 2019-11-14 DIAGNOSIS — I50.32 CHRONIC DIASTOLIC HEART FAILURE: ICD-10-CM

## 2019-11-14 DIAGNOSIS — E11.59 HYPERTENSION ASSOCIATED WITH DIABETES: ICD-10-CM

## 2019-11-14 DIAGNOSIS — I87.2 VENOUS STASIS ULCER OF LEFT ANKLE WITH FAT LAYER EXPOSED WITHOUT VARICOSE VEINS: Primary | ICD-10-CM

## 2019-11-14 PROBLEM — A41.9 SEPSIS: Status: RESOLVED | Noted: 2019-02-25 | Resolved: 2019-11-14

## 2019-11-14 PROBLEM — L03.116 CELLULITIS OF LEFT LOWER EXTREMITY: Status: RESOLVED | Noted: 2019-02-25 | Resolved: 2019-11-14

## 2019-11-14 PROCEDURE — 99999 PR PBB SHADOW E&M-EST. PATIENT-LVL V: CPT | Mod: PBBFAC,,, | Performed by: NURSE PRACTITIONER

## 2019-11-14 PROCEDURE — 99214 PR OFFICE/OUTPT VISIT, EST, LEVL IV, 30-39 MIN: ICD-10-PCS | Mod: S$PBB,,, | Performed by: NURSE PRACTITIONER

## 2019-11-14 PROCEDURE — 99215 OFFICE O/P EST HI 40 MIN: CPT | Mod: PBBFAC,PO | Performed by: NURSE PRACTITIONER

## 2019-11-14 PROCEDURE — 99999 PR PBB SHADOW E&M-EST. PATIENT-LVL V: ICD-10-PCS | Mod: PBBFAC,,, | Performed by: NURSE PRACTITIONER

## 2019-11-14 PROCEDURE — 99214 OFFICE O/P EST MOD 30 MIN: CPT | Mod: S$PBB,,, | Performed by: NURSE PRACTITIONER

## 2019-11-14 RX ORDER — CIPROFLOXACIN 500 MG/1
500 TABLET ORAL 2 TIMES DAILY
Refills: 0 | COMMUNITY
Start: 2019-11-05 | End: 2019-11-14 | Stop reason: ALTCHOICE

## 2019-11-14 RX ORDER — LISINOPRIL 10 MG/1
10 TABLET ORAL DAILY
Qty: 90 TABLET | Refills: 1 | Status: SHIPPED | OUTPATIENT
Start: 2019-11-14 | End: 2020-02-11 | Stop reason: DRUGHIGH

## 2019-11-14 RX ORDER — SPIRONOLACTONE 50 MG/1
50 TABLET, FILM COATED ORAL DAILY
Qty: 90 TABLET | Refills: 1 | Status: SHIPPED | OUTPATIENT
Start: 2019-11-14 | End: 2020-05-20 | Stop reason: SDUPTHER

## 2019-11-14 NOTE — PROGRESS NOTES
Subjective:       Patient ID: Lukas Chance is a 69 y.o. male.    Chief Complaint: Follow-up (hospital 10/25/19)    Chief Complaint  Chief Complaint   Patient presents with    Follow-up     hospital 10/25/19       HPI  Lukas Chance is a 69 y.o. male with medical diagnoses as listed in the medical history and problem list that presents for hospital follow-up from an admission to [facility] from 10/25/2019 to 10/28/2019.  Patient presented to the emergency room via ambulance with complaints of shortness of breath and weakness.  He had been seen in the ED earlier that day for shortness of breath and was determined to have a viral pneumonitis and had been discharged home with nebulizer treatments and home health.  By the time he return to the emergency room his shortness of breath was about the same but his weakness had progressed chest x-ray on arrival indicated a right lower lobe pneumonia and he met sepsis criteria and was admitted as an inpatient.  Patient also is noted to have a large wound to the left lower extremity with cellulitis and a wound culture was obtained.  The patient was initiated on ceftriaxone and azithromycin for the pneumonia but was changed to clindamycin due to the wound and the cellulitis.  He was also in acute diastolic heart failure and was diuresed with IV Lasix.  He was evaluated in the hospital for home oxygen and did have hypoxemia with activity and qualified for home oxygen. This has since been discontinued.  The patient was discharged with home health care and with instructions to follow up with wound care with Dr. Carroll at [facility].  Patient was discharged home with a new prescription for Lasix 40 mg and a prescription for potassium 10 mEq to be taken with the Lasix.  Patient has stopped the lasix and potassium, also is not taking spirinolactone at this time. Since discharge the patient did have a repeat chest x-ray that was inconclusive and CT scan was recommended and  ordered but this has not yet been done, scheduled on Monday 11/18/19.  This is not actually the patient's first visit since being discharged from the hospital as he was seen by Dr. Mendez on 10/29/2019 in the office.  It was at that visit that the Lasix and potassium were discontinued and the patient was instructed to take spironolactone 50 mg daily.  Today he states that he has not been taking with spironolactone and does not have a prescription for the medication.  The patient is regularly followed for hypertension, hyperlipidemia, diastolic congestive heart failure, CAD, type 2 diabetes, and sleep apnea.  Type 2 diabetes is well controlled with most recent hemoglobin A1c done on 10/29/2019 with the results of 5.7%.  The patient's diabetes is diet controlled.  He does have a history of a pulmonary emboli and he is taking Eliquis.  Patient reports recent ultrasound to legs that showed some blockages and he is scheduled to see Dr. Harris next week to discuss possible intervention.  He also has an appointment scheduled to see Dr. Patton on 11/27/2019.  Blood pressure today is elevated 148/84 initially and 150/86 with recheck at end of visit.  Patient is currently taking no medication for high blood pressure and is not able to state when the medication was discontinued.  He has been on lisinopril and hydrochlorothiazide in the past.  BMI is 54.55 and the patient has gained 4 lb since his last visit on 10/29/2019 with Dr. Mendez.        PAST MEDICAL HISTORY:  Past Medical History:   Diagnosis Date    Anticoagulant long-term use 3/1/2013    Antithrombin 3 deficiency     Cellulitis     lower legs, venous stasis    Coronary artery disease     Diabetes mellitus type 2 in obese 5/15/2013    Hypertension     Obesity     LAKHWINDER (obstructive sleep apnea)     Pulmonary embolism        PAST SURGICAL HISTORY:  Past Surgical History:   Procedure Laterality Date    COLONOSCOPY  5/2/2011    Dr. Miller, 9 polyps, recheck 5  year    MULTIPLE TOOTH EXTRACTIONS      RADIOFREQUENCY ABLATION  2015    bilateral     TONSILLECTOMY         SOCIAL HISTORY:  Social History     Socioeconomic History    Marital status:      Spouse name: Not on file    Number of children: Not on file    Years of education: Not on file    Highest education level: Not on file   Occupational History    Not on file   Social Needs    Financial resource strain: Not hard at all    Food insecurity:     Worry: Never true     Inability: Never true    Transportation needs:     Medical: No     Non-medical: No   Tobacco Use    Smoking status: Former Smoker     Packs/day: 1.00     Years: 20.00     Pack years: 20.00     Last attempt to quit: 1/3/2002     Years since quittin.8    Smokeless tobacco: Never Used   Substance and Sexual Activity    Alcohol use: Yes     Alcohol/week: 1.0 standard drinks     Types: 1 Cans of beer per week     Frequency: Monthly or less     Drinks per session: Patient refused     Binge frequency: Never     Comment: seldom    Drug use: No    Sexual activity: Yes     Partners: Female   Lifestyle    Physical activity:     Days per week: 2 days     Minutes per session: 10 min    Stress: Not on file   Relationships    Social connections:     Talks on phone: Once a week     Gets together: Twice a week     Attends [employer] service: Not on file     Active member of club or organization: No     Attends meetings of clubs or organizations: Never     Relationship status:    Other Topics Concern    Not on file   Social History Narrative    Not on file       FAMILY HISTORY:  Family History   Problem Relation Age of Onset    Heart disease Mother     Heart disease Father     Gallbladder disease Sister     Heart disease Brother     Stroke Brother     Emphysema Maternal Uncle        ALLERGIES AND MEDICATIONS: updated and reviewed.  Review of patient's allergies indicates:   Allergen Reactions    Bactroban [mupirocin calcium]  Other (See Comments)     Burning feeling on open sore     Metoprolol Other (See Comments)     dizzy     Current Outpatient Medications   Medication Sig Dispense Refill    acetaminophen (TYLENOL EXTRA STRENGTH) 500 MG tablet Take 500 mg by mouth 2 (two) times daily as needed for Pain.       albuterol-ipratropium (DUO-NEB) 2.5 mg-0.5 mg/3 mL nebulizer solution Take 3 mLs by nebulization every 6 (six) hours. And PRN for shortness of breath 1 Box 0    ELIQUIS 5 mg Tab Take 1 tablet (5 mg total) by mouth 2 (two) times daily. 180 tablet 3    multivitamin (THERAGRAN) per tablet Take 1 tablet by mouth once daily.      salmon oil/omega-3 fatty acids (SALMON OIL-1000 ORAL) Take 1 capsule by mouth once daily.      lisinopril 10 MG tablet Take 1 tablet (10 mg total) by mouth once daily. 90 tablet 1    spironolactone (ALDACTONE) 50 MG tablet Take 1 tablet (50 mg total) by mouth once daily. 90 tablet 1    tiotropium-olodaterol (STIOLTO RESPIMAT) 2.5-2.5 mcg/actuation Mist Inhale 2 puffs into the lungs once daily. Controller 12 g 3     No current facility-administered medications for this visit.        I have reviewed the patient's medical, family, and social history in detail and updated the computerized patient record.    Review of Systems   Constitutional: Positive for fatigue. Negative for activity change, appetite change, chills, fever and unexpected weight change.        Fatigued during the day due to up every 90 minutes to urinate.    HENT: Negative for congestion, ear pain, postnasal drip, rhinorrhea, sinus pressure, sinus pain and sore throat.    Eyes: Negative for discharge and visual disturbance.   Respiratory: Positive for cough, shortness of breath and wheezing.         Patient c/o dry cough, shortness of breath with exertion, some wheezing.   Cardiovascular: Positive for leg swelling. Negative for chest pain and palpitations.        Bilateral lower leg swelling.   Gastrointestinal: Negative for abdominal  "pain, blood in stool, constipation, diarrhea, nausea and vomiting.   Genitourinary: Positive for frequency and urgency. Negative for difficulty urinating and hematuria.        Urinary frequency and urgency with nocturia every 90 minutes   Musculoskeletal: Negative for arthralgias and myalgias.   Skin: Positive for wound. Negative for rash.        Ulcer to lateral aspect of the left lower leg that patient states is improving with treatment, states about 4 cm in diameter.   Neurological: Negative for dizziness, numbness and headaches.   Hematological: Bruises/bleeds easily.        Easy bruising with eliquis.   Psychiatric/Behavioral: Positive for sleep disturbance. Negative for dysphoric mood. The patient is not nervous/anxious.         Sleep disturbed by frequent urination.          Objective:      Vitals:    11/14/19 1430 11/14/19 1516   BP: (!) 148/84 (!) 150/86   BP Location: Right arm Right arm   Patient Position: Sitting Sitting   BP Method: Large (Manual) X-Large (Manual)   Pulse: 86    Resp: 18    Temp: 98.2 °F (36.8 °C)    TempSrc: Oral    SpO2: 99%    Weight: (!) 177.4 kg (391 lb 1.5 oz)    Height: 5' 11" (1.803 m)      Physical Exam   Constitutional: He is oriented to person, place, and time. Vital signs are normal. He appears well-developed. No distress.   Blood pressure elevated 148/84   HENT:   Head: Normocephalic and atraumatic.   Right Ear: Hearing and external ear normal.   Left Ear: Hearing and external ear normal.   Mouth/Throat: Mucous membranes are normal. Normal dentition.   Eyes: Pupils are equal, round, and reactive to light. Conjunctivae and lids are normal. Right eye exhibits no discharge. Left eye exhibits no discharge. Right conjunctiva is not injected. Left conjunctiva is not injected.   Neck: Normal range of motion. Neck supple. Normal carotid pulses present. Carotid bruit is not present.   Cardiovascular: Normal rate, regular rhythm, S1 normal, S2 normal, normal heart sounds, intact " distal pulses and normal pulses.   Pulses:       Carotid pulses are 2+ on the right side, and 2+ on the left side.       Radial pulses are 2+ on the right side, and 2+ on the left side.   Edema to bilateral lower legs. Unable to palpate posterior tibial pulses due to edema.    Pulmonary/Chest: Effort normal. No respiratory distress. He has decreased breath sounds in the right lower field and the left lower field. He has no wheezes. He has no rhonchi. He has no rales.   Musculoskeletal: Normal range of motion.   Lymphadenopathy:        Head (right side): No submental, no submandibular and no tonsillar adenopathy present.        Head (left side): No submental, no submandibular and no tonsillar adenopathy present.     He has no cervical adenopathy.        Right: No supraclavicular adenopathy present.        Left: No supraclavicular adenopathy present.   Neurological: He is alert and oriented to person, place, and time. He has normal strength. Gait normal.   Skin: Skin is warm and dry. No rash noted. He is not diaphoretic. No erythema.   Wound to left lateral lower leg, covered with dressing.   Psychiatric: He has a normal mood and affect. His speech is normal and behavior is normal. Judgment and thought content normal. His mood appears not anxious. Cognition and memory are normal. He does not exhibit a depressed mood.   Nursing note and vitals reviewed.        Assessment:       1. Venous stasis ulcer of left ankle with fat layer exposed without varicose veins    2. Type 2 diabetes mellitus with other circulatory complication, without long-term current use of insulin    3. Centrilobular emphysema    4. Dyspnea on exertion    5. Venous (peripheral) insufficiency    6. Hypertension associated with diabetes    7. Chronic diastolic heart failure    8. Anticoagulant long-term use    9. High risk medication use    10. BMI 50.0-59.9, adult          Plan:       Lukas was seen today for follow-up.    Diagnoses and all orders for  this visit:    Venous stasis ulcer of left ankle with fat layer exposed without varicose veins   Continue follow-up as instructed with wound care, Dr. Carroll, wound care dressing in place at today's visit and not removed    Type 2 diabetes mellitus with other circulatory complication, without long-term current use of insulin     Lab Results   Component Value Date    HGBA1C 5.7 (H) 10/26/2019    diabetes is well controlled with diet and meal planning only, to continue as is    Centrilobular emphysema  -     tiotropium-olodaterol (STIOLTO RESPIMAT) 2.5-2.5 mcg/actuation Mist; Inhale 2 puffs into the lungs once daily. Controller  - CT scan chest 1/26/16: There are emphysematous changes to the scattered lucencies and blebs and subpleural blebs.  - patient is a former smoker  - patient has a repeat CT scan of the chest schedule for Monday 11/18/2019, depending on results may require referral to pulmonology    Dyspnea on exertion  -     tiotropium-olodaterol (STIOLTO RESPIMAT) 2.5-2.5 mcg/actuation Mist; Inhale 2 puffs into the lungs once daily. Controller  - oxygen saturation by pulse ox in office at rest on room air is 99%.  - home oxygen has been discontinued    Venous (peripheral) insufficiency   Patient has an appointment scheduled with vascular surgeon, Dr. Harris, keep appointment is scheduled    Hypertension associated with diabetes  -     lisinopril 10 MG tablet; Take 1 tablet (10 mg total) by mouth once daily.  -     Basic metabolic panel; Future  - blood pressure initially elevated 148/84, repeat measure at end of visit 150/86  - patient will start lisinopril 10 mg daily and keep scheduled follow-up appointment with Dr. Patton in two weeks with medication adjustment by Cardiology if indicated at that time    Chronic diastolic heart failure  -     spironolactone (ALDACTONE) 50 MG tablet; Take 1 tablet (50 mg total) by mouth once daily.  -     Basic metabolic panel; Future  - patient instructed to start  spironolactone 50 mg as prescribed by Dr. Mendez at previous visit  - patient will have BMP done in 2-3 weeks to check kidney function and potassium level  - keep scheduled appointment with Cardiology, Dr. Patton    Anticoagulant long-term use   Stable on Eliquis 5 mg BID, continue as is    High risk medication use  -     Basic metabolic panel; Future    BMI 50.0-59.9, adult   BMI today is 54.55 and the patient has gained 4 lb since his last visit on 10/29/2019   Weight loss recommended with well balanced diet and portion controlled meals and increased activity.     Follow up in about 3 months (around 2/14/2020) for Dr. Mendez, 40 minutes, schedule BMP 2-3 weeks.      Patient readiness: acceptance and barriers:readiness    During the course of the visit the patient was educated and counseled about the following:     Diabetes:  Discussed general issues about diabetes pathophysiology and management.  Addressed ADA diet.  Reminded to get yearly retinal exam.  Follow up in 3 months or as needed.  Hypertension:   Medication: begin lisinopril 10 mg daily.  Dietary sodium restriction.  Regular aerobic exercise.  Follow up: 3 months and as needed.  Keep scheduled appointment with Cardiology on 11/27/2019  Obesity:   General weight loss/lifestyle modification strategies discussed (elicit support from others; identify saboteurs; non-food rewards, etc).  Diet interventions: low calorie (1000 kCal/d) deficit diet and qualitative changes (increase low-fat,  high-fiber foods).  Informal exercise measures discussed, e.g. taking stairs instead of elevator.  Regular aerobic exercise program discussed.    Goals: Diabetes: Maintain Hemoglobin A1C below 7, Hypertension: Reduce Blood Pressure and Obesity: Reduce calorie intake and BMI    Did patient meet goals/outcomes: Yes for diabetes, no for hypertension and obesity    The following self management tools provided: declined    Patient Instructions (the written plan) was given to  the patient/family.     Time spent with patient: 45 minutes    Barriers to medications present (no )    Adverse reactions to current medications (no)    Over the counter medications reviewed (Yes)

## 2019-11-16 ENCOUNTER — TELEPHONE (OUTPATIENT)
Dept: FAMILY MEDICINE | Facility: CLINIC | Age: 69
End: 2019-11-16

## 2019-11-18 ENCOUNTER — HOSPITAL ENCOUNTER (OUTPATIENT)
Dept: RADIOLOGY | Facility: HOSPITAL | Age: 69
Discharge: HOME | End: 2019-11-18
Attending: FAMILY MEDICINE
Payer: MEDICARE

## 2019-11-18 DIAGNOSIS — R93.89 ABNORMAL CXR: ICD-10-CM

## 2019-11-18 PROCEDURE — 71250 CT THORAX DX C-: CPT | Mod: TC

## 2019-11-18 PROCEDURE — 71250 CT CHEST WITHOUT CONTRAST: ICD-10-PCS | Mod: 26,,, | Performed by: RADIOLOGY

## 2019-11-18 PROCEDURE — 71250 CT THORAX DX C-: CPT | Mod: 26,,, | Performed by: RADIOLOGY

## 2019-11-27 ENCOUNTER — INFUSION (OUTPATIENT)
Dept: INFUSION THERAPY | Facility: HOSPITAL | Age: 69
End: 2019-11-27
Attending: FAMILY MEDICINE
Payer: MEDICARE

## 2019-11-27 VITALS
DIASTOLIC BLOOD PRESSURE: 82 MMHG | WEIGHT: 315 LBS | SYSTOLIC BLOOD PRESSURE: 146 MMHG | HEART RATE: 88 BPM | HEIGHT: 71 IN | RESPIRATION RATE: 20 BRPM | TEMPERATURE: 99 F | BODY MASS INDEX: 44.1 KG/M2

## 2019-11-27 DIAGNOSIS — L03.116 CELLULITIS OF LEFT LOWER EXTREMITY: Primary | ICD-10-CM

## 2019-11-27 PROCEDURE — 96365 THER/PROPH/DIAG IV INF INIT: CPT

## 2019-11-27 PROCEDURE — 63600175 PHARM REV CODE 636 W HCPCS: Performed by: FAMILY MEDICINE

## 2019-11-27 RX ADMIN — ERTAPENEM SODIUM 1 G: 1 INJECTION, POWDER, LYOPHILIZED, FOR SOLUTION INTRAMUSCULAR; INTRAVENOUS at 09:11

## 2019-12-03 DIAGNOSIS — I82.4Z2 ACUTE DEEP VEIN THROMBOSIS (DVT) OF DISTAL END OF LEFT LOWER EXTREMITY: ICD-10-CM

## 2019-12-03 RX ORDER — APIXABAN 5 MG/1
TABLET, FILM COATED ORAL
Qty: 180 TABLET | Refills: 3 | Status: SHIPPED | OUTPATIENT
Start: 2019-12-03 | End: 2022-02-24 | Stop reason: SDUPTHER

## 2019-12-10 ENCOUNTER — INFUSION (OUTPATIENT)
Dept: INFUSION THERAPY | Facility: HOSPITAL | Age: 69
End: 2019-12-10
Attending: NURSE PRACTITIONER
Payer: MEDICARE

## 2019-12-10 VITALS
TEMPERATURE: 99 F | HEART RATE: 78 BPM | DIASTOLIC BLOOD PRESSURE: 84 MMHG | SYSTOLIC BLOOD PRESSURE: 152 MMHG | RESPIRATION RATE: 20 BRPM

## 2019-12-10 DIAGNOSIS — L03.116 CELLULITIS OF LEFT LOWER EXTREMITY: Primary | ICD-10-CM

## 2019-12-10 PROCEDURE — 96365 THER/PROPH/DIAG IV INF INIT: CPT

## 2019-12-10 PROCEDURE — 63600175 PHARM REV CODE 636 W HCPCS: Performed by: NURSE PRACTITIONER

## 2019-12-10 RX ADMIN — MEROPENEM 1 G: 1 INJECTION, POWDER, FOR SOLUTION INTRAVENOUS at 02:12

## 2020-01-08 ENCOUNTER — HOSPITAL ENCOUNTER (OUTPATIENT)
Dept: PREADMISSION TESTING | Facility: HOSPITAL | Age: 70
Discharge: HOME | End: 2020-01-08
Attending: SURGERY
Payer: MEDICARE

## 2020-01-08 ENCOUNTER — HOSPITAL ENCOUNTER (OUTPATIENT)
Dept: RADIOLOGY | Facility: HOSPITAL | Age: 70
Discharge: HOME | End: 2020-01-08
Attending: SURGERY
Payer: MEDICARE

## 2020-01-08 VITALS — BODY MASS INDEX: 44.1 KG/M2 | WEIGHT: 315 LBS | HEIGHT: 71 IN

## 2020-01-08 DIAGNOSIS — Z01.812 PRE-PROCEDURE LAB EXAM: Primary | ICD-10-CM

## 2020-01-08 DIAGNOSIS — I87.2 PERIPHERAL VENOUS INSUFFICIENCY: ICD-10-CM

## 2020-01-08 LAB
ANION GAP SERPL CALC-SCNC: 7 MMOL/L (ref 8–16)
APTT PPP: 35.4 SEC (ref 23.6–33.3)
BASOPHILS # BLD AUTO: 0.05 K/UL (ref 0–0.2)
BASOPHILS NFR BLD: 0.6 % (ref 0–1.9)
BUN SERPL-MCNC: 27 MG/DL (ref 8–23)
CALCIUM SERPL-MCNC: 8.9 MG/DL (ref 8.7–10.5)
CHLORIDE SERPL-SCNC: 100 MMOL/L (ref 95–110)
CO2 SERPL-SCNC: 29 MMOL/L (ref 23–29)
CREAT SERPL-MCNC: 1.2 MG/DL (ref 0.5–1.4)
DIFFERENTIAL METHOD: ABNORMAL
EOSINOPHIL # BLD AUTO: 0.3 K/UL (ref 0–0.5)
EOSINOPHIL NFR BLD: 3.1 % (ref 0–8)
ERYTHROCYTE [DISTWIDTH] IN BLOOD BY AUTOMATED COUNT: 14.1 % (ref 11.5–14.5)
EST. GFR  (AFRICAN AMERICAN): >60 ML/MIN/1.73 M^2
EST. GFR  (NON AFRICAN AMERICAN): >60 ML/MIN/1.73 M^2
GLUCOSE SERPL-MCNC: 92 MG/DL (ref 70–110)
HCT VFR BLD AUTO: 39.2 % (ref 40–54)
HGB BLD-MCNC: 11.9 G/DL (ref 14–18)
IMM GRANULOCYTES # BLD AUTO: 0.04 K/UL (ref 0–0.04)
IMM GRANULOCYTES NFR BLD AUTO: 0.4 % (ref 0–0.5)
INR PPP: 1.4
LYMPHOCYTES # BLD AUTO: 1.6 K/UL (ref 1–4.8)
LYMPHOCYTES NFR BLD: 17.3 % (ref 18–48)
MCH RBC QN AUTO: 27.9 PG (ref 27–31)
MCHC RBC AUTO-ENTMCNC: 30.4 G/DL (ref 32–36)
MCV RBC AUTO: 92 FL (ref 82–98)
MONOCYTES # BLD AUTO: 0.8 K/UL (ref 0.3–1)
MONOCYTES NFR BLD: 9.3 % (ref 4–15)
NEUTROPHILS # BLD AUTO: 6.3 K/UL (ref 1.8–7.7)
NEUTROPHILS NFR BLD: 69.3 % (ref 38–73)
NRBC BLD-RTO: 0 /100 WBC
PLATELET # BLD AUTO: 271 K/UL (ref 150–350)
PMV BLD AUTO: 9.3 FL (ref 9.2–12.9)
POTASSIUM SERPL-SCNC: 5.1 MMOL/L (ref 3.5–5.1)
PROTHROMBIN TIME: 16.5 SEC (ref 10.6–14.8)
RBC # BLD AUTO: 4.27 M/UL (ref 4.6–6.2)
SODIUM SERPL-SCNC: 136 MMOL/L (ref 136–145)
WBC # BLD AUTO: 9.03 K/UL (ref 3.9–12.7)

## 2020-01-08 PROCEDURE — 93005 ELECTROCARDIOGRAM TRACING: CPT

## 2020-01-08 PROCEDURE — 36415 COLL VENOUS BLD VENIPUNCTURE: CPT

## 2020-01-08 PROCEDURE — 80048 BASIC METABOLIC PNL TOTAL CA: CPT

## 2020-01-08 PROCEDURE — 71046 X-RAY EXAM CHEST 2 VIEWS: CPT | Mod: TC

## 2020-01-08 PROCEDURE — 85730 THROMBOPLASTIN TIME PARTIAL: CPT

## 2020-01-08 PROCEDURE — 85610 PROTHROMBIN TIME: CPT

## 2020-01-08 PROCEDURE — 85025 COMPLETE CBC W/AUTO DIFF WBC: CPT

## 2020-01-08 NOTE — DISCHARGE INSTRUCTIONS
 Nothing to eat or drink after midnight the night before your procedure.   Do not take any medications the morning of your procedure   Bring all your medications with you in the original pill bottles from pharmacy.   If you take blood thinners, ask your doctor if you should stop taking them.  Last dose of eliquis 1/12/2020   Do your Hibiclens wash the night before and morning of your procedure.   Make arrangements for someone you know to drive you home after your procedure. Taxi and Uber are not acceptable.

## 2020-01-15 ENCOUNTER — HOSPITAL ENCOUNTER (OUTPATIENT)
Facility: HOSPITAL | Age: 70
Discharge: HOME | End: 2020-01-15
Attending: SURGERY | Admitting: SURGERY
Payer: MEDICARE

## 2020-01-15 VITALS
SYSTOLIC BLOOD PRESSURE: 180 MMHG | OXYGEN SATURATION: 95 % | DIASTOLIC BLOOD PRESSURE: 75 MMHG | HEART RATE: 103 BPM | RESPIRATION RATE: 34 BRPM

## 2020-01-15 DIAGNOSIS — L97.322 VENOUS STASIS ULCER OF LEFT ANKLE WITH FAT LAYER EXPOSED WITHOUT VARICOSE VEINS: Primary | ICD-10-CM

## 2020-01-15 DIAGNOSIS — I87.2 VENOUS (PERIPHERAL) INSUFFICIENCY: ICD-10-CM

## 2020-01-15 DIAGNOSIS — I87.2 VENOUS STASIS ULCER OF LEFT ANKLE WITH FAT LAYER EXPOSED WITHOUT VARICOSE VEINS: Primary | ICD-10-CM

## 2020-01-15 PROCEDURE — 37252 INTRVASC US NONCORONARY 1ST: CPT | Mod: LT | Performed by: SURGERY

## 2020-01-15 PROCEDURE — C1876 STENT, NON-COA/NON-COV W/DEL: HCPCS | Performed by: SURGERY

## 2020-01-15 PROCEDURE — 27201423 OPTIME MED/SURG SUP & DEVICES STERILE SUPPLY: Performed by: SURGERY

## 2020-01-15 PROCEDURE — 63600175 PHARM REV CODE 636 W HCPCS: Performed by: SURGERY

## 2020-01-15 PROCEDURE — C1894 INTRO/SHEATH, NON-LASER: HCPCS | Performed by: SURGERY

## 2020-01-15 PROCEDURE — C1887 CATHETER, GUIDING: HCPCS | Performed by: SURGERY

## 2020-01-15 PROCEDURE — 99152 MOD SED SAME PHYS/QHP 5/>YRS: CPT | Performed by: SURGERY

## 2020-01-15 PROCEDURE — 75820 VEIN X-RAY ARM/LEG: CPT | Mod: XU | Performed by: SURGERY

## 2020-01-15 PROCEDURE — C1751 CATH, INF, PER/CENT/MIDLINE: HCPCS | Performed by: SURGERY

## 2020-01-15 PROCEDURE — 25000003 PHARM REV CODE 250: Performed by: SURGERY

## 2020-01-15 PROCEDURE — 25500020 PHARM REV CODE 255: Performed by: SURGERY

## 2020-01-15 PROCEDURE — 37238 OPEN/PERQ PLACE STENT SAME: CPT | Mod: LT | Performed by: SURGERY

## 2020-01-15 PROCEDURE — C1769 GUIDE WIRE: HCPCS | Performed by: SURGERY

## 2020-01-15 PROCEDURE — 36011 PLACE CATHETER IN VEIN: CPT | Mod: LT | Performed by: SURGERY

## 2020-01-15 PROCEDURE — 99153 MOD SED SAME PHYS/QHP EA: CPT | Performed by: SURGERY

## 2020-01-15 DEVICE — SELF-EXPANDING STENT
Type: IMPLANTABLE DEVICE | Site: VEIN | Status: FUNCTIONAL
Brand: WALLSTENT® ENDOPROSTHESIS

## 2020-01-15 RX ORDER — LIDOCAINE HYDROCHLORIDE 10 MG/ML
INJECTION, SOLUTION EPIDURAL; INFILTRATION; INTRACAUDAL; PERINEURAL
Status: DISCONTINUED | OUTPATIENT
Start: 2020-01-15 | End: 2020-01-15 | Stop reason: HOSPADM

## 2020-01-15 RX ORDER — SODIUM CHLORIDE 9 MG/ML
INJECTION, SOLUTION INTRAVENOUS
Status: DISCONTINUED | OUTPATIENT
Start: 2020-01-15 | End: 2020-01-15 | Stop reason: HOSPADM

## 2020-01-15 RX ORDER — MIDAZOLAM HYDROCHLORIDE 1 MG/ML
INJECTION INTRAMUSCULAR; INTRAVENOUS
Status: DISCONTINUED | OUTPATIENT
Start: 2020-01-15 | End: 2020-01-15 | Stop reason: HOSPADM

## 2020-01-15 RX ORDER — FENTANYL CITRATE 50 UG/ML
INJECTION, SOLUTION INTRAMUSCULAR; INTRAVENOUS
Status: DISCONTINUED | OUTPATIENT
Start: 2020-01-15 | End: 2020-01-15 | Stop reason: HOSPADM

## 2020-01-15 RX ORDER — SODIUM CHLORIDE 9 MG/ML
INJECTION, SOLUTION INTRAVENOUS CONTINUOUS
Status: DISCONTINUED | OUTPATIENT
Start: 2020-01-15 | End: 2020-01-15 | Stop reason: HOSPADM

## 2020-01-15 NOTE — Clinical Note
The vessel was injected with single simultaneous hand injection through bilateral acess sheathsInjection rate = 4 mL/sec. Injected volume = 10 mL. The venogram syringe was removed and the venogram is complete.

## 2020-01-15 NOTE — Clinical Note
The vessel was injected with single simultaneous hand injection through bilateral acess sheathsInjection rate = 10 mL/sec. Injected volume = 10 mL. The venogram syringe was removed and the venogram is complete.

## 2020-01-15 NOTE — DISCHARGE SUMMARY
Patient underwent left iliac and inferior vena cavogram with intravascular ultrasound interrogation and stent angioplasty with 20 by 80 wall stent of the common iliac extending into the external iliac vein.  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

## 2020-01-15 NOTE — DISCHARGE INSTRUCTIONS
Activity   Do not drive or operate an automobile or hazardous machinery for 24 hours   Do note engage in sports for 1 week.   Limit lifting over 10 pounds for the nest week, or until puncture site heals.   Limit you activities for the next 48 hours. Avoid excessive bending or squatting.   You may resume normal activity in 2-3 days, let pain be your guide.    Call your Doctor immediatly if you experience any of the following:   Chest pain, palpitations, shortness of breath, excessive dizziness, fainting, nausea or vomiting.   Jules signs of infection: redness, warmth, swelling, pain, drainage (other than a little blood on bandaide), chills, poorly healing puncture site, fever greater than 101.0 F   Change in color, coolness, swelling, numbness, or pain to the involved extremity   Significant bleeding from the puncture site.

## 2020-01-27 PROBLEM — J12.9 VIRAL PNEUMONITIS: Status: RESOLVED | Noted: 2019-10-25 | Resolved: 2020-01-27

## 2020-01-29 ENCOUNTER — TELEPHONE (OUTPATIENT)
Dept: FAMILY MEDICINE | Facility: CLINIC | Age: 70
End: 2020-01-29

## 2020-01-29 NOTE — TELEPHONE ENCOUNTER
----- Message from Veronique Brasher sent at 1/29/2020  9:52 AM CST -----  Type: Needs Medical Advice    Who Called:  [facility] [place]/Chanel Gonzalez Call Back Number: [phone]  Additional Information: Needs to see if office did a A1C of patient and if office can fax it to them at fax [phone].

## 2020-02-04 ENCOUNTER — DOCUMENTATION ONLY (OUTPATIENT)
Dept: FAMILY MEDICINE | Facility: CLINIC | Age: 70
End: 2020-02-04

## 2020-02-04 NOTE — PROGRESS NOTES
Pre-Visit Chart Review  For Appointment Scheduled on 2-11-20    Health Maintenance Due   Topic Date Due    Colonoscopy  05/02/2016    Foot Exam  06/26/2019

## 2020-02-11 ENCOUNTER — LAB VISIT (OUTPATIENT)
Dept: LAB | Facility: HOSPITAL | Age: 70
End: 2020-02-11
Attending: FAMILY MEDICINE
Payer: MEDICARE

## 2020-02-11 ENCOUNTER — OFFICE VISIT (OUTPATIENT)
Dept: FAMILY MEDICINE | Facility: CLINIC | Age: 70
End: 2020-02-11
Attending: FAMILY MEDICINE
Payer: MEDICARE

## 2020-02-11 VITALS
HEIGHT: 71 IN | HEART RATE: 108 BPM | DIASTOLIC BLOOD PRESSURE: 68 MMHG | TEMPERATURE: 98 F | OXYGEN SATURATION: 96 % | SYSTOLIC BLOOD PRESSURE: 120 MMHG | RESPIRATION RATE: 36 BRPM | WEIGHT: 315 LBS | BODY MASS INDEX: 44.1 KG/M2

## 2020-02-11 DIAGNOSIS — J43.2 CENTRILOBULAR EMPHYSEMA: ICD-10-CM

## 2020-02-11 DIAGNOSIS — E11.69 COMBINED HYPERLIPIDEMIA ASSOCIATED WITH TYPE 2 DIABETES MELLITUS: ICD-10-CM

## 2020-02-11 DIAGNOSIS — E78.2 COMBINED HYPERLIPIDEMIA ASSOCIATED WITH TYPE 2 DIABETES MELLITUS: ICD-10-CM

## 2020-02-11 DIAGNOSIS — N40.1 BPH WITH OBSTRUCTION/LOWER URINARY TRACT SYMPTOMS: Primary | ICD-10-CM

## 2020-02-11 DIAGNOSIS — D68.9 COAGULOPATHY: ICD-10-CM

## 2020-02-11 DIAGNOSIS — I15.2 HYPERTENSION ASSOCIATED WITH DIABETES: ICD-10-CM

## 2020-02-11 DIAGNOSIS — M17.0 PRIMARY OSTEOARTHRITIS OF BOTH KNEES: ICD-10-CM

## 2020-02-11 DIAGNOSIS — E11.59 TYPE 2 DIABETES MELLITUS WITH OTHER CIRCULATORY COMPLICATION, WITHOUT LONG-TERM CURRENT USE OF INSULIN: ICD-10-CM

## 2020-02-11 DIAGNOSIS — I50.33 ACUTE ON CHRONIC DIASTOLIC HEART FAILURE: ICD-10-CM

## 2020-02-11 DIAGNOSIS — I87.2 VENOUS INSUFFICIENCY OF RIGHT LOWER EXTREMITY: ICD-10-CM

## 2020-02-11 DIAGNOSIS — D68.59 PROTEIN C DEFICIENCY: ICD-10-CM

## 2020-02-11 DIAGNOSIS — L97.322 VENOUS STASIS ULCER OF LEFT ANKLE WITH FAT LAYER EXPOSED WITHOUT VARICOSE VEINS: ICD-10-CM

## 2020-02-11 DIAGNOSIS — N13.8 BPH WITH OBSTRUCTION/LOWER URINARY TRACT SYMPTOMS: Primary | ICD-10-CM

## 2020-02-11 DIAGNOSIS — I25.10 CORONARY ARTERY DISEASE, ANGINA PRESENCE UNSPECIFIED, UNSPECIFIED VESSEL OR LESION TYPE, UNSPECIFIED WHETHER NATIVE OR TRANSPLANTED HEART: ICD-10-CM

## 2020-02-11 DIAGNOSIS — E11.59 HYPERTENSION ASSOCIATED WITH DIABETES: ICD-10-CM

## 2020-02-11 DIAGNOSIS — D68.59 ANTITHROMBIN 3 DEFICIENCY: ICD-10-CM

## 2020-02-11 DIAGNOSIS — I87.2 VENOUS STASIS ULCER OF LEFT ANKLE WITH FAT LAYER EXPOSED WITHOUT VARICOSE VEINS: ICD-10-CM

## 2020-02-11 PROCEDURE — 83036 HEMOGLOBIN GLYCOSYLATED A1C: CPT

## 2020-02-11 PROCEDURE — 99214 OFFICE O/P EST MOD 30 MIN: CPT | Mod: S$PBB,,, | Performed by: FAMILY MEDICINE

## 2020-02-11 PROCEDURE — 99999 PR PBB SHADOW E&M-EST. PATIENT-LVL IV: CPT | Mod: PBBFAC,,, | Performed by: FAMILY MEDICINE

## 2020-02-11 PROCEDURE — 80061 LIPID PANEL: CPT

## 2020-02-11 PROCEDURE — 99214 PR OFFICE/OUTPT VISIT, EST, LEVL IV, 30-39 MIN: ICD-10-PCS | Mod: S$PBB,,, | Performed by: FAMILY MEDICINE

## 2020-02-11 PROCEDURE — 99214 OFFICE O/P EST MOD 30 MIN: CPT | Mod: PBBFAC,PO | Performed by: FAMILY MEDICINE

## 2020-02-11 PROCEDURE — 80048 BASIC METABOLIC PNL TOTAL CA: CPT

## 2020-02-11 PROCEDURE — 36415 COLL VENOUS BLD VENIPUNCTURE: CPT | Mod: PO

## 2020-02-11 PROCEDURE — 99999 PR PBB SHADOW E&M-EST. PATIENT-LVL IV: ICD-10-PCS | Mod: PBBFAC,,, | Performed by: FAMILY MEDICINE

## 2020-02-11 RX ORDER — TAMSULOSIN HYDROCHLORIDE 0.4 MG/1
0.4 CAPSULE ORAL DAILY
Qty: 30 CAPSULE | Refills: 11 | Status: SHIPPED | OUTPATIENT
Start: 2020-02-11 | End: 2021-04-19

## 2020-02-11 NOTE — PATIENT INSTRUCTIONS
Weight Management: Getting Started  Healthy bodies come in all shapes and sizes. Not all bodies are made to be thin. For some people, a healthy weight is higher than the average weight listed on weight charts. Your healthcare provider can help you decide on a healthy weight for you.    Reasons to lose weight  Losing weight can help with some health problems, such as high blood pressure, heart disease, diabetes, sleep apnea, and arthritis. You may also feel more energy.  Set your long-term goal  Your goal doesn't even have to be a specific weight. You may decide on a fitness goal (such as being able to walk 10 miles a week), or a health goal (such as lowering your blood pressure). Choose a goal that is measurable and reasonable, so you know when you've reached it. A goal of reaching a BMI of less than 25 is not always reasonable (or possible).   Make an action plan  Habits dont change overnight. Setting your goals too high can leave you feeling discouraged if you cant reach them. Be realistic. Choose one or two small changes you can make now. Set an action plan for how you are going to make these changes. When you can stick to this plan, keep making a few more small changes. Taking small steps will help you stay on the path to success.  Track your progress  Write down your goals. Then, keep a daily record of your progress. Write down what you eat and how active you are. This record lets you look back on how much youve done. It may also help when youre feeling frustrated. Reward yourself for success. Even if you dont reach every goal, give yourself credit for what you do get done.  Get support  Encouragement from others can help make losing weight easier. Ask your family members and friends for support. They may even want to join you. Also look to your healthcare provider, registered dietitian, and  for help. Your local hospital can give you more information about nutrition, exercise, and  weight loss.  Date Last Reviewed: 1/31/2016  © 0925-6912 [employer]. [address] [place] [address], [place] [address]. All rights reserved. This information is not intended as a substitute for professional medical care. Always follow your healthcare professional's instructions.        Controlling High Blood Pressure  High blood pressure (hypertension) is often called the silent killer. This is because many people who have it dont know it. High blood pressure is defined as 140/90 mm Hg or higher. Know your blood pressure and remember to check it regularly. Doing so can save your life. Here are some things you can do to help control your blood pressure.    Choose heart-healthy foods  · Select low-salt, low-fat foods. Limit sodium intake to 2,400 mg per day or the amount suggested by your healthcare provider.  · Limit canned, dried, cured, packaged, and fast foods. These can contain a lot of salt.  · Eat 8 to 10 servings of fruits and vegetables every day.  · Choose lean meats, fish, or chicken.  · Eat whole-grain pasta, brown rice, and beans.  · Eat 2 to 3 servings of low-fat or fat-free dairy products.  · Ask your doctor about the DASH eating plan. This plan helps reduce blood pressure.  · When you go to a restaurant, ask that your meal be prepared with no added salt.  Maintain a healthy weight  · Ask your healthcare provider how many calories to eat a day. Then stick to that number.  · Ask your healthcare provider what weight range is healthiest for you. If you are overweight, a weight loss of only 3% to 5% of your body weight can help lower blood pressure. Generally, a good weight loss goal is to lose 10% of your body weight in a year.  · Limit snacks and sweets.  · Get regular exercise.  Get up and get active  · Choose activities you enjoy. Find ones you can do with friends or family. This includes bicycling, dancing, walking, and jogging.  · Park farther away from building entrances.  · Use stairs  instead of the elevator.  · When you can, walk or bike instead of driving.  · [place] leaves, garden, or do household repairs.  · Be active at a moderate to vigorous level of physical activity for at least 40 minutes for a minimum of 3 to 4 days a week.   Manage stress  · Make time to relax and enjoy life. Find time to laugh.  · Communicate your concerns with your loved ones and your healthcare provider.  · Visit with family and friends, and keep up with hobbies.  Limit alcohol and quit smoking  · Men should have no more than 2 drinks per day.  · Women should have no more than 1 drink per day.  · Talk with your healthcare provider about quitting smoking. Smoking significantly increases your risk for heart disease and stroke. Ask your healthcare provider about community smoking cessation programs and other options.  Medicines  If lifestyle changes arent enough, your healthcare provider may prescribe high blood pressure medicine. Take all medicines as prescribed. If you have any questions about your medicines, ask your healthcare provider before stopping or changing them.   Date Last Reviewed: 4/27/2016 © 2000-2017 [employer]. [address] [place] [address], [place] [address]. All rights reserved. This information is not intended as a substitute for professional medical care. Always follow your healthcare professional's instructions.

## 2020-02-11 NOTE — PROGRESS NOTES
Subjective:       Patient ID: Lukas Chance is a 70 y.o. male.    Chief Complaint: Follow-up (3m)    70-year-old male coming in for follow-up of multiple medical problems. He has been experiencing dizziness some of which is orthostatic in nature and stop taking his lisinopril 10 mg.  He continues to take spironolactone and his symptoms may have abated somewhat.  He is being followed by Dr. Harris for venous insufficiency and had a stent placed in the right leg to improve arterial flow but his stasis ulcers are still healing slowly.  He is being followed by physical therapy and wound care and is still being seen by Dr. Harris.  He is having problems with insufficient sleep resulting from having to get up to urinate every 45 min.  This seemed to start after he had a catheter implanted in the hospital.  He has never gone back to normal bladder function but his stream seems to be adequate.  He has bilateral knee pain located over the anterior medial joint line in both knees.  He has no history of trauma but he does have morbid obesity and degenerative arthritis of the knees.  He is concerned that his diabetes may be slowing the healing of his ulcers as he is no longer taking any diabetic medications due to a low A1c on his last check.  He is three months out from his last test and is also due for a lipid panel.  He is due for a colonoscopy but declines to do that.  He is due for a foot exam but is under multiple layers of dressings and that cannot be done at this time.  The patient continues to complain of some shortness of breath, the stiolto respimat inhaler given to him by Nancy was of no benefit and did not help him at all.  Dr. Patton did an extensive cardiac workup on him finding no sign of any active heart disease or CHF that confirming the presence of emphysema.    Past Medical History:  3/1/2013: Anticoagulant long-term use  No date: Antithrombin 3 deficiency  No date: Cellulitis      Comment:  lower  legs, venous stasis  No date: Coronary artery disease  2012: Deep vein thrombosis (DVT)      Comment:  left leg  5/15/2013: Diabetes mellitus type 2 in obese  No date: Hypertension  No date: Obesity  No date: LAKHWINDER (obstructive sleep apnea)  No date: Pulmonary embolism  No date: Venous stasis ulcers      Comment:  left ankle    Past Surgical History:  5/2/2011: COLONOSCOPY      Comment:  Dr. Miller, 9 polyps, recheck 5 year  No date: MULTIPLE TOOTH EXTRACTIONS  7/2015: RADIOFREQUENCY ABLATION      Comment:  bilateral   No date: TONSILLECTOMY    Current Outpatient Medications on File Prior to Visit:  acetaminophen (TYLENOL EXTRA STRENGTH) 500 MG tablet, Take 500 mg by mouth 2 (two) times daily as needed for Pain. , Disp: , Rfl:   albuterol-ipratropium (DUO-NEB) 2.5 mg-0.5 mg/3 mL nebulizer solution, Take 3 mLs by nebulization every 6 (six) hours. And PRN for shortness of breath, Disp: 1 Box, Rfl: 0  ELIQUIS 5 mg Tab, TAKE 1 TABLET BY MOUTH TWICE DAILY, Disp: 180 tablet, Rfl: 3  multivitamin (THERAGRAN) per tablet, Take 1 tablet by mouth once daily., Disp: , Rfl:   salmon oil/omega-3 fatty acids (SALMON OIL-1000 ORAL), Take 1 capsule by mouth daily as needed. , Disp: , Rfl:   spironolactone (ALDACTONE) 50 MG tablet, Take 1 tablet (50 mg total) by mouth once daily., Disp: 90 tablet, Rfl: 1  tiotropium-olodaterol (STIOLTO RESPIMAT) 2.5-2.5 mcg/actuation Mist, Inhale 2 puffs into the lungs once daily. Controller, Disp: 12 g, Rfl: 3  (DISCONTINUED) lisinopril 10 MG tablet, Take 1 tablet (10 mg total) by mouth once daily. (Patient not taking: Reported on 2/11/2020), Disp: 90 tablet, Rfl: 1    No current facility-administered medications on file prior to visit.         Review of Systems   Constitutional: Negative for activity change and unexpected weight change.   HENT: Negative for hearing loss, rhinorrhea and trouble swallowing.    Eyes: Negative for discharge and visual disturbance.   Respiratory: Positive for wheezing.  Negative for chest tightness.    Cardiovascular: Negative for chest pain and palpitations.   Gastrointestinal: Negative for blood in stool, constipation, diarrhea and vomiting.   Endocrine: Negative for polydipsia and polyuria.   Genitourinary: Positive for urgency. Negative for difficulty urinating and hematuria.   Musculoskeletal: Positive for arthralgias and joint swelling. Negative for neck pain.   Neurological: Positive for weakness. Negative for headaches.   Psychiatric/Behavioral: Positive for dysphoric mood. Negative for confusion.       Objective:      Physical Exam   Constitutional: He is oriented to person, place, and time. He appears well-developed. No distress.   Systolic blood pressure drops 14 points while diastolic drops 10 points going from sitting to standing  Morbid obesity with a BMI of 54.9 he is up 2.6 lb from his visit with Nancy November 14, 2019  Examination was severely limited by habitus and dressings over his lower extremities   HENT:   Head: Normocephalic and atraumatic.   Cardiovascular: Normal rate, regular rhythm and normal heart sounds.   Pulmonary/Chest: Effort normal and breath sounds normal.   Musculoskeletal:        Right knee: Tenderness found. Medial joint line tenderness noted.        Left knee: Tenderness found. Medial joint line tenderness noted.   Neurological: He is alert and oriented to person, place, and time.   Skin: He is not diaphoretic.   Nursing note and vitals reviewed.      Assessment:       1. BPH with obstruction/lower urinary tract symptoms    2. Type 2 diabetes mellitus with other circulatory complication, without long-term current use of insulin    3. Venous stasis ulcer of left ankle with fat layer exposed without varicose veins    4. Combined hyperlipidemia associated with type 2 diabetes mellitus    5. Hypertension associated with diabetes    6. Coronary artery disease, angina presence unspecified, unspecified vessel or lesion type, unspecified whether  native or transplanted heart    7. Venous insufficiency of right lower extremity    8. Acute on chronic diastolic heart failure    9. Centrilobular emphysema    10. Coagulopathy    11. Protein C deficiency    12. Antithrombin 3 deficiency    13. Body mass index 50.0-59.9, adult        Plan:       1. BPH with obstruction/lower urinary tract symptoms  Trial Flomax.  If insufficient relief he can double up and take two daily.  If that does not relieve it we will consider treatment for overactive bladder  - tamsulosin (FLOMAX) 0.4 mg Cap; Take 1 capsule (0.4 mg total) by mouth once daily.  Dispense: 30 capsule; Refill: 11    2. Type 2 diabetes mellitus with other circulatory complication, without long-term current use of insulin  Reassess lab to see if he needs to go back on metformin  - Basic metabolic panel; Future  - Hemoglobin A1c; Future  - Lipid panel; Future    3. Venous stasis ulcer of left ankle with fat layer exposed without varicose veins  Under the care of Dr. Harris, dressings not removed    4. Combined hyperlipidemia associated with type 2 diabetes mellitus  Lab pending    5. Hypertension associated with diabetes  Appears to have sufficient control with the spironolactone alone.  He did have dizziness when he stood up and had a significant drop from sitting to standing    6. Coronary artery disease, angina presence unspecified, unspecified vessel or lesion type, unspecified whether native or transplanted heart  Asymptomatic, recent negative cardiac workup by Dr. Patton    7. Venous insufficiency of right lower extremity  Under the care of Dr. Harris    8. Acute on chronic diastolic heart failure  Asymptomatic currently    9. Centrilobular emphysema  Relatively mild, I think more of his problems coming from the morbid obesity and restrictive lung disease    10. Coagulopathy  Followed by Hematology    11. Protein C deficiency  See above    12. Antithrombin 3 deficiency  See above    13. Primary  osteoarthritis of both knees  Offered patient a consult with Dr. Olsen but he declined at this time.  He wants to get his ulcers healed 1st 14. Body mass index 50.0-59.9, adult

## 2020-02-12 ENCOUNTER — PATIENT OUTREACH (OUTPATIENT)
Dept: ADMINISTRATIVE | Facility: HOSPITAL | Age: 70
End: 2020-02-12

## 2020-02-12 DIAGNOSIS — E87.5 HYPERKALEMIA: Primary | ICD-10-CM

## 2020-02-12 DIAGNOSIS — N18.30 CKD (CHRONIC KIDNEY DISEASE) STAGE 3, GFR 30-59 ML/MIN: ICD-10-CM

## 2020-02-12 LAB
ANION GAP SERPL CALC-SCNC: 12 MMOL/L (ref 8–16)
BUN SERPL-MCNC: 27 MG/DL (ref 8–23)
CALCIUM SERPL-MCNC: 9.2 MG/DL (ref 8.7–10.5)
CHLORIDE SERPL-SCNC: 101 MMOL/L (ref 95–110)
CHOLEST SERPL-MCNC: 171 MG/DL (ref 120–199)
CHOLEST/HDLC SERPL: 3.6 {RATIO} (ref 2–5)
CO2 SERPL-SCNC: 23 MMOL/L (ref 23–29)
CREAT SERPL-MCNC: 1.5 MG/DL (ref 0.5–1.4)
EST. GFR  (AFRICAN AMERICAN): 53 ML/MIN/1.73 M^2
EST. GFR  (NON AFRICAN AMERICAN): 45.9 ML/MIN/1.73 M^2
ESTIMATED AVG GLUCOSE: 120 MG/DL (ref 68–131)
GLUCOSE SERPL-MCNC: 93 MG/DL (ref 70–110)
HBA1C MFR BLD HPLC: 5.8 % (ref 4–5.6)
HDLC SERPL-MCNC: 47 MG/DL (ref 40–75)
HDLC SERPL: 27.5 % (ref 20–50)
LDLC SERPL CALC-MCNC: 105.6 MG/DL (ref 63–159)
NONHDLC SERPL-MCNC: 124 MG/DL
POTASSIUM SERPL-SCNC: 5.2 MMOL/L (ref 3.5–5.1)
SODIUM SERPL-SCNC: 136 MMOL/L (ref 136–145)
TRIGL SERPL-MCNC: 92 MG/DL (ref 30–150)

## 2020-02-12 NOTE — PROGRESS NOTES
Chart review completed 02/12/2020.  Care Everywhere updates requested and reviewed. Media reviewed.   Eye exam requested.

## 2020-02-12 NOTE — LETTER
AUTHORIZATION FOR RELEASE OF   CONFIDENTIAL INFORMATION    Dr Jose Francisco Levyosi    We are seeing Lukas Chance, date of birth 1950, in the clinic at [facility]. Solitario Mendez MD is the patient's PCP. Lukas Chance has an outstanding lab/procedure at the time we reviewed his chart. In order to help keep his health information updated, he has authorized us to request the following medical record(s):        (  )  MAMMOGRAM                                      (  )  COLONOSCOPY      (  )  PAP SMEAR                                          (  )  OUTSIDE LAB RESULTS     (  )  DEXA SCAN                                          ( X )  EYE EXAM            (  )  FOOT EXAM                                          (  )  ENTIRE RECORD     (  )  OUTSIDE IMMUNIZATIONS                 (  )  _______________         Please fax records to [facility], Robert W Taylor, MD,[phone]    Thank you in advance,      Tarsha GOETZ  Panel Care Coordinator  [facility] [facility] [address] [place] [address]  Phone [phone]  Fax [phone]          Patient Name: Lukas Chance  : 1950  Patient Phone #: [phone]

## 2020-02-14 ENCOUNTER — LAB VISIT (OUTPATIENT)
Dept: LAB | Facility: HOSPITAL | Age: 70
End: 2020-02-14
Attending: FAMILY MEDICINE
Payer: MEDICARE

## 2020-02-14 DIAGNOSIS — N18.30 CKD (CHRONIC KIDNEY DISEASE) STAGE 3, GFR 30-59 ML/MIN: ICD-10-CM

## 2020-02-14 DIAGNOSIS — E87.5 HYPERKALEMIA: ICD-10-CM

## 2020-02-14 LAB
ANION GAP SERPL CALC-SCNC: 8 MMOL/L (ref 8–16)
BUN SERPL-MCNC: 20 MG/DL (ref 8–23)
CALCIUM SERPL-MCNC: 9 MG/DL (ref 8.7–10.5)
CHLORIDE SERPL-SCNC: 101 MMOL/L (ref 95–110)
CO2 SERPL-SCNC: 27 MMOL/L (ref 23–29)
CREAT SERPL-MCNC: 1.2 MG/DL (ref 0.5–1.4)
EST. GFR  (AFRICAN AMERICAN): >60 ML/MIN/1.73 M^2
EST. GFR  (NON AFRICAN AMERICAN): >60 ML/MIN/1.73 M^2
GLUCOSE SERPL-MCNC: 83 MG/DL (ref 70–110)
POTASSIUM SERPL-SCNC: 4.7 MMOL/L (ref 3.5–5.1)
SODIUM SERPL-SCNC: 136 MMOL/L (ref 136–145)

## 2020-02-14 PROCEDURE — 36415 COLL VENOUS BLD VENIPUNCTURE: CPT | Mod: PO

## 2020-02-14 PROCEDURE — 80048 BASIC METABOLIC PNL TOTAL CA: CPT

## 2020-03-03 ENCOUNTER — LAB VISIT (OUTPATIENT)
Dept: LAB | Facility: HOSPITAL | Age: 70
End: 2020-03-03
Attending: INTERNAL MEDICINE
Payer: MEDICARE

## 2020-03-03 ENCOUNTER — INITIAL CONSULT (OUTPATIENT)
Dept: INFECTIOUS DISEASES | Facility: CLINIC | Age: 70
End: 2020-03-03
Payer: MEDICARE

## 2020-03-03 VITALS
OXYGEN SATURATION: 94 % | BODY MASS INDEX: 44.1 KG/M2 | SYSTOLIC BLOOD PRESSURE: 158 MMHG | HEIGHT: 71 IN | HEART RATE: 100 BPM | DIASTOLIC BLOOD PRESSURE: 89 MMHG | WEIGHT: 315 LBS

## 2020-03-03 DIAGNOSIS — E66.9 OBESITY, UNSPECIFIED CLASSIFICATION, UNSPECIFIED OBESITY TYPE, UNSPECIFIED WHETHER SERIOUS COMORBIDITY PRESENT: ICD-10-CM

## 2020-03-03 DIAGNOSIS — L97.322 VENOUS STASIS ULCER OF LEFT ANKLE WITH FAT LAYER EXPOSED WITHOUT VARICOSE VEINS: Primary | ICD-10-CM

## 2020-03-03 DIAGNOSIS — N28.9 RENAL DISEASE: ICD-10-CM

## 2020-03-03 DIAGNOSIS — I87.2 PERIPHERAL VENOUS INSUFFICIENCY: Primary | ICD-10-CM

## 2020-03-03 DIAGNOSIS — Z13.21 ENCOUNTER FOR VITAMIN DEFICIENCY SCREENING: ICD-10-CM

## 2020-03-03 DIAGNOSIS — I25.10 CORONARY ARTERY DISEASE, ANGINA PRESENCE UNSPECIFIED, UNSPECIFIED VESSEL OR LESION TYPE, UNSPECIFIED WHETHER NATIVE OR TRANSPLANTED HEART: ICD-10-CM

## 2020-03-03 DIAGNOSIS — T14.8XXD DELAYED WOUND HEALING: ICD-10-CM

## 2020-03-03 DIAGNOSIS — G47.30 SLEEP APNEA, UNSPECIFIED TYPE: ICD-10-CM

## 2020-03-03 DIAGNOSIS — L97.322 NON-PRESSURE CHRONIC ULCER OF LEFT ANKLE WITH FAT LAYER EXPOSED: ICD-10-CM

## 2020-03-03 DIAGNOSIS — I87.2 VENOUS STASIS ULCER OF LEFT ANKLE WITH FAT LAYER EXPOSED WITHOUT VARICOSE VEINS: Primary | ICD-10-CM

## 2020-03-03 PROCEDURE — 87070 CULTURE OTHR SPECIMN AEROBIC: CPT

## 2020-03-03 PROCEDURE — 99214 PR OFFICE/OUTPT VISIT, EST, LEVL IV, 30-39 MIN: ICD-10-PCS | Mod: S$GLB,,, | Performed by: INTERNAL MEDICINE

## 2020-03-03 PROCEDURE — 87147 CULTURE TYPE IMMUNOLOGIC: CPT

## 2020-03-03 PROCEDURE — 99214 OFFICE O/P EST MOD 30 MIN: CPT | Mod: S$GLB,,, | Performed by: INTERNAL MEDICINE

## 2020-03-03 PROCEDURE — 87077 CULTURE AEROBIC IDENTIFY: CPT | Mod: 59

## 2020-03-03 PROCEDURE — 87186 SC STD MICRODIL/AGAR DIL: CPT | Mod: 59

## 2020-03-03 NOTE — PROGRESS NOTES
"  Reason for consult:   Subjective:      Patient ID: Lukas Chance is a 70 y.o. male.    Chief Complaint:: Wound Infection    HPI 70-year-old man with past medical history of morbid obesity, LAKHWINDER on BiPAP, PE, DVT, CAD, HTN, deal PE, and IDDM 2, venous insufficiency, multiple episodes of left leg cellulitis came in with the same.     Patient has been seen at [facility] wound care clinic. They have been applying 4 layer compression stocking. He has seen dr Harris for vascular eval and was told that his "arteries are fine , but his venous system is not." His dorsalis pedis are present +2 bilaterally.      Cultures in October grew Pseudomonas. He had received Cipro.  Then he just completed Meropenem IV, but ulcers are still not improving    Follows with Dr. Patton with cardiology  Follows with Dr. Harris with vascular surgery  Follows at wound care      Review of patient's allergies indicates:   Allergen Reactions    Bactroban [mupirocin calcium] Other (See Comments)     Burning feeling on open sore     Metoprolol Other (See Comments)     dizzy     Past Medical History:   Diagnosis Date    Anticoagulant long-term use 3/1/2013    Antithrombin 3 deficiency     Cellulitis     lower legs, venous stasis    Coronary artery disease     Deep vein thrombosis (DVT)     left leg    Diabetes mellitus type 2 in obese 5/15/2013    Hypertension     Obesity     LAKHWINDER (obstructive sleep apnea)     Pulmonary embolism     Venous stasis ulcers     left ankle     Past Surgical History:   Procedure Laterality Date    COLONOSCOPY  2011    Dr. Miller, 9 polyps, recheck 5 year    MULTIPLE TOOTH EXTRACTIONS      RADIOFREQUENCY ABLATION  2015    bilateral     TONSILLECTOMY       Social History     Tobacco Use    Smoking status: Former Smoker     Packs/day: 1.00     Years: 20.00     Pack years: 20.00     Last attempt to quit: 1/3/2002     Years since quittin.1    Smokeless tobacco: Never Used   Substance Use " Topics    Alcohol use: Yes     Alcohol/week: 1.0 standard drinks     Types: 1 Cans of beer per week     Frequency: Monthly or less     Drinks per session: Patient refused     Binge frequency: Never     Comment: seldom     Social History     Occupational History    Not on file     Hunting:  Fishing:  Pets:  Exposure to sick contacts:  Exposure to TB:  Travel:     Family History   Problem Relation Age of Onset    Heart disease Mother     Heart disease Father     Gallbladder disease Sister     Heart disease Brother     Stroke Brother     Emphysema Maternal Uncle        Review of Systems   Constitutional: Negative for activity change, appetite change, chills, fatigue and fever.        Decreased tolerance to activity. Seen by Dr Patton. Recent stress test about one month ago is negative.   HENT: Negative for congestion, dental problem, ear discharge, ear pain, hearing loss, mouth sores, postnasal drip, rhinorrhea, sinus pressure, sinus pain, sneezing, sore throat, tinnitus, trouble swallowing and voice change.    Eyes: Negative for photophobia, discharge and redness.   Respiratory: Negative for cough, chest tightness, shortness of breath, wheezing and stridor.    Cardiovascular: Negative for chest pain and leg swelling.   Gastrointestinal: Negative for abdominal distention, abdominal pain, anal bleeding, blood in stool, constipation, diarrhea, nausea, rectal pain and vomiting.   Genitourinary: Negative for difficulty urinating, dysuria, flank pain, frequency, genital sores, hematuria and urgency.   Musculoskeletal: Negative for arthralgias, back pain, joint swelling, myalgias, neck pain and neck stiffness.   Skin: Negative for color change, rash and wound.        Lower extremities have chronic changes.  3 separate wounds in lower extremities are not healing as expected   Allergic/Immunologic: Negative for immunocompromised state.   Neurological: Negative for dizziness, seizures and facial asymmetry.  "  Hematological: Negative for adenopathy. Does not bruise/bleed easily.   Psychiatric/Behavioral: Negative for agitation, confusion, decreased concentration and dysphoric mood. The patient is not nervous/anxious.         Pertinent medications noted:     Objective:      Blood pressure (!) 158/89, pulse 100, height 5' 11" (1.803 m), weight (!) 174.6 kg (385 lb), SpO2 (!) 94 %.  Body mass index is 53.7 kg/m².  Physical Exam   Constitutional: He is oriented to person, place, and time. He appears well-developed and well-nourished. No distress.   Morbidly obese   HENT:   Head: Atraumatic.   Right Ear: External ear normal.   Left Ear: External ear normal.   Nose: Nose normal.   Mouth/Throat: Oropharynx is clear and moist.   Eyes: Pupils are equal, round, and reactive to light. Conjunctivae and EOM are normal. No scleral icterus.   Neck: Normal range of motion. Neck supple. No JVD present.   Cardiovascular: Normal rate, regular rhythm and normal heart sounds.   Pulmonary/Chest: Effort normal and breath sounds normal. He has no wheezes. He has no rales. He exhibits no tenderness.   Abdominal: Soft. Bowel sounds are normal. He exhibits no distension and no mass. There is no tenderness. There is no rebound and no guarding.   Musculoskeletal: Normal range of motion.   Lymphadenopathy:     He has no cervical adenopathy.   Neurological: He is alert and oriented to person, place, and time. No cranial nerve deficit.   Skin: Skin is warm and dry. No rash noted. He is not diaphoretic. No erythema.   Bl legs are hyperpigmented. Decreased hair. Dorsalis pedis present. Left leg has 3 ulcers. The lateral ulcer is the biggest, I cleaned it and cultured it   Psychiatric: He has a normal mood and affect. His behavior is normal. Thought content normal.       VAD:     General Labs reviewed:  Lab Results   Component Value Date    WBC 9.03 01/08/2020    RBC 4.27 (L) 01/08/2020    HGB 11.9 (L) 01/08/2020    HCT 39.2 (L) 01/08/2020    MCV 92 " 01/08/2020    MCH 27.9 01/08/2020    MCHC 30.4 (L) 01/08/2020    RDW 14.1 01/08/2020     01/08/2020    MPV 9.3 01/08/2020    GRAN 6.3 01/08/2020    GRAN 69.3 01/08/2020    LYMPH 1.6 01/08/2020    LYMPH 17.3 (L) 01/08/2020    MONO 0.8 01/08/2020    MONO 9.3 01/08/2020    EOS 0.3 01/08/2020    BASO 0.05 01/08/2020    EOSINOPHIL 3.1 01/08/2020    BASOPHIL 0.6 01/08/2020     CMP  Sodium   Date Value Ref Range Status   02/14/2020 136 136 - 145 mmol/L Final     Potassium   Date Value Ref Range Status   02/14/2020 4.7 3.5 - 5.1 mmol/L Final     Chloride   Date Value Ref Range Status   02/14/2020 101 95 - 110 mmol/L Final     CO2   Date Value Ref Range Status   02/14/2020 27 23 - 29 mmol/L Final     Glucose   Date Value Ref Range Status   02/14/2020 83 70 - 110 mg/dL Final     BUN, Bld   Date Value Ref Range Status   02/14/2020 20 8 - 23 mg/dL Final     Creatinine   Date Value Ref Range Status   02/14/2020 1.2 0.5 - 1.4 mg/dL Final   02/18/2013 1.0 0.5 - 1.4 mg/dL Final     Calcium   Date Value Ref Range Status   02/14/2020 9.0 8.7 - 10.5 mg/dL Final   02/18/2013 8.9 8.7 - 10.5 mg/dL Final     Total Protein   Date Value Ref Range Status   10/25/2019 7.7 6.0 - 8.4 g/dL Final     Albumin   Date Value Ref Range Status   10/25/2019 3.4 (L) 3.5 - 5.2 g/dL Final     Total Bilirubin   Date Value Ref Range Status   10/25/2019 0.8 0.1 - 1.0 mg/dL Final     Comment:     For infants and newborns, interpretation of results should be based  on gestational age, weight and in agreement with clinical  observations.  Premature Infant recommended reference ranges:  Up to 24 hours.............<8.0 mg/dL  Up to 48 hours............<12.0 mg/dL  3-5 days..................<15.0 mg/dL  6-29 days.................<15.0 mg/dL       Alkaline Phosphatase   Date Value Ref Range Status   10/25/2019 62 55 - 135 U/L Final   02/14/2013 65 55 - 135 U/L Final     AST   Date Value Ref Range Status   10/25/2019 36 10 - 40 U/L Final   02/14/2013 24 10 -  40 U/L Final     ALT   Date Value Ref Range Status   10/25/2019 20 10 - 44 U/L Final     Anion Gap   Date Value Ref Range Status   02/14/2020 8 8 - 16 mmol/L Final   02/18/2013 10 5 - 15 meq/L Final     eGFR if    Date Value Ref Range Status   02/14/2020 >60.0 >60 mL/min/1.73 m^2 Final     eGFR if non    Date Value Ref Range Status   02/14/2020 >60.0 >60 mL/min/1.73 m^2 Final     Comment:     Calculation used to obtain the estimated glomerular filtration  rate (eGFR) is the CKD-EPI equation.          Micro:  Microbiology Results (last 7 days)     Procedure Component Value Units Date/Time    Aerobic culture [[id number]]     Order Status:  No result Specimen:  Skin from Leg, Left         Imaging Reviewed:    Assessment:       1. Venous stasis ulcer of left ankle with fat layer exposed without varicose veins    2. Delayed wound healing    3. Renal disease    4. Obesity, unspecified classification, unspecified obesity type, unspecified whether serious comorbidity present    5. Encounter for vitamin deficiency screening    6. Coronary artery disease, angina presence unspecified, unspecified vessel or lesion type, unspecified whether native or transplanted heart    7. Sleep apnea, unspecified type    8. Body mass index 50.0-59.9, adult           Plan:       Venous stasis ulcer of left ankle with fat layer ezposed  -     Culture  -     Sedimentation rate  -     C-reactive protein  -     Comprehensive metabolic panel  -     CBC auto differential  -     Vitamin D  -     G6PD,Quantitative; in case a need to use dapsone    Delayed wound healing.  Patient already follows with wound care nurse, vascular surgeon Dr. Harris.   I avised on elevating the leg.    Renal disease  -     Ambulatory referral/consult to Nephrology  -     Vitamin D;    Obesity    Encounter for vitamin deficiency screening    Vitamin D    Coronary artery disease, recently negative stress test.    Sleep apnea, unspecified  type    Body mass index 50.0-59.9, adult      Follow up if symptoms worsen or fail to improve.      I will call patient with results of cultures and blood work and come up with a plan regarding treatment.      This note was created using Dragon voice recognition software that occasionally misinterpreted phrases or words.

## 2020-03-05 ENCOUNTER — LAB VISIT (OUTPATIENT)
Dept: LAB | Facility: HOSPITAL | Age: 70
End: 2020-03-05
Attending: INTERNAL MEDICINE
Payer: MEDICARE

## 2020-03-05 DIAGNOSIS — N28.9 RENAL DISEASE: ICD-10-CM

## 2020-03-05 DIAGNOSIS — Z13.21 ENCOUNTER FOR VITAMIN DEFICIENCY SCREENING: ICD-10-CM

## 2020-03-05 DIAGNOSIS — G47.30 SLEEP APNEA, UNSPECIFIED TYPE: ICD-10-CM

## 2020-03-05 DIAGNOSIS — L97.322 VENOUS STASIS ULCER OF LEFT ANKLE WITH FAT LAYER EXPOSED WITHOUT VARICOSE VEINS: ICD-10-CM

## 2020-03-05 DIAGNOSIS — E66.9 OBESITY, UNSPECIFIED CLASSIFICATION, UNSPECIFIED OBESITY TYPE, UNSPECIFIED WHETHER SERIOUS COMORBIDITY PRESENT: ICD-10-CM

## 2020-03-05 DIAGNOSIS — T14.8XXD DELAYED WOUND HEALING: ICD-10-CM

## 2020-03-05 DIAGNOSIS — I87.2 VENOUS STASIS ULCER OF LEFT ANKLE WITH FAT LAYER EXPOSED WITHOUT VARICOSE VEINS: ICD-10-CM

## 2020-03-05 DIAGNOSIS — I25.10 CORONARY ARTERY DISEASE, ANGINA PRESENCE UNSPECIFIED, UNSPECIFIED VESSEL OR LESION TYPE, UNSPECIFIED WHETHER NATIVE OR TRANSPLANTED HEART: ICD-10-CM

## 2020-03-05 LAB
25(OH)D3+25(OH)D2 SERPL-MCNC: 19 NG/ML (ref 30–96)
ALBUMIN SERPL BCP-MCNC: 3.1 G/DL (ref 3.5–5.2)
ALP SERPL-CCNC: 79 U/L (ref 55–135)
ALT SERPL W/O P-5'-P-CCNC: 16 U/L (ref 10–44)
ANION GAP SERPL CALC-SCNC: 8 MMOL/L (ref 8–16)
AST SERPL-CCNC: 22 U/L (ref 10–40)
BASOPHILS # BLD AUTO: 0.07 K/UL (ref 0–0.2)
BASOPHILS NFR BLD: 0.8 % (ref 0–1.9)
BILIRUB SERPL-MCNC: 0.3 MG/DL (ref 0.1–1)
BUN SERPL-MCNC: 26 MG/DL (ref 8–23)
CALCIUM SERPL-MCNC: 8.9 MG/DL (ref 8.7–10.5)
CHLORIDE SERPL-SCNC: 101 MMOL/L (ref 95–110)
CO2 SERPL-SCNC: 31 MMOL/L (ref 23–29)
CREAT SERPL-MCNC: 1.4 MG/DL (ref 0.5–1.4)
CRP SERPL-MCNC: 14.4 MG/L (ref 0–8.2)
DIFFERENTIAL METHOD: ABNORMAL
EOSINOPHIL # BLD AUTO: 0.3 K/UL (ref 0–0.5)
EOSINOPHIL NFR BLD: 3.6 % (ref 0–8)
ERYTHROCYTE [DISTWIDTH] IN BLOOD BY AUTOMATED COUNT: 14.3 % (ref 11.5–14.5)
EST. GFR  (AFRICAN AMERICAN): 57.6 ML/MIN/1.73 M^2
EST. GFR  (NON AFRICAN AMERICAN): 49.8 ML/MIN/1.73 M^2
GLUCOSE SERPL-MCNC: 86 MG/DL (ref 70–110)
HCT VFR BLD AUTO: 41.2 % (ref 40–54)
HGB BLD-MCNC: 11.9 G/DL (ref 14–18)
IMM GRANULOCYTES # BLD AUTO: 0.03 K/UL (ref 0–0.04)
IMM GRANULOCYTES NFR BLD AUTO: 0.3 % (ref 0–0.5)
LYMPHOCYTES # BLD AUTO: 2.1 K/UL (ref 1–4.8)
LYMPHOCYTES NFR BLD: 22.9 % (ref 18–48)
MCH RBC QN AUTO: 27.5 PG (ref 27–31)
MCHC RBC AUTO-ENTMCNC: 28.9 G/DL (ref 32–36)
MCV RBC AUTO: 95 FL (ref 82–98)
MONOCYTES # BLD AUTO: 0.8 K/UL (ref 0.3–1)
MONOCYTES NFR BLD: 8.8 % (ref 4–15)
NEUTROPHILS # BLD AUTO: 5.8 K/UL (ref 1.8–7.7)
NEUTROPHILS NFR BLD: 63.6 % (ref 38–73)
NRBC BLD-RTO: 0 /100 WBC
PLATELET # BLD AUTO: 315 K/UL (ref 150–350)
PMV BLD AUTO: 9.6 FL (ref 9.2–12.9)
POTASSIUM SERPL-SCNC: 4.9 MMOL/L (ref 3.5–5.1)
PROT SERPL-MCNC: 8.2 G/DL (ref 6–8.4)
RBC # BLD AUTO: 4.32 M/UL (ref 4.6–6.2)
SODIUM SERPL-SCNC: 140 MMOL/L (ref 136–145)
WBC # BLD AUTO: 9.08 K/UL (ref 3.9–12.7)

## 2020-03-05 PROCEDURE — 82955 ASSAY OF G6PD ENZYME: CPT

## 2020-03-05 PROCEDURE — 85025 COMPLETE CBC W/AUTO DIFF WBC: CPT

## 2020-03-05 PROCEDURE — 80053 COMPREHEN METABOLIC PANEL: CPT

## 2020-03-05 PROCEDURE — 36415 COLL VENOUS BLD VENIPUNCTURE: CPT | Mod: PO

## 2020-03-05 PROCEDURE — 86140 C-REACTIVE PROTEIN: CPT

## 2020-03-05 PROCEDURE — 85651 RBC SED RATE NONAUTOMATED: CPT | Mod: PO

## 2020-03-05 PROCEDURE — 82306 VITAMIN D 25 HYDROXY: CPT

## 2020-03-05 RX ORDER — DOXYCYCLINE 100 MG/1
100 CAPSULE ORAL 2 TIMES DAILY
Qty: 60 CAPSULE | Refills: 3 | Status: SHIPPED | OUTPATIENT
Start: 2020-03-05 | End: 2020-04-04

## 2020-03-05 RX ORDER — DOXYCYCLINE HYCLATE 100 MG
100 TABLET ORAL DAILY
Qty: 30 TABLET | Refills: 3 | Status: SHIPPED | OUTPATIENT
Start: 2020-03-05 | End: 2020-03-05 | Stop reason: SDUPTHER

## 2020-03-05 RX ORDER — CIPROFLOXACIN 500 MG/1
500 TABLET ORAL 2 TIMES DAILY
Qty: 60 TABLET | Refills: 0 | Status: SHIPPED | OUTPATIENT
Start: 2020-03-05 | End: 2020-04-04

## 2020-03-06 LAB
BACTERIA SPEC AEROBE CULT: ABNORMAL

## 2020-03-10 ENCOUNTER — PATIENT MESSAGE (OUTPATIENT)
Dept: INFECTIOUS DISEASES | Facility: CLINIC | Age: 70
End: 2020-03-10

## 2020-03-10 NOTE — TELEPHONE ENCOUNTER
Different tests reviewed.    Cultures grew MRSA, Pseudomonas, Enterococcus.  This was a superficial culture and I am not sure of utility of it.  Patient is on doxycycline and ciprofloxacin.  This will not be covering all the above organisms.  Nevertheless I would continue the same antibiotics and see what patient does clinically.    Vitamin-D was low.  Encouraged patient to take vitamin-D over-the-counter.

## 2020-03-11 LAB — G6PD RBC-CCNT: 13.9 U/G HGB (ref 7–20.5)

## 2020-05-05 ENCOUNTER — PATIENT MESSAGE (OUTPATIENT)
Dept: ADMINISTRATIVE | Facility: HOSPITAL | Age: 70
End: 2020-05-05

## 2020-05-12 ENCOUNTER — TELEPHONE (OUTPATIENT)
Dept: FAMILY MEDICINE | Facility: CLINIC | Age: 70
End: 2020-05-12

## 2020-05-12 NOTE — TELEPHONE ENCOUNTER
----- Message from Cadence Morales sent at 5/12/2020 10:07 AM CDT -----  Contact: pt [phone]  Refill   Patient will  Need a refill on his Lasix 20 mg he takes it twice a day  The pt has not been taking it for a while Amanda from [employer] called for the Refill   Due to the Fluid in his legs.

## 2020-05-12 NOTE — TELEPHONE ENCOUNTER
----- Message from Cadence Morales sent at 5/12/2020 10:12 AM CDT -----  Contact: pt [phone]  Appt   Patient called he would like to be seen in the office this week he only wants to see you,  Call back [phone]

## 2020-05-14 ENCOUNTER — OFFICE VISIT (OUTPATIENT)
Dept: FAMILY MEDICINE | Facility: CLINIC | Age: 70
End: 2020-05-14
Payer: MEDICARE

## 2020-05-14 ENCOUNTER — TELEPHONE (OUTPATIENT)
Dept: FAMILY MEDICINE | Facility: CLINIC | Age: 70
End: 2020-05-14

## 2020-05-14 DIAGNOSIS — E11.59 HYPERTENSION ASSOCIATED WITH DIABETES: ICD-10-CM

## 2020-05-14 DIAGNOSIS — I15.2 HYPERTENSION ASSOCIATED WITH DIABETES: ICD-10-CM

## 2020-05-14 DIAGNOSIS — I87.2 VENOUS STASIS ULCER OF LEFT ANKLE WITH FAT LAYER EXPOSED WITHOUT VARICOSE VEINS: ICD-10-CM

## 2020-05-14 DIAGNOSIS — L97.322 VENOUS STASIS ULCER OF LEFT ANKLE WITH FAT LAYER EXPOSED WITHOUT VARICOSE VEINS: ICD-10-CM

## 2020-05-14 DIAGNOSIS — R60.0 LOWER EXTREMITY EDEMA: Primary | ICD-10-CM

## 2020-05-14 DIAGNOSIS — I50.33 ACUTE ON CHRONIC DIASTOLIC HEART FAILURE: ICD-10-CM

## 2020-05-14 DIAGNOSIS — I87.2 VENOUS (PERIPHERAL) INSUFFICIENCY: ICD-10-CM

## 2020-05-14 PROCEDURE — 99214 PR OFFICE/OUTPT VISIT, EST, LEVL IV, 30-39 MIN: ICD-10-PCS | Mod: 95,,, | Performed by: NURSE PRACTITIONER

## 2020-05-14 PROCEDURE — 99211 OFF/OP EST MAY X REQ PHY/QHP: CPT | Mod: PO

## 2020-05-14 PROCEDURE — G0463 HOSPITAL OUTPT CLINIC VISIT: HCPCS | Mod: PO

## 2020-05-14 PROCEDURE — 99214 OFFICE O/P EST MOD 30 MIN: CPT | Mod: 95,,, | Performed by: NURSE PRACTITIONER

## 2020-05-14 RX ORDER — FUROSEMIDE 40 MG/1
40 TABLET ORAL DAILY
Qty: 30 TABLET | Refills: 1 | Status: SHIPPED | OUTPATIENT
Start: 2020-05-14 | End: 2020-06-22 | Stop reason: DRUGHIGH

## 2020-05-14 NOTE — PROGRESS NOTES
"Subjective:       Patient ID: Lukas Chance is a 70 y.o. male.    Chief Complaint: No chief complaint on file.    Chief Complaint  No chief complaint on file.      HPI  Lukas Chance is a 70 y.o. male with medical diagnoses as listed in the medical history and problem list that presents with c/o edema.  Established patient with last clinic appointment 2/11/2020.  The patient has history of venous insufficiency and chronic venus ulcer which are managed by Dr. Harris.  He receives regular wound care through [facility].  He states today that his legs are improving but he was told by the staff at [facility] that he should be taking lasix daily to aid in the resolution of the edema.  He also notes that his blood pressure has been uncontrolled as of late with most readings between 150-160's/80-90's.  He reports that he is currently taking lisinopril and aldactone for his blood pressure, although I do not see lisinopril on his active medication list.  He is regularly followed by cardiology, Dr. Patton. He mentions that he recently had a "full workup" done by Dr. Patton and that he "looked good " from a cardiac stand point.  He also mentions that he was prescribed carvedilol by Dr. Patton but hasn't been taking this medication.        PAST MEDICAL HISTORY:  Past Medical History:   Diagnosis Date    Anticoagulant long-term use 3/1/2013    Antithrombin 3 deficiency     Cellulitis     lower legs, venous stasis    Coronary artery disease     Deep vein thrombosis (DVT) 2012    left leg    Diabetes mellitus type 2 in obese 5/15/2013    Hypertension     Obesity     LAKHWINDER (obstructive sleep apnea)     Pulmonary embolism     Venous stasis ulcers     left ankle       PAST SURGICAL HISTORY:  Past Surgical History:   Procedure Laterality Date    COLONOSCOPY  5/2/2011    Dr. Miller, 9 polyps, recheck 5 year    MULTIPLE TOOTH EXTRACTIONS      RADIOFREQUENCY ABLATION  7/2015    bilateral     TONSILLECTOMY   "       SOCIAL HISTORY:  Social History     Socioeconomic History    Marital status:      Spouse name: Not on file    Number of children: Not on file    Years of education: Not on file    Highest education level: Not on file   Occupational History    Not on file   Social Needs    Financial resource strain: Not hard at all    Food insecurity:     Worry: Never true     Inability: Never true    Transportation needs:     Medical: No     Non-medical: No   Tobacco Use    Smoking status: Former Smoker     Packs/day: 1.00     Years: 20.00     Pack years: 20.00     Last attempt to quit: 1/3/2002     Years since quittin.3    Smokeless tobacco: Never Used   Substance and Sexual Activity    Alcohol use: Yes     Alcohol/week: 1.0 standard drinks     Types: 1 Cans of beer per week     Frequency: Never     Drinks per session: Patient refused     Binge frequency: Never     Comment: seldom    Drug use: No    Sexual activity: Yes     Partners: Female   Lifestyle    Physical activity:     Days per week: 2 days     Minutes per session: 10 min    Stress: Not on file   Relationships    Social connections:     Talks on phone: Once a week     Gets together: Once a week     Attends [employer] service: Not on file     Active member of club or organization: No     Attends meetings of clubs or organizations: Never     Relationship status:    Other Topics Concern    Not on file   Social History Narrative    Not on file       FAMILY HISTORY:  Family History   Problem Relation Age of Onset    Heart disease Mother     Heart disease Father     Gallbladder disease Sister     Heart disease Brother     Stroke Brother     Emphysema Maternal Uncle        ALLERGIES AND MEDICATIONS: updated and reviewed.  Review of patient's allergies indicates:   Allergen Reactions    Bactroban [mupirocin calcium] Other (See Comments)     Burning feeling on open sore     Metoprolol Other (See Comments)     dizzy     Current  Outpatient Medications   Medication Sig Dispense Refill    acetaminophen (TYLENOL EXTRA STRENGTH) 500 MG tablet Take 500 mg by mouth 2 (two) times daily as needed for Pain.       albuterol-ipratropium (DUO-NEB) 2.5 mg-0.5 mg/3 mL nebulizer solution Take 3 mLs by nebulization every 6 (six) hours. And PRN for shortness of breath 1 Box 0    ELIQUIS 5 mg Tab TAKE 1 TABLET BY MOUTH TWICE DAILY 180 tablet 3    furosemide (LASIX) 40 MG tablet Take 1 tablet (40 mg total) by mouth once daily. 30 tablet 1    Lactobacillus acidoph-L.bulgar 1 million cell Chew Take 2 tablets by mouth 2 (two) times daily.      multivitamin (THERAGRAN) per tablet Take 1 tablet by mouth once daily.      salmon oil/omega-3 fatty acids (SALMON OIL-1000 ORAL) Take 1 capsule by mouth daily as needed.       spironolactone (ALDACTONE) 50 MG tablet Take 1 tablet (50 mg total) by mouth once daily. 90 tablet 1    tamsulosin (FLOMAX) 0.4 mg Cap Take 1 capsule (0.4 mg total) by mouth once daily. 30 capsule 11    tiotropium-olodaterol (STIOLTO RESPIMAT) 2.5-2.5 mcg/actuation Mist Inhale 2 puffs into the lungs once daily. Controller 12 g 3     No current facility-administered medications for this visit.        I have reviewed the patient's medical, family, and social history in detail and updated the computerized patient record.    Review of Systems   Constitutional: Negative for chills, fatigue, fever and unexpected weight change.   HENT: Negative for congestion, hearing loss, nosebleeds, postnasal drip, sinus pressure, sinus pain and sore throat.    Eyes: Negative for pain, discharge, redness and visual disturbance.   Respiratory: Positive for shortness of breath (chronic). Negative for cough and chest tightness.    Cardiovascular: Positive for leg swelling. Negative for chest pain and palpitations.   Gastrointestinal: Negative for abdominal pain, constipation, diarrhea, nausea and vomiting.   Endocrine: Negative for cold intolerance and heat  intolerance.   Musculoskeletal: Negative for back pain.   Skin: Positive for wound (chronic).   Neurological: Negative for dizziness, syncope, light-headedness and headaches.   Psychiatric/Behavioral: Negative for agitation and dysphoric mood. The patient is not nervous/anxious.          Objective:      There were no vitals filed for this visit.  Physical Exam   Constitutional: He is oriented to person, place, and time. He appears well-developed and well-nourished.   HENT:   Head: Normocephalic and atraumatic.   Right Ear: Hearing normal.   Left Ear: Hearing normal.   Nose: Nose normal.   Eyes: Pupils are equal, round, and reactive to light. Conjunctivae and lids are normal.   Neck: Normal range of motion. Neck supple.   Pulmonary/Chest: Effort normal. No accessory muscle usage. No tachypnea and no bradypnea.   Abdominal: Soft.   Musculoskeletal: Normal range of motion.   Unable to visualize legs as they are wrapped    Neurological: He is alert and oriented to person, place, and time.   Skin: Skin is intact.         Assessment:       1. Lower extremity edema    2. Venous (peripheral) insufficiency    3. Hypertension associated with diabetes    4. Acute on chronic diastolic heart failure    5. Venous stasis ulcer of left ankle with fat layer exposed without varicose veins          Plan:       Diagnoses and all orders for this visit:    Lower extremity edema  -     furosemide (LASIX) 40 MG tablet; Take 1 tablet (40 mg total) by mouth once daily.  -     Comprehensive metabolic panel; Future  -     Comprehensive metabolic panel; Future        -     Need new baseline cmp prior to the start of lasix therapy        -     As patient on multiple potassium sparing medications and now potassium depleting(with addition of Lasix), will need baseline panel to determine current potassium level, and if supplementation is needed anymore(patient reports he has been taking a supplement)        Venous (peripheral)  insufficiency  -Continue regular follow up and care with Dr. Harris  Hypertension associated with diabetes  -Uncontrolled, continue lisinopril 10mg daily and aldactone   -Add lasix - determine if this provides sufficient BP control   -Need to obtain records from Dr. Patton's office as there seems to be confusion in medications   -Take blood pressure twice daily and record, bring logs to next visit   -Low salt diet   Acute on chronic diastolic heart failure  -Continue regular cardiology follow up   Venous stasis ulcer of left ankle with fat layer exposed without varicose veins  -Continue regular wound care with [facility]        Baseline CMP now   Repeat CMP in one week  Close in clinic follow up in one week for evaluation of edema and blood pressure   No follow-ups on file.

## 2020-05-14 NOTE — Clinical Note
Patient inform patient of the followin. Need baseline CMP to be drawn now, repeat in one week2. Need records from Dr. Patton 3. Please inform patient to leave dose of lisinopril and aldactone as is, Lasix was called in to pharmacy for him to begin taking once daily 5. Patient needs to keep BP log twice daily and bring to follow up 4. Please assist patient in scheduling one week in clinic follow up appointment with PCP

## 2020-05-15 ENCOUNTER — LAB VISIT (OUTPATIENT)
Dept: LAB | Facility: HOSPITAL | Age: 70
End: 2020-05-15
Attending: FAMILY MEDICINE
Payer: MEDICARE

## 2020-05-15 DIAGNOSIS — R60.0 LOWER EXTREMITY EDEMA: ICD-10-CM

## 2020-05-15 LAB
ALBUMIN SERPL BCP-MCNC: 3.2 G/DL (ref 3.5–5.2)
ALP SERPL-CCNC: 78 U/L (ref 55–135)
ALT SERPL W/O P-5'-P-CCNC: 20 U/L (ref 10–44)
ANION GAP SERPL CALC-SCNC: 8 MMOL/L (ref 8–16)
AST SERPL-CCNC: 21 U/L (ref 10–40)
BILIRUB SERPL-MCNC: 0.5 MG/DL (ref 0.1–1)
BUN SERPL-MCNC: 23 MG/DL (ref 8–23)
CALCIUM SERPL-MCNC: 9.3 MG/DL (ref 8.7–10.5)
CHLORIDE SERPL-SCNC: 100 MMOL/L (ref 95–110)
CO2 SERPL-SCNC: 31 MMOL/L (ref 23–29)
CREAT SERPL-MCNC: 1.3 MG/DL (ref 0.5–1.4)
EST. GFR  (AFRICAN AMERICAN): >60 ML/MIN/1.73 M^2
EST. GFR  (NON AFRICAN AMERICAN): 54.5 ML/MIN/1.73 M^2
GLUCOSE SERPL-MCNC: 101 MG/DL (ref 70–110)
POTASSIUM SERPL-SCNC: 4.8 MMOL/L (ref 3.5–5.1)
PROT SERPL-MCNC: 8.7 G/DL (ref 6–8.4)
SODIUM SERPL-SCNC: 139 MMOL/L (ref 136–145)

## 2020-05-15 PROCEDURE — 80053 COMPREHEN METABOLIC PANEL: CPT

## 2020-05-15 PROCEDURE — 36415 COLL VENOUS BLD VENIPUNCTURE: CPT | Mod: PO

## 2020-05-19 ENCOUNTER — TELEPHONE (OUTPATIENT)
Dept: FAMILY MEDICINE | Facility: CLINIC | Age: 70
End: 2020-05-19

## 2020-05-19 NOTE — TELEPHONE ENCOUNTER
Spoke to pharmacy and confirmed they do have the prescription at the pharmacy. They will get it ready for the patient and notify the patient.

## 2020-05-19 NOTE — TELEPHONE ENCOUNTER
----- Message from Princess CHIKI Carmona sent at 5/19/2020 12:12 PM CDT -----  Contact: pt  Type: Needs Medical Advice  Who Called:  Patient  Pharmacy name and phone #:    [id number] - [place] - [address]  [address] [place] [address]  Phone: [phone] Fax: [phone]  Best Call Back Number: [phone] (home)     Additional Information:Requesting to speak with someone in regards to pharmacy stating they dont have the rx furosemide (LASIX) 40 MG tablet. Please resend rx over for the patient

## 2020-05-20 DIAGNOSIS — I50.32 CHRONIC DIASTOLIC HEART FAILURE: ICD-10-CM

## 2020-05-20 RX ORDER — SPIRONOLACTONE 50 MG/1
50 TABLET, FILM COATED ORAL DAILY
Qty: 90 TABLET | Refills: 1 | Status: SHIPPED | OUTPATIENT
Start: 2020-05-20 | End: 2021-08-13

## 2020-05-20 NOTE — TELEPHONE ENCOUNTER
Prescription refill request.   LOV: 05/14/2020  NOV: 05/22/2020  LDR: 11/14/2019  Last Labs: 05/15/2020

## 2020-05-22 ENCOUNTER — OFFICE VISIT (OUTPATIENT)
Dept: FAMILY MEDICINE | Facility: CLINIC | Age: 70
End: 2020-05-22
Payer: MEDICARE

## 2020-05-22 ENCOUNTER — LAB VISIT (OUTPATIENT)
Dept: LAB | Facility: HOSPITAL | Age: 70
End: 2020-05-22
Attending: NURSE PRACTITIONER
Payer: MEDICARE

## 2020-05-22 VITALS
WEIGHT: 315 LBS | SYSTOLIC BLOOD PRESSURE: 138 MMHG | RESPIRATION RATE: 22 BRPM | HEIGHT: 71 IN | DIASTOLIC BLOOD PRESSURE: 88 MMHG | TEMPERATURE: 97 F | OXYGEN SATURATION: 98 % | BODY MASS INDEX: 44.1 KG/M2 | HEART RATE: 95 BPM

## 2020-05-22 DIAGNOSIS — R60.0 LOWER EXTREMITY EDEMA: Primary | ICD-10-CM

## 2020-05-22 DIAGNOSIS — E66.01 OBESITY, MORBID, BMI 50 OR HIGHER: ICD-10-CM

## 2020-05-22 DIAGNOSIS — N18.30 CKD (CHRONIC KIDNEY DISEASE) STAGE 3, GFR 30-59 ML/MIN: ICD-10-CM

## 2020-05-22 DIAGNOSIS — I87.2 VENOUS (PERIPHERAL) INSUFFICIENCY: ICD-10-CM

## 2020-05-22 DIAGNOSIS — R60.0 LOWER EXTREMITY EDEMA: ICD-10-CM

## 2020-05-22 DIAGNOSIS — L97.322 VENOUS STASIS ULCER OF LEFT ANKLE WITH FAT LAYER EXPOSED WITHOUT VARICOSE VEINS: ICD-10-CM

## 2020-05-22 DIAGNOSIS — I15.2 HYPERTENSION ASSOCIATED WITH DIABETES: ICD-10-CM

## 2020-05-22 DIAGNOSIS — I87.2 VENOUS STASIS ULCER OF LEFT ANKLE WITH FAT LAYER EXPOSED WITHOUT VARICOSE VEINS: ICD-10-CM

## 2020-05-22 DIAGNOSIS — E11.59 HYPERTENSION ASSOCIATED WITH DIABETES: ICD-10-CM

## 2020-05-22 PROCEDURE — 99214 PR OFFICE/OUTPT VISIT, EST, LEVL IV, 30-39 MIN: ICD-10-PCS | Mod: S$PBB,,, | Performed by: NURSE PRACTITIONER

## 2020-05-22 PROCEDURE — 99999 PR PBB SHADOW E&M-EST. PATIENT-LVL IV: CPT | Mod: PBBFAC,,, | Performed by: NURSE PRACTITIONER

## 2020-05-22 PROCEDURE — 99999 PR PBB SHADOW E&M-EST. PATIENT-LVL IV: ICD-10-PCS | Mod: PBBFAC,,, | Performed by: NURSE PRACTITIONER

## 2020-05-22 PROCEDURE — 80053 COMPREHEN METABOLIC PANEL: CPT

## 2020-05-22 PROCEDURE — 99214 OFFICE O/P EST MOD 30 MIN: CPT | Mod: S$PBB,,, | Performed by: NURSE PRACTITIONER

## 2020-05-22 PROCEDURE — 36415 COLL VENOUS BLD VENIPUNCTURE: CPT | Mod: PO

## 2020-05-22 PROCEDURE — 99214 OFFICE O/P EST MOD 30 MIN: CPT | Mod: PBBFAC,PO | Performed by: NURSE PRACTITIONER

## 2020-05-22 RX ORDER — DOXYCYCLINE 100 MG/1
CAPSULE ORAL
COMMUNITY
Start: 2020-04-29 | End: 2020-06-22 | Stop reason: ALTCHOICE

## 2020-05-23 LAB
ALBUMIN SERPL BCP-MCNC: 3.1 G/DL (ref 3.5–5.2)
ALP SERPL-CCNC: 75 U/L (ref 55–135)
ALT SERPL W/O P-5'-P-CCNC: 25 U/L (ref 10–44)
ANION GAP SERPL CALC-SCNC: 7 MMOL/L (ref 8–16)
AST SERPL-CCNC: 25 U/L (ref 10–40)
BILIRUB SERPL-MCNC: 0.3 MG/DL (ref 0.1–1)
BUN SERPL-MCNC: 26 MG/DL (ref 8–23)
CALCIUM SERPL-MCNC: 8.9 MG/DL (ref 8.7–10.5)
CHLORIDE SERPL-SCNC: 100 MMOL/L (ref 95–110)
CO2 SERPL-SCNC: 28 MMOL/L (ref 23–29)
CREAT SERPL-MCNC: 1.3 MG/DL (ref 0.5–1.4)
EST. GFR  (AFRICAN AMERICAN): >60 ML/MIN/1.73 M^2
EST. GFR  (NON AFRICAN AMERICAN): 54.5 ML/MIN/1.73 M^2
GLUCOSE SERPL-MCNC: 92 MG/DL (ref 70–110)
POTASSIUM SERPL-SCNC: 4.8 MMOL/L (ref 3.5–5.1)
PROT SERPL-MCNC: 8.6 G/DL (ref 6–8.4)
SODIUM SERPL-SCNC: 135 MMOL/L (ref 136–145)

## 2020-06-16 ENCOUNTER — DOCUMENTATION ONLY (OUTPATIENT)
Dept: FAMILY MEDICINE | Facility: CLINIC | Age: 70
End: 2020-06-16

## 2020-06-16 NOTE — PROGRESS NOTES
Pre-Visit Chart Review  For Appointment Scheduled on 6-22-20    Health Maintenance Due   Topic Date Due    High Dose Statin  01/24/1971    Foot Exam  06/26/2019    Eye Exam  05/08/2020    Urine Microalbumin  07/10/2020

## 2020-06-22 ENCOUNTER — OFFICE VISIT (OUTPATIENT)
Dept: FAMILY MEDICINE | Facility: CLINIC | Age: 70
End: 2020-06-22
Attending: FAMILY MEDICINE
Payer: MEDICARE

## 2020-06-22 VITALS
HEIGHT: 71 IN | DIASTOLIC BLOOD PRESSURE: 88 MMHG | WEIGHT: 315 LBS | SYSTOLIC BLOOD PRESSURE: 136 MMHG | TEMPERATURE: 98 F | RESPIRATION RATE: 24 BRPM | OXYGEN SATURATION: 98 % | HEART RATE: 85 BPM | BODY MASS INDEX: 44.1 KG/M2

## 2020-06-22 DIAGNOSIS — E11.59 TYPE 2 DIABETES MELLITUS WITH OTHER CIRCULATORY COMPLICATION, WITHOUT LONG-TERM CURRENT USE OF INSULIN: Primary | ICD-10-CM

## 2020-06-22 DIAGNOSIS — E78.2 COMBINED HYPERLIPIDEMIA ASSOCIATED WITH TYPE 2 DIABETES MELLITUS: ICD-10-CM

## 2020-06-22 DIAGNOSIS — I87.2 VENOUS (PERIPHERAL) INSUFFICIENCY: ICD-10-CM

## 2020-06-22 DIAGNOSIS — I87.2 VENOUS STASIS ULCER OF LEFT ANKLE WITH FAT LAYER EXPOSED WITHOUT VARICOSE VEINS: ICD-10-CM

## 2020-06-22 DIAGNOSIS — I15.2 HYPERTENSION ASSOCIATED WITH DIABETES: ICD-10-CM

## 2020-06-22 DIAGNOSIS — E66.01 MORBID OBESITY WITH BMI OF 50.0-59.9, ADULT: ICD-10-CM

## 2020-06-22 DIAGNOSIS — E11.69 COMBINED HYPERLIPIDEMIA ASSOCIATED WITH TYPE 2 DIABETES MELLITUS: ICD-10-CM

## 2020-06-22 DIAGNOSIS — E11.59 HYPERTENSION ASSOCIATED WITH DIABETES: ICD-10-CM

## 2020-06-22 DIAGNOSIS — L97.322 VENOUS STASIS ULCER OF LEFT ANKLE WITH FAT LAYER EXPOSED WITHOUT VARICOSE VEINS: ICD-10-CM

## 2020-06-22 PROCEDURE — 99999 PR PBB SHADOW E&M-EST. PATIENT-LVL V: CPT | Mod: PBBFAC,,, | Performed by: FAMILY MEDICINE

## 2020-06-22 PROCEDURE — 99214 OFFICE O/P EST MOD 30 MIN: CPT | Mod: S$PBB,,, | Performed by: FAMILY MEDICINE

## 2020-06-22 PROCEDURE — 99215 OFFICE O/P EST HI 40 MIN: CPT | Mod: PBBFAC,PO | Performed by: FAMILY MEDICINE

## 2020-06-22 PROCEDURE — 99999 PR PBB SHADOW E&M-EST. PATIENT-LVL V: ICD-10-PCS | Mod: PBBFAC,,, | Performed by: FAMILY MEDICINE

## 2020-06-22 PROCEDURE — 99214 PR OFFICE/OUTPT VISIT, EST, LEVL IV, 30-39 MIN: ICD-10-PCS | Mod: S$PBB,,, | Performed by: FAMILY MEDICINE

## 2020-06-22 RX ORDER — LISINOPRIL 10 MG/1
10 TABLET ORAL DAILY
Qty: 90 TABLET | Refills: 1 | Status: SHIPPED | OUTPATIENT
Start: 2020-06-22 | End: 2021-08-13

## 2020-06-22 RX ORDER — LISINOPRIL 10 MG/1
10 TABLET ORAL DAILY
COMMUNITY
End: 2020-06-22 | Stop reason: SDUPTHER

## 2020-06-22 RX ORDER — FUROSEMIDE 20 MG/1
20 TABLET ORAL 2 TIMES DAILY
COMMUNITY
Start: 2020-04-04 | End: 2021-08-13

## 2020-06-22 RX ORDER — CARVEDILOL 3.12 MG/1
3.12 TABLET ORAL DAILY
COMMUNITY
Start: 2019-12-27 | End: 2021-08-13

## 2020-06-22 RX ORDER — BUPROPION HYDROCHLORIDE 150 MG/1
150 TABLET ORAL DAILY
Qty: 90 TABLET | Refills: 1 | Status: SHIPPED | OUTPATIENT
Start: 2020-06-22 | End: 2021-08-13

## 2020-06-22 NOTE — PATIENT INSTRUCTIONS
Weight Management: Getting Started  Healthy bodies come in all shapes and sizes. Not all bodies are made to be thin. For some people, a healthy weight is higher than the average weight listed on weight charts. Your healthcare provider can help you decide on a healthy weight for you.    Reasons to lose weight  Losing weight can help with some health problems, such as high blood pressure, heart disease, diabetes, sleep apnea, and arthritis. You may also feel more energy.  Set your long-term goal  Your goal doesn't even have to be a specific weight. You may decide on a fitness goal (such as being able to walk 10 miles a week), or a health goal (such as lowering your blood pressure). Choose a goal that is measurable and reasonable, so you know when you've reached it. A goal of reaching a BMI of less than 25 is not always reasonable (or possible).   Make an action plan  Habits dont change overnight. Setting your goals too high can leave you feeling discouraged if you cant reach them. Be realistic. Choose one or two small changes you can make now. Set an action plan for how you are going to make these changes. When you can stick to this plan, keep making a few more small changes. Taking small steps will help you stay on the path to success.  Track your progress  Write down your goals. Then, keep a daily record of your progress. Write down what you eat and how active you are. This record lets you look back on how much youve done. It may also help when youre feeling frustrated. Reward yourself for success. Even if you dont reach every goal, give yourself credit for what you do get done.  Get support  Encouragement from others can help make losing weight easier. Ask your family members and friends for support. They may even want to join you. Also look to your healthcare provider, registered dietitian, and  for help. Your local hospital can give you more information about nutrition, exercise, and  weight loss.  Date Last Reviewed: 1/31/2016  © 2002-5235 [employer]. [address] [place] [address], [place] [address]. All rights reserved. This information is not intended as a substitute for professional medical care. Always follow your healthcare professional's instructions.        Controlling High Blood Pressure  High blood pressure (hypertension) is often called the silent killer. This is because many people who have it dont know it. High blood pressure is defined as 140/90 mm Hg or higher. Know your blood pressure and remember to check it regularly. Doing so can save your life. Here are some things you can do to help control your blood pressure.    Choose heart-healthy foods  · Select low-salt, low-fat foods. Limit sodium intake to 2,400 mg per day or the amount suggested by your healthcare provider.  · Limit canned, dried, cured, packaged, and fast foods. These can contain a lot of salt.  · Eat 8 to 10 servings of fruits and vegetables every day.  · Choose lean meats, fish, or chicken.  · Eat whole-grain pasta, brown rice, and beans.  · Eat 2 to 3 servings of low-fat or fat-free dairy products.  · Ask your doctor about the DASH eating plan. This plan helps reduce blood pressure.  · When you go to a restaurant, ask that your meal be prepared with no added salt.  Maintain a healthy weight  · Ask your healthcare provider how many calories to eat a day. Then stick to that number.  · Ask your healthcare provider what weight range is healthiest for you. If you are overweight, a weight loss of only 3% to 5% of your body weight can help lower blood pressure. Generally, a good weight loss goal is to lose 10% of your body weight in a year.  · Limit snacks and sweets.  · Get regular exercise.  Get up and get active  · Choose activities you enjoy. Find ones you can do with friends or family. This includes bicycling, dancing, walking, and jogging.  · Park farther away from building entrances.  · Use stairs  instead of the elevator.  · When you can, walk or bike instead of driving.  · [place] leaves, garden, or do household repairs.  · Be active at a moderate to vigorous level of physical activity for at least 40 minutes for a minimum of 3 to 4 days a week.   Manage stress  · Make time to relax and enjoy life. Find time to laugh.  · Communicate your concerns with your loved ones and your healthcare provider.  · Visit with family and friends, and keep up with hobbies.  Limit alcohol and quit smoking  · Men should have no more than 2 drinks per day.  · Women should have no more than 1 drink per day.  · Talk with your healthcare provider about quitting smoking. Smoking significantly increases your risk for heart disease and stroke. Ask your healthcare provider about community smoking cessation programs and other options.  Medicines  If lifestyle changes arent enough, your healthcare provider may prescribe high blood pressure medicine. Take all medicines as prescribed. If you have any questions about your medicines, ask your healthcare provider before stopping or changing them.   Date Last Reviewed: 4/27/2016 © 2000-2017 [employer]. [address] [place] [address], [place] [address]. All rights reserved. This information is not intended as a substitute for professional medical care. Always follow your healthcare professional's instructions.

## 2020-06-22 NOTE — PROGRESS NOTES
Subjective:       Patient ID: Lukas Chance is a 70 y.o. male.    Chief Complaint: Follow-up ([place])    70-year-old male coming in follow-up on multiple health issues.  He has chronic venous insufficiency with venous stasis ulcers and recently had several radiofrequency ablation is a done by Dr. Harris.  He is being followed by wound care and is ulcers are healing well.  Currently he is wrapped up in their bandage and they are doing rechecks on Tuesdays.  Dr. Harris thinks he will be healed up within the next month.  Dr. Patton started him on some Coreg 3.125 mg twice day but the patient is only taking it once a day.  He also is supposed to be taking lisinopril 10 mg daily and he is taking that erratically.  He thought that the lisinopril was a diuretic and he was already taking Lasix and Aldactone so he did not continue taking it.  Blood pressure has been running in the 140s over 80s.  He has had no problems with hypoglycemia and is last A1c was very good.  He is due for recheck in August.  He is interested in something to help him with appetite control for weight loss.    Past Medical History:  3/1/2013: Anticoagulant long-term use  No date: Antithrombin 3 deficiency  No date: Cellulitis      Comment:  lower legs, venous stasis  No date: Coronary artery disease  2012: Deep vein thrombosis (DVT)      Comment:  left leg  5/15/2013: Diabetes mellitus type 2 in obese  No date: Hypertension  No date: Obesity  No date: LAKHWINDER (obstructive sleep apnea)  No date: Pulmonary embolism  No date: Venous stasis ulcers      Comment:  left ankle    Past Surgical History:  5/2/2011: COLONOSCOPY      Comment:  Dr. Miller, 9 polyps, recheck 5 year  No date: MULTIPLE TOOTH EXTRACTIONS  7/2015: RADIOFREQUENCY ABLATION      Comment:  bilateral   No date: TONSILLECTOMY    Current Outpatient Medications on File Prior to Visit:  acetaminophen (TYLENOL EXTRA STRENGTH) 500 MG tablet, Take 500 mg by mouth 2 (two) times daily as needed for  Pain. , Disp: , Rfl:   carvediloL (COREG) 3.125 MG tablet, Take 3.125 mg by mouth once daily. Listed as bid / pt taking qd, Disp: , Rfl:   ELIQUIS 5 mg Tab, TAKE 1 TABLET BY MOUTH TWICE DAILY, Disp: 180 tablet, Rfl: 3  furosemide (LASIX) 20 MG tablet, Take 20 mg by mouth 2 (two) times daily. Per Wound care - 40 mg caused dizziness, Disp: , Rfl:   multivitamin (THERAGRAN) per tablet, Take 1 tablet by mouth once daily., Disp: , Rfl:   salmon oil/omega-3 fatty acids (SALMON OIL-1000 ORAL), Take 1 capsule by mouth daily as needed. , Disp: , Rfl:   spironolactone (ALDACTONE) 50 MG tablet, Take 1 tablet (50 mg total) by mouth once daily., Disp: 90 tablet, Rfl: 1  tamsulosin (FLOMAX) 0.4 mg Cap, Take 1 capsule (0.4 mg total) by mouth once daily., Disp: 30 capsule, Rfl: 11  (DISCONTINUED) lisinopriL 10 MG tablet, Take 10 mg by mouth once daily., Disp: , Rfl:   (DISCONTINUED) albuterol-ipratropium (DUO-NEB) 2.5 mg-0.5 mg/3 mL nebulizer solution, Take 3 mLs by nebulization every 6 (six) hours. And PRN for shortness of breath (Patient not taking: Reported on 6/22/2020), Disp: 1 Box, Rfl: 0  (DISCONTINUED) doxycycline (VIBRAMYCIN) 100 MG Cap, , Disp: , Rfl:   (DISCONTINUED) furosemide (LASIX) 40 MG tablet, Take 1 tablet (40 mg total) by mouth once daily. (Patient not taking: Reported on 6/22/2020), Disp: 30 tablet, Rfl: 1  (DISCONTINUED) Lactobacillus acidoph-L.bulgar 1 million cell Chew, Take 2 tablets by mouth 2 (two) times daily. (Patient not taking: Reported on 6/22/2020), Disp: , Rfl:     No current facility-administered medications on file prior to visit.         Review of Systems   Constitutional: Negative for chills, diaphoresis and fever.   Respiratory: Negative for chest tightness and shortness of breath.    Cardiovascular: Positive for leg swelling. Negative for chest pain and palpitations.   Skin: Positive for wound.       Objective:      Physical Exam  Vitals signs and nursing note reviewed.   Constitutional:        Appearance: Normal appearance. He is obese.      Comments: Blood pressure upper limit of normal range  Morbid obesity with a BMI of 53.6 he is down 9.8 lb from his last visit with me February 11, 2020   Cardiovascular:      Rate and Rhythm: Normal rate and regular rhythm.      Heart sounds: Normal heart sounds.   Pulmonary:      Effort: Pulmonary effort is normal. No respiratory distress.      Breath sounds: Normal breath sounds. No stridor. No wheezing, rhonchi or rales.   Chest:      Chest wall: No tenderness.   Skin:     Comments: Lower extremities in compression dressings which were not removed   Neurological:      Mental Status: He is alert.   Psychiatric:         Mood and Affect: Mood normal.         Behavior: Behavior normal.         Assessment:       1. Type 2 diabetes mellitus with other circulatory complication, without long-term current use of insulin    2. Hypertension associated with diabetes    3. Combined hyperlipidemia associated with type 2 diabetes mellitus    4. Venous stasis ulcer of left ankle with fat layer exposed without varicose veins    5. Venous (peripheral) insufficiency    6. Morbid obesity with BMI of 50.0-59.9, adult        Plan:       1. Type 2 diabetes mellitus with other circulatory complication, without long-term current use of insulin  Lab Results   Component Value Date    HGBA1C 5.8 (H) 02/11/2020     Recheck mid to late August  - Hemoglobin A1C; Future    2. Hypertension associated with diabetes  Fair control, patient instructed to take his Coreg twice daily as ordered and his lisinopril daily as ordered  - lisinopriL 10 MG tablet; Take 1 tablet (10 mg total) by mouth once daily.  Dispense: 90 tablet; Refill: 1  - Comprehensive metabolic panel; Future    3. Combined hyperlipidemia associated with type 2 diabetes mellitus  Lab Results   Component Value Date    CHOL 171 02/11/2020    CHOL 128 02/26/2019    CHOL 162 06/26/2018     Lab Results   Component Value Date    HDL 47  02/11/2020    HDL 31 (L) 02/26/2019    HDL 38 (L) 06/26/2018     Lab Results   Component Value Date    LDLCALC 105.6 02/11/2020    LDLCALC 78.4 02/26/2019    LDLCALC 105.4 06/26/2018     Lab Results   Component Value Date    TRIG 92 02/11/2020    TRIG 93 02/26/2019    TRIG 93 06/26/2018     Lab Results   Component Value Date    CHOLHDL 27.5 02/11/2020    CHOLHDL 24.2 02/26/2019    CHOLHDL 23.5 06/26/2018     Statin declined  - Comprehensive metabolic panel; Future    4. Venous stasis ulcer of left ankle with fat layer exposed without varicose veins  Followed by Dr. Harris and wound care    5. Venous (peripheral) insufficiency  Status post radiofrequency ablation by Dr. Harris    6. Morbid obesity with BMI of 50.0-59.9, adult  - buPROPion (WELLBUTRIN XL) 150 MG TB24 tablet; Take 1 tablet (150 mg total) by mouth once daily.  Dispense: 90 tablet; Refill: 1  - Comprehensive metabolic panel; Future  - Hemoglobin A1C; Future

## 2020-07-17 DIAGNOSIS — Z71.89 COMPLEX CARE COORDINATION: ICD-10-CM

## 2020-08-19 ENCOUNTER — LAB VISIT (OUTPATIENT)
Dept: LAB | Facility: HOSPITAL | Age: 70
End: 2020-08-19
Attending: FAMILY MEDICINE
Payer: MEDICARE

## 2020-08-19 DIAGNOSIS — E11.69 COMBINED HYPERLIPIDEMIA ASSOCIATED WITH TYPE 2 DIABETES MELLITUS: ICD-10-CM

## 2020-08-19 DIAGNOSIS — E11.59 HYPERTENSION ASSOCIATED WITH DIABETES: ICD-10-CM

## 2020-08-19 DIAGNOSIS — E78.2 COMBINED HYPERLIPIDEMIA ASSOCIATED WITH TYPE 2 DIABETES MELLITUS: ICD-10-CM

## 2020-08-19 DIAGNOSIS — E66.01 MORBID OBESITY WITH BMI OF 50.0-59.9, ADULT: ICD-10-CM

## 2020-08-19 DIAGNOSIS — I15.2 HYPERTENSION ASSOCIATED WITH DIABETES: ICD-10-CM

## 2020-08-19 DIAGNOSIS — E11.59 TYPE 2 DIABETES MELLITUS WITH OTHER CIRCULATORY COMPLICATION, WITHOUT LONG-TERM CURRENT USE OF INSULIN: ICD-10-CM

## 2020-08-19 LAB
ALBUMIN SERPL BCP-MCNC: 3.4 G/DL (ref 3.5–5.2)
ALP SERPL-CCNC: 78 U/L (ref 55–135)
ALT SERPL W/O P-5'-P-CCNC: 22 U/L (ref 10–44)
ANION GAP SERPL CALC-SCNC: 7 MMOL/L (ref 8–16)
AST SERPL-CCNC: 22 U/L (ref 10–40)
BILIRUB SERPL-MCNC: 0.6 MG/DL (ref 0.1–1)
BUN SERPL-MCNC: 21 MG/DL (ref 8–23)
CALCIUM SERPL-MCNC: 9 MG/DL (ref 8.7–10.5)
CHLORIDE SERPL-SCNC: 101 MMOL/L (ref 95–110)
CO2 SERPL-SCNC: 28 MMOL/L (ref 23–29)
CREAT SERPL-MCNC: 1.2 MG/DL (ref 0.5–1.4)
EST. GFR  (AFRICAN AMERICAN): >60 ML/MIN/1.73 M^2
EST. GFR  (NON AFRICAN AMERICAN): >60 ML/MIN/1.73 M^2
ESTIMATED AVG GLUCOSE: 120 MG/DL (ref 68–131)
GLUCOSE SERPL-MCNC: 107 MG/DL (ref 70–110)
HBA1C MFR BLD HPLC: 5.8 % (ref 4–5.6)
POTASSIUM SERPL-SCNC: 4.5 MMOL/L (ref 3.5–5.1)
PROT SERPL-MCNC: 8.1 G/DL (ref 6–8.4)
SODIUM SERPL-SCNC: 136 MMOL/L (ref 136–145)

## 2020-08-19 PROCEDURE — 80053 COMPREHEN METABOLIC PANEL: CPT

## 2020-08-19 PROCEDURE — 83036 HEMOGLOBIN GLYCOSYLATED A1C: CPT

## 2020-08-19 PROCEDURE — 36415 COLL VENOUS BLD VENIPUNCTURE: CPT | Mod: PO

## 2020-08-20 DIAGNOSIS — E11.59 TYPE 2 DIABETES MELLITUS WITH OTHER CIRCULATORY COMPLICATION, WITHOUT LONG-TERM CURRENT USE OF INSULIN: Primary | ICD-10-CM

## 2020-08-20 DIAGNOSIS — E11.69 COMBINED HYPERLIPIDEMIA ASSOCIATED WITH TYPE 2 DIABETES MELLITUS: ICD-10-CM

## 2020-08-20 DIAGNOSIS — E78.2 COMBINED HYPERLIPIDEMIA ASSOCIATED WITH TYPE 2 DIABETES MELLITUS: ICD-10-CM

## 2020-08-20 DIAGNOSIS — I15.2 HYPERTENSION ASSOCIATED WITH DIABETES: ICD-10-CM

## 2020-08-20 DIAGNOSIS — E11.59 HYPERTENSION ASSOCIATED WITH DIABETES: ICD-10-CM

## 2020-10-01 ENCOUNTER — PATIENT MESSAGE (OUTPATIENT)
Dept: OTHER | Facility: OTHER | Age: 70
End: 2020-10-01

## 2020-10-05 ENCOUNTER — PATIENT MESSAGE (OUTPATIENT)
Dept: ADMINISTRATIVE | Facility: HOSPITAL | Age: 70
End: 2020-10-05

## 2020-12-11 ENCOUNTER — PATIENT MESSAGE (OUTPATIENT)
Dept: OTHER | Facility: OTHER | Age: 70
End: 2020-12-11

## 2020-12-15 NOTE — PLAN OF CARE
Patient receiving aerosol tx via duoneb now and q8hr.  Hr 98 and 02 saturation 90% on ROOM AIR.  Nc at 2lpm.   Yes

## 2021-01-04 ENCOUNTER — PATIENT MESSAGE (OUTPATIENT)
Dept: ADMINISTRATIVE | Facility: HOSPITAL | Age: 71
End: 2021-01-04

## 2021-01-14 DIAGNOSIS — E11.9 TYPE 2 DIABETES MELLITUS WITHOUT COMPLICATION, UNSPECIFIED WHETHER LONG TERM INSULIN USE: ICD-10-CM

## 2021-01-20 NOTE — PLAN OF CARE
Problem: Infection  Goal: Infection Symptom Resolution  Remains afebrile during shift no s/s of infection noted        No

## 2021-01-22 ENCOUNTER — PATIENT MESSAGE (OUTPATIENT)
Dept: ADMINISTRATIVE | Facility: OTHER | Age: 71
End: 2021-01-22

## 2021-02-12 ENCOUNTER — IMMUNIZATION (OUTPATIENT)
Dept: PRIMARY CARE CLINIC | Facility: CLINIC | Age: 71
End: 2021-02-12
Payer: MEDICARE

## 2021-02-12 DIAGNOSIS — Z23 NEED FOR VACCINATION: Primary | ICD-10-CM

## 2021-02-12 PROCEDURE — 0001A COVID-19, MRNA, LNP-S, PF, 30 MCG/0.3 ML DOSE VACCINE: CPT | Mod: S$GLB,,, | Performed by: FAMILY MEDICINE

## 2021-02-12 PROCEDURE — 91300 COVID-19, MRNA, LNP-S, PF, 30 MCG/0.3 ML DOSE VACCINE: ICD-10-PCS | Mod: S$GLB,,, | Performed by: FAMILY MEDICINE

## 2021-02-12 PROCEDURE — 0001A COVID-19, MRNA, LNP-S, PF, 30 MCG/0.3 ML DOSE VACCINE: ICD-10-PCS | Mod: S$GLB,,, | Performed by: FAMILY MEDICINE

## 2021-02-12 PROCEDURE — 91300 COVID-19, MRNA, LNP-S, PF, 30 MCG/0.3 ML DOSE VACCINE: CPT | Mod: S$GLB,,, | Performed by: FAMILY MEDICINE

## 2021-02-28 ENCOUNTER — EXTERNAL CHRONIC CARE MANAGEMENT (OUTPATIENT)
Dept: PRIMARY CARE CLINIC | Facility: CLINIC | Age: 71
End: 2021-02-28
Payer: MEDICARE

## 2021-02-28 PROCEDURE — 99490 PR CHRONIC CARE MGMT, 1ST 20 MIN: ICD-10-PCS | Mod: S$PBB,,, | Performed by: FAMILY MEDICINE

## 2021-02-28 PROCEDURE — 99490 CHRNC CARE MGMT STAFF 1ST 20: CPT | Mod: PBBFAC,PO | Performed by: FAMILY MEDICINE

## 2021-02-28 PROCEDURE — 99490 CHRNC CARE MGMT STAFF 1ST 20: CPT | Mod: S$PBB,,, | Performed by: FAMILY MEDICINE

## 2021-03-05 ENCOUNTER — IMMUNIZATION (OUTPATIENT)
Dept: PRIMARY CARE CLINIC | Facility: CLINIC | Age: 71
End: 2021-03-05
Payer: MEDICARE

## 2021-03-05 DIAGNOSIS — Z23 NEED FOR VACCINATION: Primary | ICD-10-CM

## 2021-03-05 PROCEDURE — 0002A COVID-19, MRNA, LNP-S, PF, 30 MCG/0.3 ML DOSE VACCINE: ICD-10-PCS | Mod: CV19,S$GLB,, | Performed by: FAMILY MEDICINE

## 2021-03-05 PROCEDURE — 91300 COVID-19, MRNA, LNP-S, PF, 30 MCG/0.3 ML DOSE VACCINE: CPT | Mod: S$GLB,,, | Performed by: FAMILY MEDICINE

## 2021-03-05 PROCEDURE — 0002A COVID-19, MRNA, LNP-S, PF, 30 MCG/0.3 ML DOSE VACCINE: CPT | Mod: CV19,S$GLB,, | Performed by: FAMILY MEDICINE

## 2021-03-05 PROCEDURE — 91300 COVID-19, MRNA, LNP-S, PF, 30 MCG/0.3 ML DOSE VACCINE: ICD-10-PCS | Mod: S$GLB,,, | Performed by: FAMILY MEDICINE

## 2021-04-05 ENCOUNTER — PATIENT MESSAGE (OUTPATIENT)
Dept: ADMINISTRATIVE | Facility: HOSPITAL | Age: 71
End: 2021-04-05

## 2021-04-16 ENCOUNTER — TELEPHONE (OUTPATIENT)
Dept: FAMILY MEDICINE | Facility: CLINIC | Age: 71
End: 2021-04-16

## 2021-04-19 ENCOUNTER — OFFICE VISIT (OUTPATIENT)
Dept: FAMILY MEDICINE | Facility: CLINIC | Age: 71
End: 2021-04-19
Payer: MEDICARE

## 2021-04-19 VITALS
BODY MASS INDEX: 44.1 KG/M2 | SYSTOLIC BLOOD PRESSURE: 126 MMHG | OXYGEN SATURATION: 97 % | DIASTOLIC BLOOD PRESSURE: 88 MMHG | WEIGHT: 315 LBS | TEMPERATURE: 98 F | HEIGHT: 71 IN | HEART RATE: 69 BPM

## 2021-04-19 DIAGNOSIS — R06.09 DYSPNEA ON EXERTION: Primary | ICD-10-CM

## 2021-04-19 DIAGNOSIS — M54.16 LUMBAR RADICULOPATHY: ICD-10-CM

## 2021-04-19 DIAGNOSIS — R29.898 WEAKNESS OF LOWER EXTREMITY, UNSPECIFIED LATERALITY: ICD-10-CM

## 2021-04-19 DIAGNOSIS — R29.6 MULTIPLE FALLS: ICD-10-CM

## 2021-04-19 PROCEDURE — 99215 OFFICE O/P EST HI 40 MIN: CPT | Mod: PBBFAC,PO | Performed by: NURSE PRACTITIONER

## 2021-04-19 PROCEDURE — 99999 PR PBB SHADOW E&M-EST. PATIENT-LVL V: CPT | Mod: PBBFAC,,, | Performed by: NURSE PRACTITIONER

## 2021-04-19 PROCEDURE — 99214 PR OFFICE/OUTPT VISIT, EST, LEVL IV, 30-39 MIN: ICD-10-PCS | Mod: S$PBB,,, | Performed by: NURSE PRACTITIONER

## 2021-04-19 PROCEDURE — 99214 OFFICE O/P EST MOD 30 MIN: CPT | Mod: S$PBB,,, | Performed by: NURSE PRACTITIONER

## 2021-04-19 PROCEDURE — 99999 PR PBB SHADOW E&M-EST. PATIENT-LVL V: ICD-10-PCS | Mod: PBBFAC,,, | Performed by: NURSE PRACTITIONER

## 2021-04-19 RX ORDER — ALBUTEROL SULFATE 0.63 MG/3ML
0.63 SOLUTION RESPIRATORY (INHALATION) EVERY 6 HOURS PRN
Qty: 1 BOX | Refills: 2 | Status: ON HOLD | OUTPATIENT
Start: 2021-04-19 | End: 2023-05-12 | Stop reason: HOSPADM

## 2021-04-23 ENCOUNTER — CLINICAL SUPPORT (OUTPATIENT)
Dept: REHABILITATION | Facility: HOSPITAL | Age: 71
End: 2021-04-23
Attending: NURSE PRACTITIONER
Payer: MEDICARE

## 2021-04-23 DIAGNOSIS — R29.898 BILATERAL LEG WEAKNESS: ICD-10-CM

## 2021-04-23 DIAGNOSIS — R29.6 MULTIPLE FALLS: ICD-10-CM

## 2021-04-23 DIAGNOSIS — M54.16 LUMBAR RADICULOPATHY: ICD-10-CM

## 2021-04-23 DIAGNOSIS — Z74.09 DECREASED FUNCTIONAL MOBILITY AND ENDURANCE: ICD-10-CM

## 2021-04-23 DIAGNOSIS — R29.898 WEAKNESS OF LOWER EXTREMITY, UNSPECIFIED LATERALITY: ICD-10-CM

## 2021-04-23 PROCEDURE — 97162 PT EVAL MOD COMPLEX 30 MIN: CPT | Mod: PN

## 2021-04-29 ENCOUNTER — CLINICAL SUPPORT (OUTPATIENT)
Dept: REHABILITATION | Facility: HOSPITAL | Age: 71
End: 2021-04-29
Payer: MEDICARE

## 2021-04-29 DIAGNOSIS — R29.898 BILATERAL LEG WEAKNESS: ICD-10-CM

## 2021-04-29 DIAGNOSIS — Z74.09 DECREASED FUNCTIONAL MOBILITY AND ENDURANCE: ICD-10-CM

## 2021-04-29 PROCEDURE — 97116 GAIT TRAINING THERAPY: CPT | Mod: PN,CQ

## 2021-04-29 PROCEDURE — 97110 THERAPEUTIC EXERCISES: CPT | Mod: PN,CQ

## 2021-04-29 PROCEDURE — 97530 THERAPEUTIC ACTIVITIES: CPT | Mod: PN,CQ

## 2021-05-04 ENCOUNTER — PATIENT OUTREACH (OUTPATIENT)
Dept: ADMINISTRATIVE | Facility: OTHER | Age: 71
End: 2021-05-04

## 2021-05-04 ENCOUNTER — CLINICAL SUPPORT (OUTPATIENT)
Dept: REHABILITATION | Facility: HOSPITAL | Age: 71
End: 2021-05-04
Payer: MEDICARE

## 2021-05-04 DIAGNOSIS — R29.898 BILATERAL LEG WEAKNESS: ICD-10-CM

## 2021-05-04 DIAGNOSIS — Z74.09 DECREASED FUNCTIONAL MOBILITY AND ENDURANCE: ICD-10-CM

## 2021-05-04 PROCEDURE — 97110 THERAPEUTIC EXERCISES: CPT | Mod: PN

## 2021-05-04 PROCEDURE — 97116 GAIT TRAINING THERAPY: CPT | Mod: PN

## 2021-05-05 ENCOUNTER — DOCUMENTATION ONLY (OUTPATIENT)
Dept: REHABILITATION | Facility: HOSPITAL | Age: 71
End: 2021-05-05

## 2021-05-05 DIAGNOSIS — E11.9 TYPE 2 DIABETES MELLITUS WITHOUT COMPLICATION: ICD-10-CM

## 2021-05-06 ENCOUNTER — OFFICE VISIT (OUTPATIENT)
Dept: PHYSICAL MEDICINE AND REHAB | Facility: CLINIC | Age: 71
End: 2021-05-06
Payer: MEDICARE

## 2021-05-06 VITALS — HEIGHT: 71 IN | BODY MASS INDEX: 44.1 KG/M2 | RESPIRATION RATE: 20 BRPM | WEIGHT: 315 LBS

## 2021-05-06 DIAGNOSIS — M54.16 LUMBAR RADICULOPATHY: ICD-10-CM

## 2021-05-06 DIAGNOSIS — M54.9 DORSALGIA, UNSPECIFIED: ICD-10-CM

## 2021-05-06 DIAGNOSIS — M17.0 PRIMARY OSTEOARTHRITIS OF BOTH KNEES: Primary | ICD-10-CM

## 2021-05-06 DIAGNOSIS — R29.6 MULTIPLE FALLS: ICD-10-CM

## 2021-05-06 DIAGNOSIS — I89.0 LYMPHEDEMA: ICD-10-CM

## 2021-05-06 DIAGNOSIS — M25.561 BILATERAL CHRONIC KNEE PAIN: ICD-10-CM

## 2021-05-06 DIAGNOSIS — R29.898 WEAKNESS OF LOWER EXTREMITY, UNSPECIFIED LATERALITY: ICD-10-CM

## 2021-05-06 DIAGNOSIS — M25.562 BILATERAL CHRONIC KNEE PAIN: ICD-10-CM

## 2021-05-06 DIAGNOSIS — Z74.09 IMPAIRED FUNCTIONAL MOBILITY AND ACTIVITY TOLERANCE: ICD-10-CM

## 2021-05-06 DIAGNOSIS — G89.29 BILATERAL CHRONIC KNEE PAIN: ICD-10-CM

## 2021-05-06 DIAGNOSIS — E66.01 CLASS 3 SEVERE OBESITY WITH BODY MASS INDEX (BMI) OF 45.0 TO 49.9 IN ADULT, UNSPECIFIED OBESITY TYPE, UNSPECIFIED WHETHER SERIOUS COMORBIDITY PRESENT: ICD-10-CM

## 2021-05-06 DIAGNOSIS — G89.29 OTHER CHRONIC PAIN: ICD-10-CM

## 2021-05-06 PROCEDURE — 99999 PR PBB SHADOW E&M-EST. PATIENT-LVL IV: ICD-10-PCS | Mod: PBBFAC,,, | Performed by: PHYSICAL MEDICINE & REHABILITATION

## 2021-05-06 PROCEDURE — 99214 OFFICE O/P EST MOD 30 MIN: CPT | Mod: PBBFAC,PN,25 | Performed by: PHYSICAL MEDICINE & REHABILITATION

## 2021-05-06 PROCEDURE — 99203 PR OFFICE/OUTPT VISIT, NEW, LEVL III, 30-44 MIN: ICD-10-PCS | Mod: 25,S$PBB,, | Performed by: PHYSICAL MEDICINE & REHABILITATION

## 2021-05-06 PROCEDURE — 20611 PR DRAIN/ASP/INJECT MAJOR JOINT/BURSA W/US GUIDANCE: ICD-10-PCS | Mod: 50,S$PBB,, | Performed by: PHYSICAL MEDICINE & REHABILITATION

## 2021-05-06 PROCEDURE — 20611 DRAIN/INJ JOINT/BURSA W/US: CPT | Mod: PBBFAC,PN | Performed by: PHYSICAL MEDICINE & REHABILITATION

## 2021-05-06 PROCEDURE — 99203 OFFICE O/P NEW LOW 30 MIN: CPT | Mod: 25,S$PBB,, | Performed by: PHYSICAL MEDICINE & REHABILITATION

## 2021-05-06 PROCEDURE — 20611 DRAIN/INJ JOINT/BURSA W/US: CPT | Mod: 50,S$PBB,, | Performed by: PHYSICAL MEDICINE & REHABILITATION

## 2021-05-06 PROCEDURE — 99999 PR PBB SHADOW E&M-EST. PATIENT-LVL IV: CPT | Mod: PBBFAC,,, | Performed by: PHYSICAL MEDICINE & REHABILITATION

## 2021-05-11 ENCOUNTER — CLINICAL SUPPORT (OUTPATIENT)
Dept: REHABILITATION | Facility: HOSPITAL | Age: 71
End: 2021-05-11
Payer: MEDICARE

## 2021-05-11 DIAGNOSIS — Z74.09 DECREASED FUNCTIONAL MOBILITY AND ENDURANCE: ICD-10-CM

## 2021-05-11 DIAGNOSIS — R29.898 BILATERAL LEG WEAKNESS: ICD-10-CM

## 2021-05-11 PROCEDURE — 97110 THERAPEUTIC EXERCISES: CPT | Mod: PN,CQ

## 2021-05-11 PROCEDURE — 97116 GAIT TRAINING THERAPY: CPT | Mod: PN,CQ

## 2021-05-13 ENCOUNTER — CLINICAL SUPPORT (OUTPATIENT)
Dept: REHABILITATION | Facility: HOSPITAL | Age: 71
End: 2021-05-13
Payer: MEDICARE

## 2021-05-13 DIAGNOSIS — R29.898 BILATERAL LEG WEAKNESS: ICD-10-CM

## 2021-05-13 DIAGNOSIS — Z74.09 DECREASED FUNCTIONAL MOBILITY AND ENDURANCE: ICD-10-CM

## 2021-05-13 PROCEDURE — 97116 GAIT TRAINING THERAPY: CPT | Mod: PN,CQ

## 2021-05-13 PROCEDURE — 97110 THERAPEUTIC EXERCISES: CPT | Mod: PN,CQ

## 2021-05-18 ENCOUNTER — CLINICAL SUPPORT (OUTPATIENT)
Dept: REHABILITATION | Facility: HOSPITAL | Age: 71
End: 2021-05-18
Payer: MEDICARE

## 2021-05-18 DIAGNOSIS — R29.898 BILATERAL LEG WEAKNESS: ICD-10-CM

## 2021-05-18 DIAGNOSIS — Z74.09 DECREASED FUNCTIONAL MOBILITY AND ENDURANCE: ICD-10-CM

## 2021-05-18 PROCEDURE — 97110 THERAPEUTIC EXERCISES: CPT | Mod: PN,CQ

## 2021-05-18 PROCEDURE — 97116 GAIT TRAINING THERAPY: CPT | Mod: PN,CQ

## 2021-05-27 ENCOUNTER — CLINICAL SUPPORT (OUTPATIENT)
Dept: REHABILITATION | Facility: HOSPITAL | Age: 71
End: 2021-05-27
Payer: MEDICARE

## 2021-05-27 DIAGNOSIS — Z74.09 DECREASED FUNCTIONAL MOBILITY AND ENDURANCE: ICD-10-CM

## 2021-05-27 DIAGNOSIS — R29.898 BILATERAL LEG WEAKNESS: ICD-10-CM

## 2021-05-27 PROCEDURE — 97110 THERAPEUTIC EXERCISES: CPT | Mod: PN,CQ

## 2021-05-27 PROCEDURE — 97116 GAIT TRAINING THERAPY: CPT | Mod: PN,CQ

## 2021-06-02 ENCOUNTER — DOCUMENTATION ONLY (OUTPATIENT)
Dept: REHABILITATION | Facility: HOSPITAL | Age: 71
End: 2021-06-02

## 2021-06-08 ENCOUNTER — DOCUMENTATION ONLY (OUTPATIENT)
Dept: REHABILITATION | Facility: HOSPITAL | Age: 71
End: 2021-06-08

## 2021-06-08 ENCOUNTER — TELEPHONE (OUTPATIENT)
Dept: REHABILITATION | Facility: HOSPITAL | Age: 71
End: 2021-06-08

## 2021-06-10 ENCOUNTER — TELEPHONE (OUTPATIENT)
Dept: REHABILITATION | Facility: HOSPITAL | Age: 71
End: 2021-06-10

## 2021-06-11 ENCOUNTER — TELEPHONE (OUTPATIENT)
Dept: FAMILY MEDICINE | Facility: CLINIC | Age: 71
End: 2021-06-11

## 2021-06-14 ENCOUNTER — OFFICE VISIT (OUTPATIENT)
Dept: PODIATRY | Facility: CLINIC | Age: 71
End: 2021-06-14
Payer: MEDICARE

## 2021-06-14 VITALS — BODY MASS INDEX: 46.3 KG/M2 | OXYGEN SATURATION: 96 % | HEIGHT: 71 IN | HEART RATE: 78 BPM

## 2021-06-14 DIAGNOSIS — E11.42 DIABETIC POLYNEUROPATHY ASSOCIATED WITH TYPE 2 DIABETES MELLITUS: ICD-10-CM

## 2021-06-14 DIAGNOSIS — L60.2 ONYCHOGRYPOSIS: ICD-10-CM

## 2021-06-14 DIAGNOSIS — E11.59 TYPE 2 DIABETES MELLITUS WITH OTHER CIRCULATORY COMPLICATION, WITHOUT LONG-TERM CURRENT USE OF INSULIN: Primary | ICD-10-CM

## 2021-06-14 DIAGNOSIS — I87.2 VENOUS (PERIPHERAL) INSUFFICIENCY: ICD-10-CM

## 2021-06-14 DIAGNOSIS — M79.675 PAIN OF TOE OF LEFT FOOT: ICD-10-CM

## 2021-06-14 PROCEDURE — 17999 UNLISTD PX SKN MUC MEMB SUBQ: CPT | Mod: CSM,S$GLB,, | Performed by: PODIATRIST

## 2021-06-14 PROCEDURE — 99203 PR OFFICE/OUTPT VISIT, NEW, LEVL III, 30-44 MIN: ICD-10-PCS | Mod: S$GLB,,, | Performed by: PODIATRIST

## 2021-06-14 PROCEDURE — 99203 OFFICE O/P NEW LOW 30 MIN: CPT | Mod: S$GLB,,, | Performed by: PODIATRIST

## 2021-06-14 PROCEDURE — 17999 PR NON-COVERED FOOT CARE: ICD-10-PCS | Mod: CSM,S$GLB,, | Performed by: PODIATRIST

## 2021-06-17 ENCOUNTER — TELEPHONE (OUTPATIENT)
Dept: FAMILY MEDICINE | Facility: CLINIC | Age: 71
End: 2021-06-17

## 2021-06-18 ENCOUNTER — OFFICE VISIT (OUTPATIENT)
Dept: FAMILY MEDICINE | Facility: CLINIC | Age: 71
End: 2021-06-18
Payer: MEDICARE

## 2021-06-18 ENCOUNTER — TELEPHONE (OUTPATIENT)
Dept: FAMILY MEDICINE | Facility: CLINIC | Age: 71
End: 2021-06-18

## 2021-06-18 DIAGNOSIS — G60.3 IDIOPATHIC PROGRESSIVE NEUROPATHY: ICD-10-CM

## 2021-06-18 DIAGNOSIS — G62.9 NEUROPATHY: Primary | ICD-10-CM

## 2021-06-18 DIAGNOSIS — M17.0 PRIMARY OSTEOARTHRITIS OF BOTH KNEES: ICD-10-CM

## 2021-06-18 PROCEDURE — 99214 PR OFFICE/OUTPT VISIT, EST, LEVL IV, 30-39 MIN: ICD-10-PCS | Mod: 95,,, | Performed by: NURSE PRACTITIONER

## 2021-06-18 PROCEDURE — 99214 OFFICE O/P EST MOD 30 MIN: CPT | Mod: 95,,, | Performed by: NURSE PRACTITIONER

## 2021-06-18 RX ORDER — GABAPENTIN 100 MG/1
100 CAPSULE ORAL 3 TIMES DAILY
Qty: 90 CAPSULE | Refills: 0 | Status: SHIPPED | OUTPATIENT
Start: 2021-06-18 | End: 2021-08-13

## 2021-06-18 RX ORDER — NAPROXEN 500 MG/1
500 TABLET ORAL 3 TIMES DAILY PRN
Qty: 30 TABLET | Refills: 0 | Status: SHIPPED | OUTPATIENT
Start: 2021-06-18 | End: 2021-08-13

## 2021-06-18 RX ORDER — TRAMADOL HYDROCHLORIDE 50 MG/1
50 TABLET ORAL EVERY 6 HOURS PRN
Qty: 28 TABLET | Refills: 0 | Status: SHIPPED | OUTPATIENT
Start: 2021-06-18 | End: 2021-06-25

## 2021-08-04 ENCOUNTER — PATIENT MESSAGE (OUTPATIENT)
Dept: ADMINISTRATIVE | Facility: HOSPITAL | Age: 71
End: 2021-08-04

## 2021-08-10 ENCOUNTER — TELEPHONE (OUTPATIENT)
Dept: FAMILY MEDICINE | Facility: CLINIC | Age: 71
End: 2021-08-10

## 2021-08-13 ENCOUNTER — OFFICE VISIT (OUTPATIENT)
Dept: FAMILY MEDICINE | Facility: CLINIC | Age: 71
End: 2021-08-13
Attending: FAMILY MEDICINE
Payer: MEDICARE

## 2021-08-13 VITALS
TEMPERATURE: 98 F | WEIGHT: 315 LBS | SYSTOLIC BLOOD PRESSURE: 130 MMHG | HEART RATE: 78 BPM | BODY MASS INDEX: 44.1 KG/M2 | DIASTOLIC BLOOD PRESSURE: 70 MMHG | HEIGHT: 71 IN | OXYGEN SATURATION: 96 %

## 2021-08-13 DIAGNOSIS — R60.9 DEPENDENT EDEMA: ICD-10-CM

## 2021-08-13 DIAGNOSIS — M17.0 PRIMARY OSTEOARTHRITIS OF BOTH KNEES: Primary | ICD-10-CM

## 2021-08-13 DIAGNOSIS — M79.2 NEUROPATHIC PAIN: ICD-10-CM

## 2021-08-13 DIAGNOSIS — I15.2 HYPERTENSION ASSOCIATED WITH DIABETES: ICD-10-CM

## 2021-08-13 DIAGNOSIS — I87.2 VENOUS (PERIPHERAL) INSUFFICIENCY: ICD-10-CM

## 2021-08-13 DIAGNOSIS — E66.01 MORBID OBESITY WITH BMI OF 50.0-59.9, ADULT: ICD-10-CM

## 2021-08-13 DIAGNOSIS — I50.32 CHRONIC DIASTOLIC HEART FAILURE: ICD-10-CM

## 2021-08-13 DIAGNOSIS — E11.59 TYPE 2 DIABETES MELLITUS WITH OTHER CIRCULATORY COMPLICATION, WITHOUT LONG-TERM CURRENT USE OF INSULIN: ICD-10-CM

## 2021-08-13 DIAGNOSIS — Z79.01 ANTICOAGULANT LONG-TERM USE: ICD-10-CM

## 2021-08-13 DIAGNOSIS — Z86.711 HISTORY OF PULMONARY EMBOLISM: ICD-10-CM

## 2021-08-13 DIAGNOSIS — E11.69 COMBINED HYPERLIPIDEMIA ASSOCIATED WITH TYPE 2 DIABETES MELLITUS: ICD-10-CM

## 2021-08-13 DIAGNOSIS — F32.A DEPRESSION, UNSPECIFIED DEPRESSION TYPE: ICD-10-CM

## 2021-08-13 DIAGNOSIS — D68.59 ANTITHROMBIN 3 DEFICIENCY: ICD-10-CM

## 2021-08-13 DIAGNOSIS — D68.9 COAGULOPATHY: ICD-10-CM

## 2021-08-13 DIAGNOSIS — E78.2 COMBINED HYPERLIPIDEMIA ASSOCIATED WITH TYPE 2 DIABETES MELLITUS: ICD-10-CM

## 2021-08-13 DIAGNOSIS — D68.59 PROTEIN C DEFICIENCY: ICD-10-CM

## 2021-08-13 DIAGNOSIS — E11.59 HYPERTENSION ASSOCIATED WITH DIABETES: ICD-10-CM

## 2021-08-13 PROCEDURE — 99214 PR OFFICE/OUTPT VISIT, EST, LEVL IV, 30-39 MIN: ICD-10-PCS | Mod: S$PBB,,, | Performed by: FAMILY MEDICINE

## 2021-08-13 PROCEDURE — 99213 OFFICE O/P EST LOW 20 MIN: CPT | Mod: PBBFAC,PN | Performed by: FAMILY MEDICINE

## 2021-08-13 PROCEDURE — 99999 PR PBB SHADOW E&M-EST. PATIENT-LVL III: CPT | Mod: PBBFAC,,, | Performed by: FAMILY MEDICINE

## 2021-08-13 PROCEDURE — 99214 OFFICE O/P EST MOD 30 MIN: CPT | Mod: S$PBB,,, | Performed by: FAMILY MEDICINE

## 2021-08-13 PROCEDURE — 99999 PR PBB SHADOW E&M-EST. PATIENT-LVL III: ICD-10-PCS | Mod: PBBFAC,,, | Performed by: FAMILY MEDICINE

## 2021-08-13 RX ORDER — CELECOXIB 200 MG/1
200 CAPSULE ORAL DAILY
Qty: 90 CAPSULE | Refills: 1 | Status: ON HOLD | OUTPATIENT
Start: 2021-08-13 | End: 2021-12-10 | Stop reason: HOSPADM

## 2021-08-13 RX ORDER — FUROSEMIDE 40 MG/1
40 TABLET ORAL DAILY
Qty: 90 TABLET | Refills: 1 | Status: ON HOLD | OUTPATIENT
Start: 2021-08-13 | End: 2021-12-10 | Stop reason: HOSPADM

## 2021-08-13 RX ORDER — SPIRONOLACTONE 100 MG/1
100 TABLET, FILM COATED ORAL
Qty: 40 TABLET | Refills: 1 | Status: ON HOLD | OUTPATIENT
Start: 2021-08-13 | End: 2021-12-10 | Stop reason: HOSPADM

## 2021-08-13 RX ORDER — DULOXETIN HYDROCHLORIDE 30 MG/1
30 CAPSULE, DELAYED RELEASE ORAL DAILY
Qty: 30 CAPSULE | Refills: 11 | Status: SHIPPED | OUTPATIENT
Start: 2021-08-13 | End: 2022-02-24 | Stop reason: SDUPTHER

## 2021-08-16 ENCOUNTER — HOSPITAL ENCOUNTER (OUTPATIENT)
Dept: RADIOLOGY | Facility: CLINIC | Age: 71
Discharge: HOME | End: 2021-08-16
Attending: PHYSICAL MEDICINE & REHABILITATION
Payer: MEDICARE

## 2021-08-16 DIAGNOSIS — M54.16 LUMBAR RADICULOPATHY: ICD-10-CM

## 2021-08-16 DIAGNOSIS — M17.0 PRIMARY OSTEOARTHRITIS OF BOTH KNEES: ICD-10-CM

## 2021-08-16 PROCEDURE — 73565 X-RAY EXAM OF KNEES: CPT | Mod: TC,FY,PO

## 2021-08-16 PROCEDURE — 73565 XR KNEE AP STANDING BILATERAL: ICD-10-PCS | Mod: 26,,, | Performed by: RADIOLOGY

## 2021-08-16 PROCEDURE — 73565 X-RAY EXAM OF KNEES: CPT | Mod: 26,,, | Performed by: RADIOLOGY

## 2021-09-17 ENCOUNTER — LAB VISIT (OUTPATIENT)
Dept: LAB | Facility: HOSPITAL | Age: 71
End: 2021-09-17
Attending: NURSE PRACTITIONER
Payer: MEDICARE

## 2021-09-17 DIAGNOSIS — I89.0 OBLITERATION OF LYMPHATIC VESSEL: Primary | ICD-10-CM

## 2021-09-17 DIAGNOSIS — L97.822 SKIN ULCER OF POPLITEAL REGION, LEFT, WITH FAT LAYER EXPOSED: ICD-10-CM

## 2021-09-17 LAB
ALBUMIN SERPL BCP-MCNC: 3.4 G/DL (ref 3.5–5.2)
ALP SERPL-CCNC: 71 U/L (ref 55–135)
ALT SERPL W/O P-5'-P-CCNC: 24 U/L (ref 10–44)
ANION GAP SERPL CALC-SCNC: 10 MMOL/L (ref 8–16)
AST SERPL-CCNC: 22 U/L (ref 10–40)
BASOPHILS # BLD AUTO: 0.05 K/UL (ref 0–0.2)
BASOPHILS NFR BLD: 0.6 % (ref 0–1.9)
BILIRUB SERPL-MCNC: 0.7 MG/DL (ref 0.1–1)
BUN SERPL-MCNC: 22 MG/DL (ref 8–23)
CALCIUM SERPL-MCNC: 9.3 MG/DL (ref 8.7–10.5)
CHLORIDE SERPL-SCNC: 99 MMOL/L (ref 95–110)
CO2 SERPL-SCNC: 27 MMOL/L (ref 23–29)
CREAT SERPL-MCNC: 1 MG/DL (ref 0.5–1.4)
CRP SERPL-MCNC: 1.68 MG/DL
DIFFERENTIAL METHOD: ABNORMAL
EOSINOPHIL # BLD AUTO: 0.2 K/UL (ref 0–0.5)
EOSINOPHIL NFR BLD: 1.8 % (ref 0–8)
ERYTHROCYTE [DISTWIDTH] IN BLOOD BY AUTOMATED COUNT: 12.8 % (ref 11.5–14.5)
ERYTHROCYTE [SEDIMENTATION RATE] IN BLOOD BY WESTERGREN METHOD: 43 MM/HR (ref 0–10)
EST. GFR  (AFRICAN AMERICAN): >60 ML/MIN/1.73 M^2
EST. GFR  (NON AFRICAN AMERICAN): >60 ML/MIN/1.73 M^2
GLUCOSE SERPL-MCNC: 97 MG/DL (ref 70–110)
HCT VFR BLD AUTO: 43.2 % (ref 40–54)
HGB BLD-MCNC: 14 G/DL (ref 14–18)
IMM GRANULOCYTES # BLD AUTO: 0.03 K/UL (ref 0–0.04)
IMM GRANULOCYTES NFR BLD AUTO: 0.4 % (ref 0–0.5)
LYMPHOCYTES # BLD AUTO: 0.9 K/UL (ref 1–4.8)
LYMPHOCYTES NFR BLD: 10.2 % (ref 18–48)
MCH RBC QN AUTO: 29.7 PG (ref 27–31)
MCHC RBC AUTO-ENTMCNC: 32.4 G/DL (ref 32–36)
MCV RBC AUTO: 92 FL (ref 82–98)
MONOCYTES # BLD AUTO: 0.6 K/UL (ref 0.3–1)
MONOCYTES NFR BLD: 7.3 % (ref 4–15)
NEUTROPHILS # BLD AUTO: 6.8 K/UL (ref 1.8–7.7)
NEUTROPHILS NFR BLD: 79.7 % (ref 38–73)
NRBC BLD-RTO: 0 /100 WBC
PLATELET # BLD AUTO: 243 K/UL (ref 150–450)
PMV BLD AUTO: 9.7 FL (ref 9.2–12.9)
POTASSIUM SERPL-SCNC: 4.7 MMOL/L (ref 3.5–5.1)
PREALB SERPL-MCNC: 16 MG/DL (ref 20–43)
PROT SERPL-MCNC: 6.8 G/DL (ref 6–8.4)
RBC # BLD AUTO: 4.71 M/UL (ref 4.6–6.2)
SODIUM SERPL-SCNC: 136 MMOL/L (ref 136–145)
WBC # BLD AUTO: 8.52 K/UL (ref 3.9–12.7)

## 2021-09-17 PROCEDURE — 84134 ASSAY OF PREALBUMIN: CPT | Performed by: NURSE PRACTITIONER

## 2021-09-17 PROCEDURE — 85651 RBC SED RATE NONAUTOMATED: CPT | Performed by: NURSE PRACTITIONER

## 2021-09-17 PROCEDURE — 85025 COMPLETE CBC W/AUTO DIFF WBC: CPT | Performed by: NURSE PRACTITIONER

## 2021-09-17 PROCEDURE — 80053 COMPREHEN METABOLIC PANEL: CPT | Performed by: NURSE PRACTITIONER

## 2021-09-17 PROCEDURE — 86140 C-REACTIVE PROTEIN: CPT | Performed by: NURSE PRACTITIONER

## 2021-09-17 PROCEDURE — 36415 COLL VENOUS BLD VENIPUNCTURE: CPT | Performed by: NURSE PRACTITIONER

## 2021-10-07 ENCOUNTER — PATIENT MESSAGE (OUTPATIENT)
Dept: ADMINISTRATIVE | Facility: HOSPITAL | Age: 71
End: 2021-10-07

## 2021-10-07 DIAGNOSIS — Z74.09 DECREASED FUNCTIONAL MOBILITY AND ENDURANCE: Primary | ICD-10-CM

## 2021-10-07 DIAGNOSIS — R29.898 BILATERAL LEG WEAKNESS: ICD-10-CM

## 2021-10-07 DIAGNOSIS — E11.59 TYPE 2 DIABETES MELLITUS WITH OTHER CIRCULATORY COMPLICATION, WITHOUT LONG-TERM CURRENT USE OF INSULIN: ICD-10-CM

## 2021-10-08 ENCOUNTER — TELEPHONE (OUTPATIENT)
Dept: FAMILY MEDICINE | Facility: CLINIC | Age: 71
End: 2021-10-08

## 2021-10-18 ENCOUNTER — PATIENT MESSAGE (OUTPATIENT)
Dept: ADMINISTRATIVE | Facility: HOSPITAL | Age: 71
End: 2021-10-18
Payer: MEDICARE

## 2021-10-22 ENCOUNTER — TELEPHONE (OUTPATIENT)
Dept: FAMILY MEDICINE | Facility: CLINIC | Age: 71
End: 2021-10-22

## 2021-11-04 ENCOUNTER — TELEPHONE (OUTPATIENT)
Dept: FAMILY MEDICINE | Facility: CLINIC | Age: 71
End: 2021-11-04
Payer: MEDICARE

## 2021-11-04 DIAGNOSIS — Z74.09 IMPAIRED PHYSICAL MOBILITY: Primary | ICD-10-CM

## 2021-11-04 DIAGNOSIS — R29.898 WEAKNESS OF LOWER EXTREMITY, UNSPECIFIED LATERALITY: ICD-10-CM

## 2021-11-05 ENCOUNTER — TELEPHONE (OUTPATIENT)
Dept: PHYSICAL MEDICINE AND REHAB | Facility: CLINIC | Age: 71
End: 2021-11-05
Payer: MEDICARE

## 2021-11-26 ENCOUNTER — TELEPHONE (OUTPATIENT)
Dept: FAMILY MEDICINE | Facility: CLINIC | Age: 71
End: 2021-11-26
Payer: MEDICARE

## 2021-11-28 ENCOUNTER — HOSPITAL ENCOUNTER (INPATIENT)
Facility: HOSPITAL | Age: 71
LOS: 12 days | Discharge: HOME HEALTH | DRG: 189 | End: 2021-12-10
Attending: EMERGENCY MEDICINE | Admitting: STUDENT IN AN ORGANIZED HEALTH CARE EDUCATION/TRAINING PROGRAM
Payer: MEDICARE

## 2021-11-28 DIAGNOSIS — J96.22 ACUTE ON CHRONIC RESPIRATORY FAILURE WITH HYPOXIA AND HYPERCAPNIA: Primary | ICD-10-CM

## 2021-11-28 DIAGNOSIS — J96.21 ACUTE ON CHRONIC RESPIRATORY FAILURE WITH HYPOXIA AND HYPERCAPNIA: Primary | ICD-10-CM

## 2021-11-28 DIAGNOSIS — J96.01 ACUTE HYPOXEMIC RESPIRATORY FAILURE: ICD-10-CM

## 2021-11-28 DIAGNOSIS — J96.01 ACUTE RESPIRATORY FAILURE WITH HYPOXEMIA: ICD-10-CM

## 2021-11-28 DIAGNOSIS — J44.1 COPD EXACERBATION: ICD-10-CM

## 2021-11-28 DIAGNOSIS — I50.9 CHF (CONGESTIVE HEART FAILURE): ICD-10-CM

## 2021-11-28 DIAGNOSIS — R06.02 SOB (SHORTNESS OF BREATH): ICD-10-CM

## 2021-11-28 LAB
ALBUMIN SERPL BCP-MCNC: 2.9 G/DL (ref 3.5–5.2)
ALBUMIN SERPL BCP-MCNC: 2.9 G/DL (ref 3.5–5.2)
ALLENS TEST: ABNORMAL
ALP SERPL-CCNC: 91 U/L (ref 55–135)
ALP SERPL-CCNC: 94 U/L (ref 55–135)
ALT SERPL W/O P-5'-P-CCNC: 20 U/L (ref 10–44)
ALT SERPL W/O P-5'-P-CCNC: 22 U/L (ref 10–44)
ANION GAP SERPL CALC-SCNC: 11 MMOL/L (ref 8–16)
ANION GAP SERPL CALC-SCNC: 12 MMOL/L (ref 8–16)
AST SERPL-CCNC: 22 U/L (ref 10–40)
AST SERPL-CCNC: 23 U/L (ref 10–40)
BASOPHILS # BLD AUTO: 0.03 K/UL (ref 0–0.2)
BASOPHILS # BLD AUTO: 0.05 K/UL (ref 0–0.2)
BASOPHILS NFR BLD: 0.2 % (ref 0–1.9)
BASOPHILS NFR BLD: 0.4 % (ref 0–1.9)
BILIRUB SERPL-MCNC: 0.4 MG/DL (ref 0.1–1)
BILIRUB SERPL-MCNC: 0.6 MG/DL (ref 0.1–1)
BILIRUB UR QL STRIP: NEGATIVE
BNP SERPL-MCNC: 192 PG/ML (ref 0–99)
BUN SERPL-MCNC: 16 MG/DL (ref 8–23)
BUN SERPL-MCNC: 17 MG/DL (ref 8–23)
CALCIUM SERPL-MCNC: 8.7 MG/DL (ref 8.7–10.5)
CALCIUM SERPL-MCNC: 8.9 MG/DL (ref 8.7–10.5)
CHLORIDE SERPL-SCNC: 97 MMOL/L (ref 95–110)
CHLORIDE SERPL-SCNC: 98 MMOL/L (ref 95–110)
CLARITY UR: CLEAR
CO2 SERPL-SCNC: 27 MMOL/L (ref 23–29)
CO2 SERPL-SCNC: 27 MMOL/L (ref 23–29)
COLOR UR: YELLOW
CREAT SERPL-MCNC: 1 MG/DL (ref 0.5–1.4)
CREAT SERPL-MCNC: 1.1 MG/DL (ref 0.5–1.4)
DELSYS: ABNORMAL
DIFFERENTIAL METHOD: ABNORMAL
DIFFERENTIAL METHOD: ABNORMAL
EOSINOPHIL # BLD AUTO: 0 K/UL (ref 0–0.5)
EOSINOPHIL # BLD AUTO: 0 K/UL (ref 0–0.5)
EOSINOPHIL NFR BLD: 0 % (ref 0–8)
EOSINOPHIL NFR BLD: 0.2 % (ref 0–8)
EP: 5
ERYTHROCYTE [DISTWIDTH] IN BLOOD BY AUTOMATED COUNT: 13.3 % (ref 11.5–14.5)
ERYTHROCYTE [DISTWIDTH] IN BLOOD BY AUTOMATED COUNT: 13.3 % (ref 11.5–14.5)
ERYTHROCYTE [SEDIMENTATION RATE] IN BLOOD BY WESTERGREN METHOD: 10 MM/H
ERYTHROCYTE [SEDIMENTATION RATE] IN BLOOD BY WESTERGREN METHOD: 20 MM/H
ERYTHROCYTE [SEDIMENTATION RATE] IN BLOOD BY WESTERGREN METHOD: 24 MM/H
EST. GFR  (AFRICAN AMERICAN): >60 ML/MIN/1.73 M^2
EST. GFR  (AFRICAN AMERICAN): >60 ML/MIN/1.73 M^2
EST. GFR  (NON AFRICAN AMERICAN): >60 ML/MIN/1.73 M^2
EST. GFR  (NON AFRICAN AMERICAN): >60 ML/MIN/1.73 M^2
FIO2: 28
FIO2: 30
FIO2: 40
FLOW: 2
GLUCOSE SERPL-MCNC: 139 MG/DL (ref 70–110)
GLUCOSE SERPL-MCNC: 143 MG/DL (ref 70–110)
GLUCOSE UR QL STRIP: NEGATIVE
HCO3 UR-SCNC: 33.5 MMOL/L (ref 24–28)
HCO3 UR-SCNC: 37.8 MMOL/L (ref 24–28)
HCO3 UR-SCNC: 38.5 MMOL/L (ref 24–28)
HCO3 UR-SCNC: 39.2 MMOL/L (ref 24–28)
HCO3 UR-SCNC: 39.8 MMOL/L (ref 24–28)
HCT VFR BLD AUTO: 42.4 % (ref 40–54)
HCT VFR BLD AUTO: 42.6 % (ref 40–54)
HGB BLD-MCNC: 13 G/DL (ref 14–18)
HGB BLD-MCNC: 13.1 G/DL (ref 14–18)
HGB UR QL STRIP: ABNORMAL
IMM GRANULOCYTES # BLD AUTO: 0.1 K/UL (ref 0–0.04)
IMM GRANULOCYTES # BLD AUTO: 0.1 K/UL (ref 0–0.04)
IMM GRANULOCYTES NFR BLD AUTO: 0.7 % (ref 0–0.5)
IMM GRANULOCYTES NFR BLD AUTO: 0.8 % (ref 0–0.5)
IP: 15
KETONES UR QL STRIP: NEGATIVE
LEUKOCYTE ESTERASE UR QL STRIP: NEGATIVE
LYMPHOCYTES # BLD AUTO: 0.4 K/UL (ref 1–4.8)
LYMPHOCYTES # BLD AUTO: 0.9 K/UL (ref 1–4.8)
LYMPHOCYTES NFR BLD: 2.9 % (ref 18–48)
LYMPHOCYTES NFR BLD: 7.5 % (ref 18–48)
MAGNESIUM SERPL-MCNC: 1.9 MG/DL (ref 1.6–2.6)
MCH RBC QN AUTO: 29 PG (ref 27–31)
MCH RBC QN AUTO: 29.5 PG (ref 27–31)
MCHC RBC AUTO-ENTMCNC: 30.7 G/DL (ref 32–36)
MCHC RBC AUTO-ENTMCNC: 30.8 G/DL (ref 32–36)
MCV RBC AUTO: 95 FL (ref 82–98)
MCV RBC AUTO: 96 FL (ref 82–98)
MIN VOL: 13.1
MODE: ABNORMAL
MONOCYTES # BLD AUTO: 0.4 K/UL (ref 0.3–1)
MONOCYTES # BLD AUTO: 1.1 K/UL (ref 0.3–1)
MONOCYTES NFR BLD: 3.3 % (ref 4–15)
MONOCYTES NFR BLD: 9 % (ref 4–15)
NEUTROPHILS # BLD AUTO: 10.2 K/UL (ref 1.8–7.7)
NEUTROPHILS # BLD AUTO: 12.4 K/UL (ref 1.8–7.7)
NEUTROPHILS NFR BLD: 82.1 % (ref 38–73)
NEUTROPHILS NFR BLD: 92.9 % (ref 38–73)
NITRITE UR QL STRIP: NEGATIVE
NRBC BLD-RTO: 0 /100 WBC
NRBC BLD-RTO: 0 /100 WBC
PCO2 BLDA: 64.9 MMHG (ref 35–45)
PCO2 BLDA: 74 MMHG (ref 35–45)
PCO2 BLDA: 75.5 MMHG (ref 35–45)
PCO2 BLDA: 78 MMHG (ref 35–45)
PCO2 BLDA: 94 MMHG (ref 35–45)
PH SMN: 7.21 [PH] (ref 7.35–7.45)
PH SMN: 7.24 [PH] (ref 7.35–7.45)
PH SMN: 7.32 [PH] (ref 7.35–7.45)
PH SMN: 7.33 [PH] (ref 7.35–7.45)
PH SMN: 7.4 [PH] (ref 7.35–7.45)
PH UR STRIP: 5 [PH] (ref 5–8)
PHOSPHATE SERPL-MCNC: 4 MG/DL (ref 2.7–4.5)
PIP: 17
PIP: 17
PIP: 21
PLATELET # BLD AUTO: 254 K/UL (ref 150–450)
PLATELET # BLD AUTO: 259 K/UL (ref 150–450)
PMV BLD AUTO: 9.4 FL (ref 9.2–12.9)
PMV BLD AUTO: 9.7 FL (ref 9.2–12.9)
PO2 BLDA: 124 MMHG (ref 80–100)
PO2 BLDA: 80 MMHG (ref 80–100)
PO2 BLDA: 81 MMHG (ref 80–100)
PO2 BLDA: 86 MMHG (ref 80–100)
PO2 BLDA: 89 MMHG (ref 80–100)
POC BE: 10 MMOL/L
POC BE: 12 MMOL/L
POC BE: 13 MMOL/L
POC BE: 15 MMOL/L
POC BE: 6 MMOL/L
POC SATURATED O2: 94 % (ref 95–100)
POC SATURATED O2: 94 % (ref 95–100)
POC SATURATED O2: 95 % (ref 95–100)
POC SATURATED O2: 95 % (ref 95–100)
POC SATURATED O2: 98 % (ref 95–100)
POC TCO2: 36 MMOL/L (ref 23–27)
POC TCO2: 41 MMOL/L (ref 23–27)
POC TCO2: 42 MMOL/L (ref 23–27)
POCT GLUCOSE: 104 MG/DL (ref 70–110)
POCT GLUCOSE: 122 MG/DL (ref 70–110)
POCT GLUCOSE: 146 MG/DL (ref 70–110)
POCT GLUCOSE: 163 MG/DL (ref 70–110)
POTASSIUM SERPL-SCNC: 4 MMOL/L (ref 3.5–5.1)
POTASSIUM SERPL-SCNC: 4.1 MMOL/L (ref 3.5–5.1)
PROT SERPL-MCNC: 7.6 G/DL (ref 6–8.4)
PROT SERPL-MCNC: 7.7 G/DL (ref 6–8.4)
PROT UR QL STRIP: NEGATIVE
RBC # BLD AUTO: 4.4 M/UL (ref 4.6–6.2)
RBC # BLD AUTO: 4.51 M/UL (ref 4.6–6.2)
SAMPLE: ABNORMAL
SARS-COV-2 RDRP RESP QL NAA+PROBE: NEGATIVE
SITE: ABNORMAL
SODIUM SERPL-SCNC: 136 MMOL/L (ref 136–145)
SODIUM SERPL-SCNC: 136 MMOL/L (ref 136–145)
SP GR UR STRIP: <=1.005 (ref 1–1.03)
SP02: 94
SP02: 99
SPONT RATE: 18
URN SPEC COLLECT METH UR: ABNORMAL
UROBILINOGEN UR STRIP-ACNC: NEGATIVE EU/DL
VT: 500
WBC # BLD AUTO: 12.46 K/UL (ref 3.9–12.7)
WBC # BLD AUTO: 13.34 K/UL (ref 3.9–12.7)

## 2021-11-28 PROCEDURE — 94761 N-INVAS EAR/PLS OXIMETRY MLT: CPT

## 2021-11-28 PROCEDURE — 99900035 HC TECH TIME PER 15 MIN (STAT)

## 2021-11-28 PROCEDURE — 93010 ELECTROCARDIOGRAM REPORT: CPT | Mod: ,,, | Performed by: INTERNAL MEDICINE

## 2021-11-28 PROCEDURE — 82803 BLOOD GASES ANY COMBINATION: CPT

## 2021-11-28 PROCEDURE — 36600 WITHDRAWAL OF ARTERIAL BLOOD: CPT

## 2021-11-28 PROCEDURE — 27000221 HC OXYGEN, UP TO 24 HOURS

## 2021-11-28 PROCEDURE — 63600175 PHARM REV CODE 636 W HCPCS: Performed by: NURSE PRACTITIONER

## 2021-11-28 PROCEDURE — 84100 ASSAY OF PHOSPHORUS: CPT | Performed by: NURSE PRACTITIONER

## 2021-11-28 PROCEDURE — 93005 ELECTROCARDIOGRAM TRACING: CPT

## 2021-11-28 PROCEDURE — 96374 THER/PROPH/DIAG INJ IV PUSH: CPT

## 2021-11-28 PROCEDURE — 36415 COLL VENOUS BLD VENIPUNCTURE: CPT | Performed by: EMERGENCY MEDICINE

## 2021-11-28 PROCEDURE — 25000242 PHARM REV CODE 250 ALT 637 W/ HCPCS: Performed by: EMERGENCY MEDICINE

## 2021-11-28 PROCEDURE — 27000190 HC CPAP FULL FACE MASK W/VALVE

## 2021-11-28 PROCEDURE — 27000207 HC ISOLATION

## 2021-11-28 PROCEDURE — 99291 CRITICAL CARE FIRST HOUR: CPT | Mod: 25

## 2021-11-28 PROCEDURE — 63600175 PHARM REV CODE 636 W HCPCS: Performed by: EMERGENCY MEDICINE

## 2021-11-28 PROCEDURE — 51702 INSERT TEMP BLADDER CATH: CPT

## 2021-11-28 PROCEDURE — U0002 COVID-19 LAB TEST NON-CDC: HCPCS | Performed by: EMERGENCY MEDICINE

## 2021-11-28 PROCEDURE — 80053 COMPREHEN METABOLIC PANEL: CPT | Performed by: EMERGENCY MEDICINE

## 2021-11-28 PROCEDURE — 93010 EKG 12-LEAD: ICD-10-PCS | Mod: ,,, | Performed by: INTERNAL MEDICINE

## 2021-11-28 PROCEDURE — 80053 COMPREHEN METABOLIC PANEL: CPT | Mod: 91 | Performed by: NURSE PRACTITIONER

## 2021-11-28 PROCEDURE — 83880 ASSAY OF NATRIURETIC PEPTIDE: CPT | Performed by: EMERGENCY MEDICINE

## 2021-11-28 PROCEDURE — 36415 COLL VENOUS BLD VENIPUNCTURE: CPT | Performed by: NURSE PRACTITIONER

## 2021-11-28 PROCEDURE — 85025 COMPLETE CBC W/AUTO DIFF WBC: CPT | Performed by: EMERGENCY MEDICINE

## 2021-11-28 PROCEDURE — 25000003 PHARM REV CODE 250: Performed by: NURSE PRACTITIONER

## 2021-11-28 PROCEDURE — 94660 CPAP INITIATION&MGMT: CPT

## 2021-11-28 PROCEDURE — 83735 ASSAY OF MAGNESIUM: CPT | Performed by: NURSE PRACTITIONER

## 2021-11-28 PROCEDURE — 81003 URINALYSIS AUTO W/O SCOPE: CPT | Performed by: NURSE PRACTITIONER

## 2021-11-28 PROCEDURE — 20000000 HC ICU ROOM

## 2021-11-28 PROCEDURE — 94644 CONT INHLJ TX 1ST HOUR: CPT

## 2021-11-28 PROCEDURE — 85025 COMPLETE CBC W/AUTO DIFF WBC: CPT | Mod: 91 | Performed by: NURSE PRACTITIONER

## 2021-11-28 RX ORDER — DULOXETIN HYDROCHLORIDE 30 MG/1
30 CAPSULE, DELAYED RELEASE ORAL DAILY
Status: DISCONTINUED | OUTPATIENT
Start: 2021-11-28 | End: 2021-12-10 | Stop reason: HOSPADM

## 2021-11-28 RX ORDER — ONDANSETRON 2 MG/ML
4 INJECTION INTRAMUSCULAR; INTRAVENOUS EVERY 8 HOURS PRN
Status: DISCONTINUED | OUTPATIENT
Start: 2021-11-28 | End: 2021-12-10 | Stop reason: HOSPADM

## 2021-11-28 RX ORDER — IBUPROFEN 200 MG
16 TABLET ORAL
Status: DISCONTINUED | OUTPATIENT
Start: 2021-11-28 | End: 2021-12-10 | Stop reason: HOSPADM

## 2021-11-28 RX ORDER — IPRATROPIUM BROMIDE AND ALBUTEROL SULFATE 2.5; .5 MG/3ML; MG/3ML
9 SOLUTION RESPIRATORY (INHALATION) CONTINUOUS
Status: DISCONTINUED | OUTPATIENT
Start: 2021-11-28 | End: 2021-11-29

## 2021-11-28 RX ORDER — ACETAMINOPHEN 325 MG/1
650 TABLET ORAL EVERY 4 HOURS PRN
Status: DISCONTINUED | OUTPATIENT
Start: 2021-11-28 | End: 2021-12-10 | Stop reason: HOSPADM

## 2021-11-28 RX ORDER — IPRATROPIUM BROMIDE AND ALBUTEROL SULFATE 2.5; .5 MG/3ML; MG/3ML
3 SOLUTION RESPIRATORY (INHALATION) EVERY 4 HOURS PRN
Status: DISCONTINUED | OUTPATIENT
Start: 2021-11-28 | End: 2021-11-30

## 2021-11-28 RX ORDER — SODIUM,POTASSIUM PHOSPHATES 280-250MG
2 POWDER IN PACKET (EA) ORAL
Status: DISCONTINUED | OUTPATIENT
Start: 2021-11-28 | End: 2021-12-10 | Stop reason: HOSPADM

## 2021-11-28 RX ORDER — LANOLIN ALCOHOL/MO/W.PET/CERES
800 CREAM (GRAM) TOPICAL
Status: DISCONTINUED | OUTPATIENT
Start: 2021-11-28 | End: 2021-12-10 | Stop reason: HOSPADM

## 2021-11-28 RX ORDER — SIMETHICONE 80 MG
1 TABLET,CHEWABLE ORAL 4 TIMES DAILY PRN
Status: DISCONTINUED | OUTPATIENT
Start: 2021-11-28 | End: 2021-12-10 | Stop reason: HOSPADM

## 2021-11-28 RX ORDER — TALC
9 POWDER (GRAM) TOPICAL NIGHTLY PRN
Status: DISCONTINUED | OUTPATIENT
Start: 2021-11-28 | End: 2021-12-10 | Stop reason: HOSPADM

## 2021-11-28 RX ORDER — ACETAMINOPHEN 325 MG/1
650 TABLET ORAL EVERY 6 HOURS PRN
Status: DISCONTINUED | OUTPATIENT
Start: 2021-11-28 | End: 2021-12-10 | Stop reason: HOSPADM

## 2021-11-28 RX ORDER — IBUPROFEN 200 MG
24 TABLET ORAL
Status: DISCONTINUED | OUTPATIENT
Start: 2021-11-28 | End: 2021-12-10 | Stop reason: HOSPADM

## 2021-11-28 RX ORDER — FUROSEMIDE 10 MG/ML
40 INJECTION INTRAMUSCULAR; INTRAVENOUS
Status: DISCONTINUED | OUTPATIENT
Start: 2021-11-28 | End: 2021-12-01

## 2021-11-28 RX ORDER — MAG HYDROX/ALUMINUM HYD/SIMETH 200-200-20
30 SUSPENSION, ORAL (FINAL DOSE FORM) ORAL 4 TIMES DAILY PRN
Status: DISCONTINUED | OUTPATIENT
Start: 2021-11-28 | End: 2021-12-10 | Stop reason: HOSPADM

## 2021-11-28 RX ORDER — SODIUM CHLORIDE 0.9 % (FLUSH) 0.9 %
10 SYRINGE (ML) INJECTION EVERY 8 HOURS PRN
Status: DISCONTINUED | OUTPATIENT
Start: 2021-11-28 | End: 2021-12-10 | Stop reason: HOSPADM

## 2021-11-28 RX ORDER — NALOXONE HCL 0.4 MG/ML
0.02 VIAL (ML) INJECTION
Status: DISCONTINUED | OUTPATIENT
Start: 2021-11-28 | End: 2021-12-10 | Stop reason: HOSPADM

## 2021-11-28 RX ORDER — FUROSEMIDE 10 MG/ML
60 INJECTION INTRAMUSCULAR; INTRAVENOUS
Status: COMPLETED | OUTPATIENT
Start: 2021-11-28 | End: 2021-11-28

## 2021-11-28 RX ORDER — INSULIN ASPART 100 [IU]/ML
1-10 INJECTION, SOLUTION INTRAVENOUS; SUBCUTANEOUS
Status: DISCONTINUED | OUTPATIENT
Start: 2021-11-28 | End: 2021-12-10 | Stop reason: HOSPADM

## 2021-11-28 RX ORDER — GLUCAGON 1 MG
1 KIT INJECTION
Status: DISCONTINUED | OUTPATIENT
Start: 2021-11-28 | End: 2021-12-10 | Stop reason: HOSPADM

## 2021-11-28 RX ADMIN — APIXABAN 5 MG: 2.5 TABLET, FILM COATED ORAL at 08:11

## 2021-11-28 RX ADMIN — IPRATROPIUM BROMIDE AND ALBUTEROL SULFATE 9 ML: 2.5; .5 SOLUTION RESPIRATORY (INHALATION) at 01:11

## 2021-11-28 RX ADMIN — FUROSEMIDE 40 MG: 40 INJECTION, SOLUTION INTRAMUSCULAR; INTRAVENOUS at 03:11

## 2021-11-28 RX ADMIN — DULOXETINE 30 MG: 30 CAPSULE, DELAYED RELEASE ORAL at 09:11

## 2021-11-28 RX ADMIN — APIXABAN 5 MG: 2.5 TABLET, FILM COATED ORAL at 09:11

## 2021-11-28 RX ADMIN — FUROSEMIDE 60 MG: 10 INJECTION, SOLUTION INTRAMUSCULAR; INTRAVENOUS at 03:11

## 2021-11-28 RX ADMIN — Medication 9 MG: at 08:11

## 2021-11-29 LAB
ALBUMIN SERPL BCP-MCNC: 2.7 G/DL (ref 3.5–5.2)
ALP SERPL-CCNC: 86 U/L (ref 55–135)
ALT SERPL W/O P-5'-P-CCNC: 25 U/L (ref 10–44)
ANION GAP SERPL CALC-SCNC: 12 MMOL/L (ref 8–16)
AST SERPL-CCNC: 26 U/L (ref 10–40)
BASOPHILS # BLD AUTO: 0.03 K/UL (ref 0–0.2)
BASOPHILS NFR BLD: 0.2 % (ref 0–1.9)
BILIRUB SERPL-MCNC: 0.4 MG/DL (ref 0.1–1)
BUN SERPL-MCNC: 21 MG/DL (ref 8–23)
CALCIUM SERPL-MCNC: 9.1 MG/DL (ref 8.7–10.5)
CHLORIDE SERPL-SCNC: 93 MMOL/L (ref 95–110)
CO2 SERPL-SCNC: 35 MMOL/L (ref 23–29)
CREAT SERPL-MCNC: 1 MG/DL (ref 0.5–1.4)
DIFFERENTIAL METHOD: ABNORMAL
EOSINOPHIL # BLD AUTO: 0 K/UL (ref 0–0.5)
EOSINOPHIL NFR BLD: 0.1 % (ref 0–8)
ERYTHROCYTE [DISTWIDTH] IN BLOOD BY AUTOMATED COUNT: 13.3 % (ref 11.5–14.5)
EST. GFR  (AFRICAN AMERICAN): >60 ML/MIN/1.73 M^2
EST. GFR  (NON AFRICAN AMERICAN): >60 ML/MIN/1.73 M^2
GLUCOSE SERPL-MCNC: 107 MG/DL (ref 70–110)
HCT VFR BLD AUTO: 40.8 % (ref 40–54)
HGB BLD-MCNC: 12.4 G/DL (ref 14–18)
IMM GRANULOCYTES # BLD AUTO: 0.07 K/UL (ref 0–0.04)
IMM GRANULOCYTES NFR BLD AUTO: 0.5 % (ref 0–0.5)
LYMPHOCYTES # BLD AUTO: 1.4 K/UL (ref 1–4.8)
LYMPHOCYTES NFR BLD: 10.1 % (ref 18–48)
MAGNESIUM SERPL-MCNC: 2 MG/DL (ref 1.6–2.6)
MCH RBC QN AUTO: 29 PG (ref 27–31)
MCHC RBC AUTO-ENTMCNC: 30.4 G/DL (ref 32–36)
MCV RBC AUTO: 95 FL (ref 82–98)
MONOCYTES # BLD AUTO: 1.9 K/UL (ref 0.3–1)
MONOCYTES NFR BLD: 13.4 % (ref 4–15)
NEUTROPHILS # BLD AUTO: 10.5 K/UL (ref 1.8–7.7)
NEUTROPHILS NFR BLD: 75.7 % (ref 38–73)
NRBC BLD-RTO: 0 /100 WBC
PHOSPHATE SERPL-MCNC: 3.2 MG/DL (ref 2.7–4.5)
PLATELET # BLD AUTO: 260 K/UL (ref 150–450)
PMV BLD AUTO: 9.7 FL (ref 9.2–12.9)
POCT GLUCOSE: 112 MG/DL (ref 70–110)
POCT GLUCOSE: 112 MG/DL (ref 70–110)
POCT GLUCOSE: 154 MG/DL (ref 70–110)
POTASSIUM SERPL-SCNC: 4.6 MMOL/L (ref 3.5–5.1)
PROT SERPL-MCNC: 6.9 G/DL (ref 6–8.4)
RBC # BLD AUTO: 4.28 M/UL (ref 4.6–6.2)
SODIUM SERPL-SCNC: 140 MMOL/L (ref 136–145)
WBC # BLD AUTO: 13.88 K/UL (ref 3.9–12.7)

## 2021-11-29 PROCEDURE — 99900035 HC TECH TIME PER 15 MIN (STAT)

## 2021-11-29 PROCEDURE — 94761 N-INVAS EAR/PLS OXIMETRY MLT: CPT

## 2021-11-29 PROCEDURE — 85025 COMPLETE CBC W/AUTO DIFF WBC: CPT | Performed by: NURSE PRACTITIONER

## 2021-11-29 PROCEDURE — 25000003 PHARM REV CODE 250: Performed by: NURSE PRACTITIONER

## 2021-11-29 PROCEDURE — 84100 ASSAY OF PHOSPHORUS: CPT | Performed by: NURSE PRACTITIONER

## 2021-11-29 PROCEDURE — 83735 ASSAY OF MAGNESIUM: CPT | Performed by: NURSE PRACTITIONER

## 2021-11-29 PROCEDURE — 97530 THERAPEUTIC ACTIVITIES: CPT

## 2021-11-29 PROCEDURE — 94660 CPAP INITIATION&MGMT: CPT

## 2021-11-29 PROCEDURE — 27000221 HC OXYGEN, UP TO 24 HOURS

## 2021-11-29 PROCEDURE — 80053 COMPREHEN METABOLIC PANEL: CPT | Performed by: NURSE PRACTITIONER

## 2021-11-29 PROCEDURE — 97162 PT EVAL MOD COMPLEX 30 MIN: CPT

## 2021-11-29 PROCEDURE — 20000000 HC ICU ROOM

## 2021-11-29 PROCEDURE — 63600175 PHARM REV CODE 636 W HCPCS: Performed by: NURSE PRACTITIONER

## 2021-11-29 PROCEDURE — 36415 COLL VENOUS BLD VENIPUNCTURE: CPT | Performed by: NURSE PRACTITIONER

## 2021-11-29 RX ADMIN — FUROSEMIDE 40 MG: 40 INJECTION, SOLUTION INTRAMUSCULAR; INTRAVENOUS at 02:11

## 2021-11-29 RX ADMIN — DULOXETINE 30 MG: 30 CAPSULE, DELAYED RELEASE ORAL at 09:11

## 2021-11-29 RX ADMIN — APIXABAN 5 MG: 2.5 TABLET, FILM COATED ORAL at 09:11

## 2021-11-29 RX ADMIN — FUROSEMIDE 40 MG: 40 INJECTION, SOLUTION INTRAMUSCULAR; INTRAVENOUS at 03:11

## 2021-11-29 RX ADMIN — ACETAMINOPHEN 650 MG: 325 TABLET ORAL at 09:11

## 2021-11-30 PROBLEM — J44.1 COPD EXACERBATION: Status: ACTIVE | Noted: 2021-11-30

## 2021-11-30 PROBLEM — J96.22 ACUTE ON CHRONIC RESPIRATORY FAILURE WITH HYPOXIA AND HYPERCAPNIA: Status: ACTIVE | Noted: 2021-11-28

## 2021-11-30 PROBLEM — J96.21 ACUTE ON CHRONIC RESPIRATORY FAILURE WITH HYPOXIA AND HYPERCAPNIA: Status: ACTIVE | Noted: 2021-11-28

## 2021-11-30 LAB
ALBUMIN SERPL BCP-MCNC: 2.5 G/DL (ref 3.5–5.2)
ALP SERPL-CCNC: 78 U/L (ref 55–135)
ALT SERPL W/O P-5'-P-CCNC: 28 U/L (ref 10–44)
ANION GAP SERPL CALC-SCNC: 13 MMOL/L (ref 8–16)
AORTIC ROOT ANNULUS: 3.11 CM
AORTIC VALVE CUSP SEPERATION: 2.16 CM
AST SERPL-CCNC: 28 U/L (ref 10–40)
AV INDEX (PROSTH): 0.71
AV MEAN GRADIENT: 10 MMHG
AV PEAK GRADIENT: 17 MMHG
AV VALVE AREA: 2.69 CM2
AV VELOCITY RATIO: 0.57
BASOPHILS # BLD AUTO: 0.04 K/UL (ref 0–0.2)
BASOPHILS NFR BLD: 0.3 % (ref 0–1.9)
BILIRUB SERPL-MCNC: 0.4 MG/DL (ref 0.1–1)
BSA FOR ECHO PROCEDURE: 2.89 M2
BUN SERPL-MCNC: 23 MG/DL (ref 8–23)
CALCIUM SERPL-MCNC: 8.8 MG/DL (ref 8.7–10.5)
CHLORIDE SERPL-SCNC: 89 MMOL/L (ref 95–110)
CO2 SERPL-SCNC: 38 MMOL/L (ref 23–29)
CREAT SERPL-MCNC: 0.9 MG/DL (ref 0.5–1.4)
CV ECHO LV RWT: 0.55 CM
DIFFERENTIAL METHOD: ABNORMAL
DOP CALC AO PEAK VEL: 2.09 M/S
DOP CALC AO VTI: 38.26 CM
DOP CALC LVOT AREA: 3.8 CM2
DOP CALC LVOT DIAMETER: 2.2 CM
DOP CALC LVOT PEAK VEL: 1.19 M/S
DOP CALC LVOT STROKE VOLUME: 102.74 CM3
DOP CALC MV VTI: 30.59 CM
DOP CALCLVOT PEAK VEL VTI: 27.04 CM
E WAVE DECELERATION TIME: 220.08 MSEC
E/A RATIO: 0.79
E/E' RATIO: 12.12 M/S
ECHO LV POSTERIOR WALL: 0.96 CM (ref 0.6–1.1)
EJECTION FRACTION: 60 %
EOSINOPHIL # BLD AUTO: 0 K/UL (ref 0–0.5)
EOSINOPHIL NFR BLD: 0.3 % (ref 0–8)
ERYTHROCYTE [DISTWIDTH] IN BLOOD BY AUTOMATED COUNT: 13.1 % (ref 11.5–14.5)
EST. GFR  (AFRICAN AMERICAN): >60 ML/MIN/1.73 M^2
EST. GFR  (NON AFRICAN AMERICAN): >60 ML/MIN/1.73 M^2
FRACTIONAL SHORTENING: 28 % (ref 28–44)
GLUCOSE SERPL-MCNC: 101 MG/DL (ref 70–110)
HCT VFR BLD AUTO: 40.3 % (ref 40–54)
HGB BLD-MCNC: 12.1 G/DL (ref 14–18)
IMM GRANULOCYTES # BLD AUTO: 0.05 K/UL (ref 0–0.04)
IMM GRANULOCYTES NFR BLD AUTO: 0.4 % (ref 0–0.5)
INTERVENTRICULAR SEPTUM: 0.96 CM (ref 0.6–1.1)
IVRT: 102.76 MSEC
LA MAJOR: 5.44 CM
LA WIDTH: 3.52 CM
LEFT ATRIUM SIZE: 3.23 CM
LEFT ATRIUM VOLUME INDEX MOD: 14 ML/M2
LEFT ATRIUM VOLUME MOD: 38.11 CM3
LEFT INTERNAL DIMENSION IN SYSTOLE: 2.5 CM (ref 2.1–4)
LEFT VENTRICLE DIASTOLIC VOLUME INDEX: 18.53 ML/M2
LEFT VENTRICLE DIASTOLIC VOLUME: 50.59 ML
LEFT VENTRICLE MASS INDEX: 36 G/M2
LEFT VENTRICLE SYSTOLIC VOLUME INDEX: 8.2 ML/M2
LEFT VENTRICLE SYSTOLIC VOLUME: 22.3 ML
LEFT VENTRICULAR INTERNAL DIMENSION IN DIASTOLE: 3.49 CM (ref 3.5–6)
LEFT VENTRICULAR MASS: 96.97 G
LV LATERAL E/E' RATIO: 10.3 M/S
LV SEPTAL E/E' RATIO: 14.71 M/S
LYMPHOCYTES # BLD AUTO: 1.5 K/UL (ref 1–4.8)
LYMPHOCYTES NFR BLD: 12.6 % (ref 18–48)
MAGNESIUM SERPL-MCNC: 1.9 MG/DL (ref 1.6–2.6)
MCH RBC QN AUTO: 28.9 PG (ref 27–31)
MCHC RBC AUTO-ENTMCNC: 30 G/DL (ref 32–36)
MCV RBC AUTO: 96 FL (ref 82–98)
MONOCYTES # BLD AUTO: 1.5 K/UL (ref 0.3–1)
MONOCYTES NFR BLD: 12.1 % (ref 4–15)
MV A" WAVE DURATION": 10.28 MSEC
MV MEAN GRADIENT: 1 MMHG
MV PEAK A VEL: 1.3 M/S
MV PEAK E VEL: 1.03 M/S
MV PEAK GRADIENT: 6 MMHG
MV STENOSIS PRESSURE HALF TIME: 63.82 MS
MV VALVE AREA BY CONTINUITY EQUATION: 3.36 CM2
MV VALVE AREA P 1/2 METHOD: 3.45 CM2
NEUTROPHILS # BLD AUTO: 9 K/UL (ref 1.8–7.7)
NEUTROPHILS NFR BLD: 74.3 % (ref 38–73)
NRBC BLD-RTO: 0 /100 WBC
PHOSPHATE SERPL-MCNC: 3.3 MG/DL (ref 2.7–4.5)
PISA TR MAX VEL: 2.39 M/S
PLATELET # BLD AUTO: 252 K/UL (ref 150–450)
PMV BLD AUTO: 9.7 FL (ref 9.2–12.9)
POCT GLUCOSE: 133 MG/DL (ref 70–110)
POCT GLUCOSE: 142 MG/DL (ref 70–110)
POTASSIUM SERPL-SCNC: 4.3 MMOL/L (ref 3.5–5.1)
PROT SERPL-MCNC: 6.5 G/DL (ref 6–8.4)
PV PEAK VELOCITY: 1.03 CM/S
RA MAJOR: 5.4 CM
RA PRESSURE: 3 MMHG
RA WIDTH: 3.61 CM
RBC # BLD AUTO: 4.19 M/UL (ref 4.6–6.2)
RIGHT VENTRICULAR END-DIASTOLIC DIMENSION: 3.28 CM
RV TISSUE DOPPLER FREE WALL SYSTOLIC VELOCITY 1 (APICAL 4 CHAMBER VIEW): 16.26 CM/S
SODIUM SERPL-SCNC: 140 MMOL/L (ref 136–145)
TDI LATERAL: 0.1 M/S
TDI SEPTAL: 0.07 M/S
TDI: 0.09 M/S
TR MAX PG: 23 MMHG
TRICUSPID ANNULAR PLANE SYSTOLIC EXCURSION: 2.98 CM
TV REST PULMONARY ARTERY PRESSURE: 26 MMHG
WBC # BLD AUTO: 12.08 K/UL (ref 3.9–12.7)

## 2021-11-30 PROCEDURE — 83735 ASSAY OF MAGNESIUM: CPT | Performed by: NURSE PRACTITIONER

## 2021-11-30 PROCEDURE — 82803 BLOOD GASES ANY COMBINATION: CPT

## 2021-11-30 PROCEDURE — 97530 THERAPEUTIC ACTIVITIES: CPT

## 2021-11-30 PROCEDURE — 63600175 PHARM REV CODE 636 W HCPCS: Performed by: INTERNAL MEDICINE

## 2021-11-30 PROCEDURE — 85025 COMPLETE CBC W/AUTO DIFF WBC: CPT | Performed by: NURSE PRACTITIONER

## 2021-11-30 PROCEDURE — 25000242 PHARM REV CODE 250 ALT 637 W/ HCPCS: Performed by: NURSE PRACTITIONER

## 2021-11-30 PROCEDURE — 99900035 HC TECH TIME PER 15 MIN (STAT)

## 2021-11-30 PROCEDURE — 25500020 PHARM REV CODE 255: Performed by: SPECIALIST

## 2021-11-30 PROCEDURE — 25000003 PHARM REV CODE 250: Performed by: NURSE PRACTITIONER

## 2021-11-30 PROCEDURE — 94761 N-INVAS EAR/PLS OXIMETRY MLT: CPT

## 2021-11-30 PROCEDURE — 25000242 PHARM REV CODE 250 ALT 637 W/ HCPCS: Performed by: INTERNAL MEDICINE

## 2021-11-30 PROCEDURE — 99233 PR SUBSEQUENT HOSPITAL CARE,LEVL III: ICD-10-PCS | Mod: ,,, | Performed by: INTERNAL MEDICINE

## 2021-11-30 PROCEDURE — 80053 COMPREHEN METABOLIC PANEL: CPT | Performed by: NURSE PRACTITIONER

## 2021-11-30 PROCEDURE — 20000000 HC ICU ROOM

## 2021-11-30 PROCEDURE — 63600175 PHARM REV CODE 636 W HCPCS: Performed by: NURSE PRACTITIONER

## 2021-11-30 PROCEDURE — 36600 WITHDRAWAL OF ARTERIAL BLOOD: CPT

## 2021-11-30 PROCEDURE — 99233 SBSQ HOSP IP/OBS HIGH 50: CPT | Mod: ,,, | Performed by: SPECIALIST

## 2021-11-30 PROCEDURE — 94660 CPAP INITIATION&MGMT: CPT

## 2021-11-30 PROCEDURE — 30200315 PPD INTRADERMAL TEST REV CODE 302: Performed by: HOSPITALIST

## 2021-11-30 PROCEDURE — 27000221 HC OXYGEN, UP TO 24 HOURS

## 2021-11-30 PROCEDURE — 99233 SBSQ HOSP IP/OBS HIGH 50: CPT | Mod: ,,, | Performed by: INTERNAL MEDICINE

## 2021-11-30 PROCEDURE — 86580 TB INTRADERMAL TEST: CPT | Performed by: HOSPITALIST

## 2021-11-30 PROCEDURE — 94640 AIRWAY INHALATION TREATMENT: CPT

## 2021-11-30 PROCEDURE — 99233 PR SUBSEQUENT HOSPITAL CARE,LEVL III: ICD-10-PCS | Mod: ,,, | Performed by: SPECIALIST

## 2021-11-30 PROCEDURE — 84100 ASSAY OF PHOSPHORUS: CPT | Performed by: NURSE PRACTITIONER

## 2021-11-30 PROCEDURE — 36415 COLL VENOUS BLD VENIPUNCTURE: CPT | Performed by: NURSE PRACTITIONER

## 2021-11-30 RX ORDER — IPRATROPIUM BROMIDE AND ALBUTEROL SULFATE 2.5; .5 MG/3ML; MG/3ML
3 SOLUTION RESPIRATORY (INHALATION) EVERY 6 HOURS
Status: DISCONTINUED | OUTPATIENT
Start: 2021-11-30 | End: 2021-12-07

## 2021-11-30 RX ORDER — PREDNISONE 20 MG/1
20 TABLET ORAL EVERY 8 HOURS
Status: DISCONTINUED | OUTPATIENT
Start: 2021-11-30 | End: 2021-12-03

## 2021-11-30 RX ORDER — PREDNISONE 20 MG/1
TABLET ORAL
Status: COMPLETED
Start: 2021-11-30 | End: 2021-12-03

## 2021-11-30 RX ADMIN — DULOXETINE 30 MG: 30 CAPSULE, DELAYED RELEASE ORAL at 08:11

## 2021-11-30 RX ADMIN — IPRATROPIUM BROMIDE AND ALBUTEROL SULFATE 3 ML: .5; 2.5 SOLUTION RESPIRATORY (INHALATION) at 12:11

## 2021-11-30 RX ADMIN — APIXABAN 5 MG: 2.5 TABLET, FILM COATED ORAL at 08:11

## 2021-11-30 RX ADMIN — SULFUR HEXAFLUORIDE 2.4 ML: KIT at 11:11

## 2021-11-30 RX ADMIN — TUBERCULIN PURIFIED PROTEIN DERIVATIVE 5 UNITS: 5 INJECTION, SOLUTION INTRADERMAL at 09:11

## 2021-11-30 RX ADMIN — FUROSEMIDE 40 MG: 40 INJECTION, SOLUTION INTRAMUSCULAR; INTRAVENOUS at 03:11

## 2021-11-30 RX ADMIN — PREDNISONE 20 MG: 20 TABLET ORAL at 08:11

## 2021-11-30 RX ADMIN — IPRATROPIUM BROMIDE AND ALBUTEROL SULFATE 3 ML: .5; 2.5 SOLUTION RESPIRATORY (INHALATION) at 08:11

## 2021-11-30 RX ADMIN — IPRATROPIUM BROMIDE AND ALBUTEROL SULFATE 3 ML: .5; 3 SOLUTION RESPIRATORY (INHALATION) at 07:11

## 2021-12-01 LAB
ALLENS TEST: ABNORMAL
DELSYS: ABNORMAL
ERYTHROCYTE [SEDIMENTATION RATE] IN BLOOD BY WESTERGREN METHOD: 26 MM/H
FLOW: 3
HCO3 UR-SCNC: 51.7 MMOL/L (ref 24–28)
MODE: ABNORMAL
PCO2 BLDA: 86 MMHG (ref 35–45)
PH SMN: 7.39 [PH] (ref 7.35–7.45)
PO2 BLDA: 99 MMHG (ref 80–100)
POC BE: 27 MMOL/L
POC SATURATED O2: 97 % (ref 95–100)
POC TCO2: >50 MMOL/L (ref 23–27)
POCT GLUCOSE: 121 MG/DL (ref 70–110)
POCT GLUCOSE: 129 MG/DL (ref 70–110)
POCT GLUCOSE: 161 MG/DL (ref 70–110)
POCT GLUCOSE: 161 MG/DL (ref 70–110)
SAMPLE: ABNORMAL
SITE: ABNORMAL
SP02: 94

## 2021-12-01 PROCEDURE — 99900035 HC TECH TIME PER 15 MIN (STAT)

## 2021-12-01 PROCEDURE — 97110 THERAPEUTIC EXERCISES: CPT

## 2021-12-01 PROCEDURE — 94640 AIRWAY INHALATION TREATMENT: CPT

## 2021-12-01 PROCEDURE — 25000242 PHARM REV CODE 250 ALT 637 W/ HCPCS: Performed by: INTERNAL MEDICINE

## 2021-12-01 PROCEDURE — 20000000 HC ICU ROOM

## 2021-12-01 PROCEDURE — 99233 SBSQ HOSP IP/OBS HIGH 50: CPT | Mod: ,,, | Performed by: INTERNAL MEDICINE

## 2021-12-01 PROCEDURE — 94761 N-INVAS EAR/PLS OXIMETRY MLT: CPT

## 2021-12-01 PROCEDURE — 25000003 PHARM REV CODE 250: Performed by: INTERNAL MEDICINE

## 2021-12-01 PROCEDURE — 97165 OT EVAL LOW COMPLEX 30 MIN: CPT

## 2021-12-01 PROCEDURE — 63600175 PHARM REV CODE 636 W HCPCS: Performed by: INTERNAL MEDICINE

## 2021-12-01 PROCEDURE — 94660 CPAP INITIATION&MGMT: CPT

## 2021-12-01 PROCEDURE — 63600175 PHARM REV CODE 636 W HCPCS: Performed by: NURSE PRACTITIONER

## 2021-12-01 PROCEDURE — 25000003 PHARM REV CODE 250: Performed by: NURSE PRACTITIONER

## 2021-12-01 PROCEDURE — 99233 PR SUBSEQUENT HOSPITAL CARE,LEVL III: ICD-10-PCS | Mod: ,,, | Performed by: INTERNAL MEDICINE

## 2021-12-01 PROCEDURE — 27000221 HC OXYGEN, UP TO 24 HOURS

## 2021-12-01 RX ORDER — SPIRONOLACTONE 25 MG/1
50 TABLET ORAL DAILY
Status: DISCONTINUED | OUTPATIENT
Start: 2021-12-02 | End: 2021-12-10 | Stop reason: HOSPADM

## 2021-12-01 RX ORDER — BENZONATATE 100 MG/1
100 CAPSULE ORAL 3 TIMES DAILY PRN
Status: DISCONTINUED | OUTPATIENT
Start: 2021-12-02 | End: 2021-12-10 | Stop reason: HOSPADM

## 2021-12-01 RX ORDER — SPIRONOLACTONE 25 MG/1
25 TABLET ORAL DAILY
Status: DISCONTINUED | OUTPATIENT
Start: 2021-12-01 | End: 2021-12-01

## 2021-12-01 RX ORDER — FUROSEMIDE 40 MG/1
40 TABLET ORAL
Status: DISCONTINUED | OUTPATIENT
Start: 2021-12-01 | End: 2021-12-10 | Stop reason: HOSPADM

## 2021-12-01 RX ADMIN — PREDNISONE 20 MG: 20 TABLET ORAL at 09:12

## 2021-12-01 RX ADMIN — IPRATROPIUM BROMIDE AND ALBUTEROL SULFATE 3 ML: .5; 3 SOLUTION RESPIRATORY (INHALATION) at 12:12

## 2021-12-01 RX ADMIN — INSULIN ASPART 2 UNITS: 100 INJECTION, SOLUTION INTRAVENOUS; SUBCUTANEOUS at 08:12

## 2021-12-01 RX ADMIN — IPRATROPIUM BROMIDE AND ALBUTEROL SULFATE 3 ML: .5; 3 SOLUTION RESPIRATORY (INHALATION) at 07:12

## 2021-12-01 RX ADMIN — APIXABAN 5 MG: 2.5 TABLET, FILM COATED ORAL at 09:12

## 2021-12-01 RX ADMIN — FUROSEMIDE 40 MG: 40 INJECTION, SOLUTION INTRAMUSCULAR; INTRAVENOUS at 02:12

## 2021-12-01 RX ADMIN — FUROSEMIDE 40 MG: 40 TABLET ORAL at 04:12

## 2021-12-01 RX ADMIN — PREDNISONE 20 MG: 20 TABLET ORAL at 02:12

## 2021-12-01 RX ADMIN — APIXABAN 5 MG: 2.5 TABLET, FILM COATED ORAL at 08:12

## 2021-12-01 RX ADMIN — DULOXETINE 30 MG: 30 CAPSULE, DELAYED RELEASE ORAL at 08:12

## 2021-12-01 RX ADMIN — PREDNISONE 20 MG: 20 TABLET ORAL at 06:12

## 2021-12-01 RX ADMIN — SPIRONOLACTONE 25 MG: 25 TABLET ORAL at 11:12

## 2021-12-01 RX ADMIN — INSULIN ASPART 1 UNITS: 100 INJECTION, SOLUTION INTRAVENOUS; SUBCUTANEOUS at 09:12

## 2021-12-02 PROBLEM — Z71.89 ADVANCE CARE PLANNING: Status: ACTIVE | Noted: 2021-12-02

## 2021-12-02 PROBLEM — F32.9 MAJOR DEPRESSIVE DISORDER WITH CURRENT ACTIVE EPISODE: Status: ACTIVE | Noted: 2021-12-02

## 2021-12-02 LAB
ANION GAP SERPL CALC-SCNC: 12 MMOL/L (ref 8–16)
BASOPHILS # BLD AUTO: 0.01 K/UL (ref 0–0.2)
BASOPHILS NFR BLD: 0.1 % (ref 0–1.9)
BNP SERPL-MCNC: 66 PG/ML (ref 0–99)
BUN SERPL-MCNC: 27 MG/DL (ref 8–23)
CALCIUM SERPL-MCNC: 9.1 MG/DL (ref 8.7–10.5)
CHLORIDE SERPL-SCNC: 86 MMOL/L (ref 95–110)
CO2 SERPL-SCNC: 39 MMOL/L (ref 23–29)
CREAT SERPL-MCNC: 1 MG/DL (ref 0.5–1.4)
DIFFERENTIAL METHOD: ABNORMAL
EOSINOPHIL # BLD AUTO: 0 K/UL (ref 0–0.5)
EOSINOPHIL NFR BLD: 0 % (ref 0–8)
ERYTHROCYTE [DISTWIDTH] IN BLOOD BY AUTOMATED COUNT: 12.8 % (ref 11.5–14.5)
EST. GFR  (AFRICAN AMERICAN): >60 ML/MIN/1.73 M^2
EST. GFR  (NON AFRICAN AMERICAN): >60 ML/MIN/1.73 M^2
GLUCOSE SERPL-MCNC: 136 MG/DL (ref 70–110)
HCT VFR BLD AUTO: 43.4 % (ref 40–54)
HGB BLD-MCNC: 13.4 G/DL (ref 14–18)
IMM GRANULOCYTES # BLD AUTO: 0.08 K/UL (ref 0–0.04)
IMM GRANULOCYTES NFR BLD AUTO: 0.6 % (ref 0–0.5)
LYMPHOCYTES # BLD AUTO: 0.9 K/UL (ref 1–4.8)
LYMPHOCYTES NFR BLD: 7.3 % (ref 18–48)
MAGNESIUM SERPL-MCNC: 2.2 MG/DL (ref 1.6–2.6)
MCH RBC QN AUTO: 28.8 PG (ref 27–31)
MCHC RBC AUTO-ENTMCNC: 30.9 G/DL (ref 32–36)
MCV RBC AUTO: 93 FL (ref 82–98)
MONOCYTES # BLD AUTO: 0.7 K/UL (ref 0.3–1)
MONOCYTES NFR BLD: 5.8 % (ref 4–15)
NEUTROPHILS # BLD AUTO: 11 K/UL (ref 1.8–7.7)
NEUTROPHILS NFR BLD: 86.2 % (ref 38–73)
NRBC BLD-RTO: 0 /100 WBC
PLATELET # BLD AUTO: 283 K/UL (ref 150–450)
PMV BLD AUTO: 9.5 FL (ref 9.2–12.9)
POCT GLUCOSE: 114 MG/DL (ref 70–110)
POCT GLUCOSE: 125 MG/DL (ref 70–110)
POCT GLUCOSE: 133 MG/DL (ref 70–110)
POCT GLUCOSE: 161 MG/DL (ref 70–110)
POTASSIUM SERPL-SCNC: 4.5 MMOL/L (ref 3.5–5.1)
RBC # BLD AUTO: 4.65 M/UL (ref 4.6–6.2)
SODIUM SERPL-SCNC: 137 MMOL/L (ref 136–145)
TB INDURATION 48 - 72 HR READ: 0 MM
WBC # BLD AUTO: 12.75 K/UL (ref 3.9–12.7)

## 2021-12-02 PROCEDURE — 25000003 PHARM REV CODE 250: Performed by: NURSE PRACTITIONER

## 2021-12-02 PROCEDURE — 99232 SBSQ HOSP IP/OBS MODERATE 35: CPT | Mod: ,,, | Performed by: INTERNAL MEDICINE

## 2021-12-02 PROCEDURE — 99223 1ST HOSP IP/OBS HIGH 75: CPT | Mod: ,,, | Performed by: FAMILY MEDICINE

## 2021-12-02 PROCEDURE — 97110 THERAPEUTIC EXERCISES: CPT | Mod: CO

## 2021-12-02 PROCEDURE — 97530 THERAPEUTIC ACTIVITIES: CPT

## 2021-12-02 PROCEDURE — 94660 CPAP INITIATION&MGMT: CPT

## 2021-12-02 PROCEDURE — 99233 PR SUBSEQUENT HOSPITAL CARE,LEVL III: ICD-10-PCS | Mod: ,,, | Performed by: GENERAL PRACTICE

## 2021-12-02 PROCEDURE — 99232 PR SUBSEQUENT HOSPITAL CARE,LEVL II: ICD-10-PCS | Mod: ,,, | Performed by: INTERNAL MEDICINE

## 2021-12-02 PROCEDURE — 99497 ADVNCD CARE PLAN 30 MIN: CPT | Mod: 25,,, | Performed by: FAMILY MEDICINE

## 2021-12-02 PROCEDURE — 94761 N-INVAS EAR/PLS OXIMETRY MLT: CPT

## 2021-12-02 PROCEDURE — 36415 COLL VENOUS BLD VENIPUNCTURE: CPT | Performed by: INTERNAL MEDICINE

## 2021-12-02 PROCEDURE — 94640 AIRWAY INHALATION TREATMENT: CPT

## 2021-12-02 PROCEDURE — 63600175 PHARM REV CODE 636 W HCPCS: Performed by: INTERNAL MEDICINE

## 2021-12-02 PROCEDURE — 99223 PR INITIAL HOSPITAL CARE,LEVL III: ICD-10-PCS | Mod: ,,, | Performed by: FAMILY MEDICINE

## 2021-12-02 PROCEDURE — 25000242 PHARM REV CODE 250 ALT 637 W/ HCPCS: Performed by: INTERNAL MEDICINE

## 2021-12-02 PROCEDURE — 27000221 HC OXYGEN, UP TO 24 HOURS

## 2021-12-02 PROCEDURE — 12000002 HC ACUTE/MED SURGE SEMI-PRIVATE ROOM

## 2021-12-02 PROCEDURE — 85025 COMPLETE CBC W/AUTO DIFF WBC: CPT | Performed by: INTERNAL MEDICINE

## 2021-12-02 PROCEDURE — 99233 SBSQ HOSP IP/OBS HIGH 50: CPT | Mod: ,,, | Performed by: GENERAL PRACTICE

## 2021-12-02 PROCEDURE — 25000003 PHARM REV CODE 250: Performed by: INTERNAL MEDICINE

## 2021-12-02 PROCEDURE — 99900035 HC TECH TIME PER 15 MIN (STAT)

## 2021-12-02 PROCEDURE — 83735 ASSAY OF MAGNESIUM: CPT | Performed by: INTERNAL MEDICINE

## 2021-12-02 PROCEDURE — 99497 PR ADVNCD CARE PLAN 30 MIN: ICD-10-PCS | Mod: 25,,, | Performed by: FAMILY MEDICINE

## 2021-12-02 PROCEDURE — 83880 ASSAY OF NATRIURETIC PEPTIDE: CPT | Performed by: INTERNAL MEDICINE

## 2021-12-02 PROCEDURE — 25000003 PHARM REV CODE 250: Performed by: PHYSICIAN ASSISTANT

## 2021-12-02 PROCEDURE — 80048 BASIC METABOLIC PNL TOTAL CA: CPT | Performed by: INTERNAL MEDICINE

## 2021-12-02 RX ADMIN — APIXABAN 5 MG: 2.5 TABLET, FILM COATED ORAL at 08:12

## 2021-12-02 RX ADMIN — IPRATROPIUM BROMIDE AND ALBUTEROL SULFATE 3 ML: .5; 3 SOLUTION RESPIRATORY (INHALATION) at 12:12

## 2021-12-02 RX ADMIN — PREDNISONE 20 MG: 20 TABLET ORAL at 06:12

## 2021-12-02 RX ADMIN — FUROSEMIDE 40 MG: 40 TABLET ORAL at 04:12

## 2021-12-02 RX ADMIN — BENZONATATE 100 MG: 100 CAPSULE ORAL at 10:12

## 2021-12-02 RX ADMIN — IPRATROPIUM BROMIDE AND ALBUTEROL SULFATE 3 ML: .5; 3 SOLUTION RESPIRATORY (INHALATION) at 08:12

## 2021-12-02 RX ADMIN — DULOXETINE 30 MG: 30 CAPSULE, DELAYED RELEASE ORAL at 09:12

## 2021-12-02 RX ADMIN — APIXABAN 5 MG: 2.5 TABLET, FILM COATED ORAL at 09:12

## 2021-12-02 RX ADMIN — FUROSEMIDE 40 MG: 40 TABLET ORAL at 06:12

## 2021-12-02 RX ADMIN — PREDNISONE 20 MG: 20 TABLET ORAL at 09:12

## 2021-12-02 RX ADMIN — PREDNISONE 20 MG: 20 TABLET ORAL at 03:12

## 2021-12-02 RX ADMIN — SPIRONOLACTONE 50 MG: 25 TABLET ORAL at 09:12

## 2021-12-02 RX ADMIN — IPRATROPIUM BROMIDE AND ALBUTEROL SULFATE 3 ML: .5; 3 SOLUTION RESPIRATORY (INHALATION) at 07:12

## 2021-12-03 LAB
ANION GAP SERPL CALC-SCNC: 10 MMOL/L (ref 8–16)
BUN SERPL-MCNC: 31 MG/DL (ref 8–23)
CALCIUM SERPL-MCNC: 9.2 MG/DL (ref 8.7–10.5)
CHLORIDE SERPL-SCNC: 89 MMOL/L (ref 95–110)
CO2 SERPL-SCNC: 37 MMOL/L (ref 23–29)
CREAT SERPL-MCNC: 0.9 MG/DL (ref 0.5–1.4)
EST. GFR  (AFRICAN AMERICAN): >60 ML/MIN/1.73 M^2
EST. GFR  (NON AFRICAN AMERICAN): >60 ML/MIN/1.73 M^2
GLUCOSE SERPL-MCNC: 143 MG/DL (ref 70–110)
MAGNESIUM SERPL-MCNC: 2.3 MG/DL (ref 1.6–2.6)
POCT GLUCOSE: 130 MG/DL (ref 70–110)
POCT GLUCOSE: 140 MG/DL (ref 70–110)
POCT GLUCOSE: 142 MG/DL (ref 70–110)
POTASSIUM SERPL-SCNC: 4.9 MMOL/L (ref 3.5–5.1)
SODIUM SERPL-SCNC: 136 MMOL/L (ref 136–145)

## 2021-12-03 PROCEDURE — 99232 SBSQ HOSP IP/OBS MODERATE 35: CPT | Mod: ,,, | Performed by: GENERAL PRACTICE

## 2021-12-03 PROCEDURE — 25000003 PHARM REV CODE 250: Performed by: INTERNAL MEDICINE

## 2021-12-03 PROCEDURE — 83735 ASSAY OF MAGNESIUM: CPT | Performed by: INTERNAL MEDICINE

## 2021-12-03 PROCEDURE — 25000242 PHARM REV CODE 250 ALT 637 W/ HCPCS: Performed by: INTERNAL MEDICINE

## 2021-12-03 PROCEDURE — 63600175 PHARM REV CODE 636 W HCPCS

## 2021-12-03 PROCEDURE — 63600175 PHARM REV CODE 636 W HCPCS: Performed by: INTERNAL MEDICINE

## 2021-12-03 PROCEDURE — 99231 PR SUBSEQUENT HOSPITAL CARE,LEVL I: ICD-10-PCS | Mod: ,,, | Performed by: INTERNAL MEDICINE

## 2021-12-03 PROCEDURE — 94640 AIRWAY INHALATION TREATMENT: CPT

## 2021-12-03 PROCEDURE — 80048 BASIC METABOLIC PNL TOTAL CA: CPT | Performed by: INTERNAL MEDICINE

## 2021-12-03 PROCEDURE — 99231 SBSQ HOSP IP/OBS SF/LOW 25: CPT | Mod: ,,, | Performed by: INTERNAL MEDICINE

## 2021-12-03 PROCEDURE — 99900035 HC TECH TIME PER 15 MIN (STAT)

## 2021-12-03 PROCEDURE — 94660 CPAP INITIATION&MGMT: CPT

## 2021-12-03 PROCEDURE — 97530 THERAPEUTIC ACTIVITIES: CPT

## 2021-12-03 PROCEDURE — 94761 N-INVAS EAR/PLS OXIMETRY MLT: CPT

## 2021-12-03 PROCEDURE — 36415 COLL VENOUS BLD VENIPUNCTURE: CPT | Performed by: INTERNAL MEDICINE

## 2021-12-03 PROCEDURE — 99232 PR SUBSEQUENT HOSPITAL CARE,LEVL II: ICD-10-PCS | Mod: ,,, | Performed by: GENERAL PRACTICE

## 2021-12-03 PROCEDURE — 12000002 HC ACUTE/MED SURGE SEMI-PRIVATE ROOM

## 2021-12-03 PROCEDURE — 27000221 HC OXYGEN, UP TO 24 HOURS

## 2021-12-03 RX ORDER — PREDNISONE 20 MG/1
20 TABLET ORAL 2 TIMES DAILY
Status: DISCONTINUED | OUTPATIENT
Start: 2021-12-03 | End: 2021-12-06

## 2021-12-03 RX ADMIN — APIXABAN 5 MG: 2.5 TABLET, FILM COATED ORAL at 08:12

## 2021-12-03 RX ADMIN — IPRATROPIUM BROMIDE AND ALBUTEROL SULFATE 3 ML: .5; 3 SOLUTION RESPIRATORY (INHALATION) at 07:12

## 2021-12-03 RX ADMIN — FUROSEMIDE 40 MG: 40 TABLET ORAL at 05:12

## 2021-12-03 RX ADMIN — APIXABAN 5 MG: 2.5 TABLET, FILM COATED ORAL at 09:12

## 2021-12-03 RX ADMIN — PREDNISONE 20 MG: 20 TABLET ORAL at 01:12

## 2021-12-03 RX ADMIN — DULOXETINE 30 MG: 30 CAPSULE, DELAYED RELEASE ORAL at 09:12

## 2021-12-03 RX ADMIN — IPRATROPIUM BROMIDE AND ALBUTEROL SULFATE 3 ML: .5; 3 SOLUTION RESPIRATORY (INHALATION) at 01:12

## 2021-12-03 RX ADMIN — ACETAMINOPHEN 650 MG: 325 TABLET ORAL at 08:12

## 2021-12-03 RX ADMIN — PREDNISONE 20 MG: 20 TABLET ORAL at 05:12

## 2021-12-03 RX ADMIN — PREDNISONE 20 MG: 20 TABLET ORAL at 08:12

## 2021-12-03 RX ADMIN — SPIRONOLACTONE 50 MG: 25 TABLET ORAL at 09:12

## 2021-12-03 RX ADMIN — INSULIN ASPART 2 UNITS: 100 INJECTION, SOLUTION INTRAVENOUS; SUBCUTANEOUS at 08:12

## 2021-12-03 RX ADMIN — IPRATROPIUM BROMIDE AND ALBUTEROL SULFATE 3 ML: .5; 3 SOLUTION RESPIRATORY (INHALATION) at 12:12

## 2021-12-04 LAB
ANION GAP SERPL CALC-SCNC: 12 MMOL/L (ref 8–16)
ANION GAP SERPL CALC-SCNC: 13 MMOL/L (ref 8–16)
BUN SERPL-MCNC: 39 MG/DL (ref 8–23)
BUN SERPL-MCNC: 39 MG/DL (ref 8–23)
CALCIUM SERPL-MCNC: 9.1 MG/DL (ref 8.7–10.5)
CALCIUM SERPL-MCNC: 9.2 MG/DL (ref 8.7–10.5)
CHLORIDE SERPL-SCNC: 87 MMOL/L (ref 95–110)
CHLORIDE SERPL-SCNC: 91 MMOL/L (ref 95–110)
CO2 SERPL-SCNC: 29 MMOL/L (ref 23–29)
CO2 SERPL-SCNC: 35 MMOL/L (ref 23–29)
CREAT SERPL-MCNC: 1 MG/DL (ref 0.5–1.4)
CREAT SERPL-MCNC: 1.1 MG/DL (ref 0.5–1.4)
EST. GFR  (AFRICAN AMERICAN): >60 ML/MIN/1.73 M^2
EST. GFR  (AFRICAN AMERICAN): >60 ML/MIN/1.73 M^2
EST. GFR  (NON AFRICAN AMERICAN): >60 ML/MIN/1.73 M^2
EST. GFR  (NON AFRICAN AMERICAN): >60 ML/MIN/1.73 M^2
GLUCOSE SERPL-MCNC: 129 MG/DL (ref 70–110)
GLUCOSE SERPL-MCNC: 148 MG/DL (ref 70–110)
MAGNESIUM SERPL-MCNC: 2.4 MG/DL (ref 1.6–2.6)
POCT GLUCOSE: 130 MG/DL (ref 70–110)
POCT GLUCOSE: 137 MG/DL (ref 70–110)
POCT GLUCOSE: 157 MG/DL (ref 70–110)
POCT GLUCOSE: 221 MG/DL (ref 70–110)
POCT GLUCOSE: 223 MG/DL (ref 70–110)
POTASSIUM SERPL-SCNC: 4.3 MMOL/L (ref 3.5–5.1)
POTASSIUM SERPL-SCNC: 5.8 MMOL/L (ref 3.5–5.1)
SODIUM SERPL-SCNC: 132 MMOL/L (ref 136–145)
SODIUM SERPL-SCNC: 135 MMOL/L (ref 136–145)

## 2021-12-04 PROCEDURE — 25000003 PHARM REV CODE 250: Performed by: INTERNAL MEDICINE

## 2021-12-04 PROCEDURE — 36410 VNPNXR 3YR/> PHY/QHP DX/THER: CPT

## 2021-12-04 PROCEDURE — 99900035 HC TECH TIME PER 15 MIN (STAT)

## 2021-12-04 PROCEDURE — 12000002 HC ACUTE/MED SURGE SEMI-PRIVATE ROOM

## 2021-12-04 PROCEDURE — 94660 CPAP INITIATION&MGMT: CPT

## 2021-12-04 PROCEDURE — 97110 THERAPEUTIC EXERCISES: CPT

## 2021-12-04 PROCEDURE — 63600175 PHARM REV CODE 636 W HCPCS: Performed by: INTERNAL MEDICINE

## 2021-12-04 PROCEDURE — 25000242 PHARM REV CODE 250 ALT 637 W/ HCPCS: Performed by: INTERNAL MEDICINE

## 2021-12-04 PROCEDURE — 94640 AIRWAY INHALATION TREATMENT: CPT

## 2021-12-04 PROCEDURE — 94761 N-INVAS EAR/PLS OXIMETRY MLT: CPT

## 2021-12-04 PROCEDURE — 27000221 HC OXYGEN, UP TO 24 HOURS

## 2021-12-04 PROCEDURE — 36415 COLL VENOUS BLD VENIPUNCTURE: CPT | Performed by: INTERNAL MEDICINE

## 2021-12-04 PROCEDURE — 80048 BASIC METABOLIC PNL TOTAL CA: CPT | Performed by: INTERNAL MEDICINE

## 2021-12-04 PROCEDURE — 36415 COLL VENOUS BLD VENIPUNCTURE: CPT | Performed by: HOSPITALIST

## 2021-12-04 PROCEDURE — 25000003 PHARM REV CODE 250: Performed by: HOSPITALIST

## 2021-12-04 PROCEDURE — C1751 CATH, INF, PER/CENT/MIDLINE: HCPCS

## 2021-12-04 PROCEDURE — 80048 BASIC METABOLIC PNL TOTAL CA: CPT | Mod: 91 | Performed by: HOSPITALIST

## 2021-12-04 PROCEDURE — 97530 THERAPEUTIC ACTIVITIES: CPT

## 2021-12-04 PROCEDURE — 83735 ASSAY OF MAGNESIUM: CPT | Performed by: INTERNAL MEDICINE

## 2021-12-04 RX ADMIN — APIXABAN 5 MG: 2.5 TABLET, FILM COATED ORAL at 08:12

## 2021-12-04 RX ADMIN — APIXABAN 5 MG: 2.5 TABLET, FILM COATED ORAL at 10:12

## 2021-12-04 RX ADMIN — FUROSEMIDE 40 MG: 40 TABLET ORAL at 04:12

## 2021-12-04 RX ADMIN — INSULIN ASPART 4 UNITS: 100 INJECTION, SOLUTION INTRAVENOUS; SUBCUTANEOUS at 06:12

## 2021-12-04 RX ADMIN — PREDNISONE 20 MG: 20 TABLET ORAL at 10:12

## 2021-12-04 RX ADMIN — IPRATROPIUM BROMIDE AND ALBUTEROL SULFATE 3 ML: .5; 3 SOLUTION RESPIRATORY (INHALATION) at 12:12

## 2021-12-04 RX ADMIN — DULOXETINE 30 MG: 30 CAPSULE, DELAYED RELEASE ORAL at 08:12

## 2021-12-04 RX ADMIN — INSULIN ASPART 2 UNITS: 100 INJECTION, SOLUTION INTRAVENOUS; SUBCUTANEOUS at 06:12

## 2021-12-04 RX ADMIN — IPRATROPIUM BROMIDE AND ALBUTEROL SULFATE 3 ML: .5; 3 SOLUTION RESPIRATORY (INHALATION) at 07:12

## 2021-12-04 RX ADMIN — IPRATROPIUM BROMIDE AND ALBUTEROL SULFATE 3 ML: .5; 3 SOLUTION RESPIRATORY (INHALATION) at 06:12

## 2021-12-04 RX ADMIN — PREDNISONE 20 MG: 20 TABLET ORAL at 08:12

## 2021-12-04 RX ADMIN — SPIRONOLACTONE 50 MG: 25 TABLET ORAL at 08:12

## 2021-12-04 RX ADMIN — MENTHOL, METHYL SALICYLATE: 10; 15 CREAM TOPICAL at 04:12

## 2021-12-04 RX ADMIN — ACETAMINOPHEN 650 MG: 325 TABLET ORAL at 06:12

## 2021-12-04 RX ADMIN — MENTHOL, METHYL SALICYLATE: 10; 15 CREAM TOPICAL at 10:12

## 2021-12-04 RX ADMIN — FUROSEMIDE 40 MG: 40 TABLET ORAL at 06:12

## 2021-12-05 LAB
ANION GAP SERPL CALC-SCNC: 11 MMOL/L (ref 8–16)
BUN SERPL-MCNC: 44 MG/DL (ref 8–23)
CALCIUM SERPL-MCNC: 9 MG/DL (ref 8.7–10.5)
CHLORIDE SERPL-SCNC: 88 MMOL/L (ref 95–110)
CO2 SERPL-SCNC: 33 MMOL/L (ref 23–29)
CREAT SERPL-MCNC: 1.1 MG/DL (ref 0.5–1.4)
EST. GFR  (AFRICAN AMERICAN): >60 ML/MIN/1.73 M^2
EST. GFR  (NON AFRICAN AMERICAN): >60 ML/MIN/1.73 M^2
GLUCOSE SERPL-MCNC: 138 MG/DL (ref 70–110)
MAGNESIUM SERPL-MCNC: 2.2 MG/DL (ref 1.6–2.6)
POCT GLUCOSE: 138 MG/DL (ref 70–110)
POCT GLUCOSE: 146 MG/DL (ref 70–110)
POCT GLUCOSE: 162 MG/DL (ref 70–110)
POCT GLUCOSE: 166 MG/DL (ref 70–110)
POTASSIUM SERPL-SCNC: 4.9 MMOL/L (ref 3.5–5.1)
SODIUM SERPL-SCNC: 132 MMOL/L (ref 136–145)

## 2021-12-05 PROCEDURE — 12000002 HC ACUTE/MED SURGE SEMI-PRIVATE ROOM

## 2021-12-05 PROCEDURE — 25000003 PHARM REV CODE 250: Performed by: HOSPITALIST

## 2021-12-05 PROCEDURE — 36415 COLL VENOUS BLD VENIPUNCTURE: CPT | Performed by: INTERNAL MEDICINE

## 2021-12-05 PROCEDURE — 25000242 PHARM REV CODE 250 ALT 637 W/ HCPCS: Performed by: INTERNAL MEDICINE

## 2021-12-05 PROCEDURE — 63600175 PHARM REV CODE 636 W HCPCS: Performed by: INTERNAL MEDICINE

## 2021-12-05 PROCEDURE — 94660 CPAP INITIATION&MGMT: CPT

## 2021-12-05 PROCEDURE — 25000003 PHARM REV CODE 250: Performed by: INTERNAL MEDICINE

## 2021-12-05 PROCEDURE — 94761 N-INVAS EAR/PLS OXIMETRY MLT: CPT

## 2021-12-05 PROCEDURE — 94640 AIRWAY INHALATION TREATMENT: CPT

## 2021-12-05 PROCEDURE — 99900035 HC TECH TIME PER 15 MIN (STAT)

## 2021-12-05 PROCEDURE — 80048 BASIC METABOLIC PNL TOTAL CA: CPT | Performed by: INTERNAL MEDICINE

## 2021-12-05 PROCEDURE — 83735 ASSAY OF MAGNESIUM: CPT | Performed by: INTERNAL MEDICINE

## 2021-12-05 PROCEDURE — 99900035 HC TECH TIME PER 15 MIN (STAT): Performed by: STUDENT IN AN ORGANIZED HEALTH CARE EDUCATION/TRAINING PROGRAM

## 2021-12-05 RX ADMIN — MENTHOL, METHYL SALICYLATE: 10; 15 CREAM TOPICAL at 08:12

## 2021-12-05 RX ADMIN — APIXABAN 5 MG: 2.5 TABLET, FILM COATED ORAL at 08:12

## 2021-12-05 RX ADMIN — INSULIN ASPART 1 UNITS: 100 INJECTION, SOLUTION INTRAVENOUS; SUBCUTANEOUS at 08:12

## 2021-12-05 RX ADMIN — IPRATROPIUM BROMIDE AND ALBUTEROL SULFATE 3 ML: .5; 3 SOLUTION RESPIRATORY (INHALATION) at 07:12

## 2021-12-05 RX ADMIN — MENTHOL, METHYL SALICYLATE: 10; 15 CREAM TOPICAL at 02:12

## 2021-12-05 RX ADMIN — SPIRONOLACTONE 50 MG: 25 TABLET ORAL at 08:12

## 2021-12-05 RX ADMIN — FUROSEMIDE 40 MG: 40 TABLET ORAL at 04:12

## 2021-12-05 RX ADMIN — DULOXETINE 30 MG: 30 CAPSULE, DELAYED RELEASE ORAL at 08:12

## 2021-12-05 RX ADMIN — INSULIN ASPART 2 UNITS: 100 INJECTION, SOLUTION INTRAVENOUS; SUBCUTANEOUS at 05:12

## 2021-12-05 RX ADMIN — FUROSEMIDE 40 MG: 40 TABLET ORAL at 06:12

## 2021-12-05 RX ADMIN — PREDNISONE 20 MG: 20 TABLET ORAL at 08:12

## 2021-12-05 RX ADMIN — IPRATROPIUM BROMIDE AND ALBUTEROL SULFATE 3 ML: .5; 3 SOLUTION RESPIRATORY (INHALATION) at 12:12

## 2021-12-05 RX ADMIN — ACETAMINOPHEN 650 MG: 325 TABLET ORAL at 01:12

## 2021-12-06 LAB
ANION GAP SERPL CALC-SCNC: 12 MMOL/L (ref 8–16)
BUN SERPL-MCNC: 49 MG/DL (ref 8–23)
CALCIUM SERPL-MCNC: 9.2 MG/DL (ref 8.7–10.5)
CHLORIDE SERPL-SCNC: 90 MMOL/L (ref 95–110)
CO2 SERPL-SCNC: 31 MMOL/L (ref 23–29)
CREAT SERPL-MCNC: 1.1 MG/DL (ref 0.5–1.4)
EST. GFR  (AFRICAN AMERICAN): >60 ML/MIN/1.73 M^2
EST. GFR  (NON AFRICAN AMERICAN): >60 ML/MIN/1.73 M^2
GLUCOSE SERPL-MCNC: 141 MG/DL (ref 70–110)
MAGNESIUM SERPL-MCNC: 2.3 MG/DL (ref 1.6–2.6)
POCT GLUCOSE: 144 MG/DL (ref 70–110)
POCT GLUCOSE: 148 MG/DL (ref 70–110)
POCT GLUCOSE: 184 MG/DL (ref 70–110)
POCT GLUCOSE: 207 MG/DL (ref 70–110)
POTASSIUM SERPL-SCNC: 4.7 MMOL/L (ref 3.5–5.1)
SODIUM SERPL-SCNC: 133 MMOL/L (ref 136–145)

## 2021-12-06 PROCEDURE — 25000003 PHARM REV CODE 250: Performed by: HOSPITALIST

## 2021-12-06 PROCEDURE — 80048 BASIC METABOLIC PNL TOTAL CA: CPT | Performed by: INTERNAL MEDICINE

## 2021-12-06 PROCEDURE — 83735 ASSAY OF MAGNESIUM: CPT | Performed by: INTERNAL MEDICINE

## 2021-12-06 PROCEDURE — 25000003 PHARM REV CODE 250: Performed by: INTERNAL MEDICINE

## 2021-12-06 PROCEDURE — 63600175 PHARM REV CODE 636 W HCPCS: Performed by: INTERNAL MEDICINE

## 2021-12-06 PROCEDURE — 94660 CPAP INITIATION&MGMT: CPT

## 2021-12-06 PROCEDURE — 94640 AIRWAY INHALATION TREATMENT: CPT

## 2021-12-06 PROCEDURE — 99232 PR SUBSEQUENT HOSPITAL CARE,LEVL II: ICD-10-PCS | Mod: ,,, | Performed by: INTERNAL MEDICINE

## 2021-12-06 PROCEDURE — 99232 SBSQ HOSP IP/OBS MODERATE 35: CPT | Mod: ,,, | Performed by: INTERNAL MEDICINE

## 2021-12-06 PROCEDURE — 12000002 HC ACUTE/MED SURGE SEMI-PRIVATE ROOM

## 2021-12-06 PROCEDURE — 94761 N-INVAS EAR/PLS OXIMETRY MLT: CPT

## 2021-12-06 PROCEDURE — 25000242 PHARM REV CODE 250 ALT 637 W/ HCPCS: Performed by: INTERNAL MEDICINE

## 2021-12-06 PROCEDURE — 99900035 HC TECH TIME PER 15 MIN (STAT)

## 2021-12-06 PROCEDURE — 97530 THERAPEUTIC ACTIVITIES: CPT | Mod: CQ

## 2021-12-06 PROCEDURE — 36415 COLL VENOUS BLD VENIPUNCTURE: CPT | Performed by: INTERNAL MEDICINE

## 2021-12-06 RX ORDER — PREDNISONE 5 MG/1
10 TABLET ORAL 2 TIMES DAILY
Status: DISCONTINUED | OUTPATIENT
Start: 2021-12-06 | End: 2021-12-08

## 2021-12-06 RX ADMIN — INSULIN ASPART 4 UNITS: 100 INJECTION, SOLUTION INTRAVENOUS; SUBCUTANEOUS at 05:12

## 2021-12-06 RX ADMIN — PREDNISONE 20 MG: 20 TABLET ORAL at 08:12

## 2021-12-06 RX ADMIN — IPRATROPIUM BROMIDE AND ALBUTEROL SULFATE 3 ML: .5; 3 SOLUTION RESPIRATORY (INHALATION) at 06:12

## 2021-12-06 RX ADMIN — IPRATROPIUM BROMIDE AND ALBUTEROL SULFATE 3 ML: .5; 3 SOLUTION RESPIRATORY (INHALATION) at 01:12

## 2021-12-06 RX ADMIN — MENTHOL, METHYL SALICYLATE: 10; 15 CREAM TOPICAL at 08:12

## 2021-12-06 RX ADMIN — SPIRONOLACTONE 50 MG: 25 TABLET ORAL at 08:12

## 2021-12-06 RX ADMIN — IPRATROPIUM BROMIDE AND ALBUTEROL SULFATE 3 ML: .5; 3 SOLUTION RESPIRATORY (INHALATION) at 12:12

## 2021-12-06 RX ADMIN — DULOXETINE 30 MG: 30 CAPSULE, DELAYED RELEASE ORAL at 08:12

## 2021-12-06 RX ADMIN — FUROSEMIDE 40 MG: 40 TABLET ORAL at 05:12

## 2021-12-06 RX ADMIN — PREDNISONE 10 MG: 5 TABLET ORAL at 09:12

## 2021-12-06 RX ADMIN — APIXABAN 5 MG: 2.5 TABLET, FILM COATED ORAL at 08:12

## 2021-12-06 RX ADMIN — FUROSEMIDE 40 MG: 40 TABLET ORAL at 06:12

## 2021-12-06 RX ADMIN — APIXABAN 5 MG: 2.5 TABLET, FILM COATED ORAL at 09:12

## 2021-12-06 RX ADMIN — IPRATROPIUM BROMIDE AND ALBUTEROL SULFATE 3 ML: .5; 3 SOLUTION RESPIRATORY (INHALATION) at 07:12

## 2021-12-07 ENCOUNTER — PATIENT OUTREACH (OUTPATIENT)
Dept: ADMINISTRATIVE | Facility: OTHER | Age: 71
End: 2021-12-07
Payer: MEDICARE

## 2021-12-07 LAB
ANION GAP SERPL CALC-SCNC: 13 MMOL/L (ref 8–16)
BUN SERPL-MCNC: 50 MG/DL (ref 8–23)
CALCIUM SERPL-MCNC: 9.2 MG/DL (ref 8.7–10.5)
CHLORIDE SERPL-SCNC: 91 MMOL/L (ref 95–110)
CO2 SERPL-SCNC: 32 MMOL/L (ref 23–29)
CREAT SERPL-MCNC: 1.2 MG/DL (ref 0.5–1.4)
EST. GFR  (AFRICAN AMERICAN): >60 ML/MIN/1.73 M^2
EST. GFR  (NON AFRICAN AMERICAN): 60 ML/MIN/1.73 M^2
GLUCOSE SERPL-MCNC: 122 MG/DL (ref 70–110)
MAGNESIUM SERPL-MCNC: 2.3 MG/DL (ref 1.6–2.6)
POCT GLUCOSE: 110 MG/DL (ref 70–110)
POCT GLUCOSE: 126 MG/DL (ref 70–110)
POCT GLUCOSE: 143 MG/DL (ref 70–110)
POTASSIUM SERPL-SCNC: 4.4 MMOL/L (ref 3.5–5.1)
SODIUM SERPL-SCNC: 136 MMOL/L (ref 136–145)

## 2021-12-07 PROCEDURE — 99232 SBSQ HOSP IP/OBS MODERATE 35: CPT | Mod: ,,, | Performed by: INTERNAL MEDICINE

## 2021-12-07 PROCEDURE — 97530 THERAPEUTIC ACTIVITIES: CPT | Mod: CQ

## 2021-12-07 PROCEDURE — 36415 COLL VENOUS BLD VENIPUNCTURE: CPT | Performed by: INTERNAL MEDICINE

## 2021-12-07 PROCEDURE — 94640 AIRWAY INHALATION TREATMENT: CPT

## 2021-12-07 PROCEDURE — 25000242 PHARM REV CODE 250 ALT 637 W/ HCPCS: Performed by: INTERNAL MEDICINE

## 2021-12-07 PROCEDURE — 99900035 HC TECH TIME PER 15 MIN (STAT)

## 2021-12-07 PROCEDURE — 97110 THERAPEUTIC EXERCISES: CPT | Mod: CO

## 2021-12-07 PROCEDURE — 94761 N-INVAS EAR/PLS OXIMETRY MLT: CPT

## 2021-12-07 PROCEDURE — 99406 BEHAV CHNG SMOKING 3-10 MIN: CPT

## 2021-12-07 PROCEDURE — 63600175 PHARM REV CODE 636 W HCPCS: Performed by: INTERNAL MEDICINE

## 2021-12-07 PROCEDURE — 25000003 PHARM REV CODE 250: Performed by: INTERNAL MEDICINE

## 2021-12-07 PROCEDURE — 97535 SELF CARE MNGMENT TRAINING: CPT | Mod: CO

## 2021-12-07 PROCEDURE — 83735 ASSAY OF MAGNESIUM: CPT | Performed by: INTERNAL MEDICINE

## 2021-12-07 PROCEDURE — 12000002 HC ACUTE/MED SURGE SEMI-PRIVATE ROOM

## 2021-12-07 PROCEDURE — 99232 PR SUBSEQUENT HOSPITAL CARE,LEVL II: ICD-10-PCS | Mod: ,,, | Performed by: INTERNAL MEDICINE

## 2021-12-07 PROCEDURE — 80048 BASIC METABOLIC PNL TOTAL CA: CPT | Performed by: INTERNAL MEDICINE

## 2021-12-07 RX ORDER — FLUTICASONE FUROATE AND VILANTEROL 200; 25 UG/1; UG/1
1 POWDER RESPIRATORY (INHALATION) DAILY
Status: DISCONTINUED | OUTPATIENT
Start: 2021-12-07 | End: 2021-12-10 | Stop reason: HOSPADM

## 2021-12-07 RX ORDER — IPRATROPIUM BROMIDE AND ALBUTEROL SULFATE 2.5; .5 MG/3ML; MG/3ML
3 SOLUTION RESPIRATORY (INHALATION) EVERY 6 HOURS PRN
Status: DISCONTINUED | OUTPATIENT
Start: 2021-12-07 | End: 2021-12-10 | Stop reason: HOSPADM

## 2021-12-07 RX ADMIN — PREDNISONE 10 MG: 5 TABLET ORAL at 09:12

## 2021-12-07 RX ADMIN — SPIRONOLACTONE 50 MG: 25 TABLET ORAL at 08:12

## 2021-12-07 RX ADMIN — FLUTICASONE FUROATE AND VILANTEROL TRIFENATATE 1 PUFF: 200; 25 POWDER RESPIRATORY (INHALATION) at 12:12

## 2021-12-07 RX ADMIN — IPRATROPIUM BROMIDE AND ALBUTEROL SULFATE 3 ML: .5; 3 SOLUTION RESPIRATORY (INHALATION) at 12:12

## 2021-12-07 RX ADMIN — APIXABAN 5 MG: 2.5 TABLET, FILM COATED ORAL at 09:12

## 2021-12-07 RX ADMIN — APIXABAN 5 MG: 2.5 TABLET, FILM COATED ORAL at 08:12

## 2021-12-07 RX ADMIN — FUROSEMIDE 40 MG: 40 TABLET ORAL at 06:12

## 2021-12-07 RX ADMIN — FUROSEMIDE 40 MG: 40 TABLET ORAL at 04:12

## 2021-12-07 RX ADMIN — PREDNISONE 10 MG: 5 TABLET ORAL at 08:12

## 2021-12-07 RX ADMIN — DULOXETINE 30 MG: 30 CAPSULE, DELAYED RELEASE ORAL at 08:12

## 2021-12-08 LAB
ANION GAP SERPL CALC-SCNC: 9 MMOL/L (ref 8–16)
BUN SERPL-MCNC: 52 MG/DL (ref 8–23)
CALCIUM SERPL-MCNC: 9.1 MG/DL (ref 8.7–10.5)
CHLORIDE SERPL-SCNC: 89 MMOL/L (ref 95–110)
CO2 SERPL-SCNC: 34 MMOL/L (ref 23–29)
CREAT SERPL-MCNC: 1.3 MG/DL (ref 0.5–1.4)
EST. GFR  (AFRICAN AMERICAN): >60 ML/MIN/1.73 M^2
EST. GFR  (NON AFRICAN AMERICAN): 54 ML/MIN/1.73 M^2
GLUCOSE SERPL-MCNC: 136 MG/DL (ref 70–110)
MAGNESIUM SERPL-MCNC: 2.3 MG/DL (ref 1.6–2.6)
POCT GLUCOSE: 130 MG/DL (ref 70–110)
POCT GLUCOSE: 146 MG/DL (ref 70–110)
POCT GLUCOSE: 155 MG/DL (ref 70–110)
POCT GLUCOSE: 183 MG/DL (ref 70–110)
POTASSIUM SERPL-SCNC: 4.6 MMOL/L (ref 3.5–5.1)
SODIUM SERPL-SCNC: 132 MMOL/L (ref 136–145)

## 2021-12-08 PROCEDURE — 25000003 PHARM REV CODE 250: Performed by: INTERNAL MEDICINE

## 2021-12-08 PROCEDURE — 36415 COLL VENOUS BLD VENIPUNCTURE: CPT | Performed by: INTERNAL MEDICINE

## 2021-12-08 PROCEDURE — 12000002 HC ACUTE/MED SURGE SEMI-PRIVATE ROOM

## 2021-12-08 PROCEDURE — 80048 BASIC METABOLIC PNL TOTAL CA: CPT | Performed by: INTERNAL MEDICINE

## 2021-12-08 PROCEDURE — 97530 THERAPEUTIC ACTIVITIES: CPT | Mod: CQ

## 2021-12-08 PROCEDURE — 97110 THERAPEUTIC EXERCISES: CPT | Mod: CQ

## 2021-12-08 PROCEDURE — 99232 SBSQ HOSP IP/OBS MODERATE 35: CPT | Mod: ,,, | Performed by: INTERNAL MEDICINE

## 2021-12-08 PROCEDURE — 83735 ASSAY OF MAGNESIUM: CPT | Performed by: INTERNAL MEDICINE

## 2021-12-08 PROCEDURE — 99232 PR SUBSEQUENT HOSPITAL CARE,LEVL II: ICD-10-PCS | Mod: ,,, | Performed by: INTERNAL MEDICINE

## 2021-12-08 PROCEDURE — 94640 AIRWAY INHALATION TREATMENT: CPT

## 2021-12-08 PROCEDURE — 94761 N-INVAS EAR/PLS OXIMETRY MLT: CPT

## 2021-12-08 PROCEDURE — 99900035 HC TECH TIME PER 15 MIN (STAT)

## 2021-12-08 RX ADMIN — DULOXETINE 30 MG: 30 CAPSULE, DELAYED RELEASE ORAL at 09:12

## 2021-12-08 RX ADMIN — FUROSEMIDE 40 MG: 40 TABLET ORAL at 04:12

## 2021-12-08 RX ADMIN — APIXABAN 5 MG: 2.5 TABLET, FILM COATED ORAL at 09:12

## 2021-12-08 RX ADMIN — INSULIN ASPART 1 UNITS: 100 INJECTION, SOLUTION INTRAVENOUS; SUBCUTANEOUS at 09:12

## 2021-12-08 RX ADMIN — SPIRONOLACTONE 50 MG: 25 TABLET ORAL at 09:12

## 2021-12-08 RX ADMIN — FUROSEMIDE 40 MG: 40 TABLET ORAL at 05:12

## 2021-12-08 RX ADMIN — FLUTICASONE FUROATE AND VILANTEROL TRIFENATATE 1 PUFF: 200; 25 POWDER RESPIRATORY (INHALATION) at 08:12

## 2021-12-09 LAB
ANION GAP SERPL CALC-SCNC: 13 MMOL/L (ref 8–16)
BUN SERPL-MCNC: 50 MG/DL (ref 8–23)
CALCIUM SERPL-MCNC: 9.1 MG/DL (ref 8.7–10.5)
CHLORIDE SERPL-SCNC: 89 MMOL/L (ref 95–110)
CO2 SERPL-SCNC: 30 MMOL/L (ref 23–29)
CREAT SERPL-MCNC: 1.5 MG/DL (ref 0.5–1.4)
EST. GFR  (AFRICAN AMERICAN): 53 ML/MIN/1.73 M^2
EST. GFR  (NON AFRICAN AMERICAN): 46 ML/MIN/1.73 M^2
GLUCOSE SERPL-MCNC: 138 MG/DL (ref 70–110)
MAGNESIUM SERPL-MCNC: 2.2 MG/DL (ref 1.6–2.6)
POCT GLUCOSE: 134 MG/DL (ref 70–110)
POCT GLUCOSE: 156 MG/DL (ref 70–110)
POCT GLUCOSE: 160 MG/DL (ref 70–110)
POCT GLUCOSE: 261 MG/DL (ref 70–110)
POTASSIUM SERPL-SCNC: 4.2 MMOL/L (ref 3.5–5.1)
SODIUM SERPL-SCNC: 132 MMOL/L (ref 136–145)

## 2021-12-09 PROCEDURE — 25000003 PHARM REV CODE 250: Performed by: INTERNAL MEDICINE

## 2021-12-09 PROCEDURE — 80048 BASIC METABOLIC PNL TOTAL CA: CPT | Performed by: INTERNAL MEDICINE

## 2021-12-09 PROCEDURE — 99231 PR SUBSEQUENT HOSPITAL CARE,LEVL I: ICD-10-PCS | Mod: ,,, | Performed by: INTERNAL MEDICINE

## 2021-12-09 PROCEDURE — 97530 THERAPEUTIC ACTIVITIES: CPT | Mod: CQ

## 2021-12-09 PROCEDURE — 99900035 HC TECH TIME PER 15 MIN (STAT)

## 2021-12-09 PROCEDURE — 99231 SBSQ HOSP IP/OBS SF/LOW 25: CPT | Mod: ,,, | Performed by: INTERNAL MEDICINE

## 2021-12-09 PROCEDURE — 83735 ASSAY OF MAGNESIUM: CPT | Performed by: INTERNAL MEDICINE

## 2021-12-09 PROCEDURE — 94761 N-INVAS EAR/PLS OXIMETRY MLT: CPT

## 2021-12-09 PROCEDURE — 94640 AIRWAY INHALATION TREATMENT: CPT

## 2021-12-09 PROCEDURE — 36415 COLL VENOUS BLD VENIPUNCTURE: CPT | Performed by: INTERNAL MEDICINE

## 2021-12-09 PROCEDURE — 12000002 HC ACUTE/MED SURGE SEMI-PRIVATE ROOM

## 2021-12-09 RX ADMIN — APIXABAN 5 MG: 2.5 TABLET, FILM COATED ORAL at 08:12

## 2021-12-09 RX ADMIN — INSULIN ASPART 2 UNITS: 100 INJECTION, SOLUTION INTRAVENOUS; SUBCUTANEOUS at 06:12

## 2021-12-09 RX ADMIN — ACETAMINOPHEN 650 MG: 325 TABLET ORAL at 09:12

## 2021-12-09 RX ADMIN — FLUTICASONE FUROATE AND VILANTEROL TRIFENATATE 1 PUFF: 200; 25 POWDER RESPIRATORY (INHALATION) at 07:12

## 2021-12-09 RX ADMIN — INSULIN ASPART 3 UNITS: 100 INJECTION, SOLUTION INTRAVENOUS; SUBCUTANEOUS at 09:12

## 2021-12-09 RX ADMIN — DULOXETINE 30 MG: 30 CAPSULE, DELAYED RELEASE ORAL at 08:12

## 2021-12-09 RX ADMIN — FUROSEMIDE 40 MG: 40 TABLET ORAL at 06:12

## 2021-12-09 RX ADMIN — SPIRONOLACTONE 50 MG: 25 TABLET ORAL at 08:12

## 2021-12-09 RX ADMIN — APIXABAN 5 MG: 2.5 TABLET, FILM COATED ORAL at 09:12

## 2021-12-10 VITALS
OXYGEN SATURATION: 93 % | SYSTOLIC BLOOD PRESSURE: 123 MMHG | TEMPERATURE: 98 F | WEIGHT: 315 LBS | RESPIRATION RATE: 20 BRPM | DIASTOLIC BLOOD PRESSURE: 72 MMHG | HEART RATE: 96 BPM | BODY MASS INDEX: 44.1 KG/M2 | HEIGHT: 71 IN

## 2021-12-10 LAB
ANION GAP SERPL CALC-SCNC: 14 MMOL/L (ref 8–16)
BUN SERPL-MCNC: 44 MG/DL (ref 8–23)
CALCIUM SERPL-MCNC: 9.1 MG/DL (ref 8.7–10.5)
CHLORIDE SERPL-SCNC: 91 MMOL/L (ref 95–110)
CO2 SERPL-SCNC: 30 MMOL/L (ref 23–29)
CREAT SERPL-MCNC: 1.2 MG/DL (ref 0.5–1.4)
EST. GFR  (AFRICAN AMERICAN): >60 ML/MIN/1.73 M^2
EST. GFR  (NON AFRICAN AMERICAN): 60 ML/MIN/1.73 M^2
GLUCOSE SERPL-MCNC: 126 MG/DL (ref 70–110)
MAGNESIUM SERPL-MCNC: 2.3 MG/DL (ref 1.6–2.6)
POCT GLUCOSE: 121 MG/DL (ref 70–110)
POTASSIUM SERPL-SCNC: 4.1 MMOL/L (ref 3.5–5.1)
SODIUM SERPL-SCNC: 135 MMOL/L (ref 136–145)

## 2021-12-10 PROCEDURE — 97110 THERAPEUTIC EXERCISES: CPT | Mod: CQ

## 2021-12-10 PROCEDURE — 36415 COLL VENOUS BLD VENIPUNCTURE: CPT | Performed by: INTERNAL MEDICINE

## 2021-12-10 PROCEDURE — 83735 ASSAY OF MAGNESIUM: CPT | Performed by: INTERNAL MEDICINE

## 2021-12-10 PROCEDURE — 94761 N-INVAS EAR/PLS OXIMETRY MLT: CPT

## 2021-12-10 PROCEDURE — 94640 AIRWAY INHALATION TREATMENT: CPT

## 2021-12-10 PROCEDURE — 25000003 PHARM REV CODE 250: Performed by: INTERNAL MEDICINE

## 2021-12-10 PROCEDURE — 80048 BASIC METABOLIC PNL TOTAL CA: CPT | Performed by: INTERNAL MEDICINE

## 2021-12-10 PROCEDURE — 99232 SBSQ HOSP IP/OBS MODERATE 35: CPT | Mod: ,,, | Performed by: INTERNAL MEDICINE

## 2021-12-10 PROCEDURE — 97110 THERAPEUTIC EXERCISES: CPT | Mod: CO

## 2021-12-10 PROCEDURE — 97535 SELF CARE MNGMENT TRAINING: CPT | Mod: CO

## 2021-12-10 PROCEDURE — 99232 PR SUBSEQUENT HOSPITAL CARE,LEVL II: ICD-10-PCS | Mod: ,,, | Performed by: INTERNAL MEDICINE

## 2021-12-10 PROCEDURE — 97530 THERAPEUTIC ACTIVITIES: CPT | Mod: CQ

## 2021-12-10 RX ORDER — FLUTICASONE FUROATE AND VILANTEROL 200; 25 UG/1; UG/1
1 POWDER RESPIRATORY (INHALATION) DAILY
Qty: 1 EACH | Refills: 2 | Status: SHIPPED | OUTPATIENT
Start: 2021-12-11 | End: 2022-09-16 | Stop reason: CLARIF

## 2021-12-10 RX ORDER — BENZONATATE 100 MG/1
100 CAPSULE ORAL 3 TIMES DAILY PRN
Qty: 30 CAPSULE | Refills: 0 | Status: SHIPPED | OUTPATIENT
Start: 2021-12-10 | End: 2021-12-20

## 2021-12-10 RX ORDER — IPRATROPIUM BROMIDE AND ALBUTEROL SULFATE 2.5; .5 MG/3ML; MG/3ML
3 SOLUTION RESPIRATORY (INHALATION) EVERY 6 HOURS PRN
Qty: 75 ML | Refills: 0 | Status: ON HOLD | OUTPATIENT
Start: 2021-12-10 | End: 2023-05-12 | Stop reason: HOSPADM

## 2021-12-10 RX ORDER — FUROSEMIDE 40 MG/1
40 TABLET ORAL
Qty: 60 TABLET | Refills: 11 | Status: SHIPPED | OUTPATIENT
Start: 2021-12-10 | End: 2023-05-04 | Stop reason: SDUPTHER

## 2021-12-10 RX ORDER — SPIRONOLACTONE 50 MG/1
50 TABLET, FILM COATED ORAL DAILY
Qty: 30 TABLET | Refills: 11 | Status: SHIPPED | OUTPATIENT
Start: 2021-12-11 | End: 2023-05-04 | Stop reason: SDUPTHER

## 2021-12-10 RX ADMIN — SPIRONOLACTONE 50 MG: 25 TABLET ORAL at 08:12

## 2021-12-10 RX ADMIN — FUROSEMIDE 40 MG: 40 TABLET ORAL at 07:12

## 2021-12-10 RX ADMIN — DULOXETINE 30 MG: 30 CAPSULE, DELAYED RELEASE ORAL at 08:12

## 2021-12-10 RX ADMIN — APIXABAN 5 MG: 2.5 TABLET, FILM COATED ORAL at 08:12

## 2021-12-10 RX ADMIN — FLUTICASONE FUROATE AND VILANTEROL TRIFENATATE 1 PUFF: 200; 25 POWDER RESPIRATORY (INHALATION) at 07:12

## 2021-12-12 ENCOUNTER — HOSPITAL ENCOUNTER (OUTPATIENT)
Facility: HOSPITAL | Age: 71
Discharge: NURSING, REHAB OR LONG-TERM CARE | End: 2021-12-14
Attending: EMERGENCY MEDICINE | Admitting: INTERNAL MEDICINE
Payer: MEDICARE

## 2021-12-12 ENCOUNTER — NURSE TRIAGE (OUTPATIENT)
Dept: ADMINISTRATIVE | Facility: CLINIC | Age: 71
End: 2021-12-12
Payer: MEDICARE

## 2021-12-12 DIAGNOSIS — Z78.9 DEFICIT IN ACTIVITIES OF DAILY LIVING (ADL): Primary | ICD-10-CM

## 2021-12-12 DIAGNOSIS — R07.9 CHEST PAIN: ICD-10-CM

## 2021-12-12 DIAGNOSIS — M79.89 LEG SWELLING: ICD-10-CM

## 2021-12-12 LAB
ANION GAP SERPL CALC-SCNC: 13 MMOL/L (ref 8–16)
BASOPHILS # BLD AUTO: 0.06 K/UL (ref 0–0.2)
BASOPHILS NFR BLD: 0.3 % (ref 0–1.9)
BNP SERPL-MCNC: 16 PG/ML (ref 0–99)
BUN SERPL-MCNC: 56 MG/DL (ref 8–23)
CALCIUM SERPL-MCNC: 8.8 MG/DL (ref 8.7–10.5)
CHLORIDE SERPL-SCNC: 94 MMOL/L (ref 95–110)
CO2 SERPL-SCNC: 24 MMOL/L (ref 23–29)
CREAT SERPL-MCNC: 2 MG/DL (ref 0.5–1.4)
DIFFERENTIAL METHOD: ABNORMAL
EOSINOPHIL # BLD AUTO: 0.1 K/UL (ref 0–0.5)
EOSINOPHIL NFR BLD: 0.5 % (ref 0–8)
ERYTHROCYTE [DISTWIDTH] IN BLOOD BY AUTOMATED COUNT: 13.2 % (ref 11.5–14.5)
EST. GFR  (AFRICAN AMERICAN): 37 ML/MIN/1.73 M^2
EST. GFR  (NON AFRICAN AMERICAN): 32 ML/MIN/1.73 M^2
GLUCOSE SERPL-MCNC: 126 MG/DL (ref 70–110)
HCT VFR BLD AUTO: 45.2 % (ref 40–54)
HGB BLD-MCNC: 14.5 G/DL (ref 14–18)
IMM GRANULOCYTES # BLD AUTO: 0.23 K/UL (ref 0–0.04)
IMM GRANULOCYTES NFR BLD AUTO: 1 % (ref 0–0.5)
LYMPHOCYTES # BLD AUTO: 1.3 K/UL (ref 1–4.8)
LYMPHOCYTES NFR BLD: 6 % (ref 18–48)
MCH RBC QN AUTO: 28.9 PG (ref 27–31)
MCHC RBC AUTO-ENTMCNC: 32.1 G/DL (ref 32–36)
MCV RBC AUTO: 90 FL (ref 82–98)
MONOCYTES # BLD AUTO: 1.9 K/UL (ref 0.3–1)
MONOCYTES NFR BLD: 8.7 % (ref 4–15)
NEUTROPHILS # BLD AUTO: 18.3 K/UL (ref 1.8–7.7)
NEUTROPHILS NFR BLD: 83.5 % (ref 38–73)
NRBC BLD-RTO: 0 /100 WBC
PLATELET # BLD AUTO: 317 K/UL (ref 150–450)
PMV BLD AUTO: 10.3 FL (ref 9.2–12.9)
POTASSIUM SERPL-SCNC: 4.6 MMOL/L (ref 3.5–5.1)
RBC # BLD AUTO: 5.01 M/UL (ref 4.6–6.2)
SODIUM SERPL-SCNC: 131 MMOL/L (ref 136–145)
TROPONIN I SERPL DL<=0.01 NG/ML-MCNC: 0.02 NG/ML (ref 0–0.03)
WBC # BLD AUTO: 21.92 K/UL (ref 3.9–12.7)

## 2021-12-12 PROCEDURE — 85025 COMPLETE CBC W/AUTO DIFF WBC: CPT | Performed by: EMERGENCY MEDICINE

## 2021-12-12 PROCEDURE — 84484 ASSAY OF TROPONIN QUANT: CPT | Performed by: EMERGENCY MEDICINE

## 2021-12-12 PROCEDURE — 99285 EMERGENCY DEPT VISIT HI MDM: CPT | Mod: 25

## 2021-12-12 PROCEDURE — G0378 HOSPITAL OBSERVATION PER HR: HCPCS

## 2021-12-12 PROCEDURE — 63600175 PHARM REV CODE 636 W HCPCS: Performed by: EMERGENCY MEDICINE

## 2021-12-12 PROCEDURE — 36415 COLL VENOUS BLD VENIPUNCTURE: CPT | Performed by: EMERGENCY MEDICINE

## 2021-12-12 PROCEDURE — 93010 EKG 12-LEAD: ICD-10-PCS | Mod: ,,, | Performed by: INTERNAL MEDICINE

## 2021-12-12 PROCEDURE — 96375 TX/PRO/DX INJ NEW DRUG ADDON: CPT

## 2021-12-12 PROCEDURE — 80048 BASIC METABOLIC PNL TOTAL CA: CPT | Performed by: EMERGENCY MEDICINE

## 2021-12-12 PROCEDURE — 83880 ASSAY OF NATRIURETIC PEPTIDE: CPT | Performed by: EMERGENCY MEDICINE

## 2021-12-12 PROCEDURE — 93005 ELECTROCARDIOGRAM TRACING: CPT

## 2021-12-12 PROCEDURE — 93010 ELECTROCARDIOGRAM REPORT: CPT | Mod: ,,, | Performed by: INTERNAL MEDICINE

## 2021-12-12 RX ORDER — SIMETHICONE 80 MG
1 TABLET,CHEWABLE ORAL 4 TIMES DAILY PRN
Status: DISCONTINUED | OUTPATIENT
Start: 2021-12-13 | End: 2021-12-14 | Stop reason: HOSPADM

## 2021-12-12 RX ORDER — DULOXETIN HYDROCHLORIDE 30 MG/1
30 CAPSULE, DELAYED RELEASE ORAL DAILY
Status: DISCONTINUED | OUTPATIENT
Start: 2021-12-13 | End: 2021-12-14 | Stop reason: HOSPADM

## 2021-12-12 RX ORDER — FLUTICASONE FUROATE AND VILANTEROL 200; 25 UG/1; UG/1
1 POWDER RESPIRATORY (INHALATION) DAILY
Status: DISCONTINUED | OUTPATIENT
Start: 2021-12-13 | End: 2021-12-14 | Stop reason: HOSPADM

## 2021-12-12 RX ORDER — SODIUM,POTASSIUM PHOSPHATES 280-250MG
2 POWDER IN PACKET (EA) ORAL
Status: DISCONTINUED | OUTPATIENT
Start: 2021-12-13 | End: 2021-12-14 | Stop reason: HOSPADM

## 2021-12-12 RX ORDER — LANOLIN ALCOHOL/MO/W.PET/CERES
800 CREAM (GRAM) TOPICAL
Status: DISCONTINUED | OUTPATIENT
Start: 2021-12-13 | End: 2021-12-14 | Stop reason: HOSPADM

## 2021-12-12 RX ORDER — MAG HYDROX/ALUMINUM HYD/SIMETH 200-200-20
30 SUSPENSION, ORAL (FINAL DOSE FORM) ORAL 4 TIMES DAILY PRN
Status: DISCONTINUED | OUTPATIENT
Start: 2021-12-13 | End: 2021-12-14 | Stop reason: HOSPADM

## 2021-12-12 RX ORDER — TALC
6 POWDER (GRAM) TOPICAL NIGHTLY PRN
Status: DISCONTINUED | OUTPATIENT
Start: 2021-12-13 | End: 2021-12-14 | Stop reason: HOSPADM

## 2021-12-12 RX ORDER — SPIRONOLACTONE 25 MG/1
50 TABLET ORAL DAILY
Status: DISCONTINUED | OUTPATIENT
Start: 2021-12-13 | End: 2021-12-14 | Stop reason: HOSPADM

## 2021-12-12 RX ORDER — FUROSEMIDE 10 MG/ML
20 INJECTION INTRAMUSCULAR; INTRAVENOUS
Status: COMPLETED | OUTPATIENT
Start: 2021-12-12 | End: 2021-12-12

## 2021-12-12 RX ORDER — PROCHLORPERAZINE EDISYLATE 5 MG/ML
5 INJECTION INTRAMUSCULAR; INTRAVENOUS EVERY 6 HOURS PRN
Status: DISCONTINUED | OUTPATIENT
Start: 2021-12-13 | End: 2021-12-14 | Stop reason: HOSPADM

## 2021-12-12 RX ORDER — ACETAMINOPHEN 325 MG/1
650 TABLET ORAL EVERY 8 HOURS PRN
Status: DISCONTINUED | OUTPATIENT
Start: 2021-12-13 | End: 2021-12-14 | Stop reason: HOSPADM

## 2021-12-12 RX ORDER — SODIUM CHLORIDE 0.9 % (FLUSH) 0.9 %
10 SYRINGE (ML) INJECTION EVERY 8 HOURS PRN
Status: DISCONTINUED | OUTPATIENT
Start: 2021-12-13 | End: 2021-12-14 | Stop reason: HOSPADM

## 2021-12-12 RX ORDER — FUROSEMIDE 40 MG/1
40 TABLET ORAL
Status: DISCONTINUED | OUTPATIENT
Start: 2021-12-13 | End: 2021-12-13

## 2021-12-12 RX ORDER — ONDANSETRON 2 MG/ML
4 INJECTION INTRAMUSCULAR; INTRAVENOUS EVERY 6 HOURS PRN
Status: DISCONTINUED | OUTPATIENT
Start: 2021-12-13 | End: 2021-12-14 | Stop reason: HOSPADM

## 2021-12-12 RX ORDER — IPRATROPIUM BROMIDE AND ALBUTEROL SULFATE 2.5; .5 MG/3ML; MG/3ML
3 SOLUTION RESPIRATORY (INHALATION) EVERY 6 HOURS PRN
Status: DISCONTINUED | OUTPATIENT
Start: 2021-12-13 | End: 2021-12-14 | Stop reason: HOSPADM

## 2021-12-12 RX ORDER — AMOXICILLIN 250 MG
1 CAPSULE ORAL 2 TIMES DAILY
Status: DISCONTINUED | OUTPATIENT
Start: 2021-12-12 | End: 2021-12-14 | Stop reason: HOSPADM

## 2021-12-12 RX ADMIN — FUROSEMIDE 20 MG: 10 INJECTION, SOLUTION INTRAMUSCULAR; INTRAVENOUS at 11:12

## 2021-12-13 ENCOUNTER — PATIENT MESSAGE (OUTPATIENT)
Dept: FAMILY MEDICINE | Facility: CLINIC | Age: 71
End: 2021-12-13
Payer: MEDICARE

## 2021-12-13 PROBLEM — I50.32 CHRONIC DIASTOLIC HEART FAILURE: Status: ACTIVE | Noted: 2019-10-28

## 2021-12-13 PROBLEM — R53.81 PHYSICAL DEBILITY: Status: ACTIVE | Noted: 2021-12-13

## 2021-12-13 PROBLEM — J44.0 COPD WITH LOWER RESPIRATORY INFECTION: Status: ACTIVE | Noted: 2021-11-30

## 2021-12-13 LAB
ANION GAP SERPL CALC-SCNC: 14 MMOL/L (ref 8–16)
BILIRUB UR QL STRIP: ABNORMAL
BUN SERPL-MCNC: 59 MG/DL (ref 8–23)
CALCIUM SERPL-MCNC: 8.8 MG/DL (ref 8.7–10.5)
CHLORIDE SERPL-SCNC: 94 MMOL/L (ref 95–110)
CLARITY UR: CLEAR
CO2 SERPL-SCNC: 27 MMOL/L (ref 23–29)
COLOR UR: YELLOW
CREAT SERPL-MCNC: 1.9 MG/DL (ref 0.5–1.4)
EST. GFR  (AFRICAN AMERICAN): 40 ML/MIN/1.73 M^2
EST. GFR  (NON AFRICAN AMERICAN): 34 ML/MIN/1.73 M^2
GLUCOSE SERPL-MCNC: 102 MG/DL (ref 70–110)
GLUCOSE UR QL STRIP: NEGATIVE
HGB UR QL STRIP: NEGATIVE
HYALINE CASTS #/AREA URNS LPF: 3 /LPF
KETONES UR QL STRIP: NEGATIVE
LEUKOCYTE ESTERASE UR QL STRIP: ABNORMAL
MICROSCOPIC COMMENT: ABNORMAL
NITRITE UR QL STRIP: NEGATIVE
PH UR STRIP: 5 [PH] (ref 5–8)
POCT GLUCOSE: 116 MG/DL (ref 70–110)
POTASSIUM SERPL-SCNC: 4.1 MMOL/L (ref 3.5–5.1)
PROCALCITONIN SERPL IA-MCNC: 0.12 NG/ML
PROT UR QL STRIP: ABNORMAL
SODIUM SERPL-SCNC: 135 MMOL/L (ref 136–145)
SP GR UR STRIP: >=1.03 (ref 1–1.03)
SQUAMOUS #/AREA URNS HPF: 3 /HPF
URN SPEC COLLECT METH UR: ABNORMAL
UROBILINOGEN UR STRIP-ACNC: ABNORMAL EU/DL
WBC #/AREA URNS HPF: 4 /HPF (ref 0–5)

## 2021-12-13 PROCEDURE — 80048 BASIC METABOLIC PNL TOTAL CA: CPT | Performed by: NURSE PRACTITIONER

## 2021-12-13 PROCEDURE — 63600175 PHARM REV CODE 636 W HCPCS: Performed by: NURSE PRACTITIONER

## 2021-12-13 PROCEDURE — 94761 N-INVAS EAR/PLS OXIMETRY MLT: CPT

## 2021-12-13 PROCEDURE — 25000003 PHARM REV CODE 250: Performed by: NURSE PRACTITIONER

## 2021-12-13 PROCEDURE — 97535 SELF CARE MNGMENT TRAINING: CPT

## 2021-12-13 PROCEDURE — 25000242 PHARM REV CODE 250 ALT 637 W/ HCPCS: Performed by: NURSE PRACTITIONER

## 2021-12-13 PROCEDURE — 97161 PT EVAL LOW COMPLEX 20 MIN: CPT

## 2021-12-13 PROCEDURE — 84145 PROCALCITONIN (PCT): CPT | Performed by: NURSE PRACTITIONER

## 2021-12-13 PROCEDURE — 81000 URINALYSIS NONAUTO W/SCOPE: CPT | Performed by: NURSE PRACTITIONER

## 2021-12-13 PROCEDURE — G0378 HOSPITAL OBSERVATION PER HR: HCPCS

## 2021-12-13 PROCEDURE — 96365 THER/PROPH/DIAG IV INF INIT: CPT

## 2021-12-13 PROCEDURE — 36415 COLL VENOUS BLD VENIPUNCTURE: CPT | Performed by: NURSE PRACTITIONER

## 2021-12-13 PROCEDURE — 94640 AIRWAY INHALATION TREATMENT: CPT

## 2021-12-13 PROCEDURE — 97166 OT EVAL MOD COMPLEX 45 MIN: CPT

## 2021-12-13 PROCEDURE — 97530 THERAPEUTIC ACTIVITIES: CPT

## 2021-12-13 PROCEDURE — 99900035 HC TECH TIME PER 15 MIN (STAT)

## 2021-12-13 RX ADMIN — FLUTICASONE FUROATE AND VILANTEROL TRIFENATATE 1 PUFF: 200; 25 POWDER RESPIRATORY (INHALATION) at 07:12

## 2021-12-13 RX ADMIN — APIXABAN 5 MG: 2.5 TABLET, FILM COATED ORAL at 08:12

## 2021-12-13 RX ADMIN — DULOXETINE 30 MG: 30 CAPSULE, DELAYED RELEASE ORAL at 11:12

## 2021-12-13 RX ADMIN — SPIRONOLACTONE 50 MG: 25 TABLET ORAL at 11:12

## 2021-12-13 RX ADMIN — CEFTRIAXONE 1 G: 1 INJECTION, SOLUTION INTRAVENOUS at 01:12

## 2021-12-13 RX ADMIN — MELATONIN TAB 3 MG 6 MG: 3 TAB at 08:12

## 2021-12-13 RX ADMIN — DOCUSATE SODIUM AND SENNOSIDES 1 TABLET: 8.6; 5 TABLET, FILM COATED ORAL at 08:12

## 2021-12-13 RX ADMIN — APIXABAN 5 MG: 2.5 TABLET, FILM COATED ORAL at 11:12

## 2021-12-13 RX ADMIN — FUROSEMIDE 40 MG: 40 TABLET ORAL at 06:12

## 2021-12-14 VITALS
DIASTOLIC BLOOD PRESSURE: 58 MMHG | TEMPERATURE: 97 F | SYSTOLIC BLOOD PRESSURE: 111 MMHG | WEIGHT: 315 LBS | RESPIRATION RATE: 19 BRPM | BODY MASS INDEX: 44.1 KG/M2 | HEIGHT: 71 IN | HEART RATE: 85 BPM | OXYGEN SATURATION: 94 %

## 2021-12-14 LAB
ANION GAP SERPL CALC-SCNC: 10 MMOL/L (ref 8–16)
BASOPHILS # BLD AUTO: 0.08 K/UL (ref 0–0.2)
BASOPHILS NFR BLD: 0.6 % (ref 0–1.9)
BUN SERPL-MCNC: 54 MG/DL (ref 8–23)
CALCIUM SERPL-MCNC: 8.8 MG/DL (ref 8.7–10.5)
CHLORIDE SERPL-SCNC: 96 MMOL/L (ref 95–110)
CO2 SERPL-SCNC: 28 MMOL/L (ref 23–29)
CREAT SERPL-MCNC: 1.4 MG/DL (ref 0.5–1.4)
DIFFERENTIAL METHOD: ABNORMAL
EOSINOPHIL # BLD AUTO: 0.3 K/UL (ref 0–0.5)
EOSINOPHIL NFR BLD: 2.7 % (ref 0–8)
ERYTHROCYTE [DISTWIDTH] IN BLOOD BY AUTOMATED COUNT: 13.3 % (ref 11.5–14.5)
EST. GFR  (AFRICAN AMERICAN): 57 ML/MIN/1.73 M^2
EST. GFR  (NON AFRICAN AMERICAN): 49 ML/MIN/1.73 M^2
GLUCOSE SERPL-MCNC: 107 MG/DL (ref 70–110)
HCT VFR BLD AUTO: 42.9 % (ref 40–54)
HGB BLD-MCNC: 13.6 G/DL (ref 14–18)
IMM GRANULOCYTES # BLD AUTO: 0.08 K/UL (ref 0–0.04)
IMM GRANULOCYTES NFR BLD AUTO: 0.6 % (ref 0–0.5)
LYMPHOCYTES # BLD AUTO: 1.5 K/UL (ref 1–4.8)
LYMPHOCYTES NFR BLD: 11.8 % (ref 18–48)
MCH RBC QN AUTO: 29.2 PG (ref 27–31)
MCHC RBC AUTO-ENTMCNC: 31.7 G/DL (ref 32–36)
MCV RBC AUTO: 92 FL (ref 82–98)
MONOCYTES # BLD AUTO: 1.3 K/UL (ref 0.3–1)
MONOCYTES NFR BLD: 10.3 % (ref 4–15)
NEUTROPHILS # BLD AUTO: 9.1 K/UL (ref 1.8–7.7)
NEUTROPHILS NFR BLD: 74 % (ref 38–73)
NRBC BLD-RTO: 0 /100 WBC
PLATELET # BLD AUTO: 325 K/UL (ref 150–450)
PMV BLD AUTO: 10.6 FL (ref 9.2–12.9)
POTASSIUM SERPL-SCNC: 4.4 MMOL/L (ref 3.5–5.1)
RBC # BLD AUTO: 4.65 M/UL (ref 4.6–6.2)
SODIUM SERPL-SCNC: 134 MMOL/L (ref 136–145)
WBC # BLD AUTO: 12.31 K/UL (ref 3.9–12.7)

## 2021-12-14 PROCEDURE — 86580 TB INTRADERMAL TEST: CPT | Performed by: NURSE PRACTITIONER

## 2021-12-14 PROCEDURE — 80048 BASIC METABOLIC PNL TOTAL CA: CPT | Performed by: NURSE PRACTITIONER

## 2021-12-14 PROCEDURE — 99900035 HC TECH TIME PER 15 MIN (STAT)

## 2021-12-14 PROCEDURE — 85025 COMPLETE CBC W/AUTO DIFF WBC: CPT | Performed by: NURSE PRACTITIONER

## 2021-12-14 PROCEDURE — 25000003 PHARM REV CODE 250: Performed by: NURSE PRACTITIONER

## 2021-12-14 PROCEDURE — 30200315 PPD INTRADERMAL TEST REV CODE 302: Performed by: NURSE PRACTITIONER

## 2021-12-14 PROCEDURE — 36415 COLL VENOUS BLD VENIPUNCTURE: CPT | Performed by: NURSE PRACTITIONER

## 2021-12-14 PROCEDURE — 94761 N-INVAS EAR/PLS OXIMETRY MLT: CPT

## 2021-12-14 PROCEDURE — 99222 PR INITIAL HOSPITAL CARE,LEVL II: ICD-10-PCS | Mod: ,,, | Performed by: PHYSICAL MEDICINE & REHABILITATION

## 2021-12-14 PROCEDURE — G0378 HOSPITAL OBSERVATION PER HR: HCPCS

## 2021-12-14 PROCEDURE — 94640 AIRWAY INHALATION TREATMENT: CPT

## 2021-12-14 PROCEDURE — 99222 1ST HOSP IP/OBS MODERATE 55: CPT | Mod: ,,, | Performed by: PHYSICAL MEDICINE & REHABILITATION

## 2021-12-14 RX ORDER — FUROSEMIDE 40 MG/1
40 TABLET ORAL
Status: DISCONTINUED | OUTPATIENT
Start: 2021-12-14 | End: 2021-12-14 | Stop reason: HOSPADM

## 2021-12-14 RX ADMIN — SPIRONOLACTONE 50 MG: 25 TABLET ORAL at 09:12

## 2021-12-14 RX ADMIN — APIXABAN 5 MG: 2.5 TABLET, FILM COATED ORAL at 09:12

## 2021-12-14 RX ADMIN — FUROSEMIDE 40 MG: 40 TABLET ORAL at 09:12

## 2021-12-14 RX ADMIN — DULOXETINE 30 MG: 30 CAPSULE, DELAYED RELEASE ORAL at 09:12

## 2021-12-14 RX ADMIN — TUBERCULIN PURIFIED PROTEIN DERIVATIVE 5 UNITS: 5 INJECTION, SOLUTION INTRADERMAL at 03:12

## 2021-12-14 RX ADMIN — FLUTICASONE FUROATE AND VILANTEROL TRIFENATATE 1 PUFF: 200; 25 POWDER RESPIRATORY (INHALATION) at 07:12

## 2021-12-15 ENCOUNTER — TELEPHONE (OUTPATIENT)
Dept: MEDSURG UNIT | Facility: HOSPITAL | Age: 71
End: 2021-12-15
Payer: MEDICARE

## 2022-01-14 PROCEDURE — G0180 PR HOME HEALTH MD CERTIFICATION: ICD-10-PCS | Mod: ,,, | Performed by: FAMILY MEDICINE

## 2022-01-14 PROCEDURE — G0180 MD CERTIFICATION HHA PATIENT: HCPCS | Mod: ,,, | Performed by: FAMILY MEDICINE

## 2022-01-26 ENCOUNTER — TELEPHONE (OUTPATIENT)
Dept: FAMILY MEDICINE | Facility: CLINIC | Age: 72
End: 2022-01-26
Payer: MEDICARE

## 2022-01-26 DIAGNOSIS — J96.21 ACUTE ON CHRONIC RESPIRATORY FAILURE WITH HYPOXIA AND HYPERCAPNIA: ICD-10-CM

## 2022-01-26 DIAGNOSIS — I50.32 CHRONIC DIASTOLIC HEART FAILURE: Primary | ICD-10-CM

## 2022-01-26 DIAGNOSIS — R53.81 PHYSICAL DEBILITY: ICD-10-CM

## 2022-01-26 DIAGNOSIS — Z74.09 DECREASED FUNCTIONAL MOBILITY AND ENDURANCE: ICD-10-CM

## 2022-01-26 DIAGNOSIS — J96.22 ACUTE ON CHRONIC RESPIRATORY FAILURE WITH HYPOXIA AND HYPERCAPNIA: ICD-10-CM

## 2022-01-26 NOTE — TELEPHONE ENCOUNTER
Patient states he would like to send a message to Dr Mendez for notification that he has been discharged from [facility], and is currently living at [facility]. States he is weak; and having difficulty ambulating. Requesting to know if an order can be placed for physical therapy via home health. Patient states he is unsure if he will be able to come in to the office for hospital follow up appointment due to current condition. States he is trying to arrange transportation, and needs to make sure he will be able to get to the office safely. Will call back for update on if he will be able to attend an appointment. Please advise. Thank you.

## 2022-01-26 NOTE — TELEPHONE ENCOUNTER
----- Message from Michellgege Tamara sent at 1/26/2022 12:00 PM CST -----  Contact: pt at [phone]  Type: Needs Medical Advice  Who Called:  pt  Best Call Back Number: [phone]  Additional Information: pt is calling the office to speak with the nurse in regards to the pt being out of the hospital and the pt is at the [facility]. The pt needs to discuss PT with the nurse as well. Please call back and advise.

## 2022-02-02 DIAGNOSIS — E11.9 TYPE 2 DIABETES MELLITUS WITHOUT COMPLICATION, UNSPECIFIED WHETHER LONG TERM INSULIN USE: ICD-10-CM

## 2022-02-11 ENCOUNTER — PATIENT OUTREACH (OUTPATIENT)
Dept: ADMINISTRATIVE | Facility: HOSPITAL | Age: 72
End: 2022-02-11
Payer: MEDICARE

## 2022-02-11 NOTE — LETTER
AUTHORIZATION FOR RELEASE OF   CONFIDENTIAL INFORMATION    Dear Dr Reynaga,    We are seeing Lukas Chance, date of birth 1950, in the clinic at [facility]. Solitario Mendez MD is the patient's PCP. Lukas Chance has an outstanding lab/procedure at the time we reviewed his chart. In order to help keep his health information updated, he has authorized us to request the following medical record(s):        (  )  MAMMOGRAM                                      (  )  COLONOSCOPY      (  )  PAP SMEAR                                          (  )  OUTSIDE LAB RESULTS     (  )  DEXA SCAN                                          ( X )  EYE EXAM            (  )  FOOT EXAM                                          (  )  ENTIRE RECORD     (  )  OUTSIDE IMMUNIZATIONS                 (  )  _______________         [facility] [phone]     Thanks so much and have a great day!!    Keerthi Seth LPN [facility]  [address] [place] [address]  P- [phone]  F [phone]        Patient Name: Lukas Chance  : 1950  Patient Phone #: [phone]

## 2022-02-11 NOTE — PROGRESS NOTES
Chart review completed 2022.  Care Everywhere updates requested and reviewed.  Immunizations reconciled. Media reports reviewed.  Duplicate HM overrides and  orders removed if applies   Overdue HM topic chart audit and/or requested if applies .  Overdue lab testing linked to upcoming lab appointments if applies.    Lab jorge and quest reviewed-yes,no new results      DIS reviewed-yes,no new results         WOG orders placed-no  Letter mailed-no  HM updated with external report-no  Requested records- yes, eye exam requested from Dr Reynaga    I spoke to pt regarding over due HM. Pt stated he is currently is an Assisted Living facility doing PT. He will call when he is d/c to set up labs. Appt window updated with overdue HM.     Health Maintenance Due   Topic Date Due    High Dose Statin  Never done    Shingles Vaccine (1 of 2) Never done    Colorectal Cancer Screening  2016    Eye Exam  2020    Diabetes Urine Screening  07/10/2020    LDCT Lung Screen  2020    Influenza Vaccine (1) 2021    COVID-19 Vaccine (3 - Booster for Pfizer series) 2021    Hemoglobin A1c  2022

## 2022-02-17 RX ORDER — SPIRONOLACTONE 50 MG/1
50 TABLET, FILM COATED ORAL DAILY
Qty: 30 TABLET | Refills: 11 | Status: CANCELLED | OUTPATIENT
Start: 2022-02-17 | End: 2023-02-17

## 2022-02-17 NOTE — TELEPHONE ENCOUNTER
No new care gaps identified.  Powered by [employer]. Reference number: [id number].   2/17/2022 9:30:20 AM CST

## 2022-02-17 NOTE — TELEPHONE ENCOUNTER
----- Message from Misael Cordova sent at 2/17/2022  9:11 AM CST -----  Contact: Nurse  Type: RX Refill Request  Who Called:Nurse  Refill or New RX:Refill  RX Name and Strength:spironolactone (ALDACTONE) 50 MG tablet  furosemide (LASIX) 40 MG tablet  How is the patient currently taking it: As Directed  Is this a 30 or 90 day : as Directed  Preferred Pharmacy/Phone Number:  [facility] - [place] - [address] [facility]  [address] [place] [address]  Phone: [phone] Fax: [phone]      Best Call Back Number:[phone]    Additional Information :Nurse was calling to request a refill on pt medication nurse states he is completely out Thank you  Please Advise- Thank you

## 2022-02-17 NOTE — TELEPHONE ENCOUNTER
It looks like the patient is requesting both spironolactone and Lasix from the message.  Only spironolactone was requested to me.  Neither of those was given to him by me but during a prolonged hospitalization and rehab stay from November 28, 2021 to somewhere around December 29, 2021. On discharge 60 furosemide 40 mg with 11 refills and 30 spironolactone 50 mg with 11 refills each one having a full year supply was sent to [facility] on [address] with receipt confirmed for both from the pharmacy.  They do not need another refill.    Patient was noted to have had at least one episode of acute kidney injury secondary to dehydration that required reduction of Lasix dose during the hospital stay.  His last chemistry panel showed good kidney function but that was about seven weeks ago and he has had no labs since that time.  He is scheduled to see me next week and we will need to do lab to check kidney functions so do not miss the appointment.    He does not need refills on Lasix and spironolactone.  The pharmacy has them

## 2022-02-22 ENCOUNTER — TELEPHONE (OUTPATIENT)
Dept: FAMILY MEDICINE | Facility: CLINIC | Age: 72
End: 2022-02-22
Payer: MEDICARE

## 2022-02-22 DIAGNOSIS — E11.59 TYPE 2 DIABETES MELLITUS WITH OTHER CIRCULATORY COMPLICATION, WITHOUT LONG-TERM CURRENT USE OF INSULIN: ICD-10-CM

## 2022-02-22 DIAGNOSIS — E11.59 HYPERTENSION ASSOCIATED WITH DIABETES: ICD-10-CM

## 2022-02-22 DIAGNOSIS — E78.2 COMBINED HYPERLIPIDEMIA ASSOCIATED WITH TYPE 2 DIABETES MELLITUS: Primary | ICD-10-CM

## 2022-02-22 DIAGNOSIS — D64.89 DRUG-INDUCED ANEMIA: ICD-10-CM

## 2022-02-22 DIAGNOSIS — E11.69 COMBINED HYPERLIPIDEMIA ASSOCIATED WITH TYPE 2 DIABETES MELLITUS: Primary | ICD-10-CM

## 2022-02-22 DIAGNOSIS — I15.2 HYPERTENSION ASSOCIATED WITH DIABETES: ICD-10-CM

## 2022-02-22 NOTE — TELEPHONE ENCOUNTER
Patient states he has an upcoming office visit on 2-24-22. States he has home health services, and would like for home health to drawn labs at home health visit today. When asked for contact information for home health; patient states office is to call him for notification of orders, and then he will contact his home health nurse for notification of orders. Please advise. Thank you.

## 2022-02-22 NOTE — TELEPHONE ENCOUNTER
Patient notified orders placed. Call also placed to Farzaneh Espinoza ([phone]); spoke to Elizabeth for notification of lab orders.

## 2022-02-22 NOTE — TELEPHONE ENCOUNTER
----- Message from Rebekah Diallo sent at 2/22/2022  8:21 AM CST -----  Who Called: RILEY GUIDO    What is the request in detail: Pt called in regarding lab orders; states home health is coming today and can draw what he needs. Please input orders into system and advise.     Can the clinic reply by [facility]? No     Best Call Back Number: [phone]    Additional Information:

## 2022-02-23 ENCOUNTER — LAB VISIT (OUTPATIENT)
Dept: LAB | Facility: HOSPITAL | Age: 72
End: 2022-02-23
Attending: FAMILY MEDICINE
Payer: MEDICARE

## 2022-02-23 DIAGNOSIS — E78.2 COMBINED HYPERLIPIDEMIA ASSOCIATED WITH TYPE 2 DIABETES MELLITUS: ICD-10-CM

## 2022-02-23 DIAGNOSIS — E11.59 HYPERTENSION ASSOCIATED WITH DIABETES: ICD-10-CM

## 2022-02-23 DIAGNOSIS — E11.69 COMBINED HYPERLIPIDEMIA ASSOCIATED WITH TYPE 2 DIABETES MELLITUS: ICD-10-CM

## 2022-02-23 DIAGNOSIS — I15.2 HYPERTENSION ASSOCIATED WITH DIABETES: ICD-10-CM

## 2022-02-23 DIAGNOSIS — E11.59 TYPE 2 DIABETES MELLITUS WITH OTHER CIRCULATORY COMPLICATION, WITHOUT LONG-TERM CURRENT USE OF INSULIN: ICD-10-CM

## 2022-02-23 DIAGNOSIS — D64.89 DRUG-INDUCED ANEMIA: ICD-10-CM

## 2022-02-23 LAB
ALBUMIN SERPL BCP-MCNC: 3.1 G/DL (ref 3.5–5.2)
ALP SERPL-CCNC: 80 U/L (ref 55–135)
ALT SERPL W/O P-5'-P-CCNC: 16 U/L (ref 10–44)
ANION GAP SERPL CALC-SCNC: 12 MMOL/L (ref 8–16)
AST SERPL-CCNC: 18 U/L (ref 10–40)
BASOPHILS # BLD AUTO: 0.07 K/UL (ref 0–0.2)
BASOPHILS NFR BLD: 0.9 % (ref 0–1.9)
BILIRUB SERPL-MCNC: 0.7 MG/DL (ref 0.1–1)
BUN SERPL-MCNC: 21 MG/DL (ref 8–23)
CALCIUM SERPL-MCNC: 8.7 MG/DL (ref 8.7–10.5)
CHLORIDE SERPL-SCNC: 100 MMOL/L (ref 95–110)
CHOLEST SERPL-MCNC: 189 MG/DL (ref 120–199)
CHOLEST/HDLC SERPL: 4.4 {RATIO} (ref 2–5)
CO2 SERPL-SCNC: 27 MMOL/L (ref 23–29)
CREAT SERPL-MCNC: 1 MG/DL (ref 0.5–1.4)
DIFFERENTIAL METHOD: ABNORMAL
EOSINOPHIL # BLD AUTO: 0.3 K/UL (ref 0–0.5)
EOSINOPHIL NFR BLD: 3.6 % (ref 0–8)
ERYTHROCYTE [DISTWIDTH] IN BLOOD BY AUTOMATED COUNT: 14.2 % (ref 11.5–14.5)
EST. GFR  (AFRICAN AMERICAN): >60 ML/MIN/1.73 M^2
EST. GFR  (NON AFRICAN AMERICAN): >60 ML/MIN/1.73 M^2
ESTIMATED AVG GLUCOSE: 117 MG/DL (ref 68–131)
GLUCOSE SERPL-MCNC: 80 MG/DL (ref 70–110)
HBA1C MFR BLD: 5.7 % (ref 4–5.6)
HCT VFR BLD AUTO: 41 % (ref 40–54)
HDLC SERPL-MCNC: 43 MG/DL (ref 40–75)
HDLC SERPL: 22.8 % (ref 20–50)
HGB BLD-MCNC: 13 G/DL (ref 14–18)
IMM GRANULOCYTES # BLD AUTO: 0.03 K/UL (ref 0–0.04)
IMM GRANULOCYTES NFR BLD AUTO: 0.4 % (ref 0–0.5)
LDLC SERPL CALC-MCNC: 127.6 MG/DL (ref 63–159)
LYMPHOCYTES # BLD AUTO: 1.2 K/UL (ref 1–4.8)
LYMPHOCYTES NFR BLD: 15.4 % (ref 18–48)
MCH RBC QN AUTO: 30.4 PG (ref 27–31)
MCHC RBC AUTO-ENTMCNC: 31.7 G/DL (ref 32–36)
MCV RBC AUTO: 96 FL (ref 82–98)
MONOCYTES # BLD AUTO: 0.7 K/UL (ref 0.3–1)
MONOCYTES NFR BLD: 8.8 % (ref 4–15)
NEUTROPHILS # BLD AUTO: 5.5 K/UL (ref 1.8–7.7)
NEUTROPHILS NFR BLD: 70.9 % (ref 38–73)
NONHDLC SERPL-MCNC: 146 MG/DL
NRBC BLD-RTO: 0 /100 WBC
PLATELET # BLD AUTO: 252 K/UL (ref 150–450)
PMV BLD AUTO: 10 FL (ref 9.2–12.9)
POTASSIUM SERPL-SCNC: 4.5 MMOL/L (ref 3.5–5.1)
PROT SERPL-MCNC: 6.6 G/DL (ref 6–8.4)
RBC # BLD AUTO: 4.28 M/UL (ref 4.6–6.2)
SODIUM SERPL-SCNC: 139 MMOL/L (ref 136–145)
TRIGL SERPL-MCNC: 92 MG/DL (ref 30–150)
WBC # BLD AUTO: 7.71 K/UL (ref 3.9–12.7)

## 2022-02-23 PROCEDURE — 83036 HEMOGLOBIN GLYCOSYLATED A1C: CPT | Performed by: FAMILY MEDICINE

## 2022-02-23 PROCEDURE — 36415 COLL VENOUS BLD VENIPUNCTURE: CPT | Performed by: FAMILY MEDICINE

## 2022-02-23 PROCEDURE — 85025 COMPLETE CBC W/AUTO DIFF WBC: CPT | Performed by: FAMILY MEDICINE

## 2022-02-23 PROCEDURE — 80061 LIPID PANEL: CPT | Performed by: FAMILY MEDICINE

## 2022-02-23 PROCEDURE — 80053 COMPREHEN METABOLIC PANEL: CPT | Performed by: FAMILY MEDICINE

## 2022-02-24 ENCOUNTER — OFFICE VISIT (OUTPATIENT)
Dept: FAMILY MEDICINE | Facility: CLINIC | Age: 72
End: 2022-02-24
Attending: FAMILY MEDICINE
Payer: MEDICARE

## 2022-02-24 VITALS
DIASTOLIC BLOOD PRESSURE: 60 MMHG | HEIGHT: 71 IN | SYSTOLIC BLOOD PRESSURE: 115 MMHG | WEIGHT: 315 LBS | RESPIRATION RATE: 18 BRPM | OXYGEN SATURATION: 97 % | BODY MASS INDEX: 44.1 KG/M2 | TEMPERATURE: 97 F | HEART RATE: 78 BPM

## 2022-02-24 DIAGNOSIS — E78.2 COMBINED HYPERLIPIDEMIA ASSOCIATED WITH TYPE 2 DIABETES MELLITUS: ICD-10-CM

## 2022-02-24 DIAGNOSIS — J43.2 CENTRILOBULAR EMPHYSEMA: ICD-10-CM

## 2022-02-24 DIAGNOSIS — J96.21 ACUTE ON CHRONIC RESPIRATORY FAILURE WITH HYPOXIA AND HYPERCAPNIA: ICD-10-CM

## 2022-02-24 DIAGNOSIS — E11.59 HYPERTENSION ASSOCIATED WITH DIABETES: ICD-10-CM

## 2022-02-24 DIAGNOSIS — I82.4Z2 ACUTE DEEP VEIN THROMBOSIS (DVT) OF DISTAL END OF LEFT LOWER EXTREMITY: ICD-10-CM

## 2022-02-24 DIAGNOSIS — I87.2 VENOUS STASIS ULCER OF LEFT ANKLE WITH FAT LAYER EXPOSED WITHOUT VARICOSE VEINS: ICD-10-CM

## 2022-02-24 DIAGNOSIS — H61.23 BILATERAL HEARING LOSS DUE TO CERUMEN IMPACTION: ICD-10-CM

## 2022-02-24 DIAGNOSIS — E11.59 TYPE 2 DIABETES MELLITUS WITH OTHER CIRCULATORY COMPLICATION, WITHOUT LONG-TERM CURRENT USE OF INSULIN: Chronic | ICD-10-CM

## 2022-02-24 DIAGNOSIS — I25.10 CORONARY ARTERY DISEASE, UNSPECIFIED VESSEL OR LESION TYPE, UNSPECIFIED WHETHER ANGINA PRESENT, UNSPECIFIED WHETHER NATIVE OR TRANSPLANTED HEART: ICD-10-CM

## 2022-02-24 DIAGNOSIS — L97.322 VENOUS STASIS ULCER OF LEFT ANKLE WITH FAT LAYER EXPOSED WITHOUT VARICOSE VEINS: ICD-10-CM

## 2022-02-24 DIAGNOSIS — M79.2 NEUROPATHIC PAIN: ICD-10-CM

## 2022-02-24 DIAGNOSIS — F32.0 CURRENT MILD EPISODE OF MAJOR DEPRESSIVE DISORDER, UNSPECIFIED WHETHER RECURRENT: ICD-10-CM

## 2022-02-24 DIAGNOSIS — Z74.09 DECREASED FUNCTIONAL MOBILITY AND ENDURANCE: ICD-10-CM

## 2022-02-24 DIAGNOSIS — Z51.81 THERAPEUTIC DRUG MONITORING: ICD-10-CM

## 2022-02-24 DIAGNOSIS — I50.32 CHRONIC DIASTOLIC HEART FAILURE: Primary | ICD-10-CM

## 2022-02-24 DIAGNOSIS — F32.A DEPRESSION, UNSPECIFIED DEPRESSION TYPE: ICD-10-CM

## 2022-02-24 DIAGNOSIS — I15.2 HYPERTENSION ASSOCIATED WITH DIABETES: ICD-10-CM

## 2022-02-24 DIAGNOSIS — E11.69 COMBINED HYPERLIPIDEMIA ASSOCIATED WITH TYPE 2 DIABETES MELLITUS: ICD-10-CM

## 2022-02-24 DIAGNOSIS — G47.30 SLEEP APNEA, UNSPECIFIED TYPE: ICD-10-CM

## 2022-02-24 DIAGNOSIS — J96.22 ACUTE ON CHRONIC RESPIRATORY FAILURE WITH HYPOXIA AND HYPERCAPNIA: ICD-10-CM

## 2022-02-24 DIAGNOSIS — I25.2 OLD MI (MYOCARDIAL INFARCTION): ICD-10-CM

## 2022-02-24 PROCEDURE — 69210 REMOVE IMPACTED EAR WAX UNI: CPT | Mod: S$PBB,,, | Performed by: FAMILY MEDICINE

## 2022-02-24 PROCEDURE — 69210 REMOVE IMPACTED EAR WAX UNI: CPT | Mod: PBBFAC,PN | Performed by: FAMILY MEDICINE

## 2022-02-24 PROCEDURE — 99999 PR PBB SHADOW E&M-EST. PATIENT-LVL III: ICD-10-PCS | Mod: PBBFAC,,, | Performed by: FAMILY MEDICINE

## 2022-02-24 PROCEDURE — 99213 OFFICE O/P EST LOW 20 MIN: CPT | Mod: 25,S$PBB,, | Performed by: FAMILY MEDICINE

## 2022-02-24 PROCEDURE — 99213 PR OFFICE/OUTPT VISIT, EST, LEVL III, 20-29 MIN: ICD-10-PCS | Mod: 25,S$PBB,, | Performed by: FAMILY MEDICINE

## 2022-02-24 PROCEDURE — 99213 OFFICE O/P EST LOW 20 MIN: CPT | Mod: PBBFAC,PN | Performed by: FAMILY MEDICINE

## 2022-02-24 PROCEDURE — 69210 PR REMOVAL IMPACTED CERUMEN REQUIRING INSTRUMENTATION, UNILATERAL: ICD-10-PCS | Mod: S$PBB,,, | Performed by: FAMILY MEDICINE

## 2022-02-24 PROCEDURE — 99999 PR PBB SHADOW E&M-EST. PATIENT-LVL III: CPT | Mod: PBBFAC,,, | Performed by: FAMILY MEDICINE

## 2022-02-24 RX ORDER — DULOXETIN HYDROCHLORIDE 30 MG/1
30 CAPSULE, DELAYED RELEASE ORAL DAILY
Qty: 30 CAPSULE | Refills: 11 | Status: SHIPPED | OUTPATIENT
Start: 2022-02-24 | End: 2023-05-11

## 2022-02-24 RX ORDER — APIXABAN 5 MG/1
5 TABLET, FILM COATED ORAL 2 TIMES DAILY
Qty: 180 TABLET | Refills: 3 | Status: SHIPPED | OUTPATIENT
Start: 2022-02-24

## 2022-02-24 NOTE — PROGRESS NOTES
Subjective:       Patient ID: Lukas Chance is a 72 y.o. male.    Chief Complaint: Follow-up    Any 2-year-old male coming in to follow-up from an extended stay in the hospital secondary to congestive heart failure, stasis dermatitis, COPD exacerbation and chronic diastolic heart failure.  He went from acute to rehab facility and was there for quite some time during which he lost about 40 lb.  His stasis ulcers healed and his chronic leg edema improved.  He was taken off of the diet Coke he was drinking and it helped immensely with his weight loss and venous stasis.  He has regained a little bit of the weight he lost since discharge but he is maintaining his diet and is on 40 mg of Lasix twice daily and 50 mg of spironolactone once daily.  He is still being followed by wound care and lymphedema.  History includes major depressive disorder, chronic neuropathic pain, venous stasis ulcers with venous insufficiency, COPD with emphysema, acute on chronic respiratory failure, coronary artery disease with an old MI, chronic diastolic heart failure, diabetes with circulatory disease but no insulin in fact he is on no medications at all for his diabetes.  He has hypertension, hyperlipidemia, coagulopathy with an a T3 and protein C deficiency and a history of pulmonary embolism.  He is chronically anticoagulated with Eliquis.  He also has a history of sleep apnea and low testosterone levels.  He recently had his eye exam and saw Podiatry.    He has no complaints of shortness of breath and no orthopnea.  He has no nausea vomiting or diarrhea, no chest pain.    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 Miller, 9 polyps, recheck 5 year  No date: MULTIPLE TOOTH EXTRACTIONS  7/2015: RADIOFREQUENCY ABLATION      Comment:  bilateral   No date: TONSILLECTOMY    Current Outpatient Medications on File Prior to Visit:  acetaminophen (TYLENOL) 500 MG tablet, Take 500 mg by mouth 2 (two) times daily as needed for Pain. , Disp: , Rfl:   albuterol (ACCUNEB) 0.63 mg/3 mL Nebu, Take 3 mLs (0.63 mg total) by nebulization every 6 (six) hours as needed (shortness of breath). Rescue, Disp: 1 Box, Rfl: 2  albuterol-ipratropium (DUO-NEB) 2.5 mg-0.5 mg/3 mL nebulizer solution, Take 3 mLs by nebulization every 6 (six) hours as needed for Wheezing or Shortness of Breath. Rescue, Disp: 75 mL, Rfl: 0  fluticasone furoate-vilanteroL (BREO) 200-25 mcg/dose DsDv diskus inhaler, Inhale 1 puff into the lungs once daily. Controller, Disp: 1 each, Rfl: 2  furosemide (LASIX) 40 MG tablet, Take 1 tablet (40 mg total) by mouth 2 (two) times daily before meals., Disp: 60 tablet, Rfl: 11  multivitamin (THERAGRAN) per tablet, Take 1 tablet by mouth once daily., Disp: , Rfl:   salmon oil/omega-3 fatty acids (SALMON OIL-1000 ORAL), Take 1 capsule by mouth daily as needed. , Disp: , Rfl:   spironolactone (ALDACTONE) 50 MG tablet, Take 1 tablet (50 mg total) by mouth once daily., Disp: 30 tablet, Rfl: 11  (DISCONTINUED) DULoxetine (CYMBALTA) 30 MG capsule, Take 1 capsule (30 mg total) by mouth once daily., Disp: 30 capsule, Rfl: 11  (DISCONTINUED) ELIQUIS 5 mg Tab, TAKE 1 TABLET BY MOUTH TWICE DAILY, Disp: 180 tablet, Rfl: 3    No current facility-administered medications on file prior to visit.        Review of Systems   Constitutional: Negative for chills, fatigue and fever.   HENT: Positive for ear pain (No pain  but the ears feel blocked with cerumen) and hearing loss.    Respiratory: Negative for chest tightness and shortness of breath.    Cardiovascular: Negative for chest pain and palpitations.   Gastrointestinal: Negative for constipation, diarrhea, nausea and vomiting.   Endocrine: Negative for polydipsia and polyuria.   Musculoskeletal: Positive for gait problem (Imbalance and weakness secondary to being sedentary for a prolonged period of time). Negative for arthralgias, back pain and joint swelling.   Skin: Negative for color change and rash.       Objective:      Physical Exam  Vitals and nursing note reviewed.   Constitutional:       General: He is not in acute distress.     Appearance: Normal appearance. He is obese. He is not ill-appearing, toxic-appearing or diaphoretic.      Comments: Good blood pressure control  Morbid obesity with a BMI of 50 he is down 40 lb from his August 13, 2021 last visit with me   HENT:      Head: Normocephalic and atraumatic.      Right Ear: Tympanic membrane and external ear normal. There is impacted cerumen.      Left Ear: Tympanic membrane and external ear normal. There is impacted cerumen.   Cardiovascular:      Rate and Rhythm: Normal rate and regular rhythm.      Heart sounds: Normal heart sounds. No murmur heard.    No friction rub. No gallop.   Pulmonary:      Effort: Pulmonary effort is normal. No respiratory distress.      Breath sounds: Normal breath sounds. No stridor. No wheezing, rhonchi or rales.   Musculoskeletal:      Right lower leg: Edema (2+) present.      Left lower leg: Edema (2+) present.   Neurological:      General: No focal deficit present.      Mental Status: He is alert and oriented to person, place, and time. Mental status is at baseline.   Psychiatric:         Mood and Affect: Mood normal.         Behavior: Behavior normal.         Thought Content: Thought content normal.         Judgment: Judgment normal.         Assessment:       1. Chronic diastolic  heart failure    2. Coronary artery disease, unspecified vessel or lesion type, unspecified whether angina present, unspecified whether native or transplanted heart    3. Acute deep vein thrombosis (DVT) of distal end of left lower extremity    4. Therapeutic drug monitoring    5. Neuropathic pain    6. Depression, unspecified depression type    7. Old MI (myocardial infarction)    8. Current mild episode of major depressive disorder, unspecified whether recurrent    9. Venous stasis ulcer of left ankle with fat layer exposed without varicose veins    10. Centrilobular emphysema    11. Acute on chronic respiratory failure with hypoxia and hypercapnia    12. Hypertension associated with diabetes    13. Combined hyperlipidemia associated with type 2 diabetes mellitus    14. Type 2 diabetes mellitus with other circulatory complication, without long-term current use of insulin    15. Decreased functional mobility and endurance    16. Sleep apnea, unspecified type    17. Bilateral hearing loss due to cerumen impaction    18. BMI 50.0-59.9, adult        Plan:       1. Chronic diastolic heart failure  Asymptomatic currently    2. Coronary artery disease, unspecified vessel or lesion type, unspecified whether angina present, unspecified whether native or transplanted heart  Asymptomatic    3. Acute deep vein thrombosis (DVT) of distal end of left lower extremity  Resolved, prophylaxis with Eliquis  - ELIQUIS 5 mg Tab; Take 1 tablet (5 mg total) by mouth 2 (two) times daily.  Dispense: 180 tablet; Refill: 3    4. Therapeutic drug monitoring  BMP  Lab Results   Component Value Date     02/23/2022    K 4.5 02/23/2022     02/23/2022    CO2 27 02/23/2022    BUN 21 02/23/2022    CREATININE 1.0 02/23/2022    CALCIUM 8.7 02/23/2022    ANIONGAP 12 02/23/2022    ESTGFRAFRICA >60 02/23/2022    EGFRNONAA >60 02/23/2022     Improved kidney function, stable is potassium level on large doses of diuretics.  Will recheck a BMP  in three months to be drawn by home health  - Basic Metabolic Panel; Future    5. Neuropathic pain  Refill Cymbalta  - DULoxetine (CYMBALTA) 30 MG capsule; Take 1 capsule (30 mg total) by mouth once daily.  Dispense: 30 capsule; Refill: 11    6. Depression, unspecified depression type  See above  - DULoxetine (CYMBALTA) 30 MG capsule; Take 1 capsule (30 mg total) by mouth once daily.  Dispense: 30 capsule; Refill: 11    7. Old MI (myocardial infarction)  Resolved    8. Current mild episode of major depressive disorder, unspecified whether recurrent  Stable    9. Venous stasis ulcer of left ankle with fat layer exposed without varicose veins  Slightly worsened from discharge by history    10. Centrilobular emphysema  Stable    11. Acute on chronic respiratory failure with hypoxia and hypercapnia  Stable    12. Hypertension associated with diabetes  Good control with no changes needed    13. Combined hyperlipidemia associated with type 2 diabetes mellitus  Lab Results   Component Value Date    CHOL 189 02/23/2022    CHOL 181 08/16/2021    CHOL 171 02/11/2020     Lab Results   Component Value Date    HDL 43 02/23/2022    HDL 44 08/16/2021    HDL 47 02/11/2020     Lab Results   Component Value Date    LDLCALC 127.6 02/23/2022    LDLCALC 118.2 08/16/2021    LDLCALC 105.6 02/11/2020     Lab Results   Component Value Date    TRIG 92 02/23/2022    TRIG 94 08/16/2021    TRIG 92 02/11/2020     Lab Results   Component Value Date    CHOLHDL 22.8 02/23/2022    CHOLHDL 24.3 08/16/2021    CHOLHDL 27.5 02/11/2020     Good control other than the LDL which ideally it diabetic should be under 70. Patient intolerant of statins    14. Type 2 diabetes mellitus with other circulatory complication, without long-term current use of insulin  Lab Results   Component Value Date    HGBA1C 5.7 (H) 02/23/2022     Prediabetic A1c on no diabetic medications.  The patient did have an A1c of 6.6 approximately nine years ago while under the care of  Dr. Rodriguez    15. Decreased functional mobility and endurance  Continue with physical therapy    16. Sleep apnea, unspecified type  Stable    17. Bilateral hearing loss due to cerumen impaction  Removed all cerumen from the left ear we using a loop, removed about 60% from the right ear, unable to remove the remainder as it was too close to the tympanic membrane.  Hearing was improved    18. BMI 50.0-59.9, adult  Continue weight loss program    I spent 26 minutes on this encounter.  This time includes face-to-face time, orders, chart review, test review, and documentation.

## 2022-02-28 ENCOUNTER — DOCUMENT SCAN (OUTPATIENT)
Dept: HOME HEALTH SERVICES | Facility: HOSPITAL | Age: 72
End: 2022-02-28
Payer: MEDICARE

## 2022-03-03 ENCOUNTER — DOCUMENT SCAN (OUTPATIENT)
Dept: HOME HEALTH SERVICES | Facility: HOSPITAL | Age: 72
End: 2022-03-03
Payer: MEDICARE

## 2022-03-07 ENCOUNTER — DOCUMENT SCAN (OUTPATIENT)
Dept: HOME HEALTH SERVICES | Facility: HOSPITAL | Age: 72
End: 2022-03-07
Payer: MEDICARE

## 2022-03-16 ENCOUNTER — DOCUMENT SCAN (OUTPATIENT)
Dept: HOME HEALTH SERVICES | Facility: HOSPITAL | Age: 72
End: 2022-03-16
Payer: MEDICARE

## 2022-03-24 ENCOUNTER — DOCUMENT SCAN (OUTPATIENT)
Dept: HOME HEALTH SERVICES | Facility: HOSPITAL | Age: 72
End: 2022-03-24
Payer: MEDICARE

## 2022-03-29 PROCEDURE — G0179 PR HOME HEALTH MD RECERTIFICATION: ICD-10-PCS | Mod: ,,, | Performed by: FAMILY MEDICINE

## 2022-03-29 PROCEDURE — G0179 MD RECERTIFICATION HHA PT: HCPCS | Mod: ,,, | Performed by: FAMILY MEDICINE

## 2022-04-01 ENCOUNTER — EXTERNAL HOME HEALTH (OUTPATIENT)
Dept: HOME HEALTH SERVICES | Facility: HOSPITAL | Age: 72
End: 2022-04-01
Payer: MEDICARE

## 2022-04-11 ENCOUNTER — TELEPHONE (OUTPATIENT)
Dept: FAMILY MEDICINE | Facility: CLINIC | Age: 72
End: 2022-04-11
Payer: MEDICARE

## 2022-04-11 NOTE — TELEPHONE ENCOUNTER
Called patient and left message for nurse that patient needs to take Lasix 40mg TWICE a day as directed- he sits with legs hanging down most of the day-advised to elevate as much as possible. Monitor weight and edema and report to office as needed.

## 2022-04-11 NOTE — TELEPHONE ENCOUNTER
----- Message from Anneliese Willis sent at 4/11/2022 11:09 AM CDT -----  Contact: Home Health Agency  Type: Needs Medical Advice    Who Called: Home Health  Best Call Back Number: [phone]  Inquiry/Question: Please call back asap. Pt has had a 6 pound weight gain since Thursday. Legs are 4+.pitted. Toes are cold and blue. Pt has not been taking Lasix as directed he has only been taking 40 mg one a day not twice a day. She wants to see if he can double up on the Lasix and try to keep pt out of the hospital.       Thank you~

## 2022-05-28 PROCEDURE — G0179 MD RECERTIFICATION HHA PT: HCPCS | Mod: ,,, | Performed by: FAMILY MEDICINE

## 2022-05-28 PROCEDURE — G0179 PR HOME HEALTH MD RECERTIFICATION: ICD-10-PCS | Mod: ,,, | Performed by: FAMILY MEDICINE

## 2022-05-30 ENCOUNTER — EXTERNAL HOME HEALTH (OUTPATIENT)
Dept: HOME HEALTH SERVICES | Facility: HOSPITAL | Age: 72
End: 2022-05-30
Payer: MEDICARE

## 2022-06-23 ENCOUNTER — DOCUMENT SCAN (OUTPATIENT)
Dept: HOME HEALTH SERVICES | Facility: HOSPITAL | Age: 72
End: 2022-06-23
Payer: MEDICARE

## 2022-06-29 DIAGNOSIS — M25.561 ACUTE PAIN OF RIGHT KNEE: Primary | ICD-10-CM

## 2022-06-30 ENCOUNTER — DOCUMENT SCAN (OUTPATIENT)
Dept: HOME HEALTH SERVICES | Facility: HOSPITAL | Age: 72
End: 2022-06-30
Payer: MEDICARE

## 2022-07-07 ENCOUNTER — HOSPITAL ENCOUNTER (OUTPATIENT)
Dept: RADIOLOGY | Facility: HOSPITAL | Age: 72
Discharge: HOME | End: 2022-07-07
Attending: ORTHOPAEDIC SURGERY
Payer: MEDICARE

## 2022-07-07 ENCOUNTER — OFFICE VISIT (OUTPATIENT)
Dept: ORTHOPEDICS | Facility: CLINIC | Age: 72
End: 2022-07-07
Payer: MEDICARE

## 2022-07-07 VITALS — HEIGHT: 71 IN | WEIGHT: 315 LBS | RESPIRATION RATE: 18 BRPM | BODY MASS INDEX: 44.1 KG/M2

## 2022-07-07 DIAGNOSIS — M25.561 ACUTE PAIN OF RIGHT KNEE: ICD-10-CM

## 2022-07-07 DIAGNOSIS — M25.561 ACUTE PAIN OF RIGHT KNEE: Primary | ICD-10-CM

## 2022-07-07 DIAGNOSIS — M17.10 ARTHRITIS OF KNEE: ICD-10-CM

## 2022-07-07 PROCEDURE — 99203 OFFICE O/P NEW LOW 30 MIN: CPT | Mod: 25,S$PBB,, | Performed by: ORTHOPAEDIC SURGERY

## 2022-07-07 PROCEDURE — 20610 LARGE JOINT ASPIRATION/INJECTION: BILATERAL KNEE: ICD-10-PCS | Mod: 50,S$PBB,, | Performed by: ORTHOPAEDIC SURGERY

## 2022-07-07 PROCEDURE — 73564 X-RAY EXAM KNEE 4 OR MORE: CPT | Mod: 26,50,, | Performed by: RADIOLOGY

## 2022-07-07 PROCEDURE — 20610 DRAIN/INJ JOINT/BURSA W/O US: CPT | Mod: 50,PBBFAC,PN | Performed by: ORTHOPAEDIC SURGERY

## 2022-07-07 PROCEDURE — 99999 PR PBB SHADOW E&M-EST. PATIENT-LVL III: CPT | Mod: PBBFAC,,, | Performed by: ORTHOPAEDIC SURGERY

## 2022-07-07 PROCEDURE — 99999 PR PBB SHADOW E&M-EST. PATIENT-LVL III: ICD-10-PCS | Mod: PBBFAC,,, | Performed by: ORTHOPAEDIC SURGERY

## 2022-07-07 PROCEDURE — 73564 X-RAY EXAM KNEE 4 OR MORE: CPT | Mod: TC,50,FY

## 2022-07-07 PROCEDURE — 99213 OFFICE O/P EST LOW 20 MIN: CPT | Mod: PBBFAC,PN | Performed by: ORTHOPAEDIC SURGERY

## 2022-07-07 PROCEDURE — 73564 XR KNEE ORTHO BILAT WITH FLEXION: ICD-10-PCS | Mod: 26,50,, | Performed by: RADIOLOGY

## 2022-07-07 PROCEDURE — 99203 PR OFFICE/OUTPT VISIT, NEW, LEVL III, 30-44 MIN: ICD-10-PCS | Mod: 25,S$PBB,, | Performed by: ORTHOPAEDIC SURGERY

## 2022-07-07 RX ORDER — TRIAMCINOLONE ACETONIDE 40 MG/ML
40 INJECTION, SUSPENSION INTRA-ARTICULAR; INTRAMUSCULAR
Status: DISCONTINUED | OUTPATIENT
Start: 2022-07-07 | End: 2022-07-07 | Stop reason: HOSPADM

## 2022-07-07 RX ADMIN — TRIAMCINOLONE ACETONIDE 40 MG: 40 INJECTION, SUSPENSION INTRA-ARTICULAR; INTRAMUSCULAR at 01:07

## 2022-07-07 NOTE — PROCEDURES
Large Joint Aspiration/Injection: bilateral knee    Date/Time: 7/7/2022 1:45 PM  Performed by: Dick Dewey MD  Authorized by: Dick Dewey MD     Consent Done?:  Yes (Verbal)  Indications:  Pain  Site marked: the procedure site was marked    Timeout: prior to procedure the correct patient, procedure, and site was verified    Prep: patient was prepped and draped in usual sterile fashion      Local anesthesia used?: Yes    Anesthesia:  Local infiltration  Local anesthetic:  Bupivacaine 0.25% without epinephrine and lidocaine 2% without epinephrine  Anesthetic total (ml):  6      Details:  Needle Size:  22 G  Ultrasonic Guidance for needle placement?: No    Approach:  Anterolateral  Location:  Knee  Laterality:  Bilateral  Site:  Bilateral knee  Medications (Right):  40 mg triamcinolone acetonide 40 mg/mL  Medications (Left):  40 mg triamcinolone acetonide 40 mg/mL  Patient tolerance:  Patient tolerated the procedure well with no immediate complications

## 2022-07-07 NOTE — PROGRESS NOTES
Past Medical History:   Diagnosis Date    Anticoagulant long-term use 3/1/2013    Antithrombin 3 deficiency     Cellulitis     lower legs, venous stasis    CHF (congestive heart failure)     Coronary artery disease     Deep vein thrombosis (DVT) 2012    left leg    Diabetes mellitus type 2 in obese 5/15/2013    Hx of colonic polyps     Hypertension     Obesity     LAKHWINDER (obstructive sleep apnea)     Pulmonary embolism     Venous stasis ulcers     left ankle       Past Surgical History:   Procedure Laterality Date    COLONOSCOPY  5/2/2011    Dr. Miller, 9 polyps, recheck 5 year    MULTIPLE TOOTH EXTRACTIONS      RADIOFREQUENCY ABLATION  7/2015    bilateral     TONSILLECTOMY         Current Outpatient Medications   Medication Sig    acetaminophen (TYLENOL) 500 MG tablet Take 500 mg by mouth 2 (two) times daily as needed for Pain.     albuterol-ipratropium (DUO-NEB) 2.5 mg-0.5 mg/3 mL nebulizer solution Take 3 mLs by nebulization every 6 (six) hours as needed for Wheezing or Shortness of Breath. Rescue    DULoxetine (CYMBALTA) 30 MG capsule Take 1 capsule (30 mg total) by mouth once daily.    ELIQUIS 5 mg Tab Take 1 tablet (5 mg total) by mouth 2 (two) times daily.    fluticasone furoate-vilanteroL (BREO) 200-25 mcg/dose DsDv diskus inhaler Inhale 1 puff into the lungs once daily. Controller    furosemide (LASIX) 40 MG tablet Take 1 tablet (40 mg total) by mouth 2 (two) times daily before meals.    multivitamin (THERAGRAN) per tablet Take 1 tablet by mouth once daily.    salmon oil/omega-3 fatty acids (SALMON OIL-1000 ORAL) Take 1 capsule by mouth daily as needed.     spironolactone (ALDACTONE) 50 MG tablet Take 1 tablet (50 mg total) by mouth once daily.    albuterol (ACCUNEB) 0.63 mg/3 mL Nebu Take 3 mLs (0.63 mg total) by nebulization every 6 (six) hours as needed (shortness of breath). Rescue     No current facility-administered medications for this visit.       Review of patient's  allergies indicates:   Allergen Reactions    Bactroban [mupirocin calcium] Other (See Comments)     Burning feeling on open sore     Metoprolol Other (See Comments)     dizzy       Family History   Problem Relation Age of Onset    Heart disease Mother     Heart disease Father     Gallbladder disease Sister     Cataracts Sister     Heart disease Brother     Stroke Brother     Emphysema Maternal Uncle        Social History     Socioeconomic History    Marital status:    Tobacco Use    Smoking status: Current Every Day Smoker     Packs/day: 1.00     Years: 20.00     Pack years: 20.00     Last attempt to quit: 1/3/2002     Years since quittin.5    Smokeless tobacco: Never Used   Substance and Sexual Activity    Alcohol use: Yes     Alcohol/week: 1.0 standard drink     Types: 1 Cans of beer per week     Comment: seldom    Drug use: No    Sexual activity: Yes     Partners: Female       Chief Complaint:   Chief Complaint   Patient presents with    Right Knee - Pain       History of present illness:  72-year-old obese male seen for bilateral knee pain.  Right greater than left.  Patient has had pain for 3-4 years now.  No injury or trauma.  No particular treatment in the past.  Rates the pain is an 8/10.      Review of Systems:    Constitution: Negative for chills, fever, and sweats.  Negative for unexplained weight loss.    HENT:  Negative for headaches and blurry vision.    Cardiovascular:Negative for chest pain or irregular heart beat. Negative for hypertension.    Respiratory:  Negative for cough and shortness of breath.    Gastrointestinal: Negative for abdominal pain, heartburn, melena, nausea, and vomitting.    Genitourinary:  Negative bladder incontinence and dysuria.    Musculoskeletal:  See HPI    Neurological: Negative for numbness.    Psychiatric/Behavioral: Negative for depression.  The patient is not nervous/anxious.      Endocrine: Negative for polyuria    Hematologic/Lymphatic:  Negative for bleeding problem.  Does not bruise/bleed easily.    Skin: Negative for poor would healing and rash      Physical Examination:    Vital Signs:    Vitals:    07/07/22 1405   Resp: 18       Body mass index is 49.93 kg/m².    This a well-developed, well nourished patient in no acute distress.  They are alert and oriented and cooperative to examination.  Pt.  comes in in a wheelchair    Examination of bilateral knees shows no rashes or erythema. There are no masses ecchymosis or effusion.  Patient has significant peripheral edema bilaterally. Patient has full range of motion from 0-100°. Patient is nontender to palpation over lateral joint line and nontender to palpation over the medial joint line. Patient has a - Lachman exam, - anterior drawer exam, and - posterior drawer exam. - Diamante's exam. Knee is stable to varus and valgus stress. 5 out of 5 motor strength. Palpable distal pulses. Intact light touch sensation. Negative Patellofemoral crepitus      X-rays:  X-rays of both knees are ordered and reviewed which show severe tricompartmental knee arthritis with near complete joint space narrowing laterally     Assessment::  Bilateral knee osteoarthritis    Plan:  Reviewed the findings with him today.  We talked about treatment options for knee arthritis.  We agreed to try cortisone injections to start.  We briefly discussed stem cells.  I do not recommend them given the cost and that it will not change the overall prognosis of his severe knee arthritis.    This note was created using [employer] voice recognition software that occasionally misinterpreted phrases or words.    Consult note is delivered via Epic messaging service.

## 2022-07-27 ENCOUNTER — TELEPHONE (OUTPATIENT)
Dept: FAMILY MEDICINE | Facility: CLINIC | Age: 72
End: 2022-07-27
Payer: MEDICARE

## 2022-07-27 DIAGNOSIS — E11.59 TYPE 2 DIABETES MELLITUS WITH OTHER CIRCULATORY COMPLICATION, WITHOUT LONG-TERM CURRENT USE OF INSULIN: Primary | Chronic | ICD-10-CM

## 2022-07-27 NOTE — TELEPHONE ENCOUNTER
Patient denies any personal or family history of thyroid cancer or pancreatic cancer. Patient is in agreement with whichever medication Dr. Mendez thinks is best for weight loss, that can be approved by insurance. Please advise. Thank you.

## 2022-07-27 NOTE — TELEPHONE ENCOUNTER
According to epic the Ozempic is covered on insurance and the wegovy is not.  It is the same drug, semaglutide.    He takes 0.25 mg injected under the skin into the abdomen, upper thigh, or upper arm once a week on the same day of the week.  After four weeks he increases it 2.5 mg weekly for four weeks.  After that we will need to send in the prescription for the 1 mg. If he develops nausea it will usually resolve but sometimes we have to delay an increase in the dose to give it more time to settle down.  Let me know if that is a problem.  He has plenty of refills on the starting dose to get through that but I will need to send in a new prescription for the 1 mg. We should recheck a chemistry panel and an A1c in about three months.  I would also like to cm in the office to see how he is doing with it.  Do not expect a lot of change in the 1st month, the low dose is mainly to get you through the nausea and is not very effective.  Use should start seeing some changes on the 2nd month an as the dose goes up it gets stronger

## 2022-07-27 NOTE — TELEPHONE ENCOUNTER
----- Message from Jesus Queen sent at 7/27/2022  8:46 AM CDT -----  Type: Needs Medical Advice  Who Called:  pt  Symptoms (please be specific):  need to speak to the nurse he have some questions about his meds    Best Call Back Number: [phone]    Additional Information: please advise--thank you

## 2022-07-27 NOTE — TELEPHONE ENCOUNTER
We have two ways we can go with this. Wegovy is the weight loss form of the drug semaglutide.  It has five dosages going up to a maximum 2.4 mg per week and is injected once a week.  The major side effect is nausea and it should not be used in anyone with a personal or family history of thyroid cancer or pancreatic cancer.    The Ozempic is specifically for diabetes.  It has four dosage strengths up to a maximum of 2 mg. A slightly higher dose will continue to improve weight loss beyond what will work for diabetes.  That is why the Wegovy goes up a little higher and has five stages compared to four.  Ozempic has the same warnings regarding cancer and is still very good for weight loss even though it does not go up quite as high.    Getting coverage for a weight loss drug from insurance is extremely difficult.  It is easier to use it for diabetes.  He is technically diabetic based on a single elevated A1c of 6.6 back in 2014 even though his A1cs have all been much lower than that since that time.

## 2022-07-27 NOTE — TELEPHONE ENCOUNTER
Patient requesting to know if Dr. Mendez will be willing to prescribe Ozempic for weight loss. States he was advised prescription would need to state patient is taking this medication for metabolic syndrome. Preferred pharmacy is [facility] ([facility]). Please advise. Thank you.

## 2022-07-28 NOTE — TELEPHONE ENCOUNTER
Call placed to patient for notification. Patient verbalized understanding. Patient has existing appointment with Dr. Mendez scheduled for the date of 8-25-22. Patient states he normally has labs drawn at his home by the [facility] nurse who takes blood to [facility]. States he would rather for home health nurse to take blood for these labs to [facility] instead. No noted labs placed; patient states will follow up at upcoming appointment regarding this matter. Will send follow up message to Dr. Mendez for notification.

## 2022-08-16 ENCOUNTER — PATIENT OUTREACH (OUTPATIENT)
Dept: ADMINISTRATIVE | Facility: HOSPITAL | Age: 72
End: 2022-08-16
Payer: MEDICARE

## 2022-08-16 NOTE — PROGRESS NOTES
Diabetic eye exam GAP report-I spoke to pt regarding his overdue Diabetic eye exam and pt stated he will call and schedule with [facility] 20/20.

## 2022-08-18 ENCOUNTER — TELEPHONE (OUTPATIENT)
Dept: FAMILY MEDICINE | Facility: CLINIC | Age: 72
End: 2022-08-18
Payer: MEDICARE

## 2022-08-18 NOTE — TELEPHONE ENCOUNTER
----- Message from Justina Fernandez sent at 8/18/2022 10:04 AM CDT -----  Type: Patient Call Back         Who called:Pt          What is the request in detail: pt called in regarding requesting a new mediation Rx called Celebrex 200mg capsule if so send to the [facility] [address] [place]         Can the clinic reply by [facility]?no          Would the patient rather a call back or a response via [facility]?call back          Best call back number:[phone]         Additional Information:           Thank You

## 2022-08-18 NOTE — TELEPHONE ENCOUNTER
Patient has severe knee pain and trouble walking- has seen Dr. Dewey in past for injections- he is requesting Celebrex for this. [facility]

## 2022-08-19 NOTE — TELEPHONE ENCOUNTER
I would love to help him but I do not want to help him into a dialysis chair.  He was bordering on stage 4 chronic kidney disease last year anti inflammatories are definitely out of the question.  The only anti-inflammatory he can use would be the Voltaren gel topically.  Was he getting the steroid injections or did they give him the Synvisc or rooster comb as well?  If he had the Synvisc injections and was no longer responding the next step would be possibly doing a nerve ablation with pain management.

## 2022-09-09 ENCOUNTER — TELEPHONE (OUTPATIENT)
Dept: ORTHOPEDICS | Facility: CLINIC | Age: 72
End: 2022-09-09
Payer: MEDICARE

## 2022-09-09 NOTE — TELEPHONE ENCOUNTER
Pt states his knee is hurting and would like to know if he can have a script for something to relieve his pain. Please advise

## 2022-09-16 ENCOUNTER — HOSPITAL ENCOUNTER (EMERGENCY)
Facility: HOSPITAL | Age: 72
Discharge: HOME | End: 2022-09-16
Attending: EMERGENCY MEDICINE
Payer: MEDICARE

## 2022-09-16 VITALS
TEMPERATURE: 99 F | RESPIRATION RATE: 18 BRPM | HEART RATE: 86 BPM | DIASTOLIC BLOOD PRESSURE: 96 MMHG | SYSTOLIC BLOOD PRESSURE: 165 MMHG | OXYGEN SATURATION: 98 % | BODY MASS INDEX: 48.82 KG/M2 | WEIGHT: 315 LBS

## 2022-09-16 DIAGNOSIS — I89.0 LYMPHEDEMA: Primary | ICD-10-CM

## 2022-09-16 LAB
HCV AB SERPL QL IA: NORMAL
HIV 1+2 AB+HIV1 P24 AG SERPL QL IA: NORMAL

## 2022-09-16 PROCEDURE — 87389 HIV-1 AG W/HIV-1&-2 AB AG IA: CPT | Performed by: EMERGENCY MEDICINE

## 2022-09-16 PROCEDURE — 36415 COLL VENOUS BLD VENIPUNCTURE: CPT | Performed by: EMERGENCY MEDICINE

## 2022-09-16 PROCEDURE — 63600175 PHARM REV CODE 636 W HCPCS: Performed by: EMERGENCY MEDICINE

## 2022-09-16 PROCEDURE — 86803 HEPATITIS C AB TEST: CPT | Performed by: EMERGENCY MEDICINE

## 2022-09-16 PROCEDURE — 99284 EMERGENCY DEPT VISIT MOD MDM: CPT | Mod: 25

## 2022-09-16 RX ORDER — FUROSEMIDE 10 MG/ML
40 INJECTION INTRAMUSCULAR; INTRAVENOUS
Status: COMPLETED | OUTPATIENT
Start: 2022-09-16 | End: 2022-09-16

## 2022-09-16 RX ADMIN — FUROSEMIDE 40 MG: 10 INJECTION, SOLUTION INTRAMUSCULAR; INTRAVENOUS at 04:09

## 2022-09-16 NOTE — ED PROVIDER NOTES
"Encounter Date: 9/16/2022       History     Chief Complaint   Patient presents with    Weakness     Complains of weakness. Two falls today.      72-year-old male history of CHF, CAD, DVT, morbid obesity, PE, venous stasis, and lymphedema presents with a fall from assisted living.  He is complaining of weakness in his legs.  He states his legs "give out." This is chronic.  He is complaining of chronic swelling to both legs.  He reports the swelling is worse than usual but he states he has actually been losing weight lately.  He is requesting Lasix drip.  He is already on oral Lasix.  He denies chest pain denies shortness of breath.  He is complaining of chronic bilateral knee pain unchanged from prior.  He did not strike his head no loss of consciousness.    The history is provided by the patient.   Review of patient's allergies indicates:   Allergen Reactions    Bactroban [mupirocin calcium] Other (See Comments)     Burning feeling on open sore     Metoprolol Other (See Comments)     dizzy     Past Medical History:   Diagnosis Date    Anticoagulant long-term use 03/01/2013    Antithrombin 3 deficiency     Cellulitis     lower legs, venous stasis    CHF (congestive heart failure)     Coronary artery disease     Deep vein thrombosis (DVT) 2012    left leg    Diabetes mellitus type 2 in obese 05/15/2013    Hx of colonic polyps     Hypertension     Lymphedema     Obesity     LAKHWINDER (obstructive sleep apnea)     Pulmonary embolism     Venous stasis ulcers     left ankle     Past Surgical History:   Procedure Laterality Date    COLONOSCOPY  5/2/2011    Dr. Miller, 9 polyps, recheck 5 year    MULTIPLE TOOTH EXTRACTIONS      RADIOFREQUENCY ABLATION  7/2015    bilateral     TONSILLECTOMY       Family History   Problem Relation Age of Onset    Heart disease Mother     Heart disease Father     Gallbladder disease Sister     Cataracts Sister     Heart disease Brother     Stroke Brother     Emphysema Maternal Uncle      Social " History     Tobacco Use    Smoking status: Every Day     Packs/day: 1.00     Years: 20.00     Pack years: 20.00     Types: Cigarettes     Last attempt to quit: 1/3/2002     Years since quittin.7    Smokeless tobacco: Never   Substance Use Topics    Alcohol use: Yes     Alcohol/week: 1.0 standard drink     Types: 1 Cans of beer per week     Comment: seldom    Drug use: No     Review of Systems   Respiratory:  Negative for shortness of breath.    Cardiovascular:  Positive for leg swelling (Chronic swelling). Negative for chest pain and palpitations.   Musculoskeletal:  Positive for arthralgias.   Skin:  Positive for wound.        Venous stasis ulcers     Physical Exam     Initial Vitals [22 0353]   BP Pulse Resp Temp SpO2   (!) 160/74 93 18 98.5 °F (36.9 °C) 97 %      MAP       --         Physical Exam    Nursing note and vitals reviewed.  Constitutional: He appears well-developed and well-nourished. He is not diaphoretic. He is Obese .  Non-toxic appearance. He does not have a sickly appearance. He does not appear ill. No distress.   HENT:   Head: Normocephalic and atraumatic.   Eyes: EOM are normal.   Neck: Neck supple.   Normal range of motion.  Cardiovascular:  Normal rate, regular rhythm and normal heart sounds.     Exam reveals no gallop and no friction rub.       No murmur heard.  Pulmonary/Chest: Breath sounds normal. No respiratory distress. He has no wheezes. He has no rhonchi. He has no rales.   Musculoskeletal:         General: Normal range of motion.      Cervical back: Normal range of motion and neck supple. No rigidity. Normal range of motion.      Right knee: No swelling or deformity.      Left knee: No swelling or deformity.     Neurological: He is alert and oriented to person, place, and time.   Skin: Skin is warm and dry. No rash noted.   Lymphedema bilateral lower extremities   Psychiatric: He has a normal mood and affect. His behavior is normal. Judgment and thought content normal.  "      ED Course   Procedures  Labs Reviewed   HIV 1 / 2 ANTIBODY   HEPATITIS C ANTIBODY          Imaging Results    None          Medications   furosemide injection 40 mg (40 mg Intramuscular Given 9/16/22 0433)     Medical Decision Making:   History:   Old Medical Records: I decided to obtain old medical records.           ED Course as of 09/16/22 0622   Fri Sep 16, 2022   0524 72-year-old man with chronic lymphedema presents the emergency room after his legs "gave out." This is a chronic problem.  This is present in 1 of my notes regarding this gentleman from 3 years ago.  He is complaining of chronic knee pain but no new complaints.  His legs are swollen but he reports losing weight over the last several weeks.  He is requesting "a Lasix drip." He has no shortness of breath.  I do not think he needs to be placed on a Lasix drip.  I did give a dose of IM Lasix.  I see no reason for labs or imaging.  All of his complaints tonight are chronic.  He can be discharged home. [EF]      ED Course User Index  [EF] Orlando Ramires MD                 Clinical Impression:   Final diagnoses:  [I89.0] Lymphedema (Primary)      ED Disposition Condition    Discharge Stable          ED Prescriptions    None       Follow-up Information       Follow up With Specialties Details Why Contact Info    Solitario Mendez MD Family Medicine Schedule an appointment as soon as possible for a visit   [address] [facility] [address]  [facility] [id number] [phone]      [facility] Emergency Dept Emergency Medicine  As needed, If symptoms worsen [address] [place] [address] [id number] [phone]             Orlando Ramires MD  09/16/22 0623    "

## 2022-09-22 ENCOUNTER — OFFICE VISIT (OUTPATIENT)
Dept: ORTHOPEDICS | Facility: CLINIC | Age: 72
End: 2022-09-22
Payer: MEDICARE

## 2022-09-22 VITALS — HEIGHT: 71 IN | WEIGHT: 315 LBS | RESPIRATION RATE: 18 BRPM | BODY MASS INDEX: 44.1 KG/M2

## 2022-09-22 DIAGNOSIS — M17.10 ARTHRITIS OF KNEE: Primary | ICD-10-CM

## 2022-09-22 PROCEDURE — 20610 DRAIN/INJ JOINT/BURSA W/O US: CPT | Mod: 50,PBBFAC,PN | Performed by: ORTHOPAEDIC SURGERY

## 2022-09-22 PROCEDURE — 99499 UNLISTED E&M SERVICE: CPT | Mod: S$PBB,,, | Performed by: ORTHOPAEDIC SURGERY

## 2022-09-22 PROCEDURE — 99999 PR PBB SHADOW E&M-EST. PATIENT-LVL III: CPT | Mod: PBBFAC,,, | Performed by: ORTHOPAEDIC SURGERY

## 2022-09-22 PROCEDURE — 99499 NO LOS: ICD-10-PCS | Mod: S$PBB,,, | Performed by: ORTHOPAEDIC SURGERY

## 2022-09-22 PROCEDURE — 99213 OFFICE O/P EST LOW 20 MIN: CPT | Mod: PBBFAC,PN | Performed by: ORTHOPAEDIC SURGERY

## 2022-09-22 PROCEDURE — 99999 PR PBB SHADOW E&M-EST. PATIENT-LVL III: ICD-10-PCS | Mod: PBBFAC,,, | Performed by: ORTHOPAEDIC SURGERY

## 2022-09-22 PROCEDURE — 20610 LARGE JOINT ASPIRATION/INJECTION: BILATERAL KNEE: ICD-10-PCS | Mod: 50,S$PBB,, | Performed by: ORTHOPAEDIC SURGERY

## 2022-09-22 RX ADMIN — Medication 88 MG: at 03:09

## 2022-09-22 NOTE — PROCEDURES
Large Joint Aspiration/Injection: bilateral knee    Date/Time: 9/22/2022 3:45 PM  Performed by: Dick Dewey MD  Authorized by: Dick Dewey MD     Consent Done?:  Yes (Verbal)  Indications:  Pain  Site marked: the procedure site was marked    Timeout: prior to procedure the correct patient, procedure, and site was verified      Details:  Needle Size:  18 G  Approach:  Anterolateral  Location:  Knee  Laterality:  Bilateral  Site:  Bilateral knee  Medications (Right):  88 mg hyaluronate sodium, stabilized 88 mg/4 mL  Medications (Left):  88 mg hyaluronate sodium, stabilized 88 mg/4 mL  Patient tolerance:  Patient tolerated the procedure well with no immediate complications

## 2022-09-22 NOTE — PROGRESS NOTES
Past Medical History:   Diagnosis Date    Anticoagulant long-term use 03/01/2013    Antithrombin 3 deficiency     Cellulitis     lower legs, venous stasis    CHF (congestive heart failure)     Coronary artery disease     Deep vein thrombosis (DVT) 2012    left leg    Diabetes mellitus type 2 in obese 05/15/2013    Hx of colonic polyps     Hypertension     Lymphedema     Obesity     LAKHWINDER (obstructive sleep apnea)     Pulmonary embolism     Venous stasis ulcers     left ankle       Past Surgical History:   Procedure Laterality Date    COLONOSCOPY  5/2/2011    Dr. Miller, 9 polyps, recheck 5 year    MULTIPLE TOOTH EXTRACTIONS      RADIOFREQUENCY ABLATION  7/2015    bilateral     TONSILLECTOMY         Current Outpatient Medications   Medication Sig    acetaminophen (TYLENOL) 500 MG tablet Take 500 mg by mouth 2 (two) times daily as needed for Pain.     albuterol (ACCUNEB) 0.63 mg/3 mL Nebu Take 3 mLs (0.63 mg total) by nebulization every 6 (six) hours as needed (shortness of breath). Rescue    albuterol-ipratropium (DUO-NEB) 2.5 mg-0.5 mg/3 mL nebulizer solution Take 3 mLs by nebulization every 6 (six) hours as needed for Wheezing or Shortness of Breath. Rescue    DULoxetine (CYMBALTA) 30 MG capsule Take 1 capsule (30 mg total) by mouth once daily.    ELIQUIS 5 mg Tab Take 1 tablet (5 mg total) by mouth 2 (two) times daily.    furosemide (LASIX) 40 MG tablet Take 1 tablet (40 mg total) by mouth 2 (two) times daily before meals.    multivitamin (THERAGRAN) per tablet Take 1 tablet by mouth once daily.    salmon oil/omega-3 fatty acids (SALMON OIL-1000 ORAL) Take 1 capsule by mouth daily as needed.     semaglutide (OZEMPIC) 0.25 mg or 0.5 mg(2 mg/1.5 mL) pen injector Inject 0.25 mg into the skin every 7 days. For four weeks, then increase to 0.5mg every seven days for four weeks.    spironolactone (ALDACTONE) 50 MG tablet Take 1 tablet (50 mg total) by mouth once daily.     No current facility-administered medications for  this visit.       Review of patient's allergies indicates:   Allergen Reactions    Bactroban [mupirocin calcium] Other (See Comments)     Burning feeling on open sore     Metoprolol Other (See Comments)     dizzy       Family History   Problem Relation Age of Onset    Heart disease Mother     Heart disease Father     Gallbladder disease Sister     Cataracts Sister     Heart disease Brother     Stroke Brother     Emphysema Maternal Uncle        Social History     Socioeconomic History    Marital status:    Tobacco Use    Smoking status: Every Day     Packs/day: 1.00     Years: 20.00     Pack years: 20.00     Types: Cigarettes     Last attempt to quit: 1/3/2002     Years since quittin.7    Smokeless tobacco: Never   Substance and Sexual Activity    Alcohol use: Yes     Alcohol/week: 1.0 standard drink     Types: 1 Cans of beer per week     Comment: seldom    Drug use: No    Sexual activity: Yes     Partners: Female       Chief Complaint:   Chief Complaint   Patient presents with    Left Knee - Pain    Right Knee - Pain       History of present illness:  72-year-old obese male seen for bilateral knee pain.  Right greater than left.  Patient has had pain for 3-4 years now.  No injury or trauma.  Injected his knees about 3 months ago.  Only got about 2 months of relief.  Rates the pain is an 8/10.      Review of Systems:    Constitution: Negative for chills, fever, and sweats.  Negative for unexplained weight loss.    HENT:  Negative for headaches and blurry vision.    Cardiovascular:Negative for chest pain or irregular heart beat. Negative for hypertension.    Respiratory:  Negative for cough and shortness of breath.    Gastrointestinal: Negative for abdominal pain, heartburn, melena, nausea, and vomitting.    Genitourinary:  Negative bladder incontinence and dysuria.    Musculoskeletal:  See HPI    Neurological: Negative for numbness.    Psychiatric/Behavioral: Negative for depression.  The patient is not  nervous/anxious.      Endocrine: Negative for polyuria    Hematologic/Lymphatic: Negative for bleeding problem.  Does not bruise/bleed easily.    Skin: Negative for poor would healing and rash      Physical Examination:    Vital Signs:    Vitals:    09/22/22 1529   Resp: 18       Body mass index is 48.82 kg/m².    This a well-developed, well nourished patient in no acute distress.  They are alert and oriented and cooperative to examination.  Pt.  comes in in a wheelchair    Examination of bilateral knees shows no rashes or erythema. There are no masses ecchymosis or effusion.  Patient has significant peripheral edema bilaterally. Patient has full range of motion from 0-100°. Patient is nontender to palpation over lateral joint line and nontender to palpation over the medial joint line. Patient has a - Lachman exam, - anterior drawer exam, and - posterior drawer exam. - Diamante's exam. Knee is stable to varus and valgus stress. 5 out of 5 motor strength. Palpable distal pulses. Intact light touch sensation. Negative Patellofemoral crepitus      X-rays:  X-rays of both knees are reviewed which show severe tricompartmental knee arthritis with near complete joint space narrowing laterally     Assessment::  Bilateral knee osteoarthritis    Plan:  Reviewed the findings with him today.  We talked about treatment options for knee arthritis.  I recommended Monovisc injections today.  We injected both knees with Monovisc today.  Next step would possibly be Zilretta    This note was created using M Modal voice recognition software that occasionally misinterpreted phrases or words.    Consult note is delivered via Epic messaging service.

## 2022-09-28 ENCOUNTER — TELEPHONE (OUTPATIENT)
Dept: FAMILY MEDICINE | Facility: CLINIC | Age: 72
End: 2022-09-28
Payer: MEDICARE

## 2022-09-28 ENCOUNTER — PATIENT OUTREACH (OUTPATIENT)
Dept: ADMINISTRATIVE | Facility: HOSPITAL | Age: 72
End: 2022-09-28
Payer: MEDICARE

## 2022-09-28 ENCOUNTER — PATIENT MESSAGE (OUTPATIENT)
Dept: ADMINISTRATIVE | Facility: HOSPITAL | Age: 72
End: 2022-09-28
Payer: MEDICARE

## 2022-09-28 NOTE — TELEPHONE ENCOUNTER
----- Message from Lance Griffin sent at 9/28/2022  3:25 PM CDT -----  Regarding: home health nurse called  Name of Who is Calling: RILEY GUIDO [[phone]] Angela(home health nurse)      What is the request in detail: Angela home health nurse called stating the patient is in fluid overload.I tried scheduling him appt nothing was available.Please advise      Can the clinic reply by [facility]: No      What Number to Call Back [facility]:[phone] Angela

## 2022-09-28 NOTE — TELEPHONE ENCOUNTER
Patient having leg edema-nurse says lungs are clear and no dyspnea- has Lasix 40mg  supposed to taking it twice a day but she states he isn't-sits with legs hanging down all day- advised her to tell patient to take Lasix as directed-elevate legs- appt made 10/5/22 with Ms. Gutierrez.

## 2022-09-28 NOTE — PROGRESS NOTES
Diabetic eye exam GAP report-Left VM and portal message sent out regarding pt's overdue Diabetic eye exam.

## 2022-10-05 ENCOUNTER — PATIENT OUTREACH (OUTPATIENT)
Dept: ADMINISTRATIVE | Facility: HOSPITAL | Age: 72
End: 2022-10-05
Payer: MEDICARE

## 2022-10-05 NOTE — PROGRESS NOTES
"  Attempted to outreach patient for pre-visit via "Bravofly", no answer after a week. Sending outreach via Mail Out Letter now.   "

## 2022-10-05 NOTE — LETTER
October 5, 2022    Lukas Chance  [address] [place] [address]             [facility]  [address]  [place] [address]  Phone: [phone] Dear Me. Meron,    Good morning!!     I am reaching out to you because you are over due for a Diabetic eye exam. Have you had an eye exam in the last year,  if so where did you have it? If not,  would you like me to schedule you an eye exam with one of our Physician's at [facility]?     Thanks so much and have a great day!     Keerthi MONTOYA LPN [facility]  [facility]  [address] [address]  P- [phone]  F- [phone]

## 2022-10-10 LAB — HBA1C MFR BLD: 5.8 %

## 2022-10-11 ENCOUNTER — OFFICE VISIT (OUTPATIENT)
Dept: FAMILY MEDICINE | Facility: CLINIC | Age: 72
End: 2022-10-11
Payer: MEDICARE

## 2022-10-11 VITALS
BODY MASS INDEX: 44.1 KG/M2 | HEART RATE: 92 BPM | HEIGHT: 71 IN | TEMPERATURE: 98 F | OXYGEN SATURATION: 98 % | SYSTOLIC BLOOD PRESSURE: 170 MMHG | WEIGHT: 315 LBS | DIASTOLIC BLOOD PRESSURE: 80 MMHG

## 2022-10-11 DIAGNOSIS — M25.562 CHRONIC PAIN OF BOTH KNEES: Primary | ICD-10-CM

## 2022-10-11 DIAGNOSIS — E11.59 HYPERTENSION ASSOCIATED WITH DIABETES: ICD-10-CM

## 2022-10-11 DIAGNOSIS — I15.2 HYPERTENSION ASSOCIATED WITH DIABETES: ICD-10-CM

## 2022-10-11 DIAGNOSIS — R60.9 DEPENDENT EDEMA: ICD-10-CM

## 2022-10-11 DIAGNOSIS — G89.29 CHRONIC PAIN OF BOTH KNEES: Primary | ICD-10-CM

## 2022-10-11 DIAGNOSIS — M25.561 CHRONIC PAIN OF BOTH KNEES: Primary | ICD-10-CM

## 2022-10-11 PROCEDURE — 99999 PR PBB SHADOW E&M-EST. PATIENT-LVL IV: ICD-10-PCS | Mod: PBBFAC,,, | Performed by: NURSE PRACTITIONER

## 2022-10-11 PROCEDURE — 99214 OFFICE O/P EST MOD 30 MIN: CPT | Mod: PBBFAC,PO | Performed by: NURSE PRACTITIONER

## 2022-10-11 PROCEDURE — 99999 PR PBB SHADOW E&M-EST. PATIENT-LVL IV: CPT | Mod: PBBFAC,,, | Performed by: NURSE PRACTITIONER

## 2022-10-11 PROCEDURE — 99213 PR OFFICE/OUTPT VISIT, EST, LEVL III, 20-29 MIN: ICD-10-PCS | Mod: S$PBB,,, | Performed by: NURSE PRACTITIONER

## 2022-10-11 PROCEDURE — 99213 OFFICE O/P EST LOW 20 MIN: CPT | Mod: S$PBB,,, | Performed by: NURSE PRACTITIONER

## 2022-10-11 NOTE — PROGRESS NOTES
Subjective:       Patient ID: Lukas Chance is a 72 y.o. male.    Chief Complaint: bilateral edema      HPI   73 y/o male patient with medical problems listed below presents for b/l knee pain which is chronic. Denies recent trauma to the affected area. Patient is followed by Dr. Dewey for b/l knee oa, last seen on 9/22, and had Monovisc injection which he reports no relief. Patient states took tylenol with no relief, but ibuprofen and applying ice with mild relief. Patient states pain is constant, worse on prolonged standing. Patient is mostly limited to wheelchair and walks with walker at times. Noted initial bp in clinic was 170/80. Patient states has not taken morning dose of lasix today. Patient states has home care nurse visit 2-3 times per week for checking wound and for BP which he reports ranges b/w 140s/70-80s.    Patient is followed by wound care and lymphedema, and currently takes lasix 40 mg bid and spironolactone once daily. States lost weight.     Patient Active Problem List   Diagnosis    Venous (peripheral) insufficiency    Wheezes    Sleep apnea    Anticoagulant long-term use    Type 2 diabetes mellitus with circulatory disorder, without long-term current use of insulin    Combined hyperlipidemia associated with type 2 diabetes mellitus    Hypertension associated with diabetes    Coronary artery disease    BMI 50.0-59.9, adult    Hypogonadism male    Venous stasis ulcer of left ankle with fat layer exposed without varicose veins    History of pulmonary embolism    Antithrombin 3 deficiency    Protein C deficiency    Drug-induced anemia    Coagulopathy    Abnormal liver enzymes    Venous insufficiency of lower extremity    Pneumonia    Old MI (myocardial infarction)    Chronic diastolic heart failure    Centrilobular emphysema    Bilateral leg weakness    Decreased functional mobility and endurance    Acute on chronic respiratory failure with hypoxia and hypercapnia    COPD with lower respiratory  infection    Advance care planning    Major depressive disorder with current active episode    Physical debility      Review of patient's allergies indicates:   Allergen Reactions    Bactroban [mupirocin calcium] Other (See Comments)     Burning feeling on open sore     Metoprolol Other (See Comments)     dizzy     Past Surgical History:   Procedure Laterality Date    COLONOSCOPY  5/2/2011    Dr. Miller, 9 polyps, recheck 5 year    MULTIPLE TOOTH EXTRACTIONS      RADIOFREQUENCY ABLATION  7/2015    bilateral     TONSILLECTOMY          Current Outpatient Medications:     acetaminophen (TYLENOL) 500 MG tablet, Take 500 mg by mouth 2 (two) times daily as needed for Pain. , Disp: , Rfl:     albuterol-ipratropium (DUO-NEB) 2.5 mg-0.5 mg/3 mL nebulizer solution, Take 3 mLs by nebulization every 6 (six) hours as needed for Wheezing or Shortness of Breath. Rescue, Disp: 75 mL, Rfl: 0    DULoxetine (CYMBALTA) 30 MG capsule, Take 1 capsule (30 mg total) by mouth once daily., Disp: 30 capsule, Rfl: 11    ELIQUIS 5 mg Tab, Take 1 tablet (5 mg total) by mouth 2 (two) times daily., Disp: 180 tablet, Rfl: 3    furosemide (LASIX) 40 MG tablet, Take 1 tablet (40 mg total) by mouth 2 (two) times daily before meals., Disp: 60 tablet, Rfl: 11    multivitamin (THERAGRAN) per tablet, Take 1 tablet by mouth once daily., Disp: , Rfl:     semaglutide (OZEMPIC) 0.25 mg or 0.5 mg(2 mg/1.5 mL) pen injector, Inject 0.25 mg into the skin every 7 days. For four weeks, then increase to 0.5mg every seven days for four weeks., Disp: 1 pen, Rfl: 5    spironolactone (ALDACTONE) 50 MG tablet, Take 1 tablet (50 mg total) by mouth once daily., Disp: 30 tablet, Rfl: 11    albuterol (ACCUNEB) 0.63 mg/3 mL Nebu, Take 3 mLs (0.63 mg total) by nebulization every 6 (six) hours as needed (shortness of breath). Rescue, Disp: 1 Box, Rfl: 2    Lab Results   Component Value Date    WBC 7.71 02/23/2022    HGB 13.0 (L) 02/23/2022    HCT 41.0 02/23/2022      "02/23/2022    CHOL 189 02/23/2022    TRIG 92 02/23/2022    HDL 43 02/23/2022    ALT 16 02/23/2022    AST 18 02/23/2022     02/23/2022    K 4.5 02/23/2022     02/23/2022    CREATININE 1.0 02/23/2022    BUN 21 02/23/2022    CO2 27 02/23/2022    TSH 1.105 02/25/2019    PSA 1.5 06/26/2018    INR 1.4 01/08/2020    HGBA1C 5.7 (H) 02/23/2022     Above labs reviewed    Review of Systems   Constitutional:  Negative for chills and fever.   Respiratory:  Negative for chest tightness and shortness of breath.    Cardiovascular:  Negative for chest pain and palpitations.   Gastrointestinal:  Negative for abdominal pain.   Musculoskeletal:         Knee pain   Neurological:  Negative for dizziness and headaches.       BP (!) 170/80 (BP Location: Left arm, Patient Position: Sitting, BP Method: Medium (Manual))   Pulse 92   Temp 98 °F (36.7 °C) (Oral)   Ht 5' 11" (1.803 m)   Wt (!) 162.7 kg (358 lb 11 oz)   SpO2 98%   BMI 50.03 kg/m²       Physical Exam  Vitals reviewed.   Constitutional:       General: He is not in acute distress.     Appearance: Normal appearance.      Comments: Patient is sitting on wheelchair   Cardiovascular:      Rate and Rhythm: Normal rate and regular rhythm.      Pulses: Normal pulses.      Heart sounds: Normal heart sounds.   Pulmonary:      Effort: Pulmonary effort is normal.      Breath sounds: Normal breath sounds.   Chest:      Chest wall: No deformity, swelling or edema.   Abdominal:      General: Abdomen is flat. Bowel sounds are normal.      Palpations: Abdomen is soft.   Musculoskeletal:         General: Tenderness present.      Cervical back: Normal range of motion.      Right lower leg: Edema present.      Left lower leg: Edema present.      Comments: +mild diffuse tenderness in b/l knees   Skin:     General: Skin is warm and dry.   Neurological:      General: No focal deficit present.      Mental Status: He is alert.   Psychiatric:         Mood and Affect: Mood normal.         " Behavior: Behavior normal.       Assessment:       1. Chronic pain of both knees    2. Dependent edema    3. Hypertension associated with diabetes        Plan:       1. Dependent edema  - continue with current regimen  - last CMP in 2/2022  - continue to follow wound care and lymphedema    2. Chronic pain of both knees  - discussed regarding topical Bengay, and alternate tylenol with ibuprofen  - continue to follow with sports medicine for other options of knee arthritis tx    3. Hypertension associated with diabetes  - manually repeated /80  - patient reports missed morning dose of lasix today  - continue to monitor bp at home  - continue with current regimen      Patient with be reevaluated in  as needed  or sooner trevor Peterson NP

## 2022-10-12 ENCOUNTER — TELEPHONE (OUTPATIENT)
Dept: FAMILY MEDICINE | Facility: CLINIC | Age: 72
End: 2022-10-12
Payer: MEDICARE

## 2022-10-12 NOTE — TELEPHONE ENCOUNTER
I do not recall ordering these and I can assure you I did not order a prealbumin.  This looks like orders from wound care.  Is he seeing them?  Check with home health and find out who actually ordered these and when.

## 2022-10-13 NOTE — TELEPHONE ENCOUNTER
[facility] did send the orders to wound care- they ordered the cbc and prealbumin.

## 2022-10-18 ENCOUNTER — PATIENT OUTREACH (OUTPATIENT)
Dept: ADMINISTRATIVE | Facility: HOSPITAL | Age: 72
End: 2022-10-18
Payer: MEDICARE

## 2022-10-18 NOTE — LETTER
AUTHORIZATION FOR RELEASE OF   CONFIDENTIAL INFORMATION    Dear Dr. Miller    We are seeing Lukas Chance, date of birth 1950, in the clinic at [facility]. Solitario Mendez MD is the patient's PCP. Lukas Chance has an outstanding lab/procedure at the time we reviewed his chart. In order to help keep his health information updated, he has authorized us to request the following medical record(s):        (  )  MAMMOGRAM                                      ( X )  COLONOSCOPY      (  )  PAP SMEAR                                          (  )  OUTSIDE LAB RESULTS     (  )  DEXA SCAN                                          (  )  EYE EXAM            (  )  FOOT EXAM                                          (  )  ENTIRE RECORD     (  )  OUTSIDE IMMUNIZATIONS                 (  )  _______________         Please fax records to [facility], Robert W Taylor, MD, [phone]    Thank you in advance,       Tarsha GOETZ  Care Coordinator  [facility] [address] [place] [address]  Phone [phone]  Fax [phone]           Patient Name: Lukas Chance  : 1950  Patient Phone #: [phone]

## 2022-10-18 NOTE — LETTER
AUTHORIZATION FOR RELEASE OF   CONFIDENTIAL INFORMATION    Dear Dr. Reynaga    We are seeing Lukas Chance, date of birth 1950, in the clinic at [facility]. Solitario Mendez MD is the patient's PCP. Lukas Chance has an outstanding lab/procedure at the time we reviewed his chart. In order to help keep his health information updated, he has authorized us to request the following medical record(s):        (  )  MAMMOGRAM                                      (  )  COLONOSCOPY      (  )  PAP SMEAR                                          (  )  OUTSIDE LAB RESULTS     (  )  DEXA SCAN                                          (  X )  EYE EXAM            (  )  FOOT EXAM                                          (  )  ENTIRE RECORD     (  )  OUTSIDE IMMUNIZATIONS                 (  )  _______________         Please fax records to [facility], Robert W Taylor, MD, [phone]    Thank you in advance,       Tarsha GOETZ  Care Coordinator  [facility] [address] [place] [address]  Phone [phone]  Fax [phone]           Patient Name: Lukas Chance  : 1950  Patient Phone #: [phone]

## 2022-11-11 ENCOUNTER — TELEPHONE (OUTPATIENT)
Dept: FAMILY MEDICINE | Facility: CLINIC | Age: 72
End: 2022-11-11
Payer: MEDICARE

## 2022-11-11 DIAGNOSIS — F17.200 NICOTINE DEPENDENCE, UNCOMPLICATED, UNSPECIFIED NICOTINE PRODUCT TYPE: Primary | ICD-10-CM

## 2022-11-11 NOTE — TELEPHONE ENCOUNTER
----- Message from Maty Ospina, Patient Care Assistant sent at 11/11/2022 10:08 AM CST -----  Regarding: advice  Contact: pt  Type: Needs Medical Advice  Who Called:  pt   Best Call Back Number: [phone]    Additional Information: pt states he would like a callback regarding an medication to stop smoking. Please call pt to advise. Thanks!

## 2022-11-15 RX ORDER — VARENICLINE TARTRATE 0.5 (11)-1
KIT ORAL
Qty: 1 EACH | Refills: 0 | Status: SHIPPED | OUTPATIENT
Start: 2022-11-15 | End: 2023-05-11

## 2022-11-15 RX ORDER — VARENICLINE TARTRATE 1 MG/1
1 TABLET, FILM COATED ORAL 2 TIMES DAILY
Qty: 60 TABLET | Refills: 2 | Status: SHIPPED | OUTPATIENT
Start: 2022-12-13 | End: 2023-05-11

## 2022-11-15 NOTE — TELEPHONE ENCOUNTER
I sent in the starter pack to be available immediately.  I then sent in the full 1 mg number 60 with two refills to be available starting December 13.

## 2022-11-15 NOTE — TELEPHONE ENCOUNTER
That would be the bupropion SR 75 mg twice daily, the equivalent of Zyban.  Chantix is more effective, is he sure he wants the Zyban?

## 2022-11-16 ENCOUNTER — TELEPHONE (OUTPATIENT)
Dept: FAMILY MEDICINE | Facility: CLINIC | Age: 72
End: 2022-11-16
Payer: MEDICARE

## 2022-11-16 NOTE — TELEPHONE ENCOUNTER
----- Message from Stacey Gant sent at 11/16/2022 11:19 AM CST -----  Contact: Patient  Type: Needs Medical Advice  Who Called: Patient  Best Call Back Number: [phone]  Additional Information: Patient requesting to get a referral for pain management, please contact patient to advise.

## 2022-11-16 NOTE — TELEPHONE ENCOUNTER
Patient asking for referral to Dr. Cote for his knee pain. He wants pain medication. He declines surgery. Has seen Dr. Dewey in the past. Is this something Dr. Cote will consider?

## 2022-11-17 NOTE — TELEPHONE ENCOUNTER
Patient notified Dr. Cote recommends procedures, PT and non-opioid medication. Patient says he is already in PT and not helping. Patient notified he may want to try other pain management in this area.

## 2022-11-20 ENCOUNTER — DOCUMENT SCAN (OUTPATIENT)
Dept: HOME HEALTH SERVICES | Facility: HOSPITAL | Age: 72
End: 2022-11-20
Payer: MEDICARE

## 2022-11-24 PROCEDURE — G0179 PR HOME HEALTH MD RECERTIFICATION: ICD-10-PCS | Mod: ,,, | Performed by: FAMILY MEDICINE

## 2022-11-24 PROCEDURE — G0179 MD RECERTIFICATION HHA PT: HCPCS | Mod: ,,, | Performed by: FAMILY MEDICINE

## 2022-12-01 ENCOUNTER — EXTERNAL HOME HEALTH (OUTPATIENT)
Dept: HOME HEALTH SERVICES | Facility: HOSPITAL | Age: 72
End: 2022-12-01
Payer: MEDICARE

## 2022-12-08 ENCOUNTER — PATIENT OUTREACH (OUTPATIENT)
Dept: ADMINISTRATIVE | Facility: HOSPITAL | Age: 72
End: 2022-12-08
Payer: MEDICARE

## 2022-12-27 ENCOUNTER — HOSPITAL ENCOUNTER (EMERGENCY)
Facility: HOSPITAL | Age: 72
Discharge: HOME | End: 2022-12-27
Attending: EMERGENCY MEDICINE
Payer: MEDICARE

## 2022-12-27 VITALS
DIASTOLIC BLOOD PRESSURE: 69 MMHG | HEART RATE: 91 BPM | WEIGHT: 315 LBS | TEMPERATURE: 99 F | RESPIRATION RATE: 20 BRPM | OXYGEN SATURATION: 96 % | BODY MASS INDEX: 44.1 KG/M2 | HEIGHT: 71 IN | SYSTOLIC BLOOD PRESSURE: 151 MMHG

## 2022-12-27 DIAGNOSIS — I89.0 LYMPHEDEMA: Primary | ICD-10-CM

## 2022-12-27 DIAGNOSIS — M79.89 LEG SWELLING: ICD-10-CM

## 2022-12-27 LAB
ANION GAP SERPL CALC-SCNC: 9 MMOL/L (ref 8–16)
BASOPHILS # BLD AUTO: 0.07 K/UL (ref 0–0.2)
BASOPHILS NFR BLD: 0.7 % (ref 0–1.9)
BUN SERPL-MCNC: 29 MG/DL (ref 8–23)
CALCIUM SERPL-MCNC: 9.2 MG/DL (ref 8.7–10.5)
CHLORIDE SERPL-SCNC: 103 MMOL/L (ref 95–110)
CO2 SERPL-SCNC: 25 MMOL/L (ref 23–29)
CREAT SERPL-MCNC: 1.3 MG/DL (ref 0.5–1.4)
DIFFERENTIAL METHOD: ABNORMAL
EOSINOPHIL # BLD AUTO: 0.4 K/UL (ref 0–0.5)
EOSINOPHIL NFR BLD: 3.5 % (ref 0–8)
ERYTHROCYTE [DISTWIDTH] IN BLOOD BY AUTOMATED COUNT: 13.4 % (ref 11.5–14.5)
EST. GFR  (NO RACE VARIABLE): 58 ML/MIN/1.73 M^2
GLUCOSE SERPL-MCNC: 87 MG/DL (ref 70–110)
HCT VFR BLD AUTO: 39.3 % (ref 40–54)
HGB BLD-MCNC: 12.5 G/DL (ref 14–18)
IMM GRANULOCYTES # BLD AUTO: 0.04 K/UL (ref 0–0.04)
IMM GRANULOCYTES NFR BLD AUTO: 0.4 % (ref 0–0.5)
LYMPHOCYTES # BLD AUTO: 1.5 K/UL (ref 1–4.8)
LYMPHOCYTES NFR BLD: 14.1 % (ref 18–48)
MCH RBC QN AUTO: 29.6 PG (ref 27–31)
MCHC RBC AUTO-ENTMCNC: 31.8 G/DL (ref 32–36)
MCV RBC AUTO: 93 FL (ref 82–98)
MONOCYTES # BLD AUTO: 1 K/UL (ref 0.3–1)
MONOCYTES NFR BLD: 9.1 % (ref 4–15)
NEUTROPHILS # BLD AUTO: 7.5 K/UL (ref 1.8–7.7)
NEUTROPHILS NFR BLD: 72.2 % (ref 38–73)
NRBC BLD-RTO: 0 /100 WBC
PLATELET # BLD AUTO: 305 K/UL (ref 150–450)
PMV BLD AUTO: 9.5 FL (ref 9.2–12.9)
POTASSIUM SERPL-SCNC: 4.2 MMOL/L (ref 3.5–5.1)
RBC # BLD AUTO: 4.23 M/UL (ref 4.6–6.2)
SODIUM SERPL-SCNC: 137 MMOL/L (ref 136–145)
WBC # BLD AUTO: 10.4 K/UL (ref 3.9–12.7)

## 2022-12-27 PROCEDURE — 85025 COMPLETE CBC W/AUTO DIFF WBC: CPT | Performed by: PHYSICIAN ASSISTANT

## 2022-12-27 PROCEDURE — 80048 BASIC METABOLIC PNL TOTAL CA: CPT | Performed by: PHYSICIAN ASSISTANT

## 2022-12-27 PROCEDURE — 36415 COLL VENOUS BLD VENIPUNCTURE: CPT | Performed by: PHYSICIAN ASSISTANT

## 2022-12-27 PROCEDURE — 63600175 PHARM REV CODE 636 W HCPCS: Performed by: PHYSICIAN ASSISTANT

## 2022-12-27 PROCEDURE — 99284 EMERGENCY DEPT VISIT MOD MDM: CPT | Mod: 25

## 2022-12-27 RX ORDER — FUROSEMIDE 10 MG/ML
20 INJECTION INTRAMUSCULAR; INTRAVENOUS
Status: COMPLETED | OUTPATIENT
Start: 2022-12-27 | End: 2022-12-27

## 2022-12-27 RX ADMIN — FUROSEMIDE 20 MG: 10 INJECTION, SOLUTION INTRAMUSCULAR; INTRAVENOUS at 06:12

## 2022-12-27 NOTE — ED NOTES
Pt to the ED per EMS with c/o increased swelling to BLE. Pt reports that he sees wound care twice a week for wounds on his legs, but lately they have become more swollen than usual. Pt denies SOB, denies other complaints at this time. Legs are edematous.

## 2022-12-27 NOTE — ED PROVIDER NOTES
Encounter Date: 12/27/2022    SCRIBE #1 NOTE: I, Candice Rivera, am scribing for, and in the presence of,  Ludivina Marshall PA-C.     History     Chief Complaint   Patient presents with    fluid retention     Fluid retention and swelling x 2 weeks      Time seen by provider: 4:10 PM on 12/27/2022    Lukas Chance is a 72 y.o. male with a PMHx of HTN, obesity, lymphadema, DM-2, and CHF who presents to the ED via EMS with an onset of increased chronic leg swelling for the last few days. He reports home health nurse stated his abdomen looked more swollen as well. Patient states he must have eaten salty food over the Christmas holiday. He endorses compliance with his 60 mg daily Lasix.The patient denies SOB, cough, abdominal pain or any other symptoms at this time. No pertinent PSHx.    The history is provided by the patient.   Review of patient's allergies indicates:   Allergen Reactions    Bactroban [mupirocin calcium] Other (See Comments)     Burning feeling on open sore     Metoprolol Other (See Comments)     dizzy     Past Medical History:   Diagnosis Date    Anticoagulant long-term use 03/01/2013    Antithrombin 3 deficiency     Cellulitis     lower legs, venous stasis    CHF (congestive heart failure)     Coronary artery disease     Deep vein thrombosis (DVT) 2012    left leg    Diabetes mellitus type 2 in obese 05/15/2013    Hx of colonic polyps     Hypertension     Lymphedema     Obesity     LAKHWINDER (obstructive sleep apnea)     Pulmonary embolism     Venous stasis ulcers     left ankle     Past Surgical History:   Procedure Laterality Date    COLONOSCOPY  5/2/2011    Dr. Miller, 9 polyps, recheck 5 year    MULTIPLE TOOTH EXTRACTIONS      RADIOFREQUENCY ABLATION  7/2015    bilateral     TONSILLECTOMY       Family History   Problem Relation Age of Onset    Heart disease Mother     Heart disease Father     Gallbladder disease Sister     Cataracts Sister     Heart disease Brother     Stroke Brother     Emphysema  Maternal Uncle      Social History     Tobacco Use    Smoking status: Every Day     Packs/day: 1.00     Years: 20.00     Pack years: 20.00     Types: Cigarettes     Last attempt to quit: 1/3/2002     Years since quittin.9    Smokeless tobacco: Never   Substance Use Topics    Alcohol use: Yes     Alcohol/week: 1.0 standard drink     Types: 1 Cans of beer per week     Comment: seldom    Drug use: No     Review of Systems   Constitutional:  Negative for chills and fever.   HENT:  Negative for facial swelling.    Respiratory:  Negative for cough, chest tightness, shortness of breath and wheezing.    Cardiovascular:  Positive for leg swelling. Negative for chest pain and palpitations.   Gastrointestinal:  Negative for abdominal pain, diarrhea, nausea and vomiting.   Genitourinary:  Negative for dysuria and hematuria.   Musculoskeletal:  Negative for arthralgias, back pain, joint swelling, myalgias, neck pain and neck stiffness.   Skin:  Negative for color change, pallor, rash and wound.   Neurological:  Negative for dizziness, syncope, weakness, light-headedness, numbness and headaches.   Hematological:  Does not bruise/bleed easily.   Psychiatric/Behavioral:  The patient is not nervous/anxious.      Physical Exam     Initial Vitals [22 1551]   BP Pulse Resp Temp SpO2   (!) 161/64 90 20 99 °F (37.2 °C) 99 %      MAP       --         Physical Exam    Nursing note and vitals reviewed.  Constitutional: He appears well-developed and well-nourished. He is not diaphoretic. No distress.   HENT:   Head: Normocephalic and atraumatic.   Mouth/Throat: Oropharynx is clear and moist.   Cardiovascular:  Normal rate, regular rhythm, normal heart sounds and intact distal pulses.     Exam reveals no gallop and no friction rub.       No murmur heard.  Pulmonary/Chest: Breath sounds normal. No respiratory distress. He has no wheezes. He has no rhonchi. He has no rales. He exhibits no tenderness.   Abdominal: Abdomen is soft. He  exhibits no distension and no mass.   Musculoskeletal:         General: Edema present. No tenderness. Normal range of motion.      Comments: Diffuse swelling and pitting edema noted to bilateral lower extremities with chronic appearing wounds and serous oozing.  No surrounding erythema or purulence.       Neurological: He is alert and oriented to person, place, and time. He has normal strength. No sensory deficit.   Skin: Skin is warm and dry. No rash and no abscess noted. No erythema.   Psychiatric: He has a normal mood and affect.       ED Course   Procedures  Labs Reviewed   CBC W/ AUTO DIFFERENTIAL - Abnormal; Notable for the following components:       Result Value    RBC 4.23 (*)     Hemoglobin 12.5 (*)     Hematocrit 39.3 (*)     MCHC 31.8 (*)     Lymph % 14.1 (*)     All other components within normal limits   BASIC METABOLIC PANEL - Abnormal; Notable for the following components:    BUN 29 (*)     eGFR 58 (*)     All other components within normal limits          Imaging Results              X-Ray Chest 1 View (Final result)  Result time 12/27/22 17:07:25      Final result by Saleem Pak Jr., MD (12/27/22 17:07:25)                   Impression:      No acute abnormality.      Electronically signed by: Saleem Pak MD  Date:    12/27/2022  Time:    17:07               Narrative:    EXAMINATION:  XR CHEST 1 VIEW    CLINICAL HISTORY:  Other specified soft tissue disorders    TECHNIQUE:  Single frontal view of the chest was performed.    COMPARISON:  Chest x-ray of December 12, 2021    FINDINGS:  The lungs are clear, with normal appearance of pulmonary vasculature and no pleural effusion or pneumothorax.    The cardiac silhouette is normal in size. The hilar and mediastinal contours are unremarkable.    Bones are intact.                                       Medications   furosemide injection 20 mg (20 mg Intramuscular Given 12/27/22 1802)     Medical Decision Making:   History:   Old Medical  Records: I decided to obtain old medical records.  Old Records Summarized: records from clinic visits and records from previous admission(s).  Differential Diagnosis:   Lymphedema   Cellulitis   DVT   CHF   Clinical Tests:   Lab Tests: Ordered and Reviewed  ED Management:  Pt denies any shortness of breath or difficulty breathing.  No hypoxia and no abnormalities noted to CXR to suggest pulmonary edema.  He does have known bilateral lymphedema and labs are stable here in the ED.  No evidence for new or worsening of chronic venous stasis leg wounds on exam today.  He is given an additional does of IM Lasix here in the ED and will be discharged home to follow-up with his PCP for re-evaluation and further management.  He voices understanding and is agreeable to the plan.  He is given specific return precautions.             Scribe Attestation:   Scribe #1: I performed the above scribed service and the documentation accurately describes the services I performed. I attest to the accuracy of the note.    Attending Attestation:     Physician Attestation Statement for NP/PA:   I have conducted a face to face encounter with this patient in addition to the NP/PA, due to Medical Complexity    Other NP/PA Attestation Additions:      Medical Decision Making: Lukas Chance is a 72 y.o. male presenting with previous diagnosis of lymphedema coupled with morbid obesity sent for leg swelling.  This is clearly a chronic issue.  He has some leg wounds with no appearance of cellulitis on my exam.  Legs are markedly edematous bilaterally.  Lungs are clear to auscultation with no increased work of breathing.  Chest x-ray reveals no sign of pulmonary edema.  I doubt other acute life-threatening process such as CHF, pneumonia.  Renal function is near to baseline.  I doubt renal insult or marked electrolyte derangement.  He is appropriate for continued outpatient therapy including light compression therapy as well as wound care.  He may  continue diuretic at discretion of his outpatient physician with single dose of furosemide given here prior to discharge.  I have very low suspicion for alternative process in the legs such as DVT and do not think ultrasound or anticoagulation are indicated.  Return precautions reviewed.           ED Course as of 12/27/22 1913 Tue Dec 27, 2022   1647 CXR:  Poor inspiration as well as poor penetration due to body habitus.  Basilar atelectasis, similar to prior with NAD. (Independently interpreted by me) [MR]      ED Course User Index  [MR] Moiz Joshi MD          I, Ludivina Marshall PA-C, personally performed the services described in this documentation. All medical record entries made by the scribe were at my direction and in my presence.  I have reviewed the chart and agree that the record reflects my personal performance and is accurate and complete. Ludivina Marshall PA-C.  7:13 PM 12/27/2022         Clinical Impression:   Final diagnoses:  [M79.89] Leg swelling  [I89.0] Lymphedema (Primary)        ED Disposition Condition    Discharge Stable          ED Prescriptions    None       Follow-up Information       Follow up With Specialties Details Why Contact Info    [facility] Emergency Dept Emergency Medicine  As needed, If symptoms worsen [address] [place] [address] Drive  [facility] [address] [phone]    Solitario Mendez MD Family Medicine  As needed [address] [facility] [address]  [facility] [address]  [phone]               Ludivina Marshall PA-C  12/27/22 1913

## 2023-01-26 ENCOUNTER — EXTERNAL HOME HEALTH (OUTPATIENT)
Dept: HOME HEALTH SERVICES | Facility: HOSPITAL | Age: 73
End: 2023-01-26
Payer: MEDICARE

## 2023-02-07 ENCOUNTER — TELEPHONE (OUTPATIENT)
Dept: FAMILY MEDICINE | Facility: CLINIC | Age: 73
End: 2023-02-07
Payer: MEDICARE

## 2023-02-07 NOTE — TELEPHONE ENCOUNTER
Patient states he is currently receiving physical therapy per [facility] (phone: [phone]). States his therapy is nearing an end, and he would like to continue/needs new order. Please advise. Thank you.

## 2023-02-07 NOTE — TELEPHONE ENCOUNTER
Call placed to home health, spoke to Elizabeth who states the therapist discharged patient on 2-2-23 citing he has met all goals. Mrs. Johnson states the nurse is still scheduled to see patient, and if the nurse notices any decline in patient's status she will reach out to the office to request extension. This has been fully explained to patient. Will send follow up message to Dr. Mendez for notification.

## 2023-02-07 NOTE — TELEPHONE ENCOUNTER
Check with home health, usually the therapist will send a status report and request an extension if they feel it is necessary.  If the patient does not meet guidelines for extension they will not request it and it will not be granted.

## 2023-02-07 NOTE — TELEPHONE ENCOUNTER
----- Message from Yudelka Vasquez sent at 2/7/2023  9:22 AM CST -----  Regarding: pt call back  Name of Who is Calling: RILEY GUIDO [[id number]]      What is the request in detail: Pt would like a call back from the nurse. He would like to continue physical therapy and would need a new order.       Can the clinic reply by [facility]: no      What Number to Call Back if not in [facility]: [phone]

## 2023-03-03 ENCOUNTER — DOCUMENT SCAN (OUTPATIENT)
Dept: HOME HEALTH SERVICES | Facility: HOSPITAL | Age: 73
End: 2023-03-03
Payer: MEDICARE

## 2023-03-07 ENCOUNTER — HOSPITAL ENCOUNTER (EMERGENCY)
Facility: HOSPITAL | Age: 73
Discharge: HOME | End: 2023-03-07
Attending: EMERGENCY MEDICINE
Payer: MEDICARE

## 2023-03-07 VITALS
WEIGHT: 315 LBS | BODY MASS INDEX: 42.66 KG/M2 | TEMPERATURE: 99 F | HEART RATE: 87 BPM | DIASTOLIC BLOOD PRESSURE: 94 MMHG | RESPIRATION RATE: 18 BRPM | HEIGHT: 72 IN | SYSTOLIC BLOOD PRESSURE: 156 MMHG | OXYGEN SATURATION: 100 %

## 2023-03-07 DIAGNOSIS — R35.0 URINARY FREQUENCY: Primary | ICD-10-CM

## 2023-03-07 LAB
ALBUMIN SERPL BCP-MCNC: 3.6 G/DL (ref 3.5–5.2)
ALP SERPL-CCNC: 65 U/L (ref 55–135)
ALT SERPL W/O P-5'-P-CCNC: 14 U/L (ref 10–44)
ANION GAP SERPL CALC-SCNC: 7 MMOL/L (ref 8–16)
AST SERPL-CCNC: 21 U/L (ref 10–40)
BASOPHILS # BLD AUTO: 0.07 K/UL (ref 0–0.2)
BASOPHILS NFR BLD: 0.6 % (ref 0–1.9)
BILIRUB SERPL-MCNC: 0.6 MG/DL (ref 0.1–1)
BILIRUB UR QL STRIP: NEGATIVE
BUN SERPL-MCNC: 27 MG/DL (ref 8–23)
CALCIUM SERPL-MCNC: 8.9 MG/DL (ref 8.7–10.5)
CHLORIDE SERPL-SCNC: 102 MMOL/L (ref 95–110)
CLARITY UR: CLEAR
CO2 SERPL-SCNC: 27 MMOL/L (ref 23–29)
COLOR UR: YELLOW
CREAT SERPL-MCNC: 1.2 MG/DL (ref 0.5–1.4)
DIFFERENTIAL METHOD: ABNORMAL
EOSINOPHIL # BLD AUTO: 0.2 K/UL (ref 0–0.5)
EOSINOPHIL NFR BLD: 1.9 % (ref 0–8)
ERYTHROCYTE [DISTWIDTH] IN BLOOD BY AUTOMATED COUNT: 13.7 % (ref 11.5–14.5)
EST. GFR  (NO RACE VARIABLE): >60 ML/MIN/1.73 M^2
GLUCOSE SERPL-MCNC: 123 MG/DL (ref 70–110)
GLUCOSE UR QL STRIP: NEGATIVE
HCT VFR BLD AUTO: 39.2 % (ref 40–54)
HGB BLD-MCNC: 12.5 G/DL (ref 14–18)
HGB UR QL STRIP: NEGATIVE
IMM GRANULOCYTES # BLD AUTO: 0.04 K/UL (ref 0–0.04)
IMM GRANULOCYTES NFR BLD AUTO: 0.3 % (ref 0–0.5)
KETONES UR QL STRIP: NEGATIVE
LEUKOCYTE ESTERASE UR QL STRIP: NEGATIVE
LYMPHOCYTES # BLD AUTO: 1.1 K/UL (ref 1–4.8)
LYMPHOCYTES NFR BLD: 9 % (ref 18–48)
MCH RBC QN AUTO: 30.1 PG (ref 27–31)
MCHC RBC AUTO-ENTMCNC: 31.9 G/DL (ref 32–36)
MCV RBC AUTO: 95 FL (ref 82–98)
MONOCYTES # BLD AUTO: 0.8 K/UL (ref 0.3–1)
MONOCYTES NFR BLD: 6.8 % (ref 4–15)
NEUTROPHILS # BLD AUTO: 9.6 K/UL (ref 1.8–7.7)
NEUTROPHILS NFR BLD: 81.4 % (ref 38–73)
NITRITE UR QL STRIP: NEGATIVE
NRBC BLD-RTO: 0 /100 WBC
PH UR STRIP: 6 [PH] (ref 5–8)
PLATELET # BLD AUTO: 295 K/UL (ref 150–450)
PMV BLD AUTO: 10.1 FL (ref 9.2–12.9)
POTASSIUM SERPL-SCNC: 4.7 MMOL/L (ref 3.5–5.1)
PROT SERPL-MCNC: 6.9 G/DL (ref 6–8.4)
PROT UR QL STRIP: ABNORMAL
RBC # BLD AUTO: 4.15 M/UL (ref 4.6–6.2)
SODIUM SERPL-SCNC: 136 MMOL/L (ref 136–145)
SP GR UR STRIP: 1.03 (ref 1–1.03)
URN SPEC COLLECT METH UR: ABNORMAL
UROBILINOGEN UR STRIP-ACNC: ABNORMAL EU/DL
WBC # BLD AUTO: 11.83 K/UL (ref 3.9–12.7)

## 2023-03-07 PROCEDURE — 85025 COMPLETE CBC W/AUTO DIFF WBC: CPT | Performed by: EMERGENCY MEDICINE

## 2023-03-07 PROCEDURE — 99283 EMERGENCY DEPT VISIT LOW MDM: CPT

## 2023-03-07 PROCEDURE — 80053 COMPREHEN METABOLIC PANEL: CPT | Performed by: EMERGENCY MEDICINE

## 2023-03-07 PROCEDURE — 81003 URINALYSIS AUTO W/O SCOPE: CPT | Performed by: EMERGENCY MEDICINE

## 2023-03-07 RX ORDER — TAMSULOSIN HYDROCHLORIDE 0.4 MG/1
0.4 CAPSULE ORAL DAILY
Qty: 10 CAPSULE | Refills: 0 | Status: SHIPPED | OUTPATIENT
Start: 2023-03-07 | End: 2024-03-06

## 2023-03-07 RX ORDER — DICLOFENAC SODIUM 75 MG/1
75 TABLET, DELAYED RELEASE ORAL 2 TIMES DAILY
Qty: 14 TABLET | Refills: 0 | Status: SHIPPED | OUTPATIENT
Start: 2023-03-07 | End: 2023-05-11

## 2023-03-07 NOTE — ED NOTES
Adult Physical Assessment  LOC: Lukas Chance, 73 y.o. male verified via two identifiers.  The patient is awake, alert, oriented and speaking appropriately at this time.  APPEARANCE: Patient resting comfortably and appears to be in no acute distress at this time. Patient is clean and well groomed, patient's clothing is properly fastened.  SKIN:The skin is warm and dry, color consistent with ethnicity, patient has normal skin turgor and moist mucus membranes. Edema noted to pt bilateral lower extremities   MUSCULOSKELETAL: Patient moving all extremities well, no obvious swelling or deformities noted.  RESPIRATORY: Airway is open and patent, respirations are spontaneous, patient has a normal effort and rate, no accessory muscle use noted.  CARDIAC: Patient has a normal rate and rhythm, capillary refill < 3 seconds in all extremities  ABDOMEN: Soft and non tender to palpation, no abdominal distention noted. Bowel sounds present in all four quadrants.  NEUROLOGIC: Eyes open spontaneously, behavior appropriate to situation, follows commands, facial expression symmetrical

## 2023-03-08 ENCOUNTER — TELEPHONE (OUTPATIENT)
Dept: FAMILY MEDICINE | Facility: CLINIC | Age: 73
End: 2023-03-08
Payer: MEDICARE

## 2023-03-08 NOTE — ED PROVIDER NOTES
Encounter Date: 3/7/2023       History     Chief Complaint   Patient presents with    Urinary Frequency     Pt arrived by ems from [facility] frequent dark urination for one week. Increased generalized weakness increasing over the past week     Patient presents emergency department with reported urinary frequency the resident Faustino romo reports that his urine has been more frequent for approximately 1 week he states he is felt some of her since they did change his Lasix is uncertain if this what made a difference he also complains of bilateral knee pain just chronic states he is given Neurontin but does not seem to be helping the pain very much he does have bilateral lower extremity edema which is chronic as well he denies any fever chills no nausea vomiting no abdominal pain no hematuria      Review of patient's allergies indicates:   Allergen Reactions    Bactroban [mupirocin calcium] Other (See Comments)     Burning feeling on open sore     Metoprolol Other (See Comments)     dizzy     Past Medical History:   Diagnosis Date    Anticoagulant long-term use 03/01/2013    Antithrombin 3 deficiency     Cellulitis     lower legs, venous stasis    CHF (congestive heart failure)     Coronary artery disease     Deep vein thrombosis (DVT) 2012    left leg    Diabetes mellitus type 2 in obese 05/15/2013    Hx of colonic polyps     Hypertension     Lymphedema     Obesity     LAKHWINDER (obstructive sleep apnea)     Pulmonary embolism     Venous stasis ulcers     left ankle     Past Surgical History:   Procedure Laterality Date    COLONOSCOPY  5/2/2011    Dr. Miller, 9 polyps, recheck 5 year    MULTIPLE TOOTH EXTRACTIONS      RADIOFREQUENCY ABLATION  7/2015    bilateral     TONSILLECTOMY       Family History   Problem Relation Age of Onset    Heart disease Mother     Heart disease Father     Gallbladder disease Sister     Cataracts Sister     Heart disease Brother     Stroke Brother     Emphysema Maternal Uncle      Social  History     Tobacco Use    Smoking status: Every Day     Packs/day: 1.00     Years: 20.00     Pack years: 20.00     Types: Cigarettes     Last attempt to quit: 1/3/2002     Years since quittin.1    Smokeless tobacco: Never   Substance Use Topics    Alcohol use: Yes     Alcohol/week: 1.0 standard drink     Types: 1 Cans of beer per week     Comment: seldom    Drug use: No     Review of Systems   Constitutional:  Negative for chills and fever.   Respiratory:  Negative for cough and shortness of breath.    Gastrointestinal:  Negative for abdominal pain.   Genitourinary:  Positive for frequency and urgency. Negative for decreased urine volume, dysuria, flank pain, penile swelling and scrotal swelling.   Musculoskeletal:  Positive for arthralgias. Negative for back pain.   All other systems reviewed and are negative.    Physical Exam     Initial Vitals [23 1527]   BP Pulse Resp Temp SpO2   (!) 164/71 101 18 98.9 °F (37.2 °C) 100 %      MAP       --         Physical Exam    Constitutional: He appears well-developed and well-nourished. No distress.   HENT:   Head: Normocephalic and atraumatic.   Right Ear: External ear normal.   Left Ear: External ear normal.   Mouth/Throat: Oropharynx is clear and moist.   Eyes: Pupils are equal, round, and reactive to light.   Neck: Neck supple.   Normal range of motion.  Cardiovascular:  Normal rate, regular rhythm, S1 normal, S2 normal, normal heart sounds and intact distal pulses.           Pulmonary/Chest: Breath sounds normal.   Abdominal: Abdomen is soft. Bowel sounds are normal. There is no abdominal tenderness.   Musculoskeletal:         General: Edema present. Normal range of motion.      Cervical back: Normal range of motion and neck supple.     Neurological: He is alert and oriented to person, place, and time. GCS score is 15. GCS eye subscore is 4. GCS verbal subscore is 5. GCS motor subscore is 6.   Skin: Skin is warm and dry. No rash noted.   Psychiatric: He has  a normal mood and affect. His behavior is normal.       ED Course   Procedures  Labs Reviewed   CBC W/ AUTO DIFFERENTIAL - Abnormal; Notable for the following components:       Result Value    RBC 4.15 (*)     Hemoglobin 12.5 (*)     Hematocrit 39.2 (*)     MCHC 31.9 (*)     Gran # (ANC) 9.6 (*)     Gran % 81.4 (*)     Lymph % 9.0 (*)     All other components within normal limits   COMPREHENSIVE METABOLIC PANEL - Abnormal; Notable for the following components:    Glucose 123 (*)     BUN 27 (*)     Anion Gap 7 (*)     All other components within normal limits   URINALYSIS, REFLEX TO URINE CULTURE - Abnormal; Notable for the following components:    Protein, UA Trace (*)     Urobilinogen, UA 2.0-3.0 (*)     All other components within normal limits    Narrative:     In and Out Cath as needed it patient unable to void  Specimen Source->Urine          Imaging Results    None          Medications - No data to display  Medical Decision Making:   Clinical Tests:   Lab Tests: Ordered and Reviewed  ED Management:  Labs reviewed urinalysis shows no evidence of infection at this time I performed a bladder scan after patient voided there is no significant urine residual noted on bladder scan given patient's complains of frequency in decreased volumes will place him on Flomax she this makes a difference in his symptoms patient also requests up place him on something other than Neurontin for his chronic knee pain will give him prescription for short course of diclofenac as well recommend he follow-up with his primary care provider return to ER for any worsened symptoms or new symptoms                        Clinical Impression:   Final diagnoses:  [R35.0] Urinary frequency (Primary)        ED Disposition Condition    Discharge Stable          ED Prescriptions       Medication Sig Dispense Start Date End Date Auth. Provider    tamsulosin (FLOMAX) 0.4 mg Cap Take 1 capsule (0.4 mg total) by mouth once daily. 10 capsule 3/7/2023  3/6/2024 Hernandez Nguyen MD    diclofenac (VOLTAREN) 75 MG EC tablet Take 1 tablet (75 mg total) by mouth 2 (two) times daily. 14 tablet 3/7/2023 -- Hernandez Nguyen MD          Follow-up Information       Follow up With Specialties Details Why Contact Info    Solitario Mendez MD Family Medicine Call in 1 day for re-examination of your symptoms [address]  [place] [address]  [phone]               Hernandez Nguyen MD  03/07/23 7677

## 2023-03-08 NOTE — TELEPHONE ENCOUNTER
Patient states he is experiencing edema to lower extremities. Current medication list has the following order for Furosemide listed (last e-scribed on 12-10-21):    furosemide (LASIX) 40 MG tablet 60 tablet 11 12/10/2021 12/10/2022 No   Sig - Route: Take 1 tablet (40 mg total) by mouth 2 (two) times daily before meals. - Oral     Patient states his current pill bottles has the following directions: 20 mg take 3 tablets once daily at 2 pm. Patient states he has not been taking 3 tablets as the bottle states. Patient states he has been taking 2 tablets daily instead. Patient requesting to send message to Dr. Mendez for further recommendations. Please advise. Thank you.      ----- Message from Shannon Lopez sent at 3/8/2023  9:58 AM CST -----  Contact: patient  Type: Needs Medical Advice  Who Called:  patient  Best Call Back Number: [phone]  Additional Information: requesting a call back from nurse regarding patients fluid pill medications-

## 2023-03-09 ENCOUNTER — TELEPHONE (OUTPATIENT)
Dept: FAMILY MEDICINE | Facility: CLINIC | Age: 73
End: 2023-03-09
Payer: MEDICARE

## 2023-03-09 NOTE — TELEPHONE ENCOUNTER
----- Message from Amanda Eric sent at 3/9/2023  9:25 AM CST -----  Contact: [phone]  Type:  Patient Returning Call    Who Called:  Pt   Who Left Message for Patient:  Joy   Does the patient know what this is regarding?:  yes   Best Call Back Number:  [phone]

## 2023-03-09 NOTE — TELEPHONE ENCOUNTER
"Call placed to patient for notification. Patient verbalized understanding.  Patient states "They are taking me to [facility] today to try and get some of this fluid off of my legs. Then they are going to transfer me to [facility]. I will call back to schedule an appointment with Dr. Mendez once I have all of this taken care of."   "

## 2023-03-09 NOTE — TELEPHONE ENCOUNTER
I am very reluctant to make any recommendations or suggestions since I have not seen him in over a year.

## 2023-03-15 ENCOUNTER — DOCUMENT SCAN (OUTPATIENT)
Dept: HOME HEALTH SERVICES | Facility: HOSPITAL | Age: 73
End: 2023-03-15
Payer: MEDICARE

## 2023-03-15 DIAGNOSIS — E11.9 TYPE 2 DIABETES MELLITUS WITHOUT COMPLICATION: ICD-10-CM

## 2023-03-15 NOTE — PROGRESS NOTES
Health coaches ordered lipid panel, also overdue for microalbumin and will be due to a1c in April.  Orders in for a1c, lipid, microalbumin.  (Recently had cbc and cmp in ER.)  Please schedule in april

## 2023-03-15 NOTE — PROGRESS NOTES
Call placed to patient who states he is currently admitted to the hospital. Patient states he will call back to schedule lab appointment for April once he is discharged from the hospital.

## 2023-03-16 ENCOUNTER — DOCUMENT SCAN (OUTPATIENT)
Dept: HOME HEALTH SERVICES | Facility: HOSPITAL | Age: 73
End: 2023-03-16
Payer: MEDICARE

## 2023-03-21 ENCOUNTER — PATIENT OUTREACH (OUTPATIENT)
Dept: ADMINISTRATIVE | Facility: HOSPITAL | Age: 73
End: 2023-03-21
Payer: MEDICARE

## 2023-03-21 NOTE — LETTER
AUTHORIZATION FOR RELEASE OF   CONFIDENTIAL INFORMATION    Dr Reynaga    We are seeing Lukas Chance, date of birth 1950, in the clinic at [facility]. Solitario Mendez MD is the patient's PCP. Lukas Chance has an outstanding lab/procedure at the time we reviewed his chart. In order to help keep his health information updated, he has authorized us to request the following medical record(s):        (  )  MAMMOGRAM                                      (  )  COLONOSCOPY      (  )  PAP SMEAR                                          (  )  OUTSIDE LAB RESULTS     (  )  DEXA SCAN                                          (  x)  EYE EXAM            (  )  FOOT EXAM                                          (  )  ENTIRE RECORD     (  )  OUTSIDE IMMUNIZATIONS                 (  )  _______________         Please fax records to [facility], Robert W Taylor, MD, [phone]     Thank you in advance,     Tarsha GOETZ  Care Coordinator  [facility] [facility] [address] [place] [address]  Phone [phone]  Fax [phone]           Patient Name: Lukas Chance  : 1950  Patient Phone #: [phone]

## 2023-03-29 PROCEDURE — G0180 PR HOME HEALTH MD CERTIFICATION: ICD-10-PCS | Mod: ,,, | Performed by: FAMILY MEDICINE

## 2023-03-29 PROCEDURE — G0180 MD CERTIFICATION HHA PATIENT: HCPCS | Mod: ,,, | Performed by: FAMILY MEDICINE

## 2023-04-13 ENCOUNTER — HOSPITAL ENCOUNTER (EMERGENCY)
Facility: HOSPITAL | Age: 73
Discharge: HOME | End: 2023-04-13
Attending: EMERGENCY MEDICINE
Payer: MEDICARE

## 2023-04-13 VITALS
SYSTOLIC BLOOD PRESSURE: 140 MMHG | WEIGHT: 315 LBS | OXYGEN SATURATION: 100 % | HEART RATE: 80 BPM | TEMPERATURE: 98 F | DIASTOLIC BLOOD PRESSURE: 70 MMHG | RESPIRATION RATE: 16 BRPM | BODY MASS INDEX: 44.21 KG/M2

## 2023-04-13 DIAGNOSIS — M79.676 TOE PAIN: ICD-10-CM

## 2023-04-13 DIAGNOSIS — M79.674 PAIN OF TOE OF RIGHT FOOT: Primary | ICD-10-CM

## 2023-04-13 DIAGNOSIS — L03.115 CELLULITIS OF RIGHT LOWER EXTREMITY: ICD-10-CM

## 2023-04-13 LAB
ALBUMIN SERPL BCP-MCNC: 3.5 G/DL (ref 3.5–5.2)
ALP SERPL-CCNC: 75 U/L (ref 55–135)
ALT SERPL W/O P-5'-P-CCNC: 17 U/L (ref 10–44)
ANION GAP SERPL CALC-SCNC: 5 MMOL/L (ref 8–16)
AST SERPL-CCNC: 24 U/L (ref 10–40)
BASOPHILS # BLD AUTO: 0.06 K/UL (ref 0–0.2)
BASOPHILS NFR BLD: 0.7 % (ref 0–1.9)
BILIRUB SERPL-MCNC: 0.4 MG/DL (ref 0.1–1)
BUN SERPL-MCNC: 27 MG/DL (ref 8–23)
CALCIUM SERPL-MCNC: 8.6 MG/DL (ref 8.7–10.5)
CHLORIDE SERPL-SCNC: 104 MMOL/L (ref 95–110)
CO2 SERPL-SCNC: 28 MMOL/L (ref 23–29)
CREAT SERPL-MCNC: 1.2 MG/DL (ref 0.5–1.4)
DIFFERENTIAL METHOD: ABNORMAL
EOSINOPHIL # BLD AUTO: 0.4 K/UL (ref 0–0.5)
EOSINOPHIL NFR BLD: 5.3 % (ref 0–8)
ERYTHROCYTE [DISTWIDTH] IN BLOOD BY AUTOMATED COUNT: 13.1 % (ref 11.5–14.5)
EST. GFR  (NO RACE VARIABLE): >60 ML/MIN/1.73 M^2
GLUCOSE SERPL-MCNC: 76 MG/DL (ref 70–110)
HCT VFR BLD AUTO: 38.9 % (ref 40–54)
HGB BLD-MCNC: 12.4 G/DL (ref 14–18)
IMM GRANULOCYTES # BLD AUTO: 0.02 K/UL (ref 0–0.04)
IMM GRANULOCYTES NFR BLD AUTO: 0.2 % (ref 0–0.5)
LYMPHOCYTES # BLD AUTO: 1.4 K/UL (ref 1–4.8)
LYMPHOCYTES NFR BLD: 16.3 % (ref 18–48)
MCH RBC QN AUTO: 30.2 PG (ref 27–31)
MCHC RBC AUTO-ENTMCNC: 31.9 G/DL (ref 32–36)
MCV RBC AUTO: 95 FL (ref 82–98)
MONOCYTES # BLD AUTO: 0.8 K/UL (ref 0.3–1)
MONOCYTES NFR BLD: 9.8 % (ref 4–15)
NEUTROPHILS # BLD AUTO: 5.6 K/UL (ref 1.8–7.7)
NEUTROPHILS NFR BLD: 67.7 % (ref 38–73)
NRBC BLD-RTO: 0 /100 WBC
PLATELET # BLD AUTO: 275 K/UL (ref 150–450)
PMV BLD AUTO: 9.6 FL (ref 9.2–12.9)
POTASSIUM SERPL-SCNC: 4.3 MMOL/L (ref 3.5–5.1)
PROT SERPL-MCNC: 7.2 G/DL (ref 6–8.4)
RBC # BLD AUTO: 4.11 M/UL (ref 4.6–6.2)
SODIUM SERPL-SCNC: 137 MMOL/L (ref 136–145)
WBC # BLD AUTO: 8.29 K/UL (ref 3.9–12.7)

## 2023-04-13 PROCEDURE — 85025 COMPLETE CBC W/AUTO DIFF WBC: CPT | Performed by: NURSE PRACTITIONER

## 2023-04-13 PROCEDURE — 99285 EMERGENCY DEPT VISIT HI MDM: CPT | Mod: 25

## 2023-04-13 PROCEDURE — 80053 COMPREHEN METABOLIC PANEL: CPT | Performed by: NURSE PRACTITIONER

## 2023-04-13 PROCEDURE — 87040 BLOOD CULTURE FOR BACTERIA: CPT | Performed by: NURSE PRACTITIONER

## 2023-04-13 PROCEDURE — 25000003 PHARM REV CODE 250: Performed by: NURSE PRACTITIONER

## 2023-04-13 RX ORDER — DOXYCYCLINE 100 MG/1
100 CAPSULE ORAL 2 TIMES DAILY
Qty: 20 CAPSULE | Refills: 0 | Status: SHIPPED | OUTPATIENT
Start: 2023-04-13 | End: 2023-04-23

## 2023-04-13 RX ORDER — ACETAMINOPHEN 325 MG/1
650 TABLET ORAL
Status: COMPLETED | OUTPATIENT
Start: 2023-04-13 | End: 2023-04-13

## 2023-04-13 RX ADMIN — ACETAMINOPHEN 650 MG: 325 TABLET ORAL at 02:04

## 2023-04-13 NOTE — DISCHARGE INSTRUCTIONS
Continue utilizing Tylenol as needed for pain control.  I have prescribed you some doxycycline.  This is an antibiotic to treat what I believe is developing infection in your toe.  [facility].  The ultrasound done today was negative for a DVT.  The x-ray of your toe showed no evidence of fracture.

## 2023-04-13 NOTE — ED PROVIDER NOTES
Encounter Date: 4/13/2023       History     Chief Complaint   Patient presents with    Toe Injury     Right foot, 2nd toe injured over 1 month ago, abrasion there, mild swelling, purple     73-year-old white male presents emergency department for evaluation of pain to the right 2nd toe.  Patient has pain and swelling to the toe and was sent here by his wound care nurse for an ultrasound to rule out a clot in the toe.  Patient states that approximately 1 month ago he pinched his toe between his power scooter and a door casing.  States that it has been swollen for the past week.  He is constant swelling and beginning to have some skin breakdown.  There is no pain at this time.  He denies anything making symptoms better.  He does develop some pain when the toe is manipulated and moved.  He has been taking Advil and Tylenol for pain control.  He denies any other symptoms under review of systems.  Patient has a allergy to metoprolol and Bactroban.  Patient's medical history includes lymphedema, wheelchair-bound, sacral decubitus, pulmonary embolus, DVT.  States he is currently taking Eliquis daily.    The history is provided by the patient. No  was used.   Review of patient's allergies indicates:   Allergen Reactions    Bactroban [mupirocin calcium] Other (See Comments)     Burning feeling on open sore     Metoprolol Other (See Comments)     dizzy     Past Medical History:   Diagnosis Date    Anticoagulant long-term use 03/01/2013    Antithrombin 3 deficiency     Cellulitis     lower legs, venous stasis    CHF (congestive heart failure)     Coronary artery disease     Deep vein thrombosis (DVT) 2012    left leg    Diabetes mellitus type 2 in obese 05/15/2013    Hx of colonic polyps     Hypertension     Lymphedema     Obesity     LAKHWINDER (obstructive sleep apnea)     Pulmonary embolism     Venous stasis ulcers     left ankle     Past Surgical History:   Procedure Laterality Date    COLONOSCOPY  5/2/2011     Dr. Miller, 9 polyps, recheck 5 year    MULTIPLE TOOTH EXTRACTIONS      RADIOFREQUENCY ABLATION  2015    bilateral     TONSILLECTOMY       Family History   Problem Relation Age of Onset    Heart disease Mother     Heart disease Father     Gallbladder disease Sister     Cataracts Sister     Heart disease Brother     Stroke Brother     Emphysema Maternal Uncle      Social History     Tobacco Use    Smoking status: Every Day     Packs/day: 1.00     Years: 20.00     Pack years: 20.00     Types: Cigarettes     Last attempt to quit: 1/3/2002     Years since quittin.2    Smokeless tobacco: Never   Substance Use Topics    Alcohol use: Yes     Alcohol/week: 1.0 standard drink     Types: 1 Cans of beer per week     Comment: seldom    Drug use: No     Review of Systems   Constitutional:  Negative for chills and fever.   HENT:  Negative for rhinorrhea and sore throat.    Respiratory:  Negative for cough, shortness of breath and wheezing.    Cardiovascular:  Negative for chest pain.   Gastrointestinal:  Negative for abdominal pain, diarrhea, nausea and vomiting.   Genitourinary:  Negative for difficulty urinating.   Musculoskeletal:  Positive for arthralgias and joint swelling. Negative for back pain and neck pain.   Skin:  Positive for wound. Negative for rash.   Neurological:  Negative for dizziness, weakness and headaches.   All other systems reviewed and are negative.    Physical Exam     Initial Vitals [23 1018]   BP Pulse Resp Temp SpO2   (!) 199/89 78 18 98 °F (36.7 °C) 100 %      MAP       --         Physical Exam    Nursing note and vitals reviewed.  Constitutional: He appears well-developed and well-nourished. He is not diaphoretic. He is Obese . No distress.   HENT:   Head: Normocephalic and atraumatic.   Right Ear: External ear normal.   Left Ear: External ear normal.   Nose: Nose normal.   Eyes: Conjunctivae and EOM are normal. Right eye exhibits no discharge. Left eye exhibits no discharge.   Neck:  Neck supple.   Normal range of motion.  Cardiovascular:  Normal rate, regular rhythm, normal heart sounds and intact distal pulses.           Pulmonary/Chest: Breath sounds normal. No respiratory distress.   Abdominal: Abdomen is soft. Bowel sounds are normal. He exhibits no distension. There is no abdominal tenderness.   Musculoskeletal:         General: No tenderness. Normal range of motion.      Cervical back: Normal range of motion and neck supple.      Right lower leg: Swelling present. Edema present.      Left lower leg: Swelling present. Edema present.     Neurological: He is alert and oriented to person, place, and time. He has normal strength. No sensory deficit. GCS score is 15. GCS eye subscore is 4. GCS verbal subscore is 5. GCS motor subscore is 6.   Skin: Skin is warm and dry. Abrasion noted. No rash noted. There is erythema.        Psychiatric: He has a normal mood and affect. Thought content normal.             ED Course   Procedures  Labs Reviewed   CBC W/ AUTO DIFFERENTIAL - Abnormal; Notable for the following components:       Result Value    RBC 4.11 (*)     Hemoglobin 12.4 (*)     Hematocrit 38.9 (*)     MCHC 31.9 (*)     Lymph % 16.3 (*)     All other components within normal limits   COMPREHENSIVE METABOLIC PANEL - Abnormal; Notable for the following components:    BUN 27 (*)     Calcium 8.6 (*)     Anion Gap 5 (*)     All other components within normal limits   CULTURE, BLOOD   CULTURE, BLOOD          Imaging Results              US Lower Extremity Veins Right (Final result)  Result time 04/13/23 11:59:16      Final result by Adarsh Green MD (04/13/23 11:59:16)                   Narrative:    CLINICAL HISTORY:  73 years (1950) Male right lower extremity toe pain.    TECHNIQUE:  US LOWER EXTREMITY VEINS LIMITED FOLLOW-UP UNILATERAL.  25 images obtained. Ultrasound examination of right lower extremity deep venous systems was performed using grayscale, color and spectral  Doppler.    COMPARISON:  None available.    FINDINGS:  The common femoral, saphenofemoral junction, femoral and popliteal veins demonstrate a normal response to compression maneuvers. There is also a normal response to respiratory variation. The bifurcation of the common femoral into the superficial and deep femoral veins is also visualized.  Flow is identified in these vessels with no evidence of intraluminal thrombus on either color Doppler or grayscale images. The visualized portions of the proximal calf veins are also normal.    IMPRESSION:  No evidence of acute right lower extremity deep venous thrombosis in the visualized venous segments.        Electronically signed by:  Adarsh Green MD  4/13/2023 11:59 AM CDT Workstation: [id number]                                     X-Ray Foot Complete Right (Final result)  Result time 04/13/23 11:00:57      Final result by Chanelle Dunn MD (04/13/23 11:00:57)                   Narrative:    3 views right foot    Clinical history is pain to the first and second digits.    There are no fractures, dislocations or acute osseous abnormalities.    There is moderate dorsal soft tissue swelling    There is plantar spurring of the calcaneus.    IMPRESSION: No acute osseous abnormality    Moderate dorsal soft tissue swelling    Electronically signed by:  Chanelle Dunn MD  4/13/2023 11:00 AM CDT Workstation: [id number]                                     Medications - No data to display  Medical Decision Making:   ED Management:  I have reviewed the diagnostic workup done here in the emergency department and it is noted the patient has no elevation of the white blood cell count.  There is no evidence of DVT on ultrasound.  No evidence of fracture on x-ray.  It appears that the patient may be developing a cellulitis secondary to his lymphedema.  I am starting the patient on doxycycline and encouraging him to follow-up with both primary care as well as wound care.   Understands the need to return to the emergency department for any worsening or new symptoms.                        Clinical Impression:   Final diagnoses:  [M79.676] Toe pain  [M79.674] Pain of toe of right foot (Primary)  [L03.115] Cellulitis of right lower extremity        ED Disposition Condition    Discharge Stable          ED Prescriptions       Medication Sig Dispense Start Date End Date Auth. Provider    doxycycline (VIBRAMYCIN) 100 MG Cap Take 1 capsule (100 mg total) by mouth 2 (two) times daily. for 10 days 20 capsule 4/13/2023 4/23/2023 Popeye Sanchez III, NP          Follow-up Information       Follow up With Specialties Details Why Contact Info Additional Information    Solitario Mendez MD Family Medicine Call today To schedule follow-up [address]  [facility] [address]  [phone]       [facility] - Emergency Dept Emergency Medicine Go to  As needed [address] [facility] [address]  [phone] 1st floor             Popeye Sanchez III, NP  04/13/23 4353

## 2023-04-18 LAB
BACTERIA BLD CULT: NORMAL
BACTERIA BLD CULT: NORMAL

## 2023-04-19 ENCOUNTER — DOCUMENT SCAN (OUTPATIENT)
Dept: HOME HEALTH SERVICES | Facility: HOSPITAL | Age: 73
End: 2023-04-19
Payer: MEDICARE

## 2023-04-29 ENCOUNTER — EXTERNAL HOME HEALTH (OUTPATIENT)
Dept: HOME HEALTH SERVICES | Facility: HOSPITAL | Age: 73
End: 2023-04-29
Payer: MEDICARE

## 2023-05-04 RX ORDER — SPIRONOLACTONE 50 MG/1
50 TABLET, FILM COATED ORAL DAILY
Qty: 30 TABLET | Refills: 5 | Status: SHIPPED | OUTPATIENT
Start: 2023-05-04 | End: 2024-05-03

## 2023-05-04 RX ORDER — FUROSEMIDE 40 MG/1
40 TABLET ORAL
Qty: 60 TABLET | Refills: 5 | Status: SHIPPED | OUTPATIENT
Start: 2023-05-04 | End: 2024-05-03

## 2023-05-04 NOTE — TELEPHONE ENCOUNTER
Care Due:                  Date            Visit Type   Department     Provider  --------------------------------------------------------------------------------                                EP -                              PRIMARY      [facility]  Last Visit: 02-      CARE ([facility])   PRACTICE       Solitario Mendez  Next Visit: None Scheduled  None         None Found                                                            Last  Test          Frequency    Reason                     Performed    Due Date  --------------------------------------------------------------------------------    Office Visit  12 months..  DULoxetine, semaglutide..  02- 02-    HBA1C.......  6 months...  semaglutide..............  10-   04-    [employer] Embedded Care Due Messages. Reference number: [id number].   5/04/2023 2:49:15 PM CDT

## 2023-05-04 NOTE — TELEPHONE ENCOUNTER
--Patient states he received a prescription for Mobic 7.5 mg take two tablets by mouth daily. States physician's name on medication bottle is Dajuan Ricci. Patient is unsure if this is one of the providers he saw recently at [facility]. Requesting refill, states preferred pharmacy is [facility]. Please advise.     --Patient is also requesting for Dr. Mendez to look at Xray and Ultrasound performed on 4-13-23. Please advise.

## 2023-05-04 NOTE — TELEPHONE ENCOUNTER
Ja HERNANDEZ Staff  Caller: LUKAS GUIDO [[id number]] (Today, 10:20 AM)    ----- Message from Ja Sofia sent at 5/4/2023 10:20 AM CDT -----  Regarding: advice  Contact: LUKAS GUIDO [[id number]]  Type: Needs Medical Advice    Who Called:  Lukas    Symptoms (please be specific):  na    How long has patient had these symptoms:  na    Pharmacy name and phone #:    [facility]  [address] [place]  [address]  [phone]    Best Call Back Number: [phone] (home)     Additional Information: [facility] Please call to advise.

## 2023-05-04 NOTE — TELEPHONE ENCOUNTER
He has a history of stage IIIB chronic kidney disease.  He should not even be touching that bottle.  I can not refill that

## 2023-05-04 NOTE — TELEPHONE ENCOUNTER
Call placed to patient for notification. Patient verbalized understanding that he should not take Mobic due to stage IIIB chronic kidney disease. Patient verbalized he will not take this medication. Patient requesting refill of Spironolactone and Lasix ([facility] preferred). Also requesting to follow up regarding results of xray and ultrasound performed on 4-13-23. Please advise. Thank you.

## 2023-05-05 ENCOUNTER — LAB VISIT (OUTPATIENT)
Dept: LAB | Facility: HOSPITAL | Age: 73
End: 2023-05-05
Attending: NURSE PRACTITIONER
Payer: MEDICARE

## 2023-05-05 DIAGNOSIS — S91.104A: ICD-10-CM

## 2023-05-05 DIAGNOSIS — S93.104A: ICD-10-CM

## 2023-05-05 DIAGNOSIS — S81.801A WOUND OF RIGHT LEG: ICD-10-CM

## 2023-05-05 DIAGNOSIS — S81.802A WOUND OF LEFT LEG: Primary | ICD-10-CM

## 2023-05-05 LAB
ALBUMIN SERPL BCP-MCNC: 3.2 G/DL (ref 3.5–5.2)
ALP SERPL-CCNC: 77 U/L (ref 55–135)
ALT SERPL W/O P-5'-P-CCNC: 16 U/L (ref 10–44)
ANION GAP SERPL CALC-SCNC: 9 MMOL/L (ref 8–16)
AST SERPL-CCNC: 22 U/L (ref 10–40)
BILIRUB SERPL-MCNC: 0.6 MG/DL (ref 0.1–1)
BUN SERPL-MCNC: 26 MG/DL (ref 8–23)
CALCIUM SERPL-MCNC: 8.5 MG/DL (ref 8.7–10.5)
CHLORIDE SERPL-SCNC: 103 MMOL/L (ref 95–110)
CO2 SERPL-SCNC: 26 MMOL/L (ref 23–29)
CREAT SERPL-MCNC: 1.2 MG/DL (ref 0.5–1.4)
CRP SERPL-MCNC: 4.71 MG/DL
ERYTHROCYTE [DISTWIDTH] IN BLOOD BY AUTOMATED COUNT: 13.2 % (ref 11.5–14.5)
ERYTHROCYTE [SEDIMENTATION RATE] IN BLOOD BY WESTERGREN METHOD: 51 MM/HR (ref 0–10)
EST. GFR  (NO RACE VARIABLE): >60 ML/MIN/1.73 M^2
GLUCOSE SERPL-MCNC: 90 MG/DL (ref 70–110)
HCT VFR BLD AUTO: 37.7 % (ref 40–54)
HGB BLD-MCNC: 12 G/DL (ref 14–18)
MCH RBC QN AUTO: 30.2 PG (ref 27–31)
MCHC RBC AUTO-ENTMCNC: 31.8 G/DL (ref 32–36)
MCV RBC AUTO: 95 FL (ref 82–98)
PLATELET # BLD AUTO: 322 K/UL (ref 150–450)
PMV BLD AUTO: 9.5 FL (ref 9.2–12.9)
POTASSIUM SERPL-SCNC: 4.2 MMOL/L (ref 3.5–5.1)
PREALB SERPL-MCNC: 17 MG/DL (ref 20–43)
PROT SERPL-MCNC: 6.5 G/DL (ref 6–8.4)
RBC # BLD AUTO: 3.98 M/UL (ref 4.6–6.2)
SODIUM SERPL-SCNC: 138 MMOL/L (ref 136–145)
WBC # BLD AUTO: 8.74 K/UL (ref 3.9–12.7)

## 2023-05-05 PROCEDURE — 36415 COLL VENOUS BLD VENIPUNCTURE: CPT | Performed by: NURSE PRACTITIONER

## 2023-05-05 PROCEDURE — 84134 ASSAY OF PREALBUMIN: CPT | Performed by: NURSE PRACTITIONER

## 2023-05-05 PROCEDURE — 85027 COMPLETE CBC AUTOMATED: CPT | Performed by: NURSE PRACTITIONER

## 2023-05-05 PROCEDURE — 80053 COMPREHEN METABOLIC PANEL: CPT | Performed by: NURSE PRACTITIONER

## 2023-05-05 PROCEDURE — 86140 C-REACTIVE PROTEIN: CPT | Performed by: NURSE PRACTITIONER

## 2023-05-05 PROCEDURE — 85651 RBC SED RATE NONAUTOMATED: CPT | Performed by: NURSE PRACTITIONER

## 2023-05-11 ENCOUNTER — HOSPITAL ENCOUNTER (OUTPATIENT)
Facility: HOSPITAL | Age: 73
Discharge: HOME HEALTH | End: 2023-05-12
Attending: EMERGENCY MEDICINE | Admitting: FAMILY MEDICINE
Payer: MEDICARE

## 2023-05-11 DIAGNOSIS — M79.89 LEG SWELLING: Primary | ICD-10-CM

## 2023-05-11 DIAGNOSIS — I89.0 LYMPHEDEMA: ICD-10-CM

## 2023-05-11 DIAGNOSIS — L03.90 CELLULITIS, UNSPECIFIED CELLULITIS SITE: ICD-10-CM

## 2023-05-11 DIAGNOSIS — L03.116 CELLULITIS OF LEG, LEFT: ICD-10-CM

## 2023-05-11 LAB
ALBUMIN SERPL BCP-MCNC: 3.2 G/DL (ref 3.5–5.2)
ALP SERPL-CCNC: 75 U/L (ref 55–135)
ALT SERPL W/O P-5'-P-CCNC: 17 U/L (ref 10–44)
ANION GAP SERPL CALC-SCNC: 9 MMOL/L (ref 8–16)
AST SERPL-CCNC: 28 U/L (ref 10–40)
BASOPHILS # BLD AUTO: 0.08 K/UL (ref 0–0.2)
BASOPHILS NFR BLD: 0.9 % (ref 0–1.9)
BILIRUB SERPL-MCNC: 0.5 MG/DL (ref 0.1–1)
BILIRUB UR QL STRIP: NEGATIVE
BNP SERPL-MCNC: 28 PG/ML (ref 0–99)
BUN SERPL-MCNC: 32 MG/DL (ref 8–23)
CALCIUM SERPL-MCNC: 8.5 MG/DL (ref 8.7–10.5)
CHLORIDE SERPL-SCNC: 99 MMOL/L (ref 95–110)
CLARITY UR: CLEAR
CO2 SERPL-SCNC: 28 MMOL/L (ref 23–29)
COLOR UR: YELLOW
CREAT SERPL-MCNC: 1.4 MG/DL (ref 0.5–1.4)
CRP SERPL-MCNC: 2.86 MG/DL
DIFFERENTIAL METHOD: ABNORMAL
EOSINOPHIL # BLD AUTO: 0.4 K/UL (ref 0–0.5)
EOSINOPHIL NFR BLD: 4.3 % (ref 0–8)
ERYTHROCYTE [DISTWIDTH] IN BLOOD BY AUTOMATED COUNT: 13.3 % (ref 11.5–14.5)
ERYTHROCYTE [SEDIMENTATION RATE] IN BLOOD BY WESTERGREN METHOD: 41 MM/HR (ref 0–10)
EST. GFR  (NO RACE VARIABLE): 52.4 ML/MIN/1.73 M^2
GLUCOSE SERPL-MCNC: 93 MG/DL (ref 70–110)
GLUCOSE UR QL STRIP: NEGATIVE
HCT VFR BLD AUTO: 36.1 % (ref 40–54)
HGB BLD-MCNC: 11.3 G/DL (ref 14–18)
HGB UR QL STRIP: NEGATIVE
IMM GRANULOCYTES # BLD AUTO: 0.03 K/UL (ref 0–0.04)
IMM GRANULOCYTES NFR BLD AUTO: 0.3 % (ref 0–0.5)
KETONES UR QL STRIP: NEGATIVE
LACTATE SERPL-SCNC: 0.9 MMOL/L (ref 0.5–1.9)
LEUKOCYTE ESTERASE UR QL STRIP: NEGATIVE
LYMPHOCYTES # BLD AUTO: 1.6 K/UL (ref 1–4.8)
LYMPHOCYTES NFR BLD: 17.1 % (ref 18–48)
MCH RBC QN AUTO: 29.3 PG (ref 27–31)
MCHC RBC AUTO-ENTMCNC: 31.3 G/DL (ref 32–36)
MCV RBC AUTO: 94 FL (ref 82–98)
MONOCYTES # BLD AUTO: 0.8 K/UL (ref 0.3–1)
MONOCYTES NFR BLD: 8.6 % (ref 4–15)
NEUTROPHILS # BLD AUTO: 6.4 K/UL (ref 1.8–7.7)
NEUTROPHILS NFR BLD: 68.8 % (ref 38–73)
NITRITE UR QL STRIP: NEGATIVE
NRBC BLD-RTO: 0 /100 WBC
PH UR STRIP: 7 [PH] (ref 5–8)
PLATELET # BLD AUTO: 317 K/UL (ref 150–450)
PMV BLD AUTO: 9.3 FL (ref 9.2–12.9)
POTASSIUM SERPL-SCNC: 4.6 MMOL/L (ref 3.5–5.1)
PROCALCITONIN SERPL IA-MCNC: <0.05 NG/ML (ref 0–0.5)
PROT SERPL-MCNC: 7.1 G/DL (ref 6–8.4)
PROT UR QL STRIP: NEGATIVE
RBC # BLD AUTO: 3.86 M/UL (ref 4.6–6.2)
SODIUM SERPL-SCNC: 136 MMOL/L (ref 136–145)
SP GR UR STRIP: 1.01 (ref 1–1.03)
TROPONIN I SERPL HS-MCNC: 5.9 PG/ML (ref 0–14.9)
URN SPEC COLLECT METH UR: NORMAL
UROBILINOGEN UR STRIP-ACNC: NEGATIVE EU/DL
WBC # BLD AUTO: 9.28 K/UL (ref 3.9–12.7)

## 2023-05-11 PROCEDURE — 93010 EKG 12-LEAD: ICD-10-PCS | Mod: ,,, | Performed by: INTERNAL MEDICINE

## 2023-05-11 PROCEDURE — 87154 CUL TYP ID BLD PTHGN 6+ TRGT: CPT | Performed by: EMERGENCY MEDICINE

## 2023-05-11 PROCEDURE — 96365 THER/PROPH/DIAG IV INF INIT: CPT

## 2023-05-11 PROCEDURE — 93010 ELECTROCARDIOGRAM REPORT: CPT | Mod: ,,, | Performed by: INTERNAL MEDICINE

## 2023-05-11 PROCEDURE — 85651 RBC SED RATE NONAUTOMATED: CPT | Performed by: EMERGENCY MEDICINE

## 2023-05-11 PROCEDURE — 83605 ASSAY OF LACTIC ACID: CPT | Performed by: EMERGENCY MEDICINE

## 2023-05-11 PROCEDURE — 80053 COMPREHEN METABOLIC PANEL: CPT | Performed by: EMERGENCY MEDICINE

## 2023-05-11 PROCEDURE — 86140 C-REACTIVE PROTEIN: CPT | Performed by: EMERGENCY MEDICINE

## 2023-05-11 PROCEDURE — 84484 ASSAY OF TROPONIN QUANT: CPT | Performed by: EMERGENCY MEDICINE

## 2023-05-11 PROCEDURE — 96375 TX/PRO/DX INJ NEW DRUG ADDON: CPT

## 2023-05-11 PROCEDURE — 81003 URINALYSIS AUTO W/O SCOPE: CPT | Performed by: EMERGENCY MEDICINE

## 2023-05-11 PROCEDURE — 99285 EMERGENCY DEPT VISIT HI MDM: CPT | Mod: 25

## 2023-05-11 PROCEDURE — 85025 COMPLETE CBC W/AUTO DIFF WBC: CPT | Performed by: EMERGENCY MEDICINE

## 2023-05-11 PROCEDURE — 25000003 PHARM REV CODE 250: Performed by: NURSE PRACTITIONER

## 2023-05-11 PROCEDURE — 93005 ELECTROCARDIOGRAM TRACING: CPT | Performed by: INTERNAL MEDICINE

## 2023-05-11 PROCEDURE — 87040 BLOOD CULTURE FOR BACTERIA: CPT | Performed by: EMERGENCY MEDICINE

## 2023-05-11 PROCEDURE — G0378 HOSPITAL OBSERVATION PER HR: HCPCS

## 2023-05-11 PROCEDURE — 36415 COLL VENOUS BLD VENIPUNCTURE: CPT | Performed by: EMERGENCY MEDICINE

## 2023-05-11 PROCEDURE — 84145 PROCALCITONIN (PCT): CPT | Performed by: NURSE PRACTITIONER

## 2023-05-11 PROCEDURE — 25000003 PHARM REV CODE 250: Performed by: EMERGENCY MEDICINE

## 2023-05-11 PROCEDURE — 83880 ASSAY OF NATRIURETIC PEPTIDE: CPT | Performed by: EMERGENCY MEDICINE

## 2023-05-11 PROCEDURE — 63600175 PHARM REV CODE 636 W HCPCS: Performed by: EMERGENCY MEDICINE

## 2023-05-11 RX ORDER — HYDROCODONE BITARTRATE AND ACETAMINOPHEN 5; 325 MG/1; MG/1
1 TABLET ORAL EVERY 6 HOURS PRN
Status: DISCONTINUED | OUTPATIENT
Start: 2023-05-11 | End: 2023-05-12 | Stop reason: HOSPADM

## 2023-05-11 RX ORDER — ACETAMINOPHEN 325 MG/1
650 TABLET ORAL EVERY 8 HOURS PRN
Status: DISCONTINUED | OUTPATIENT
Start: 2023-05-11 | End: 2023-05-12 | Stop reason: HOSPADM

## 2023-05-11 RX ORDER — FUROSEMIDE 40 MG/1
40 TABLET ORAL
Status: DISCONTINUED | OUTPATIENT
Start: 2023-05-12 | End: 2023-05-12 | Stop reason: HOSPADM

## 2023-05-11 RX ORDER — IBUPROFEN 200 MG
16 TABLET ORAL
Status: DISCONTINUED | OUTPATIENT
Start: 2023-05-11 | End: 2023-05-12 | Stop reason: HOSPADM

## 2023-05-11 RX ORDER — TAMSULOSIN HYDROCHLORIDE 0.4 MG/1
0.4 CAPSULE ORAL DAILY
Status: DISCONTINUED | OUTPATIENT
Start: 2023-05-12 | End: 2023-05-12 | Stop reason: HOSPADM

## 2023-05-11 RX ORDER — GABAPENTIN 300 MG/1
300 CAPSULE ORAL 2 TIMES DAILY
COMMUNITY
Start: 2023-01-13

## 2023-05-11 RX ORDER — SPIRONOLACTONE 25 MG/1
50 TABLET ORAL DAILY
Status: DISCONTINUED | OUTPATIENT
Start: 2023-05-12 | End: 2023-05-12 | Stop reason: HOSPADM

## 2023-05-11 RX ORDER — GLUCAGON 1 MG
1 KIT INJECTION
Status: DISCONTINUED | OUTPATIENT
Start: 2023-05-11 | End: 2023-05-12 | Stop reason: HOSPADM

## 2023-05-11 RX ORDER — INSULIN ASPART 100 [IU]/ML
0-5 INJECTION, SOLUTION INTRAVENOUS; SUBCUTANEOUS
Status: DISCONTINUED | OUTPATIENT
Start: 2023-05-11 | End: 2023-05-12 | Stop reason: HOSPADM

## 2023-05-11 RX ORDER — HYDROCODONE BITARTRATE AND ACETAMINOPHEN 5; 325 MG/1; MG/1
1 TABLET ORAL EVERY 8 HOURS PRN
Status: ON HOLD | COMMUNITY
Start: 2022-12-09 | End: 2023-05-12 | Stop reason: HOSPADM

## 2023-05-11 RX ORDER — DULOXETIN HYDROCHLORIDE 30 MG/1
30 CAPSULE, DELAYED RELEASE ORAL DAILY
Status: DISCONTINUED | OUTPATIENT
Start: 2023-05-12 | End: 2023-05-12 | Stop reason: HOSPADM

## 2023-05-11 RX ORDER — IBUPROFEN 200 MG
24 TABLET ORAL
Status: DISCONTINUED | OUTPATIENT
Start: 2023-05-11 | End: 2023-05-12 | Stop reason: HOSPADM

## 2023-05-11 RX ORDER — FUROSEMIDE 10 MG/ML
40 INJECTION INTRAMUSCULAR; INTRAVENOUS
Status: COMPLETED | OUTPATIENT
Start: 2023-05-11 | End: 2023-05-11

## 2023-05-11 RX ORDER — LIDOCAINE 50 MG/G
1 PATCH TOPICAL DAILY
COMMUNITY
Start: 2022-12-16 | End: 2023-05-11

## 2023-05-11 RX ORDER — VANCOMYCIN HCL IN 5 % DEXTROSE 1G/250ML
2000 PLASTIC BAG, INJECTION (ML) INTRAVENOUS ONCE
Status: COMPLETED | OUTPATIENT
Start: 2023-05-11 | End: 2023-05-11

## 2023-05-11 RX ORDER — GABAPENTIN 300 MG/1
300 CAPSULE ORAL 2 TIMES DAILY
Status: DISCONTINUED | OUTPATIENT
Start: 2023-05-11 | End: 2023-05-12 | Stop reason: HOSPADM

## 2023-05-11 RX ORDER — SODIUM CHLORIDE 0.9 % (FLUSH) 0.9 %
10 SYRINGE (ML) INJECTION EVERY 12 HOURS PRN
Status: DISCONTINUED | OUTPATIENT
Start: 2023-05-11 | End: 2023-05-12 | Stop reason: HOSPADM

## 2023-05-11 RX ORDER — ONDANSETRON 2 MG/ML
4 INJECTION INTRAMUSCULAR; INTRAVENOUS EVERY 8 HOURS PRN
Status: DISCONTINUED | OUTPATIENT
Start: 2023-05-11 | End: 2023-05-12 | Stop reason: HOSPADM

## 2023-05-11 RX ORDER — BUPROPION HYDROCHLORIDE 150 MG/1
150 TABLET ORAL DAILY
Status: DISCONTINUED | OUTPATIENT
Start: 2023-05-12 | End: 2023-05-12 | Stop reason: HOSPADM

## 2023-05-11 RX ORDER — CLINDAMYCIN HYDROCHLORIDE 300 MG/1
300 CAPSULE ORAL 3 TIMES DAILY
Status: ON HOLD | COMMUNITY
Start: 2023-05-09 | End: 2023-05-12 | Stop reason: SDUPTHER

## 2023-05-11 RX ORDER — TALC
6 POWDER (GRAM) TOPICAL NIGHTLY PRN
Status: DISCONTINUED | OUTPATIENT
Start: 2023-05-11 | End: 2023-05-12 | Stop reason: HOSPADM

## 2023-05-11 RX ORDER — BUPROPION HYDROCHLORIDE 150 MG/1
150 TABLET ORAL DAILY
COMMUNITY
Start: 2023-03-28

## 2023-05-11 RX ADMIN — VANCOMYCIN HYDROCHLORIDE 2000 MG: 1 INJECTION, POWDER, LYOPHILIZED, FOR SOLUTION INTRAVENOUS at 06:05

## 2023-05-11 RX ADMIN — FUROSEMIDE 40 MG: 10 INJECTION, SOLUTION INTRAMUSCULAR; INTRAVENOUS at 05:05

## 2023-05-11 RX ADMIN — APIXABAN 5 MG: 5 TABLET, FILM COATED ORAL at 11:05

## 2023-05-11 RX ADMIN — GABAPENTIN 300 MG: 300 CAPSULE ORAL at 11:05

## 2023-05-11 NOTE — ED PROVIDER NOTES
Encounter Date: 5/11/2023       History     Chief Complaint   Patient presents with    Leg Swelling     Pt arrived EMS from residence and complained of bilateral lower limb edema. Pt stated peripheral edema has worsened over the past two weeks.      73-year-old male presented emergency department with edema of both legs.  Patient sent here as patient having nonhealing wounds and lymphedema of the legs and wound care placed dressings yesterday and wounds or appearing to be worse.  Patient has edema in the leg also is getting worse.  Denies fever or chills.  Denies nausea vomiting or chest pain or shortness of breath or dysuria or hematuria or any focal weakness or numbness.  Patient has history of severe lymphedema of lower extremities and chronic nonhealing wounds    Review of patient's allergies indicates:   Allergen Reactions    Bactroban [mupirocin calcium] Other (See Comments)     Burning feeling on open sore     Metoprolol Other (See Comments)     dizzy     Past Medical History:   Diagnosis Date    Anticoagulant long-term use 03/01/2013    Antithrombin 3 deficiency     Cellulitis     lower legs, venous stasis    CHF (congestive heart failure)     Coronary artery disease     Deep vein thrombosis (DVT) 2012    left leg    Diabetes mellitus type 2 in obese 05/15/2013    Hx of colonic polyps     Hypertension     Lymphedema     Obesity     LAKHWINDER (obstructive sleep apnea)     Pulmonary embolism     Venous stasis ulcers     left ankle     Past Surgical History:   Procedure Laterality Date    COLONOSCOPY  5/2/2011    Dr. Miller, 9 polyps, recheck 5 year    MULTIPLE TOOTH EXTRACTIONS      RADIOFREQUENCY ABLATION  7/2015    bilateral     TONSILLECTOMY       Family History   Problem Relation Age of Onset    Heart disease Mother     Heart disease Father     Gallbladder disease Sister     Cataracts Sister     Heart disease Brother     Stroke Brother     Emphysema Maternal Uncle      Social History     Tobacco Use    Smoking  status: Every Day     Packs/day: 1.00     Years: 20.00     Pack years: 20.00     Types: Cigarettes     Last attempt to quit: 1/3/2002     Years since quittin.3    Smokeless tobacco: Never   Substance Use Topics    Alcohol use: Yes     Alcohol/week: 1.0 standard drink     Types: 1 Cans of beer per week     Comment: seldom    Drug use: No     Review of Systems   Constitutional: Negative.    HENT: Negative.     Eyes: Negative.    Respiratory: Negative.     Cardiovascular:  Positive for leg swelling.   Gastrointestinal: Negative.    Endocrine: Negative.    Genitourinary: Negative.    Skin:  Positive for wound.   Allergic/Immunologic: Negative.    Neurological: Negative.    Hematological: Negative.    Psychiatric/Behavioral: Negative.     All other systems reviewed and are negative.    Physical Exam     Initial Vitals [23 1616]   BP Pulse Resp Temp SpO2   (!) 149/55 79 18 -- 96 %      MAP       --         Physical Exam    Nursing note and vitals reviewed.  Constitutional: He appears well-developed and well-nourished.   HENT:   Head: Normocephalic and atraumatic.   Nose: Nose normal.   Eyes: Conjunctivae and EOM are normal.   Neck: No tracheal deviation present.   Normal range of motion.  Cardiovascular:  Normal rate, regular rhythm, normal heart sounds and intact distal pulses.     Exam reveals no friction rub.       No murmur heard.  Pulmonary/Chest: Breath sounds normal. No respiratory distress. He has no wheezes. He has no rales. He exhibits no tenderness.   Abdominal: Abdomen is soft. He exhibits no distension. There is no abdominal tenderness. There is no guarding.   Musculoskeletal:         General: Edema present. Normal range of motion.      Cervical back: Normal range of motion.      Comments: Bilateral lower extremity lymphedema with cellulitis and small open nonhealing wounds of both legs     Neurological: He is alert and oriented to person, place, and time. He has normal strength. GCS score is 15.  GCS eye subscore is 4. GCS verbal subscore is 5. GCS motor subscore is 6.   Skin: Skin is warm and dry. Capillary refill takes less than 2 seconds. There is erythema.   Psychiatric: He has a normal mood and affect. Thought content normal.       ED Course   Procedures  Labs Reviewed   CBC W/ AUTO DIFFERENTIAL - Abnormal; Notable for the following components:       Result Value    RBC 3.86 (*)     Hemoglobin 11.3 (*)     Hematocrit 36.1 (*)     MCHC 31.3 (*)     Lymph % 17.1 (*)     All other components within normal limits   CULTURE, BLOOD   CULTURE, BLOOD   COMPREHENSIVE METABOLIC PANEL   TROPONIN I HIGH SENSITIVITY   B-TYPE NATRIURETIC PEPTIDE   LACTIC ACID, PLASMA   LACTIC ACID, PLASMA   URINALYSIS, REFLEX TO URINE CULTURE     EKG Readings: (Independently Interpreted)   Initial Reading: No STEMI. Rhythm: Normal Sinus Rhythm. Ectopy: No Ectopy. Conduction: Normal.     Imaging Results              X-Ray Chest AP Portable (No Result on File)                   X-Rays:   Independently Interpreted Readings:   Other Readings:  Chest x-ray unremarkable  Medications   vancomycin - pharmacy to dose (has no administration in time range)   vancomycin in dextrose 5 % 1 gram/250 mL IVPB 2,000 mg (has no administration in time range)   furosemide injection 40 mg (has no administration in time range)     Medical Decision Making:   History:   I obtained history from: EMS provider.       <> Summary of History: Patient with bilateral lower extremity swelling and wounds and redness and worsening pain swelling and edema  Differential Diagnosis:   73-year-old male presented emergency department with bilateral lower extremity swelling and edema and worsening cellulitis.  Patient currently getting outpatient wound care for chronic nonhealing leg wounds and lymphedema which are gradually getting worse.  Screening labs done and patient started on vancomycin.  Diuretic will be used.  Patient would need further wound care and as patient  "failing outpatient treatment Hospital Medicine consulted for evaluation for further management and inpatient care.  Independently Interpreted Test(s):   I have ordered and independently interpreted X-rays - see prior notes.  I have ordered and independently interpreted EKG Reading(s) - see prior notes  Clinical Tests:   Lab Tests: Reviewed  Radiological Study: Reviewed  Medical Tests: Reviewed  Sepsis Perfusion Assessment: "I attest a sepsis perfusion exam was performed within 6 hours of sepsis, severe sepsis, or septic shock presentation, following fluid resuscitation."  ED Management:  73-year-old male presented emergency department with bilateral lower extremity edema and nonhealing wounds and swelling is gradually getting worse and more redness and given worsening symptoms sent here.  Hospital Medicine consulted for evaluation for further management and patient treated with IV antibiotics and Lasix given  Other:   I have discussed this case with another health care provider.       <> Summary of the Discussion: Hospital Medicine consulted for evaluation for further management as failing outpatient treatment for cellulitis and lymphedema                        Clinical Impression:   Final diagnoses:  [M79.89] Leg swelling (Primary)  [L03.90] Cellulitis, unspecified cellulitis site  [I89.0] Lymphedema        ED Disposition Condition    Admit Stable                Juan Antonio Sauer MD  05/11/23 8495    "

## 2023-05-12 VITALS
RESPIRATION RATE: 18 BRPM | OXYGEN SATURATION: 100 % | SYSTOLIC BLOOD PRESSURE: 149 MMHG | HEART RATE: 78 BPM | HEIGHT: 71 IN | WEIGHT: 315 LBS | TEMPERATURE: 98 F | BODY MASS INDEX: 44.1 KG/M2 | DIASTOLIC BLOOD PRESSURE: 85 MMHG

## 2023-05-12 LAB
ACINETOBACTER CALCOACETICUS/BAUMANNII COMPLEX: NOT DETECTED
ALBUMIN SERPL BCP-MCNC: 3 G/DL (ref 3.5–5.2)
ALP SERPL-CCNC: 71 U/L (ref 55–135)
ALT SERPL W/O P-5'-P-CCNC: 18 U/L (ref 10–44)
ANION GAP SERPL CALC-SCNC: 8 MMOL/L (ref 8–16)
AST SERPL-CCNC: 20 U/L (ref 10–40)
BACTEROIDES FRAGILIS: NOT DETECTED
BASOPHILS # BLD AUTO: 0.06 K/UL (ref 0–0.2)
BASOPHILS NFR BLD: 0.7 % (ref 0–1.9)
BILIRUB SERPL-MCNC: 0.7 MG/DL (ref 0.1–1)
BUN SERPL-MCNC: 27 MG/DL (ref 8–23)
CALCIUM SERPL-MCNC: 8.1 MG/DL (ref 8.7–10.5)
CANDIDA ALBICANS: NOT DETECTED
CANDIDA AURIS: NOT DETECTED
CANDIDA GLABRATA: NOT DETECTED
CANDIDA KRUSEI: NOT DETECTED
CANDIDA PARAPSILOSIS: NOT DETECTED
CANDIDA TROPICALIS: NOT DETECTED
CHLORIDE SERPL-SCNC: 104 MMOL/L (ref 95–110)
CO2 SERPL-SCNC: 27 MMOL/L (ref 23–29)
CREAT SERPL-MCNC: 1.2 MG/DL (ref 0.5–1.4)
CRYPTOCOCCUS NEOFORMANS/GATTII: NOT DETECTED
CTX-M GENE: ABNORMAL
DIFFERENTIAL METHOD: ABNORMAL
ENTEROBACTER CLOACAE COMPLEX: NOT DETECTED
ENTEROBACTERALES: NOT DETECTED
ENTEROCOCCUS FAECALIS: NOT DETECTED
ENTEROCOCCUS FAECIUM: NOT DETECTED
EOSINOPHIL # BLD AUTO: 0.3 K/UL (ref 0–0.5)
EOSINOPHIL NFR BLD: 3.6 % (ref 0–8)
ERYTHROCYTE [DISTWIDTH] IN BLOOD BY AUTOMATED COUNT: 13.2 % (ref 11.5–14.5)
ESCHERICHIA COLI: NOT DETECTED
EST. GFR  (NO RACE VARIABLE): >60 ML/MIN/1.73 M^2
FERRITIN SERPL-MCNC: 54 NG/ML (ref 20–300)
GLUCOSE SERPL-MCNC: 100 MG/DL (ref 70–110)
GLUCOSE SERPL-MCNC: 108 MG/DL (ref 70–110)
GLUCOSE SERPL-MCNC: 92 MG/DL (ref 70–110)
HAEMOPHILUS INFLUENZAE: NOT DETECTED
HCT VFR BLD AUTO: 34.1 % (ref 40–54)
HGB BLD-MCNC: 10.9 G/DL (ref 14–18)
IMM GRANULOCYTES # BLD AUTO: 0.02 K/UL (ref 0–0.04)
IMM GRANULOCYTES NFR BLD AUTO: 0.2 % (ref 0–0.5)
IMP GENE: ABNORMAL
KLEBSIELLA AEROGENES: NOT DETECTED
KLEBSIELLA OXYTOCA: NOT DETECTED
KLEBSIELLA PNEUMONIAE GROUP: NOT DETECTED
KPC: ABNORMAL
LISTERIA MONOCYTOGENES: NOT DETECTED
LYMPHOCYTES # BLD AUTO: 1 K/UL (ref 1–4.8)
LYMPHOCYTES NFR BLD: 11.1 % (ref 18–48)
MCH RBC QN AUTO: 29.8 PG (ref 27–31)
MCHC RBC AUTO-ENTMCNC: 32 G/DL (ref 32–36)
MCR-1: ABNORMAL
MCV RBC AUTO: 93 FL (ref 82–98)
MEC A/C AND MREJ (MRSA): ABNORMAL
MEC A/C: DETECTED
MONOCYTES # BLD AUTO: 0.9 K/UL (ref 0.3–1)
MONOCYTES NFR BLD: 9.9 % (ref 4–15)
NDM GENE: ABNORMAL
NEISSERIA MENINGITIDIS: NOT DETECTED
NEUTROPHILS # BLD AUTO: 6.6 K/UL (ref 1.8–7.7)
NEUTROPHILS NFR BLD: 74.5 % (ref 38–73)
NRBC BLD-RTO: 0 /100 WBC
OXA-48-LIKE: ABNORMAL
PLATELET # BLD AUTO: 294 K/UL (ref 150–450)
PMV BLD AUTO: 9.6 FL (ref 9.2–12.9)
POTASSIUM SERPL-SCNC: 3.8 MMOL/L (ref 3.5–5.1)
PROT SERPL-MCNC: 6.4 G/DL (ref 6–8.4)
PROTEUS SPECIES: NOT DETECTED
PSEUDOMONAS AERUGINOSA: NOT DETECTED
RBC # BLD AUTO: 3.66 M/UL (ref 4.6–6.2)
SALMONELLA SP: NOT DETECTED
SERRATIA MARCESCENS: NOT DETECTED
SODIUM SERPL-SCNC: 139 MMOL/L (ref 136–145)
STAPHYLOCOCCUS AUREUS: NOT DETECTED
STAPHYLOCOCCUS EPIDERMIDIS: DETECTED
STAPHYLOCOCCUS LUGDUNESIS: NOT DETECTED
STAPHYLOCOCCUS SPECIES: ABNORMAL
STENOTROPHOMONAS MALTOPHILIA: NOT DETECTED
STREPTOCOCCUS AGALACTIAE: NOT DETECTED
STREPTOCOCCUS PNEUMONIAE: NOT DETECTED
STREPTOCOCCUS PYOGENES: NOT DETECTED
STREPTOCOCCUS SPECIES: NOT DETECTED
TROPONIN I SERPL HS-MCNC: 5.4 PG/ML (ref 0–14.9)
TROPONIN I SERPL HS-MCNC: 5.8 PG/ML (ref 0–14.9)
VAN A/B: ABNORMAL
VIM GENE: ABNORMAL
WBC # BLD AUTO: 8.91 K/UL (ref 3.9–12.7)

## 2023-05-12 PROCEDURE — 84484 ASSAY OF TROPONIN QUANT: CPT | Performed by: NURSE PRACTITIONER

## 2023-05-12 PROCEDURE — 84484 ASSAY OF TROPONIN QUANT: CPT | Mod: 91 | Performed by: NURSE PRACTITIONER

## 2023-05-12 PROCEDURE — 82728 ASSAY OF FERRITIN: CPT | Performed by: NURSE PRACTITIONER

## 2023-05-12 PROCEDURE — G0378 HOSPITAL OBSERVATION PER HR: HCPCS

## 2023-05-12 PROCEDURE — 85025 COMPLETE CBC W/AUTO DIFF WBC: CPT | Performed by: NURSE PRACTITIONER

## 2023-05-12 PROCEDURE — 25000003 PHARM REV CODE 250: Performed by: NURSE PRACTITIONER

## 2023-05-12 PROCEDURE — 83036 HEMOGLOBIN GLYCOSYLATED A1C: CPT | Performed by: NURSE PRACTITIONER

## 2023-05-12 PROCEDURE — 63600175 PHARM REV CODE 636 W HCPCS: Performed by: INTERNAL MEDICINE

## 2023-05-12 PROCEDURE — 80053 COMPREHEN METABOLIC PANEL: CPT | Performed by: NURSE PRACTITIONER

## 2023-05-12 PROCEDURE — 36415 COLL VENOUS BLD VENIPUNCTURE: CPT | Performed by: NURSE PRACTITIONER

## 2023-05-12 PROCEDURE — 25000003 PHARM REV CODE 250: Performed by: INTERNAL MEDICINE

## 2023-05-12 PROCEDURE — 96376 TX/PRO/DX INJ SAME DRUG ADON: CPT

## 2023-05-12 RX ORDER — CIPROFLOXACIN 500 MG/1
500 TABLET ORAL 2 TIMES DAILY
Qty: 20 TABLET | Refills: 0 | Status: SHIPPED | OUTPATIENT
Start: 2023-05-12 | End: 2023-05-22

## 2023-05-12 RX ORDER — CLINDAMYCIN HYDROCHLORIDE 300 MG/1
300 CAPSULE ORAL 3 TIMES DAILY
Qty: 30 CAPSULE | Refills: 0 | Status: SHIPPED | OUTPATIENT
Start: 2023-05-12 | End: 2023-05-22

## 2023-05-12 RX ADMIN — FUROSEMIDE 40 MG: 40 TABLET ORAL at 05:05

## 2023-05-12 RX ADMIN — APIXABAN 5 MG: 5 TABLET, FILM COATED ORAL at 09:05

## 2023-05-12 RX ADMIN — THERA TABS 1 TABLET: TAB at 09:05

## 2023-05-12 RX ADMIN — GABAPENTIN 300 MG: 300 CAPSULE ORAL at 09:05

## 2023-05-12 RX ADMIN — BUPROPION HYDROCHLORIDE 150 MG: 150 TABLET, FILM COATED, EXTENDED RELEASE ORAL at 09:05

## 2023-05-12 RX ADMIN — VANCOMYCIN HYDROCHLORIDE 2000 MG: 500 INJECTION, POWDER, LYOPHILIZED, FOR SOLUTION INTRAVENOUS at 01:05

## 2023-05-12 RX ADMIN — TAMSULOSIN HYDROCHLORIDE 0.4 MG: 0.4 CAPSULE ORAL at 09:05

## 2023-05-12 RX ADMIN — SPIRONOLACTONE 50 MG: 25 TABLET, FILM COATED ORAL at 09:05

## 2023-05-12 RX ADMIN — DULOXETINE 30 MG: 30 CAPSULE, DELAYED RELEASE ORAL at 09:05

## 2023-05-12 NOTE — PHARMACY MED REC
"              .      Admission Medication History     The home medication history was taken by Sidra Ernst.    You may go to "Admission" then "Reconcile Home Medications" tabs to review and/or act upon these items.     The home medication list has been updated by the Pharmacy department.   Please read ALL comments highlighted in yellow.   Please address this information as you see fit.    Feel free to contact us if you have any questions or require assistance.      The medications listed below were removed from the home medication list. Please reorder if appropriate:  Patient reports no longer taking the following medication(s):  Chantix  Lidocaine   Diclofenac    Medications listed below were obtained from: Patient/family  No current facility-administered medications on file prior to encounter.     Current Outpatient Medications on File Prior to Encounter   Medication Sig Dispense Refill    acetaminophen (TYLENOL) 500 MG tablet Take 1,000 mg by mouth 2 (two) times daily as needed for Pain.      buPROPion (WELLBUTRIN XL) 150 MG TB24 tablet Take 150 mg by mouth once daily.      clindamycin (CLEOCIN) 300 MG capsule Take 300 mg by mouth 3 (three) times daily.      DULoxetine (CYMBALTA) 30 MG capsule Take 1 capsule (30 mg total) by mouth once daily. 30 capsule 11    ELIQUIS 5 mg Tab Take 1 tablet (5 mg total) by mouth 2 (two) times daily. 180 tablet 3    furosemide (LASIX) 40 MG tablet Take 1 tablet (40 mg total) by mouth 2 (two) times daily before meals. 60 tablet 5    gabapentin (NEURONTIN) 300 MG capsule Take 300 mg by mouth 2 (two) times daily.      multivitamin (THERAGRAN) per tablet Take 1 tablet by mouth once daily.      spironolactone (ALDACTONE) 50 MG tablet Take 1 tablet (50 mg total) by mouth once daily. 30 tablet 5    tamsulosin (FLOMAX) 0.4 mg Cap Take 1 capsule (0.4 mg total) by mouth once daily. 10 capsule 0    albuterol (ACCUNEB) 0.63 mg/3 mL Nebu Take 3 mLs (0.63 mg total) by " nebulization every 6 (six) hours as needed (shortness of breath). Rescue (Patient not taking: Reported on 5/11/2023) 1 Box 2    albuterol-ipratropium (DUO-NEB) 2.5 mg-0.5 mg/3 mL nebulizer solution Take 3 mLs by nebulization every 6 (six) hours as needed for Wheezing or Shortness of Breath. Rescue (Patient not taking: Reported on 5/11/2023) 75 mL 0    HYDROcodone-acetaminophen (NORCO) 5-325 mg per tablet Take 1 tablet by mouth every 8 (eight) hours as needed.      semaglutide (OZEMPIC) 0.25 mg or 0.5 mg(2 mg/1.5 mL) pen injector Inject 0.25 mg into the skin every 7 days. For four weeks, then increase to 0.5mg every seven days for four weeks. (Patient not taking: Reported on 5/11/2023) 1 pen 5    [DISCONTINUED] diclofenac (VOLTAREN) 75 MG EC tablet Take 1 tablet (75 mg total) by mouth 2 (two) times daily. 14 tablet 0    [DISCONTINUED] LIDOcaine (LIDODERM) 5 % Place 1 patch onto the skin once daily.      [DISCONTINUED] varenicline (CHANTIX STARTING MONTH BOX) 0.5 mg (11)- 1 mg (42) tablet Take one 0.5mg tab by mouth once daily X3 days,then increase to one 0.5mg tab twice daily X4 days,then increase to one 1mg tab twice daily 1 each 0    [DISCONTINUED] varenicline (CHANTIX) 1 mg Tab Take 1 tablet (1 mg total) by mouth 2 (two) times daily. 60 tablet 2       Potential issues to be addressed PRIOR TO DISCHARGE  Patient reported not taking the following medications: (Hydrocodone, Albuterol Neb & Ozempic). These medications remain on the home medication list. Please address accordingly.     Sidra Ernst  EXT 1924

## 2023-05-12 NOTE — PLAN OF CARE
Resumption of Home Health orders placed in [facility] referral to [facility] and liaison Rosey notified.        05/12/23 1223   Post-Acute Status   Post-Acute Authorization Home Health   Home Health Status Referrals Sent

## 2023-05-12 NOTE — NURSING
Patient lying in bed AAO, nurse at the beside    Patient with Bilateral Wounds to the lower extremities, both legs weeping no odor   Clean with chlorhexidine pat dry, apply triad, cover with abd pad, kerlix,and ace wrap . Keep legs elevated, Dressing changes as needed otherwise daily. Patient to be discharged today. A follow up has been placed in S to see Dr. Gee                   Right Esquivel 4x3x1

## 2023-05-12 NOTE — NURSING
Nurses Note -- 4 Eyes      5/11/2023   11:57 PM      Skin assessed during: Admit      [] No Altered Skin Integrity Present    []Prevention Measures Documented      [x] Yes- Altered Skin Integrity Present or Discovered   [x] LDA Added if Not in Epic (Describe Wound)   [x] New Altered Skin Integrity was Present on Admit and Documented in LDA   [x] Wound Image Taken    Wound Care Consulted? Yes    Attending Nurse:  Moe Cr LPN     Second RN/Staff Member:  Rhianna Weber/ [id number]

## 2023-05-12 NOTE — PLAN OF CARE
Home health to resume with [facility] 5/13/23 per liaison Rosey.      Discharge orders/AVS faxed to [facility] at [phone].  No report needed per representative Susan.    ADT30 order placed for [facility] to arrange wheelchair van transportation today approx. 1600 as patient will need to finish IV abx.     Discharge orders and chart reviewed with no further post-acute discharge needs identified at this time.  At this time, patient is cleared for discharge from Case Management standpoint.        05/12/23 1322   Final Note   Assessment Type Final Discharge Note   Anticipated Discharge Disposition Home-Health   Post-Acute Status   Post-Acute Authorization Home Health   Home Health Status Set-up Complete/Auth obtained   Discharge Delays (!) Ambulance Transport/Facility Transport

## 2023-05-12 NOTE — PLAN OF CARE
[facility]  Initial Discharge Assessment     Assessment completed at bedside with patient.  Patient verifies demographics as listed below.  Patient is resident of [facility] with [facility].  DME listed below.  Plan is to return to [facility] with resumption of HH. Transportation services will be needed. [facility] as it is difficult to go to appointments.      Primary Care Provider: Solitario Mendez MD    Admission Diagnosis: Cellulitis of leg, left [L03.116]    Admission Date: 5/11/2023  Expected Discharge Date: 5/12/2023    Transition of Care Barriers: None    Payor: MEDICARE / Plan: MEDICARE PART A & B / Product Type: Government /     Extended Emergency Contact Information  Primary Emergency Contact: Eliza Chance  Address: [address] [place], [place] [address]            [place] [address] [facility]  Home Phone: [phone]  Work Phone: [phone]  Relation: Spouse  Preferred language: English   needed? No    Discharge Plan A: Assisted Living, Home Health  Discharge Plan B: Assisted Living, Home Health      [facility] - [place] - [address]  [address]  [facility] [address]  Phone: [phone] Fax: [phone]    [facility] - [place] - [address]  [address] [facility] [address]  Phone: [phone] Fax: [phone]      Initial Assessment (most recent)       Adult Discharge Assessment - 05/12/23 1212          Discharge Assessment    Assessment Type Discharge Planning Assessment     Confirmed/corrected address, phone number and insurance Yes     Confirmed Demographics Correct on Facesheet     Source of Information patient     Does patient/caregiver understand observation status Yes     Communicated NELI with patient/caregiver Yes     People in Home facility resident     Facility Arrived From: [facility]     Do you expect to return to your current  living situation? Yes     Do you have help at home or someone to help you manage your care at home? Yes     Prior to hospitilization cognitive status: Alert/Oriented;No Deficits     Current cognitive status: Alert/Oriented;No Deficits     Walking or Climbing Stairs ambulation difficulty, requires equipment     Mobility Management RW, WC     Dressing/Bathing dressing difficulty, dependent     Equipment Currently Used at Home walker, rolling;wheelchair     Readmission within 30 days? No     Patient currently being followed by outpatient case management? No     Do you currently have service(s) that help you manage your care at home? Yes     Name and Contact number of agency [facility] for nursing     Is the pt/caregiver preference to resume services with current agency Yes     Do you take prescription medications? Yes     Do you have prescription coverage? Yes     Coverage [employer] Supplement     Do you have any problems affording any of your prescribed medications? No     Is the patient taking medications as prescribed? yes     Are you on dialysis? No     Do you take coumadin? No     Discharge Plan A Assisted Living;Home Health     Discharge Plan B Assisted Living;Home Health     DME Needed Upon Discharge  none     Discharge Plan discussed with: Patient     Transition of Care Barriers None

## 2023-05-12 NOTE — H&P
Formerly [facility] Medicine History & Physical Examination   Patient Name: Lukas Chance  MRN: [id number]  Patient Class: Emergency   Admission Date: 5/11/2023  4:27 PM  Length of Stay: 0  Attending Physician:   Primary Care Provider: Solitario Mendez MD  Face-to-Face encounter date: 05/11/2023  Code Status:Full Code  MPOA:  Chief Complaint: Leg Swelling (Pt arrived EMS from residence and complained of bilateral lower limb edema. Pt stated peripheral edema has worsened over the past two weeks. )        Patient information was obtained from patient, past medical records and ER records.   HISTORY OF PRESENT ILLNESS:   Lukas Chance is a 73 y.o. old male who  has a past medical history of Anticoagulant long-term use (03/01/2013), Antithrombin 3 deficiency, Cellulitis, CHF (congestive heart failure), Coronary artery disease, Deep vein thrombosis (DVT) (2012), Diabetes mellitus type 2 in obese (05/15/2013), colonic polyps, Hypertension, Lymphedema, Obesity, LAKHWINDER (obstructive sleep apnea), Pulmonary embolism, and Venous stasis ulcers.. The patient presented to [facility] on 5/11/2023 with a primary complaint of Leg Swelling (Pt arrived EMS from residence and complained of bilateral lower limb edema. Pt stated peripheral edema has worsened over the past two weeks. )  .     73-year-old male presented emergency department with edema of both legs.  Patient sent here as patient having nonhealing wounds and lymphedema of the legs and wound care placed dressings yesterday and wounds or appearing to be worse.  Patient has edema in the leg also is getting worse.  Denies fever or chills.  Denies nausea vomiting or chest pain or shortness of breath or dysuria or hematuria or any focal weakness or numbness.  Patient has history of severe lymphedema of lower extremities and chronic nonhealing wounds     The patient took one dose of Clindamycin orally w/o improvement in his systems.  REVIEW OF SYSTEMS:    10 Point Review of System was performed and was found to be negative except for that mentioned already in the HPI and   Review of Systems (Negative unless checked off)  Review of Systems   Constitutional: Negative.    Eyes: Negative.    Cardiovascular:  Positive for leg swelling.   Gastrointestinal: Negative.    Genitourinary: Negative.    Musculoskeletal: Negative.    Skin:         Chronic bilateral lymphedema, chronic ulcers to bilateral lower extremities some with serosanguineous oozing all present on arrival   Neurological:  Positive for weakness.   Endo/Heme/Allergies: Negative.    Psychiatric/Behavioral:  Positive for depression.          PAST MEDICAL HISTORY:     Past Medical History:   Diagnosis Date    Anticoagulant long-term use 03/01/2013    Antithrombin 3 deficiency     Cellulitis     lower legs, venous stasis    CHF (congestive heart failure)     Coronary artery disease     Deep vein thrombosis (DVT) 2012    left leg    Diabetes mellitus type 2 in obese 05/15/2013    Hx of colonic polyps     Hypertension     Lymphedema     Obesity     LAKHWINDER (obstructive sleep apnea)     Pulmonary embolism     Venous stasis ulcers     left ankle       PAST SURGICAL HISTORY:     Past Surgical History:   Procedure Laterality Date    COLONOSCOPY  5/2/2011    Dr. Miller, 9 polyps, recheck 5 year    MULTIPLE TOOTH EXTRACTIONS      RADIOFREQUENCY ABLATION  7/2015    bilateral     TONSILLECTOMY         ALLERGIES:   Bactroban [mupirocin calcium] and Metoprolol    FAMILY HISTORY:     Family History   Problem Relation Age of Onset    Heart disease Mother     Heart disease Father     Gallbladder disease Sister     Cataracts Sister     Heart disease Brother     Stroke Brother     Emphysema Maternal Uncle        SOCIAL HISTORY:     Social History     Tobacco Use    Smoking status: Every Day     Packs/day: 1.00     Years: 20.00     Pack years: 20.00     Types: Cigarettes     Last attempt to quit: 1/3/2002     Years since quitting:  21.3    Smokeless tobacco: Never   Substance Use Topics    Alcohol use: Yes     Alcohol/week: 1.0 standard drink     Types: 1 Cans of beer per week     Comment: seldom        Social History     Substance and Sexual Activity   Sexual Activity Yes    Partners: Female        HOME MEDICATIONS:     Prior to Admission medications    Medication Sig Start Date End Date Taking? Authorizing Provider   acetaminophen (TYLENOL) 500 MG tablet Take 1,000 mg by mouth 2 (two) times daily as needed for Pain.   Yes Historical Provider   buPROPion (WELLBUTRIN XL) 150 MG TB24 tablet Take 150 mg by mouth once daily. 3/28/23  Yes Historical Provider   clindamycin (CLEOCIN) 300 MG capsule Take 300 mg by mouth 3 (three) times daily. 5/9/23  Yes Historical Provider   DULoxetine (CYMBALTA) 30 MG capsule Take 1 capsule (30 mg total) by mouth once daily. 2/24/22 5/11/23 Yes Solitario Mendez MD   ELIQUIS 5 mg Tab Take 1 tablet (5 mg total) by mouth 2 (two) times daily. 2/24/22  Yes Solitario Mendez MD   furosemide (LASIX) 40 MG tablet Take 1 tablet (40 mg total) by mouth 2 (two) times daily before meals. 5/4/23 5/3/24 Yes Solitario Mendez MD   gabapentin (NEURONTIN) 300 MG capsule Take 300 mg by mouth 2 (two) times daily. 1/13/23  Yes Historical Provider   multivitamin (THERAGRAN) per tablet Take 1 tablet by mouth once daily.   Yes    spironolactone (ALDACTONE) 50 MG tablet Take 1 tablet (50 mg total) by mouth once daily. 5/4/23 5/3/24 Yes Solitario Mendez MD   tamsulosin (FLOMAX) 0.4 mg Cap Take 1 capsule (0.4 mg total) by mouth once daily. 3/7/23 3/6/24 Yes Hernandez Nguyen MD   albuterol (ACCUNEB) 0.63 mg/3 mL Nebu Take 3 mLs (0.63 mg total) by nebulization every 6 (six) hours as needed (shortness of breath). Rescue  Patient not taking: Reported on 5/11/2023 4/19/21 4/19/22  Janelle Quintanilla DNP   albuterol-ipratropium (DUO-NEB) 2.5 mg-0.5 mg/3 mL nebulizer solution Take 3 mLs by nebulization every 6 (six) hours as needed for Wheezing  "or Shortness of Breath. Rescue  Patient not taking: Reported on 5/11/2023 12/10/21 12/10/22  Amparo Winter MD   HYDROcodone-acetaminophen (NORCO) 5-325 mg per tablet Take 1 tablet by mouth every 8 (eight) hours as needed. 12/9/22   Historical Provider   semaglutide (OZEMPIC) 0.25 mg or 0.5 mg(2 mg/1.5 mL) pen injector Inject 0.25 mg into the skin every 7 days. For four weeks, then increase to 0.5mg every seven days for four weeks.  Patient not taking: Reported on 5/11/2023 7/27/22   Solitario Mendez MD   diclofenac (VOLTAREN) 75 MG EC tablet Take 1 tablet (75 mg total) by mouth 2 (two) times daily. 3/7/23 5/11/23  Hernandez Nguyen MD   LIDOcaine (LIDODERM) 5 % Place 1 patch onto the skin once daily. 12/16/22 5/11/23  Historical Provider   varenicline (CHANTIX STARTING MONTH BOX) 0.5 mg (11)- 1 mg (42) tablet Take one 0.5mg tab by mouth once daily X3 days,then increase to one 0.5mg tab twice daily X4 days,then increase to one 1mg tab twice daily 11/15/22 5/11/23  Solitario Mendez MD   varenicline (CHANTIX) 1 mg Tab Take 1 tablet (1 mg total) by mouth 2 (two) times daily. 12/13/22 5/11/23  Solitario Mendez MD         PHYSICAL EXAM:   BP (!) 112/55 (BP Location: Right arm, Patient Position: Sitting)   Pulse 83   Resp 18   Ht 5' 11" (1.803 m)   Wt (!) 147.9 kg (326 lb)   SpO2 100%   BMI 45.47 kg/m²   Vitals Reviewed  General appearance: chronically ill appearing male in no apparent distress.  Skin: No Rash.   Neuro: Motor and sensory exams grossly intact. Good tone. Power in all 4 extremities 5/5.   HENT: Atraumatic head. Moist mucous membranes of oral cavity.  Eyes: Normal extraocular movements.   Neck: Supple. No evidence of lymphadenopathy. No thyroidomegaly.  Lungs: Clear to auscultation bilaterally. No wheezing present.   Heart: Regular rate and rhythm. S1 and S2 present with no murmurs/gallop/rub. 3+ pitted edema. No JVD present.   Abdomen: Soft, non-distended, non-tender. No rebound " tenderness/guarding. No masses or organomegaly. Bowel sounds are normal. Bladder is not palpable.   Extremities: No cyanosis, clubbing, +edema.  Psych/mental status: Alert and oriented. Cooperative. Responds appropriately to questions.         EMERGENCY DEPARTMENT LABS AND IMAGING:   Following labs were Reviewed   Recent Labs   Lab 05/11/23  1707   WBC 9.28   HGB 11.3*   HCT 36.1*      CALCIUM 8.5*   ALBUMIN 3.2*   PROT 7.1      K 4.6   CO2 28   CL 99   BUN 32*   CREATININE 1.4   ALKPHOS 75   ALT 17   AST 28   BILITOT 0.5         BMP:   Recent Labs   Lab 05/11/23  1707   GLU 93      K 4.6   CL 99   CO2 28   BUN 32*   CREATININE 1.4   CALCIUM 8.5*   , CMP   Recent Labs   Lab 05/11/23  1707      K 4.6   CL 99   CO2 28   GLU 93   BUN 32*   CREATININE 1.4   CALCIUM 8.5*   PROT 7.1   ALBUMIN 3.2*   BILITOT 0.5   ALKPHOS 75   AST 28   ALT 17   ANIONGAP 9   , CBC   Recent Labs   Lab 05/11/23  1707   WBC 9.28   HGB 11.3*   HCT 36.1*      , INR   Lab Results   Component Value Date    INR 1.4 01/08/2020    INR 1.1 10/25/2019    INR 1.1 02/26/2019   , Lipid Panel   Lab Results   Component Value Date    CHOL 189 02/23/2022    HDL 43 02/23/2022    LDLCALC 127.6 02/23/2022    TRIG 92 02/23/2022    CHOLHDL 22.8 02/23/2022   , Troponin No results for input(s): TROPONINI in the last 168 hours., A1C: No results for input(s): HGBA1C in the last 4320 hours., and All labs within the past 24 hours have been reviewed  Microbiology Results (last 7 days)       Procedure Component Value Units Date/Time    Blood culture x two cultures. Draw prior to antibiotics. [[id number]] Collected: 05/11/23 1829    Order Status: Sent Specimen: Blood from Peripheral, Antecubital, Left Updated: 05/11/23 1837    Blood culture x two cultures. Draw prior to antibiotics. [[id number]] Collected: 05/11/23 1708    Order Status: Sent Specimen: Blood from Peripheral, Upper Arm, Left Updated: 05/11/23 1717          X-Ray Chest AP  Portable   Final Result        X-Ray Foot Complete Right    Result Date: 4/13/2023  3 views right foot Clinical history is pain to the first and second digits. There are no fractures, dislocations or acute osseous abnormalities. There is moderate dorsal soft tissue swelling There is plantar spurring of the calcaneus. IMPRESSION: No acute osseous abnormality Moderate dorsal soft tissue swelling Electronically signed by:  Chanelle Dunn MD  4/13/2023 11:00 AM CDT Workstation: [id number]    X-Ray Chest AP Portable    Result Date: 5/11/2023  AP chest radiograph: 5/11/2023 7:01 PM CDT Indication: 73 years  old Male with CHF. Comparison: 12/27/2022 Findings: The cardiomediastinal silhouette is at the upper limits of normal in size. No pneumothorax is seen. There are low lung volumes and bilateral interstitial airspace opacities. Atherosclerotic calcifications are seen at the aortic arch. There is blunting of the costophrenic angles suggesting trace bilateral effusions. Impression: There are low lung volumes with bilateral interstitial airspace opacities. These can be seen with edema and/or infection. Electronically signed by:  Ania Chao DO  5/11/2023 7:01 PM CDT Workstation: [id number]    US Lower Extremity Veins Right    Result Date: 4/13/2023  CLINICAL HISTORY: 73 years (1950) Male right lower extremity toe pain. TECHNIQUE: US LOWER EXTREMITY VEINS LIMITED FOLLOW-UP UNILATERAL.  25 images obtained. Ultrasound examination of right lower extremity deep venous systems was performed using grayscale, color and spectral Doppler. COMPARISON: None available. FINDINGS: The common femoral, saphenofemoral junction, femoral and popliteal veins demonstrate a normal response to compression maneuvers. There is also a normal response to respiratory variation. The bifurcation of the common femoral into the superficial and deep femoral veins is also visualized.  Flow is identified in these vessels with no evidence of  intraluminal thrombus on either color Doppler or grayscale images. The visualized portions of the proximal calf veins are also normal. IMPRESSION: No evidence of acute right lower extremity deep venous thrombosis in the visualized venous segments. Electronically signed by:  Adarsh Green MD  4/13/2023 11:59 AM CDT Workstation: [id number]  in  ASSESSMENT & PLAN:   Lukas Chance is a 73 y.o. male admitted for    1. Bilateral lower extremity cellulitis  -IV antibiotics with vancomycin for now   -wound care consult  -pain management  -blood cultures collected and sent in ED    2. Chronic lymphedema  - continue with compressive socks once wounds heal    3. Essential hypertension  -continue home medications    4.:  Morbid obesity BMI 45.47 to manage  -followup with PCP with discharge for weight reduction weight modification    5. COPD  -DuoNeb treatments q.6 hours p.r.n. shortness of breath wheezing    6. H/P PE  - continue on Elqiuis    DVT Prophylaxis: will be placed on Eliquis for DVT prophylaxis and will be advised to be as mobile as possible and sit in a chair as tolerated.   ________________________________________________________________  Face-to-Face encounter date: 05/11/2023  Encounter included review of the medical records, interviewing and examining the patient face-to-face, discussion with family and other health care providers including emergency medicine physician, admission orders, interpreting lab/test results and formulating a plan of care.   Medical Decision Making during this encounter was  [_] Low Complexity  [_] Moderate Complexity  [x] High Complexity  _________________________________________________________________________________    INPATIENT LIST OF MEDICATIONS     Current Facility-Administered Medications:     Pharmacy to dose Vancomycin consult, , , Once **AND** vancomycin - pharmacy to dose, , Intravenous, pharmacy to manage frequency, Juan Antonio Sauer MD    Current Outpatient Medications:      acetaminophen (TYLENOL) 500 MG tablet, Take 1,000 mg by mouth 2 (two) times daily as needed for Pain., Disp: , Rfl:     buPROPion (WELLBUTRIN XL) 150 MG TB24 tablet, Take 150 mg by mouth once daily., Disp: , Rfl:     clindamycin (CLEOCIN) 300 MG capsule, Take 300 mg by mouth 3 (three) times daily., Disp: , Rfl:     DULoxetine (CYMBALTA) 30 MG capsule, Take 1 capsule (30 mg total) by mouth once daily., Disp: 30 capsule, Rfl: 11    ELIQUIS 5 mg Tab, Take 1 tablet (5 mg total) by mouth 2 (two) times daily., Disp: 180 tablet, Rfl: 3    furosemide (LASIX) 40 MG tablet, Take 1 tablet (40 mg total) by mouth 2 (two) times daily before meals., Disp: 60 tablet, Rfl: 5    gabapentin (NEURONTIN) 300 MG capsule, Take 300 mg by mouth 2 (two) times daily., Disp: , Rfl:     multivitamin (THERAGRAN) per tablet, Take 1 tablet by mouth once daily., Disp: , Rfl:     spironolactone (ALDACTONE) 50 MG tablet, Take 1 tablet (50 mg total) by mouth once daily., Disp: 30 tablet, Rfl: 5    tamsulosin (FLOMAX) 0.4 mg Cap, Take 1 capsule (0.4 mg total) by mouth once daily., Disp: 10 capsule, Rfl: 0    albuterol (ACCUNEB) 0.63 mg/3 mL Nebu, Take 3 mLs (0.63 mg total) by nebulization every 6 (six) hours as needed (shortness of breath). Rescue (Patient not taking: Reported on 5/11/2023), Disp: 1 Box, Rfl: 2    albuterol-ipratropium (DUO-NEB) 2.5 mg-0.5 mg/3 mL nebulizer solution, Take 3 mLs by nebulization every 6 (six) hours as needed for Wheezing or Shortness of Breath. Rescue (Patient not taking: Reported on 5/11/2023), Disp: 75 mL, Rfl: 0    HYDROcodone-acetaminophen (NORCO) 5-325 mg per tablet, Take 1 tablet by mouth every 8 (eight) hours as needed., Disp: , Rfl:     semaglutide (OZEMPIC) 0.25 mg or 0.5 mg(2 mg/1.5 mL) pen injector, Inject 0.25 mg into the skin every 7 days. For four weeks, then increase to 0.5mg every seven days for four weeks. (Patient not taking: Reported on 5/11/2023), Disp: 1 pen, Rfl: 5      Scheduled Meds:  Continuous  Infusions:  PRN Meds:.Pharmacy to dose Vancomycin consult **AND** vancomycin - pharmacy to dose      Анна Dhillon  [facility] Hospitalist NP  05/11/2023

## 2023-05-12 NOTE — PLAN OF CARE
05/12/23 1216   MAK Message   Medicare Outpatient and Observation Notification regarding financial responsibility Given to patient/caregiver;Explained to patient/caregiver;Signed/date by patient/caregiver   Date MAK was signed 05/12/23   Time MAK was signed 112

## 2023-05-12 NOTE — PROGRESS NOTES
Pharmacokinetic Initial Assessment: IV Vancomycin    Assessment/Plan:    Initiate intravenous vancomycin with loading dose of 2000 mg once followed by a maintenance dose of vancomycin 2000 mg IV every 18 hours  Desired empiric serum trough concentration is 10 to 15 mcg/mL  Draw vancomycin trough level 60 min prior to fourth dose on 05/13 at approximately 2300  Pharmacy will continue to follow and monitor vancomycin.      Please contact pharmacy at extension [phone] with any questions regarding this assessment.     Thank you for the consult,   Wilbert Perrin       Patient brief summary:  Lukas Chanec is a 73 y.o. male initiated on antimicrobial therapy with IV Vancomycin for treatment of suspected skin & soft tissue infection    Drug Allergies:   Review of patient's allergies indicates:   Allergen Reactions    Bactroban [mupirocin calcium] Other (See Comments)     Burning feeling on open sore     Metoprolol Other (See Comments)     dizzy       Actual Body Weight:   147.9 kg    Renal Function:   Estimated Creatinine Clearance: 67.3 mL/min (based on SCr of 1.4 mg/dL).,     CBC (last 72 hours):  Recent Labs   Lab Result Units 05/11/23  1707   WBC K/uL 9.28   Hemoglobin g/dL 11.3*   Hematocrit % 36.1*   Platelets K/uL 317   Gran % % 68.8   Lymph % % 17.1*   Mono % % 8.6   Eosinophil % % 4.3   Basophil % % 0.9   Differential Method  Automated       Metabolic Panel (last 72 hours):  Recent Labs   Lab Result Units 05/11/23  1707 05/11/23  1829   Sodium mmol/L 136  --    Potassium mmol/L 4.6  --    Chloride mmol/L 99  --    CO2 mmol/L 28  --    Glucose mg/dL 93  --    Glucose, UA   --  Negative   BUN mg/dL 32*  --    Creatinine mg/dL 1.4  --    Albumin g/dL 3.2*  --    Total Bilirubin mg/dL 0.5  --    Alkaline Phosphatase U/L 75  --    AST U/L 28  --    ALT U/L 17  --        Drug levels (last 3 results):  No results for input(s): VANCOMYCINRA, VANCORANDOM, VANCOMYCINPE, VANCOPEAK, VANCOMYCINTR, VANCOTROUGH in the last 72  hours.    Microbiologic Results:  Microbiology Results (last 7 days)       Procedure Component Value Units Date/Time    Blood culture x two cultures. Draw prior to antibiotics. [[id number]] Collected: 05/11/23 1829    Order Status: Sent Specimen: Blood from Peripheral, Antecubital, Left Updated: 05/11/23 1837    Blood culture x two cultures. Draw prior to antibiotics. [[id number]] Collected: 05/11/23 1708    Order Status: Sent Specimen: Blood from Peripheral, Upper Arm, Left Updated: 05/11/23 1717

## 2023-05-13 LAB
ESTIMATED AVG GLUCOSE: 128 MG/DL (ref 68–131)
HBA1C MFR BLD: 6.1 % (ref 4.5–6.2)

## 2023-05-13 NOTE — HOSPITAL COURSE
Pt mariana H/o Chronic lymphedema on legs/Morbid obesity got admitted with suspected bilateral leg cellulitis  One of the Blood culture bottle  grew Staph epidermidis which is a contaminant  Pt was treated with iv abx and later changed to PO abx   Pts condition got better and later was discharged from hospital  Internal referral to wound care MD made at the time of discharge

## 2023-05-13 NOTE — DISCHARGE SUMMARY
[facility] Medicine  Discharge Summary      Patient Name: Lukas Chance  MRN: [id number]  ARTURO: [id number]  Patient Class: OP- Observation  Admission Date: 5/11/2023  Hospital Length of Stay: 0 days  Discharge Date and Time: 5/12/2023  6:45 PM  Attending Physician: No att. providers found   Discharging Provider: Garett Mejias MD  Primary Care Provider: Solitario Mendez MD    Primary Care Team: Networked reference to record PCT     HPI:         * No surgery found *      Hospital Course:   Pt mariana H/o Chronic lymphedema on legs/Morbid obesity got admitted with suspected bilateral leg cellulitis  One of the Blood culture bottle  grew Staph epidermidis which is a contaminant  Pt was treated with iv abx and later changed to PO abx   Pts condition got better and later was discharged from hospital  Internal referral to wound care MD made at the time of discharge       Goals of Care Treatment Preferences:  Code Status: Full Code    Health care agent: Eliza Chance (wife)  Health care agent number: [phone] or [phone]                   Consults:     No new Assessment & Plan notes have been filed under this hospital service since the last note was generated.  Service: Hospital Medicine    Final Active Diagnoses:      Problems Resolved During this Admission:    Diagnosis Date Noted Date Resolved POA    PRINCIPAL PROBLEM:  Cellulitis [L03.90] 12/10/2012 05/12/2023 Unknown       Discharged Condition: good    [place] [facility] [place] Up:   Follow-up Information       Gunnar Gee DO Follow up in 1 week(s).    Specialty: Wound Care  Why: For wound re-check  Contact information:  [address]  [address]  [facility] [address]  [phone]               Gunnar Gee,  Follow up in 1 week(s).    Specialty: Wound Care  Contact information:  [address]  [address]  [facility] [address]  [phone]                           Patient Instructions:      Ambulatory referral/consult to Home  Health   Standing Status: Future   Referral Priority: Routine Referral Type: Home Health Care   Referral Reason: Specialty Services Required   Requested Specialty: Home Health Services   Number of Visits Requested: 1     Ambulatory referral/consult to Wound Clinic   Standing Status: Future   Referral Priority: Routine Referral Type: Consultation   Referral Reason: Specialty Services Required   Referred to Provider: KIRILL MANCILLA Requested Specialty: Wound Care   Number of Visits Requested: 1     Ambulatory referral/consult to [facility]   Standing Status: Future   Referral Priority: Routine Referral Type: Consultation   Referral Reason: Specialty Services Required   Number of Visits Requested: 1     Ambulatory referral/consult to Outpatient Case Management   Referral Priority: Routine Referral Type: Consultation   Referral Reason: Specialty Services Required   Number of Visits Requested: 1           Pending Diagnostic Studies:       None           Medications:  Reconciled Home Medications:      Medication List        START taking these medications      ciprofloxacin HCl 500 MG tablet  Commonly known as: CIPRO  Take 1 tablet (500 mg total) by mouth 2 (two) times daily. for 10 days     semaglutide 0.25 mg or 0.5 mg(2 mg/1.5 mL) pen injector  Commonly known as: OZEMPIC  Inject 0.25 mg into the skin every 7 days. For four weeks, then increase to 0.5mg every seven days for four weeks.            CONTINUE taking these medications      acetaminophen 500 MG tablet  Commonly known as: TYLENOL  Take 1,000 mg by mouth 2 (two) times daily as needed for Pain.     buPROPion 150 MG TB24 tablet  Commonly known as: WELLBUTRIN XL  Take 150 mg by mouth once daily.     clindamycin 300 MG capsule  Commonly known as: CLEOCIN  Take 1 capsule (300 mg total) by mouth 3 (three) times daily. for 10 days     DULoxetine 30 MG capsule  Commonly known as: CYMBALTA  Take 1 capsule (30 mg total) by mouth once daily.     ELIQUIS 5  mg Tab  Generic drug: apixaban  Take 1 tablet (5 mg total) by mouth 2 (two) times daily.     furosemide 40 MG tablet  Commonly known as: LASIX  Take 1 tablet (40 mg total) by mouth 2 (two) times daily before meals.     gabapentin 300 MG capsule  Commonly known as: NEURONTIN  Take 300 mg by mouth 2 (two) times daily.     multivitamin per tablet  Commonly known as: THERAGRAN  Take 1 tablet by mouth once daily.     spironolactone 50 MG tablet  Commonly known as: ALDACTONE  Take 1 tablet (50 mg total) by mouth once daily.     tamsulosin 0.4 mg Cap  Commonly known as: FLOMAX  Take 1 capsule (0.4 mg total) by mouth once daily.            STOP taking these medications      albuterol 0.63 mg/3 mL Nebu  Commonly known as: ACCUNEB     albuterol-ipratropium 2.5 mg-0.5 mg/3 mL nebulizer solution  Commonly known as: DUO-NEB     HYDROcodone-acetaminophen 5-325 mg per tablet  Commonly known as: NORCO              Indwelling Lines/Drains at time of discharge:   Lines/Drains/Airways       None                 Physical Exam  Cardiovascular:      Rate and Rhythm: Normal rate.   Neurological:      General: No focal deficit present.      Mental Status: He is alert.     Time spent on the discharge of patient: 23 minutes         Garett Mejias MD  Department of Hospital Medicine  [facility]

## 2023-05-14 LAB
BACTERIA BLD CULT: ABNORMAL

## 2023-05-16 LAB — BACTERIA BLD CULT: NORMAL

## 2023-05-19 ENCOUNTER — PES CALL (OUTPATIENT)
Dept: ADMINISTRATIVE | Facility: CLINIC | Age: 73
End: 2023-05-19
Payer: MEDICARE

## 2023-05-22 ENCOUNTER — PES CALL (OUTPATIENT)
Dept: ADMINISTRATIVE | Facility: CLINIC | Age: 73
End: 2023-05-22
Payer: MEDICARE

## 2023-06-05 ENCOUNTER — DOCUMENT SCAN (OUTPATIENT)
Dept: HOME HEALTH SERVICES | Facility: HOSPITAL | Age: 73
End: 2023-06-05
Payer: MEDICARE

## 2023-06-13 ENCOUNTER — EXTERNAL HOME HEALTH (OUTPATIENT)
Dept: HOME HEALTH SERVICES | Facility: HOSPITAL | Age: 73
End: 2023-06-13
Payer: MEDICARE

## 2023-06-27 ENCOUNTER — DOCUMENT SCAN (OUTPATIENT)
Dept: HOME HEALTH SERVICES | Facility: HOSPITAL | Age: 73
End: 2023-06-27
Payer: MEDICARE

## 2023-07-03 ENCOUNTER — APPOINTMENT (OUTPATIENT)
Dept: LAB | Facility: HOSPITAL | Age: 73
End: 2023-07-03
Payer: MEDICARE

## 2023-07-14 NOTE — ADDENDUM NOTE
Addended by: LUIS E RODRIGUEZ on: 7/14/2023 12:23 PM     Modules accepted: Orders, Level of Service

## 2023-07-17 DIAGNOSIS — E11.59 TYPE 2 DIABETES MELLITUS WITH OTHER CIRCULATORY COMPLICATION, WITHOUT LONG-TERM CURRENT USE OF INSULIN: Chronic | ICD-10-CM

## 2023-07-17 NOTE — TELEPHONE ENCOUNTER
Care Due:                  Date            Visit Type   Department     Provider  --------------------------------------------------------------------------------                                EP -                              PRIMARY      [facility]  Last Visit: 02-      CARE ([facility])   PRACTICE       Solitario Mendez                               -                              PRIMARY      [facility]  Next Visit: 12-      CARE ([facility])   PRACTICE       Solitario Mcgraw  Test          Frequency    Reason                     Performed    Due Date  --------------------------------------------------------------------------------    Office Visit  12 months..  DULoxetine, furosemide,    02- 02-                             semaglutide,                             spironolactone...........    [employer] Embedded Care Due Messages. Reference number: [id number].   7/17/2023 11:34:28 AM CDT

## 2023-07-17 NOTE — TELEPHONE ENCOUNTER
Patient last seen by Dr. Mendez 2/2022- needs refill of Ozempic- he is now in assisted living facility [place]. I made next available morning appt [facility]20/23[facility]

## 2023-07-17 NOTE — TELEPHONE ENCOUNTER
----- Message from Cristine Mendez sent at 7/17/2023  9:59 AM CDT -----  Regarding: Results  Type:  Test Results    Who Called: Pt    Name of Test (Lab/Mammo/Etc): Nursing home/lab      Date of Test: 3wks      Where the test was performed: Nursing home     Would the patient rather a call back or a response via [facility]? Callback    Best Call Back Number: [phone]    Additional Information:  Sts he did not get his results. Please advise ----------------------- Thank you.

## 2023-07-18 NOTE — TELEPHONE ENCOUNTER
I have not seen him in nearly a year and a half.  Is he still taking the Ozempic 0.25 or the 0.5?  His blood sugar control is doing well, how is his weight doing?  Kidney function looks fairly stable on home health labs.  If he could tolerate a higher dose of the Ozempic we may be able to assist him with weight loss a little better.  The appetite suppression improves at higher doses but we have to be careful about the nausea it may bring on.

## 2023-07-21 ENCOUNTER — TELEPHONE (OUTPATIENT)
Dept: FAMILY MEDICINE | Facility: CLINIC | Age: 73
End: 2023-07-21
Payer: MEDICARE

## 2023-07-21 NOTE — TELEPHONE ENCOUNTER
Call placed to patient who states he missed a call and received a voicemail stating there is an appointment available on Tuesday. Patient states he was calling to accept the appointment on Tuesday. Writer unable to locate any documentation stating a call was placed to patient to offer an appointment on Tuesday. Writer noted documentation from [facility] Abdi stating the following:    Ernestina Abdi LPN     CC    11:33 AM  Note  Patient last seen by Dr. Mendez 2/2022- needs refill of Ozempic- he is now in assisted living facility [place]. I made next available morning appt [facility]/23[facility]         Writer advised patient of above, and patient confirmed he remembers nurse Fariha scheduling this appointment. Writer asked patient if a name was left on the voicemail. Patient states he did not remember hearing a name on the voicemail. States will be in attendance of December appointment.

## 2023-07-21 NOTE — TELEPHONE ENCOUNTER
OLLIE Valle Staff    ----- Message from Maria Del Rosario Canales MA sent at 7/21/2023  3:52 PM CDT -----  Type:  Patient Returning Call    Who Called:  pt  Who Left Message for Patient:  n/a  Does the patient know what this is regarding?:  no  Best Call Back Number:  [phone]    Additional Information:  please advise

## 2023-07-24 ENCOUNTER — HOSPITAL ENCOUNTER (EMERGENCY)
Facility: HOSPITAL | Age: 73
Discharge: HOME | End: 2023-07-25
Attending: EMERGENCY MEDICINE
Payer: MEDICARE
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
## 2023-07-24 DIAGNOSIS — R06.02 SOB (SHORTNESS OF BREATH): ICD-10-CM

## 2023-07-24 DIAGNOSIS — S20.412A ABRASION OF LEFT SIDE OF BACK, INITIAL ENCOUNTER: ICD-10-CM

## 2023-07-24 PROCEDURE — 99283 EMERGENCY DEPT VISIT LOW MDM: CPT | Mod: 25

## 2023-07-25 VITALS
SYSTOLIC BLOOD PRESSURE: 105 MMHG | WEIGHT: 315 LBS | HEART RATE: 89 BPM | TEMPERATURE: 99 F | RESPIRATION RATE: 20 BRPM | HEIGHT: 70 IN | OXYGEN SATURATION: 99 % | DIASTOLIC BLOOD PRESSURE: 65 MMHG | BODY MASS INDEX: 45.1 KG/M2

## 2023-07-25 NOTE — ED PROVIDER NOTES
Encounter Date: 7/24/2023       History     Chief Complaint   Patient presents with    Fall     Pt arrived by ems from [facility], pt has had increased fluid retention in his lower extremities, pt is compliant with lasix. Increased falls due to fluid in legs      73-year-old male history morbid obesity, lymphedema, prior DVT on Eliquis presents to the ED complaining of mechanical fall that occurred while transferring from wheelchair to a scooter today.  States that he slid down from his wheelchair, no high impact.  No pain in his backside or back or hips.  Did not strike head.  Did not lose consciousness.  No prodromal chest pain, shortness of breath, lightheadedness.  Has been receiving sequential wrapping and vascular care for his severe lymphedema.     The history is provided by the patient.   Review of patient's allergies indicates:   Allergen Reactions    Bactroban [mupirocin calcium] Other (See Comments)     Burning feeling on open sore     Metoprolol Other (See Comments)     dizzy     Past Medical History:   Diagnosis Date    Anticoagulant long-term use 03/01/2013    Antithrombin 3 deficiency     Cellulitis     lower legs, venous stasis    CHF (congestive heart failure)     Coronary artery disease     Deep vein thrombosis (DVT) 2012    left leg    Diabetes mellitus type 2 in obese 05/15/2013    Hx of colonic polyps     Hypertension     Lymphedema     Obesity     LAKHWINDER (obstructive sleep apnea)     Pulmonary embolism     Venous stasis ulcers     left ankle     Past Surgical History:   Procedure Laterality Date    COLONOSCOPY  5/2/2011    Dr. Miller, 9 polyps, recheck 5 year    MULTIPLE TOOTH EXTRACTIONS      RADIOFREQUENCY ABLATION  7/2015    bilateral     TONSILLECTOMY       Family History   Problem Relation Age of Onset    Heart disease Mother     Heart disease Father     Gallbladder disease Sister     Cataracts Sister     Heart disease Brother     Stroke Brother     Emphysema Maternal Uncle      Social  History     Tobacco Use    Smoking status: Every Day     Packs/day: 1.00     Years: 20.00     Pack years: 20.00     Types: Cigarettes     Last attempt to quit: 1/3/2002     Years since quittin.5    Smokeless tobacco: Never   Substance Use Topics    Alcohol use: Yes     Alcohol/week: 1.0 standard drink     Types: 1 Cans of beer per week     Comment: seldom    Drug use: No     Review of Systems   Constitutional:  Negative for chills and fever.   HENT: Negative.     Eyes:  Negative for visual disturbance.   Respiratory:  Positive for shortness of breath. Negative for cough and wheezing.    Cardiovascular:  Positive for leg swelling. Negative for chest pain and palpitations.   Gastrointestinal:  Negative for abdominal pain, nausea and vomiting.   Genitourinary:  Negative for decreased urine volume and difficulty urinating.     Physical Exam     Initial Vitals [23]   BP Pulse Resp Temp SpO2   (!) 120/55 94 18 98.6 °F (37 °C) 100 %      MAP       --         Physical Exam    Constitutional: He appears well-developed and well-nourished. He is not diaphoretic. No distress.   HENT:   Head: Normocephalic and atraumatic.   Right Ear: External ear normal.   Left Ear: External ear normal.   Mouth/Throat: Oropharynx is clear and moist.   Eyes: EOM are normal. Pupils are equal, round, and reactive to light. Right eye exhibits no discharge. Left eye exhibits no discharge. No scleral icterus.   Neck: Neck supple. No tracheal deviation present.   Normal range of motion.  Cardiovascular:  Normal rate, regular rhythm, normal heart sounds and intact distal pulses.     Exam reveals no gallop and no friction rub.       No murmur heard.  Pulmonary/Chest: Breath sounds normal. No respiratory distress. He has no rales.   Abdominal: Abdomen is soft. He exhibits no distension. There is no abdominal tenderness. There is no rebound and no guarding.   Musculoskeletal:         General: Edema (severe edema to BLE.) present. Normal  "range of motion.      Cervical back: Normal range of motion and neck supple.     Neurological: He is alert and oriented to person, place, and time. No cranial nerve deficit. GCS score is 15. GCS eye subscore is 4. GCS verbal subscore is 5. GCS motor subscore is 6.   Skin: Skin is warm and dry.   Psychiatric: He has a normal mood and affect. Thought content normal.       ED Course   Procedures  Labs Reviewed - No data to display       Imaging Results              X-Ray Chest AP Portable (In process)                      Medications - No data to display        APC / Resident Notes:   73-year-old male with chronic lymphedema presents to the ER after mechanical fall with sliding off of wheelchair seat while attempting to transfer to his mobile scooter.  No head trauma, LOC, prodromal symptoms.  Patient with slight abrasion to left posterior flank.  No bony tenderness or other trauma noted.  Lungs clear, stable on room air without tachypnea or respiratory distress.  Patient requests IV Lasix as feels his sequential wrappings are not "doing well enough" for his lymphedema.  He has follow-up with his wound care physicians tomorrow.  Chest x-ray obtained, without findings of pulmonary edema which is concordant with examination.  Appropriate for continued outpatient treatment and follow-up for his lymphedema.  No emergent medical condition identified.  Patient discharged with medical transportation in stable condition after all questions were answered.     Moiz Kaur DO  [facility] Internal Medicine/Emergency Medicine HO-IV    4:50 AM 07/25/2023                         Clinical Impression:   Final diagnoses:  [R06.02] SOB (shortness of breath)  [W19.XXXA] Fall, initial encounter (Primary)  [S20.412A] Abrasion of left side of back, initial encounter        ED Disposition Condition    Discharge Stable          ED Prescriptions    None       Follow-up Information       Follow up With Specialties Details Why Contact Info " Additional Information    Solitario Mendez MD Family Medicine Schedule an appointment as soon as possible for a visit   [address]  [address]  [place] [facility] [address]  [phone]       [facility] - Emergency Dept Emergency Medicine Go to  As needed [address]  [facility] [address]  [phone] 1st floor    [facility] Wound Care Go on 7/25/2023 For appointment [address]  [address]  [place] [address]  [phone]                Moiz Kaur DO  Resident  07/25/23 [id number]

## 2023-07-25 NOTE — DISCHARGE INSTRUCTIONS
Follow-up with your vascular and lymphedema physicians and services.  Take precaution when transferring between mobility assistance devices.  For discomfort I recommend using Tylenol as well as ice to areas if you have any muscle aches or pains after this slip.

## 2023-12-06 DIAGNOSIS — E11.9 TYPE 2 DIABETES MELLITUS WITHOUT COMPLICATION, UNSPECIFIED WHETHER LONG TERM INSULIN USE: ICD-10-CM

## 2024-02-22 NOTE — ADDENDUM NOTE
Addended by: RIGO SHERMAN on: 5/4/2023 02:48 PM     Modules accepted: Orders    
on the discharge service for the patient. I have reviewed and made amendments to the documentation where necessary.

## 2024-04-17 NOTE — PLAN OF CARE
Problem: Adult Inpatient Plan of Care  Goal: Plan of Care Review  Outcome: Revised  Pt is AAOX4.  Pt c/o pain, prn medication given, pt tolerated well.  Cardiac monitoring in place, NSR.  IVF infusing, infusing abx as ordered, no redness or swelling at site.  Dressing to LLE maintained, pulses palpable.  VSS, in NAD, pt remains afebrile.  Pt remains free from injury.  Bed in low position, wheels locked, call light within reach.  Pt verbalized understanding of POC.  Will continue to monitor.         No

## 2024-07-17 ENCOUNTER — HOSPITAL ENCOUNTER (INPATIENT)
Facility: HOSPITAL | Age: 74
LOS: 1 days | Discharge: NURSING, REHAB OR LONG-TERM CARE | DRG: 683 | End: 2024-07-19
Attending: EMERGENCY MEDICINE | Admitting: HOSPITALIST
Payer: MEDICARE

## 2024-07-17 DIAGNOSIS — R07.9 CHEST PAIN: ICD-10-CM

## 2024-07-17 DIAGNOSIS — N17.9 AKI (ACUTE KIDNEY INJURY): ICD-10-CM
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
## 2024-07-17 DIAGNOSIS — I50.9 CHF (CONGESTIVE HEART FAILURE): ICD-10-CM

## 2024-07-17 PROBLEM — I95.9 HYPOTENSION: Status: ACTIVE | Noted: 2024-07-17

## 2024-07-17 PROBLEM — E87.1 HYPONATREMIA: Status: ACTIVE | Noted: 2024-07-17

## 2024-07-17 LAB
ALBUMIN SERPL BCP-MCNC: 3.1 G/DL (ref 3.5–5.2)
ALP SERPL-CCNC: 71 U/L (ref 55–135)
ALT SERPL W/O P-5'-P-CCNC: 29 U/L (ref 10–44)
ANION GAP SERPL CALC-SCNC: 13 MMOL/L (ref 8–16)
AST SERPL-CCNC: 31 U/L (ref 10–40)
BASOPHILS # BLD AUTO: 0.06 K/UL (ref 0–0.2)
BASOPHILS NFR BLD: 0.6 % (ref 0–1.9)
BILIRUB SERPL-MCNC: 0.3 MG/DL (ref 0.1–1)
BUN SERPL-MCNC: 72 MG/DL (ref 8–23)
CALCIUM SERPL-MCNC: 7.8 MG/DL (ref 8.7–10.5)
CHLORIDE SERPL-SCNC: 98 MMOL/L (ref 95–110)
CO2 SERPL-SCNC: 21 MMOL/L (ref 23–29)
CREAT SERPL-MCNC: 2.4 MG/DL (ref 0.5–1.4)
DIFFERENTIAL METHOD BLD: ABNORMAL
EOSINOPHIL # BLD AUTO: 0.3 K/UL (ref 0–0.5)
EOSINOPHIL NFR BLD: 2.5 % (ref 0–8)
ERYTHROCYTE [DISTWIDTH] IN BLOOD BY AUTOMATED COUNT: 13.4 % (ref 11.5–14.5)
EST. GFR  (NO RACE VARIABLE): 27.3 ML/MIN/1.73 M^2
GLUCOSE SERPL-MCNC: 164 MG/DL (ref 70–110)
HCT VFR BLD AUTO: 35.2 % (ref 40–54)
HGB BLD-MCNC: 11 G/DL (ref 14–18)
IMM GRANULOCYTES # BLD AUTO: 0.09 K/UL (ref 0–0.04)
IMM GRANULOCYTES NFR BLD AUTO: 0.8 % (ref 0–0.5)
LYMPHOCYTES # BLD AUTO: 0.7 K/UL (ref 1–4.8)
LYMPHOCYTES NFR BLD: 6.7 % (ref 18–48)
MCH RBC QN AUTO: 29.7 PG (ref 27–31)
MCHC RBC AUTO-ENTMCNC: 31.3 G/DL (ref 32–36)
MCV RBC AUTO: 95 FL (ref 82–98)
MONOCYTES # BLD AUTO: 0.8 K/UL (ref 0.3–1)
MONOCYTES NFR BLD: 7.1 % (ref 4–15)
NEUTROPHILS # BLD AUTO: 8.9 K/UL (ref 1.8–7.7)
NEUTROPHILS NFR BLD: 82.3 % (ref 38–73)
NRBC BLD-RTO: 0 /100 WBC
PLATELET # BLD AUTO: 215 K/UL (ref 150–450)
PMV BLD AUTO: 9.9 FL (ref 9.2–12.9)
POTASSIUM SERPL-SCNC: 5 MMOL/L (ref 3.5–5.1)
PROT SERPL-MCNC: 6.5 G/DL (ref 6–8.4)
RBC # BLD AUTO: 3.7 M/UL (ref 4.6–6.2)
SODIUM SERPL-SCNC: 132 MMOL/L (ref 136–145)
WBC # BLD AUTO: 10.84 K/UL (ref 3.9–12.7)

## 2024-07-17 PROCEDURE — 80053 COMPREHEN METABOLIC PANEL: CPT | Performed by: NURSE PRACTITIONER

## 2024-07-17 PROCEDURE — 85025 COMPLETE CBC W/AUTO DIFF WBC: CPT | Performed by: NURSE PRACTITIONER

## 2024-07-17 PROCEDURE — G0378 HOSPITAL OBSERVATION PER HR: HCPCS

## 2024-07-17 PROCEDURE — 99285 EMERGENCY DEPT VISIT HI MDM: CPT | Mod: 25

## 2024-07-17 RX ORDER — FUROSEMIDE 40 MG/1
40 TABLET ORAL
Status: CANCELLED | OUTPATIENT
Start: 2024-07-18

## 2024-07-17 RX ORDER — SODIUM CHLORIDE 0.9 % (FLUSH) 0.9 %
3 SYRINGE (ML) INJECTION EVERY 12 HOURS PRN
Status: DISCONTINUED | OUTPATIENT
Start: 2024-07-17 | End: 2024-07-19 | Stop reason: HOSPADM

## 2024-07-17 RX ORDER — ASCORBIC ACID 500 MG
500 TABLET ORAL DAILY
COMMUNITY

## 2024-07-17 RX ORDER — NALOXONE HCL 0.4 MG/ML
0.02 VIAL (ML) INJECTION
Status: DISCONTINUED | OUTPATIENT
Start: 2024-07-17 | End: 2024-07-19 | Stop reason: HOSPADM

## 2024-07-17 RX ORDER — LIDOCAINE 560 MG/1
1 PATCH PERCUTANEOUS; TOPICAL; TRANSDERMAL DAILY
COMMUNITY

## 2024-07-17 RX ORDER — ALUMINUM HYDROXIDE, MAGNESIUM HYDROXIDE, AND SIMETHICONE 1200; 120; 1200 MG/30ML; MG/30ML; MG/30ML
30 SUSPENSION ORAL 4 TIMES DAILY PRN
Status: DISCONTINUED | OUTPATIENT
Start: 2024-07-17 | End: 2024-07-19 | Stop reason: HOSPADM

## 2024-07-17 RX ORDER — SPIRONOLACTONE 25 MG/1
50 TABLET ORAL DAILY
Status: CANCELLED | OUTPATIENT
Start: 2024-07-18

## 2024-07-17 RX ORDER — ACETAMINOPHEN 325 MG/1
650 TABLET ORAL EVERY 8 HOURS PRN
Status: DISCONTINUED | OUTPATIENT
Start: 2024-07-17 | End: 2024-07-19 | Stop reason: HOSPADM

## 2024-07-17 RX ORDER — DOXYCYCLINE HYCLATE 100 MG
100 TABLET ORAL 2 TIMES DAILY
Status: ON HOLD | COMMUNITY
End: 2024-07-19 | Stop reason: HOSPADM

## 2024-07-17 RX ORDER — AMOXICILLIN 250 MG
1 CAPSULE ORAL DAILY
Status: DISCONTINUED | OUTPATIENT
Start: 2024-07-18 | End: 2024-07-19 | Stop reason: HOSPADM

## 2024-07-17 RX ORDER — CLINDAMYCIN HYDROCHLORIDE 300 MG/1
300 CAPSULE ORAL EVERY 6 HOURS
Status: ON HOLD | COMMUNITY
End: 2024-07-19 | Stop reason: HOSPADM

## 2024-07-17 RX ORDER — SODIUM CHLORIDE 9 MG/ML
INJECTION, SOLUTION INTRAVENOUS CONTINUOUS
Status: DISCONTINUED | OUTPATIENT
Start: 2024-07-18 | End: 2024-07-17

## 2024-07-17 RX ORDER — SODIUM CHLORIDE 9 MG/ML
INJECTION, SOLUTION INTRAVENOUS CONTINUOUS
Status: ACTIVE | OUTPATIENT
Start: 2024-07-18 | End: 2024-07-18

## 2024-07-17 RX ORDER — TALC
6 POWDER (GRAM) TOPICAL NIGHTLY PRN
Status: DISCONTINUED | OUTPATIENT
Start: 2024-07-17 | End: 2024-07-19 | Stop reason: HOSPADM

## 2024-07-17 RX ORDER — TRIAMCINOLONE ACETONIDE 1 MG/G
1 OINTMENT TOPICAL 2 TIMES DAILY
COMMUNITY
Start: 2024-07-15

## 2024-07-17 RX ORDER — LISINOPRIL 10 MG/1
10 TABLET ORAL DAILY
Status: ON HOLD | COMMUNITY
End: 2024-07-19 | Stop reason: HOSPADM

## 2024-07-17 RX ORDER — HYDROCODONE BITARTRATE AND ACETAMINOPHEN 5; 325 MG/1; MG/1
1 TABLET ORAL EVERY 6 HOURS PRN
Status: DISCONTINUED | OUTPATIENT
Start: 2024-07-17 | End: 2024-07-19 | Stop reason: HOSPADM

## 2024-07-17 RX ORDER — PANTOPRAZOLE SODIUM 40 MG/1
40 TABLET, DELAYED RELEASE ORAL DAILY
COMMUNITY
End: 2024-07-17

## 2024-07-17 RX ORDER — ONDANSETRON HYDROCHLORIDE 2 MG/ML
4 INJECTION, SOLUTION INTRAVENOUS EVERY 6 HOURS PRN
Status: DISCONTINUED | OUTPATIENT
Start: 2024-07-17 | End: 2024-07-19 | Stop reason: HOSPADM

## 2024-07-17 RX ORDER — IBUPROFEN 200 MG
24 TABLET ORAL
Status: DISCONTINUED | OUTPATIENT
Start: 2024-07-17 | End: 2024-07-19 | Stop reason: HOSPADM

## 2024-07-17 RX ORDER — IBUPROFEN 200 MG
16 TABLET ORAL
Status: DISCONTINUED | OUTPATIENT
Start: 2024-07-17 | End: 2024-07-19 | Stop reason: HOSPADM

## 2024-07-17 RX ORDER — GLUCAGON 1 MG
1 KIT INJECTION
Status: DISCONTINUED | OUTPATIENT
Start: 2024-07-17 | End: 2024-07-19 | Stop reason: HOSPADM

## 2024-07-17 RX ORDER — INSULIN ASPART 100 [IU]/ML
0-5 INJECTION, SOLUTION INTRAVENOUS; SUBCUTANEOUS
Status: DISCONTINUED | OUTPATIENT
Start: 2024-07-17 | End: 2024-07-19 | Stop reason: HOSPADM

## 2024-07-18 ENCOUNTER — CLINICAL SUPPORT (OUTPATIENT)
Dept: CARDIOLOGY | Facility: HOSPITAL | Age: 74
End: 2024-07-18
Attending: EMERGENCY MEDICINE
Payer: MEDICARE

## 2024-07-18 VITALS — HEIGHT: 71 IN | BODY MASS INDEX: 44.1 KG/M2 | WEIGHT: 315 LBS

## 2024-07-18 LAB
ALBUMIN SERPL BCP-MCNC: 3.2 G/DL (ref 3.5–5.2)
ALP SERPL-CCNC: 76 U/L (ref 55–135)
ALT SERPL W/O P-5'-P-CCNC: 34 U/L (ref 10–44)
ANION GAP SERPL CALC-SCNC: 7 MMOL/L (ref 8–16)
AORTIC ROOT ANNULUS: 3.1 CM
AORTIC VALVE CUSP SEPERATION: 1.9 CM
AST SERPL-CCNC: 30 U/L (ref 10–40)
AV INDEX (PROSTH): 0.65
AV MEAN GRADIENT: 2 MMHG
AV PEAK GRADIENT: 5 MMHG
AV VELOCITY RATIO: 0.69
BASOPHILS # BLD AUTO: 0.07 K/UL (ref 0–0.2)
BASOPHILS NFR BLD: 0.8 % (ref 0–1.9)
BILIRUB SERPL-MCNC: 0.4 MG/DL (ref 0.1–1)
BILIRUB UR QL STRIP: NEGATIVE
BILIRUB UR QL STRIP: NEGATIVE
BSA FOR ECHO PROCEDURE: 2.88 M2
BUN SERPL-MCNC: 68 MG/DL (ref 8–23)
CALCIUM SERPL-MCNC: 8 MG/DL (ref 8.7–10.5)
CHLORIDE SERPL-SCNC: 99 MMOL/L (ref 95–110)
CLARITY UR: CLEAR
CLARITY UR: CLEAR
CO2 SERPL-SCNC: 29 MMOL/L (ref 23–29)
COLOR UR: YELLOW
COLOR UR: YELLOW
CORTIS SERPL-MCNC: 8.2 UG/DL
CREAT SERPL-MCNC: 2.3 MG/DL (ref 0.5–1.4)
CV ECHO LV RWT: 0.48 CM
DIFFERENTIAL METHOD BLD: ABNORMAL
DOP CALC AO PEAK VEL: 1.08 M/S
DOP CALC AO VTI: 15 CM
DOP CALC LVOT PEAK VEL: 0.75 M/S
DOP CALC MV VTI: 20.4 CM
DOP CALCLVOT PEAK VEL VTI: 9.7 CM
E WAVE DECELERATION TIME: 141 MSEC
E/A RATIO: 0.82
E/E' RATIO: 9.68 M/S
ECHO LV POSTERIOR WALL: 1.3 CM (ref 0.6–1.1)
EOSINOPHIL # BLD AUTO: 0.3 K/UL (ref 0–0.5)
EOSINOPHIL NFR BLD: 3.8 % (ref 0–8)
ERYTHROCYTE [DISTWIDTH] IN BLOOD BY AUTOMATED COUNT: 13.4 % (ref 11.5–14.5)
EST. GFR  (NO RACE VARIABLE): 28.7 ML/MIN/1.73 M^2
GLUCOSE SERPL-MCNC: 109 MG/DL (ref 70–110)
GLUCOSE SERPL-MCNC: 120 MG/DL (ref 70–110)
GLUCOSE SERPL-MCNC: 125 MG/DL (ref 70–110)
GLUCOSE SERPL-MCNC: 166 MG/DL (ref 70–110)
GLUCOSE SERPL-MCNC: 97 MG/DL (ref 70–110)
GLUCOSE SERPL-MCNC: 97 MG/DL (ref 70–110)
GLUCOSE UR QL STRIP: NEGATIVE
GLUCOSE UR QL STRIP: NEGATIVE
HCT VFR BLD AUTO: 38.2 % (ref 40–54)
HGB BLD-MCNC: 11.9 G/DL (ref 14–18)
HGB UR QL STRIP: NEGATIVE
HGB UR QL STRIP: NEGATIVE
IMM GRANULOCYTES # BLD AUTO: 0.02 K/UL (ref 0–0.04)
IMM GRANULOCYTES NFR BLD AUTO: 0.2 % (ref 0–0.5)
INTERVENTRICULAR SEPTUM: 1.1 CM (ref 0.6–1.1)
KETONES UR QL STRIP: NEGATIVE
KETONES UR QL STRIP: NEGATIVE
LEFT VENTRICLE DIASTOLIC VOLUME INDEX: 51.76 ML/M2
LEFT VENTRICLE DIASTOLIC VOLUME: 141.31 ML
LEFT VENTRICLE MASS INDEX: 97 G/M2
LEFT VENTRICULAR INTERNAL DIMENSION IN DIASTOLE: 5.4 CM (ref 3.5–6)
LEFT VENTRICULAR MASS: 264.42 G
LEUKOCYTE ESTERASE UR QL STRIP: NEGATIVE
LEUKOCYTE ESTERASE UR QL STRIP: NEGATIVE
LV LATERAL E/E' RATIO: 7.67 M/S
LV SEPTAL E/E' RATIO: 13.14 M/S
LVED V (TEICH): 141.31 ML
LVOT MG: 1 MMHG
LVOT MV: 0.47 CM/S
LYMPHOCYTES # BLD AUTO: 1.2 K/UL (ref 1–4.8)
LYMPHOCYTES NFR BLD: 13.3 % (ref 18–48)
MAGNESIUM SERPL-MCNC: 2.5 MG/DL (ref 1.6–2.6)
MCH RBC QN AUTO: 29.6 PG (ref 27–31)
MCHC RBC AUTO-ENTMCNC: 31.2 G/DL (ref 32–36)
MCV RBC AUTO: 95 FL (ref 82–98)
MONOCYTES # BLD AUTO: 1 K/UL (ref 0.3–1)
MONOCYTES NFR BLD: 11.8 % (ref 4–15)
MV MEAN GRADIENT: 3 MMHG
MV PEAK A VEL: 1.12 M/S
MV PEAK E VEL: 0.92 M/S
MV PEAK GRADIENT: 7 MMHG
MV STENOSIS PRESSURE HALF TIME: 52 MS
MV VALVE AREA P 1/2 METHOD: 4.23 CM2
NEUTROPHILS # BLD AUTO: 6.1 K/UL (ref 1.8–7.7)
NEUTROPHILS NFR BLD: 70.1 % (ref 38–73)
NITRITE UR QL STRIP: NEGATIVE
NITRITE UR QL STRIP: NEGATIVE
NRBC BLD-RTO: 0 /100 WBC
OSMOLALITY SERPL: 308 MOSM/KG (ref 280–300)
OSMOLALITY UR: 446 MOSM/KG (ref 50–1200)
PH UR STRIP: 7 [PH] (ref 5–8)
PH UR STRIP: 7 [PH] (ref 5–8)
PISA TR MAX VEL: 2.07 M/S
PLATELET # BLD AUTO: 241 K/UL (ref 150–450)
PMV BLD AUTO: 9.9 FL (ref 9.2–12.9)
POTASSIUM SERPL-SCNC: 5.1 MMOL/L (ref 3.5–5.1)
PROT SERPL-MCNC: 6.4 G/DL (ref 6–8.4)
PROT UR QL STRIP: NEGATIVE
PROT UR QL STRIP: NEGATIVE
PV MV: 0.81 M/S
PV PEAK GRADIENT: 6 MMHG
PV PEAK VELOCITY: 1.2 M/S
RBC # BLD AUTO: 4.02 M/UL (ref 4.6–6.2)
SODIUM SERPL-SCNC: 135 MMOL/L (ref 136–145)
SODIUM UR-SCNC: 71 MMOL/L (ref 20–250)
SP GR UR STRIP: 1.01 (ref 1–1.03)
SP GR UR STRIP: 1.01 (ref 1–1.03)
TDI LATERAL: 0.12 M/S
TDI SEPTAL: 0.07 M/S
TDI: 0.1 M/S
TR MAX PG: 17 MMHG
TSH SERPL DL<=0.005 MIU/L-ACNC: 1.77 UIU/ML (ref 0.34–5.6)
URN SPEC COLLECT METH UR: NORMAL
URN SPEC COLLECT METH UR: NORMAL
UROBILINOGEN UR STRIP-ACNC: NEGATIVE EU/DL
UROBILINOGEN UR STRIP-ACNC: NEGATIVE EU/DL
WBC # BLD AUTO: 8.73 K/UL (ref 3.9–12.7)
Z-SCORE OF LEFT VENTRICULAR DIMENSION IN END DIASTOLE: -16.62

## 2024-07-18 PROCEDURE — 25000003 PHARM REV CODE 250: Performed by: NURSE PRACTITIONER

## 2024-07-18 PROCEDURE — 36415 COLL VENOUS BLD VENIPUNCTURE: CPT | Performed by: NURSE PRACTITIONER

## 2024-07-18 PROCEDURE — 82533 TOTAL CORTISOL: CPT | Performed by: NURSE PRACTITIONER

## 2024-07-18 PROCEDURE — 81003 URINALYSIS AUTO W/O SCOPE: CPT | Performed by: NURSE PRACTITIONER

## 2024-07-18 PROCEDURE — 83935 ASSAY OF URINE OSMOLALITY: CPT | Performed by: NURSE PRACTITIONER

## 2024-07-18 PROCEDURE — 80053 COMPREHEN METABOLIC PANEL: CPT | Performed by: NURSE PRACTITIONER

## 2024-07-18 PROCEDURE — 93306 TTE W/DOPPLER COMPLETE: CPT

## 2024-07-18 PROCEDURE — 93306 TTE W/DOPPLER COMPLETE: CPT | Mod: 26,,, | Performed by: GENERAL PRACTICE

## 2024-07-18 PROCEDURE — 83930 ASSAY OF BLOOD OSMOLALITY: CPT | Performed by: NURSE PRACTITIONER

## 2024-07-18 PROCEDURE — 84443 ASSAY THYROID STIM HORMONE: CPT | Performed by: NURSE PRACTITIONER

## 2024-07-18 PROCEDURE — 85025 COMPLETE CBC W/AUTO DIFF WBC: CPT | Performed by: NURSE PRACTITIONER

## 2024-07-18 PROCEDURE — 96374 THER/PROPH/DIAG INJ IV PUSH: CPT

## 2024-07-18 PROCEDURE — 84300 ASSAY OF URINE SODIUM: CPT | Performed by: NURSE PRACTITIONER

## 2024-07-18 PROCEDURE — 82962 GLUCOSE BLOOD TEST: CPT

## 2024-07-18 PROCEDURE — 25500020 PHARM REV CODE 255: Performed by: HOSPITALIST

## 2024-07-18 PROCEDURE — 21400001 HC TELEMETRY ROOM

## 2024-07-18 PROCEDURE — 83735 ASSAY OF MAGNESIUM: CPT | Performed by: NURSE PRACTITIONER

## 2024-07-18 PROCEDURE — 27000207 HC ISOLATION

## 2024-07-18 RX ORDER — TAMSULOSIN HYDROCHLORIDE 0.4 MG/1
0.4 CAPSULE ORAL DAILY
Status: DISCONTINUED | OUTPATIENT
Start: 2024-07-18 | End: 2024-07-19 | Stop reason: HOSPADM

## 2024-07-18 RX ORDER — SODIUM CHLORIDE 9 MG/ML
INJECTION, SOLUTION INTRAVENOUS CONTINUOUS
Status: ACTIVE | OUTPATIENT
Start: 2024-07-18 | End: 2024-07-18

## 2024-07-18 RX ADMIN — PERFLUTREN 1 ML: 6.52 INJECTION, SUSPENSION INTRAVENOUS at 02:07

## 2024-07-18 RX ADMIN — APIXABAN 5 MG: 5 TABLET, FILM COATED ORAL at 09:07

## 2024-07-18 RX ADMIN — SODIUM CHLORIDE: 9 INJECTION, SOLUTION INTRAVENOUS at 12:07

## 2024-07-18 RX ADMIN — TAMSULOSIN HYDROCHLORIDE 0.4 MG: 0.4 CAPSULE ORAL at 09:07

## 2024-07-18 RX ADMIN — SENNOSIDES AND DOCUSATE SODIUM 1 TABLET: 50; 8.6 TABLET ORAL at 09:07

## 2024-07-18 NOTE — ED NOTES
Patient educated on the need for a urine specimen. Patient verbalized understanding. Patient resting in bed and given a urinal. Bed locked and in lowest position. Side rails raised x 2. Call light left in reach of patient and patient instructed to call staff for assistance.

## 2024-07-18 NOTE — H&P
[facility] - Emergency Dept  Hospital Medicine  History & Physical    Patient Name: Lukas Chance  MRN: [id number]  Patient Class: OP- Observation  Admission Date: 7/17/2024  Attending Physician: Bryce Mora MD   Primary Care Provider: Solitario Mendez MD         Patient information was obtained from patient and ER records.     Subjective:     Principal Problem:ASHLEY (acute kidney injury)    Chief Complaint:   Chief Complaint   Patient presents with    Fall     Pt had a slide out of bed and denies any injuries, LOC, and is not having any complaints. EMS reports hypotension of 80/50.         HPI: 74 year old male with a past medical history of pulmonary embolism, sleep apnea, obesity, lymphedema, venous insufficiency with venous statis ulcers, DVT on Eliquis, cellulitis, CHF, diabetes, acute kidney injury whom presents to the emergency room via EMS from a nursing home (patient is bed bound) after he fell out of his bed while reaching for a remote. Reports falling on his stomach. Denied pain at that time but NH required a lift assist and EMS recommended pt come to ED for work up. During initial assessment pt found to be hypotensive at 80s systolic and EMS started IVF.  BP improved 120s systolic,         Past Medical History:   Diagnosis Date    Anticoagulant long-term use 03/01/2013    Antithrombin 3 deficiency     Cellulitis     lower legs, venous stasis    CHF (congestive heart failure)     Coronary artery disease     Deep vein thrombosis (DVT) 2012    left leg    Diabetes mellitus type 2 in obese 05/15/2013    Hx of colonic polyps     Hypertension     Lymphedema     Obesity     LAKHWINDER (obstructive sleep apnea)     Pulmonary embolism     Venous stasis ulcers     left ankle       Past Surgical History:   Procedure Laterality Date    COLONOSCOPY  5/2/2011    Dr. Miller, 9 polyps, recheck 5 year    MULTIPLE TOOTH EXTRACTIONS      RADIOFREQUENCY ABLATION  7/2015    bilateral     TONSILLECTOMY          Review of patient's allergies indicates:   Allergen Reactions    Bactroban [mupirocin calcium] Other (See Comments)     Burning feeling on open sore     Metoprolol Other (See Comments)     dizzy       No current facility-administered medications on file prior to encounter.     Current Outpatient Medications on File Prior to Encounter   Medication Sig    acetaminophen (TYLENOL) 500 MG tablet Take 1,000 mg by mouth 2 (two) times daily as needed for Pain.    buPROPion (WELLBUTRIN XL) 150 MG TB24 tablet Take 150 mg by mouth once daily.    DULoxetine (CYMBALTA) 30 MG capsule Take 1 capsule (30 mg total) by mouth once daily.    ELIQUIS 5 mg Tab Take 1 tablet (5 mg total) by mouth 2 (two) times daily.    furosemide (LASIX) 40 MG tablet Take 1 tablet (40 mg total) by mouth 2 (two) times daily before meals.    gabapentin (NEURONTIN) 300 MG capsule Take 300 mg by mouth 2 (two) times daily.    multivitamin (THERAGRAN) per tablet Take 1 tablet by mouth once daily.    semaglutide (OZEMPIC) 0.25 mg or 0.5 mg(2 mg/1.5 mL) pen injector Inject 0.25 mg into the skin every 7 days. For four weeks, then increase to 0.5mg every seven days for four weeks.    spironolactone (ALDACTONE) 50 MG tablet Take 1 tablet (50 mg total) by mouth once daily.    tamsulosin (FLOMAX) 0.4 mg Cap Take 1 capsule (0.4 mg total) by mouth once daily.     Family History       Problem Relation (Age of Onset)    Cataracts Sister    Emphysema Maternal Uncle    Gallbladder disease Sister    Heart disease Mother, Father, Brother    Stroke Brother          Tobacco Use    Smoking status: Every Day     Current packs/day: 0.00     Average packs/day: 1 pack/day for 20.0 years (20.0 ttl pk-yrs)     Types: Cigarettes     Start date: 1/3/1982     Last attempt to quit: 1/3/2002     Years since quittin.5    Smokeless tobacco: Never   Substance and Sexual Activity    Alcohol use: Yes     Alcohol/week: 1.0 standard drink of alcohol     Types: 1 Cans of beer per week      Comment: seldom    Drug use: No    Sexual activity: Yes     Partners: Female     Review of Systems   Constitutional: Negative.    Respiratory: Negative.     Cardiovascular: Negative.    Gastrointestinal:  Positive for constipation.   Genitourinary: Negative.    Objective:     Vital Signs (Most Recent):  Temp: 97.9 °F (36.6 °C) (07/17/24 1941)  Pulse: 74 (07/17/24 2201)  Resp: 18 (07/17/24 1941)  BP: 109/76 (07/17/24 2201)  SpO2: 99 % (07/17/24 2216) Vital Signs (24h Range):  Temp:  [97.9 °F (36.6 °C)] 97.9 °F (36.6 °C)  Pulse:  [74-86] 74  Resp:  [18] 18  SpO2:  [99 %] 99 %  BP: (101-109)/(54-76) 109/76     Weight: 136.1 kg (300 lb)  Body mass index is 43.05 kg/m².     Physical Exam  Vitals reviewed.   Constitutional:       General: He is not in acute distress.     Appearance: He is obese. He is not ill-appearing.   HENT:      Head: Normocephalic and atraumatic.   Eyes:      Pupils: Pupils are equal, round, and reactive to light.   Cardiovascular:      Rate and Rhythm: Normal rate and regular rhythm.      Pulses: Normal pulses.      Heart sounds: Normal heart sounds.   Pulmonary:      Effort: Pulmonary effort is normal.      Breath sounds: Normal breath sounds.   Abdominal:      General: There is distension.      Tenderness: There is no abdominal tenderness.   Musculoskeletal:      Right lower leg: 3+ Pitting Edema present.      Left lower leg: 3+ Pitting Edema present.   Skin:     General: Skin is warm.      Comments: Discoloration bilateral lower extremities. Pitting edema. Patient with history of lympedema  Scratch marks on abdomen and left elbow.    Neurological:      Mental Status: He is alert. Mental status is at baseline.   Psychiatric:         Mood and Affect: Mood normal.          CRANIAL NERVES     CN III, IV, VI   Pupils are equal, round, and reactive to light.     Significant Labs: All pertinent labs within the past 24 hours have been reviewed.  Recent Lab Results         07/17/24 2121         Albumin 3.1       ALP 71       ALT 29       Anion Gap 13       AST 31       Baso # 0.06       Basophil % 0.6       BILIRUBIN TOTAL 0.3  Comment: For infants and newborns, interpretation of results should be based  on gestational age, weight and in agreement with clinical  observations.    Premature Infant recommended reference ranges:  Up to 24 hours.............<8.0 mg/dL  Up to 48 hours............<12.0 mg/dL  3-5 days..................<15.0 mg/dL  6-29 days.................<15.0 mg/dL         BUN 72       Calcium 7.8       Chloride 98       CO2 21       Creatinine 2.4       Differential Method Automated       eGFR 27.3       Eos # 0.3       Eos % 2.5       Glucose 164       Gran # (ANC) 8.9       Gran % 82.3       Hematocrit 35.2       Hemoglobin 11.0       Immature Grans (Abs) 0.09  Comment: Mild elevation in immature granulocytes is non specific and   can be seen in a variety of conditions including stress response,   acute inflammation, trauma and pregnancy. Correlation with other   laboratory and clinical findings is essential.         Immature Granulocytes 0.8       Lymph # 0.7       Lymph % 6.7       MCH 29.7       MCHC 31.3       MCV 95       Mono # 0.8       Mono % 7.1       MPV 9.9       nRBC 0       Platelet Count 215       Potassium 5.0       PROTEIN TOTAL 6.5       RBC 3.70       RDW 13.4       Sodium 132       WBC 10.84               Significant Imaging: I have reviewed all pertinent imaging results/findings within the past 24 hours.  Imaging Results              X-Ray Pelvis Routine AP (Final result)  Result time 07/17/24 22:43:19      Final result by Eli Willis MD (07/17/24 22:43:19)                   Impression:      No acute trauma.      Electronically signed by: Eli Willis  Date:    07/17/2024  Time:    22:43               Narrative:    EXAMINATION:  XR PELVIS ROUTINE AP    CLINICAL HISTORY:  Unspecified fall, initial encounter    TECHNIQUE:  AP view of the pelvis was  performed.    COMPARISON:  None.    FINDINGS:  Limited by osteopenia.  No detectable fracture.  Moderate bilateral hip osteoarthritis.                                      Assessment/Plan:     ASHLEY (acute kidney injury)  Patient with acute kidney injury/acute renal failure likely due to pre-renal azotemia due to dehydration ASHLEY is currently worsening. Baseline creatinine  1.4  - Labs reviewed- Renal function/electrolytes with Estimated Creatinine Clearance: 36.4 mL/min (A) (based on SCr of 2.4 mg/dL (H)). according to latest data. Monitor urine output and serial BMP and adjust therapy as needed. Avoid nephrotoxins and renally dose meds for GFR listed above    Bladder scan: r/o urinary retention, place ryder cath if patient has >400 ml on PVR  KUB: r/o constipation, patient reports last bowel movement with hard stool.   Trend renal function    Hyponatremia  Patient has hyponatremia which is worsening,We will aim to correct the sodium by 4-6mEq in 24 hours. We will monitor sodium Daily. The hyponatremia is due to Dehydration/hypovolemia, Heart Failure, and renal insufficiency. We will obtain the following studies: Urine sodium, urine osmolality, serum osmolality, AM cortisol, or TSH, T4. We will treat the hyponatremia with IV fluids as follows: NS over 10 hours, gentle hydration. The patient's sodium results have been reviewed and are listed below.  Recent Labs   Lab 07/17/24  2121   *       Hypotension  Cautious with Gentle IV hydration.   Hold antihypertensives,   Monitor.   Consider midodrine if no improvement in blood pressure with IV hydration      Chronic diastolic heart failure   Echo ordered  CXR ordered, consider stopping IVF after reviewing CXR results.   Monitor for fluid overload.   Holding diuretic due to hyponatremia and ASHLEY. Re-evaluate in am.      Last echo: 2021:  The left ventricle is normal in size with concentric remodeling and normal systolic function. Lumason contrast was used to confirm  "normal ejection fraction left ventricle  The estimated ejection fraction is 60%.  Normal left ventricular diastolic function.  Atrial fibrillation not observed.  Normal right ventricular size with normal right ventricular systolic function.  Mild tricuspid regurgitation.  Normal central venous pressure (3 mmHg).  The estimated PA systolic pressure is 26 mmHg.    History of pulmonary embolism  Chronic. Continue eliquis      Hypertension associated with diabetes        Type 2 diabetes mellitus with circulatory disorder, without long-term current use of insulin  Patient's FSGs are uncontrolled due to hyperglycemia on current medication regimen.  Last A1c reviewed-   Lab Results   Component Value Date    HGBA1C 6.1 05/12/2023     Most recent fingerstick glucose reviewed- No results for input(s): "POCTGLUCOSE" in the last 24 hours.  Current correctional scale  Low  Maintain anti-hyperglycemic dose as follows-   Antihyperglycemics (From admission, onward)      Start     Stop Route Frequency Ordered    07/17/24 2357  insulin aspart U-100 pen 0-5 Units         -- SubQ Before meals & nightly PRN 07/17/24 2258          Hold Oral hypoglycemics while patient is in the hospital.       Sleep apnea  Continue home regimen        VTE Risk Mitigation (From admission, onward)           Ordered     Reason for No Pharmacological VTE Prophylaxis  Once        Question:  Reasons:  Answer:  Already adequately anticoagulated on oral Anticoagulants    07/17/24 2258     IP VTE HIGH RISK PATIENT  Once         07/17/24 2258     Place sequential compression device  Until discontinued         07/17/24 2258                         On 07/17/2024, patient should be placed in hospital observation services under my care in collaboration with Dr. Mora.           Mounika Cunningham NP  Department of Hospital Medicine  [facility] - Emergency Dept          "

## 2024-07-18 NOTE — ASSESSMENT & PLAN NOTE
"Patient's FSGs are uncontrolled due to hyperglycemia on current medication regimen.  Last A1c reviewed-   Lab Results   Component Value Date    HGBA1C 6.1 05/12/2023     Most recent fingerstick glucose reviewed- No results for input(s): "POCTGLUCOSE" in the last 24 hours.  Current correctional scale  Low  Maintain anti-hyperglycemic dose as follows-   Antihyperglycemics (From admission, onward)      Start     Stop Route Frequency Ordered    07/17/24 2357  insulin aspart U-100 pen 0-5 Units         -- SubQ Before meals & nightly PRN 07/17/24 2258          Hold Oral hypoglycemics while patient is in the hospital.     "

## 2024-07-18 NOTE — HPI
74 year old male with a past medical history of pulmonary embolism, sleep apnea, obesity, lymphedema, venous insufficiency with venous statis ulcers, DVT on Eliquis, cellulitis, CHF, diabetes, acute kidney injury whom presents to the emergency room via EMS from a nursing home (patient is bed bound) after he fell out of his bed while reaching for a remote. Reports falling on his stomach. Denied pain at that time but NH required a lift assist and EMS recommended pt come to ED for work up. During initial assessment pt found to be hypotensive at 80s systolic and EMS started IVF.  BP improved 120s systolic,

## 2024-07-18 NOTE — ED NOTES
Bed: [place] [address]  Expected date:   Expected time:   Means of arrival:   Comments:  Ems [facility]

## 2024-07-18 NOTE — ASSESSMENT & PLAN NOTE
Patient with acute kidney injury/acute renal failure likely due to pre-renal azotemia due to dehydration ASHLEY is currently worsening. Baseline creatinine  1.4  - Labs reviewed- Renal function/electrolytes with Estimated Creatinine Clearance: 43.1 mL/min (A) (based on SCr of 2.3 mg/dL (H)). according to latest data. Monitor urine output and serial BMP and adjust therapy as needed. Avoid nephrotoxins and renally dose meds for GFR listed above    Bladder scan: r/o urinary retention, place ryder cath if patient has >400 ml on PVR  KUB: r/o constipation, patient reports last bowel movement with hard stool.  Unable to perform KUB due to weight limit.    CT abdomen pending  Trend renal function

## 2024-07-18 NOTE — ASSESSMENT & PLAN NOTE
Echo ordered  CXR ordered, consider stopping IVF after reviewing CXR results.   Monitor for fluid overload.   Holding diuretic due to hyponatremia and ASHLEY. Re-evaluate in am.      Last echo: 2021:  The left ventricle is normal in size with concentric remodeling and normal systolic function. Lumason contrast was used to confirm normal ejection fraction left ventricle  The estimated ejection fraction is 60%.  Normal left ventricular diastolic function.  Atrial fibrillation not observed.  Normal right ventricular size with normal right ventricular systolic function.  Mild tricuspid regurgitation.  Normal central venous pressure (3 mmHg).  The estimated PA systolic pressure is 26 mmHg.

## 2024-07-18 NOTE — ED PROVIDER NOTES
Encounter Date: 7/17/2024       History     Chief Complaint   Patient presents with    Fall     Pt had a slide out of bed and denies any injuries, LOC, and is not having any complaints. EMS reports hypotension of 80/50.      HPI  74M pmhx DVT on Eliquis, cellulitis, CHF, DM2 BIBEMS after he fell out of his bed at his NH while reaching for a remote. Pt fell on his stomach. No pain at that time but NH required a lift assist and EMS recommended pt come to ED for work up. During initial assessment pt found to be hypotn at 80s systolic and EMS started IVF. On my exam, pt is alert and states he has no pain anywhere, no LOC, did not syncopize or feel lightheaded, no CP, SOB, N/V/F/C, no abd pain. Has chronic knee pain with lidoderm patches on but states no change to his chronic pain.   Review of patient's allergies indicates:   Allergen Reactions    Bactroban [mupirocin calcium] Other (See Comments)     Burning feeling on open sore     Metoprolol Other (See Comments)     dizzy     Past Medical History:   Diagnosis Date    Anticoagulant long-term use 03/01/2013    Antithrombin 3 deficiency     Cellulitis     lower legs, venous stasis    CHF (congestive heart failure)     Coronary artery disease     Deep vein thrombosis (DVT) 2012    left leg    Diabetes mellitus type 2 in obese 05/15/2013    Hx of colonic polyps     Hypertension     Lymphedema     Obesity     LAKHWINDER (obstructive sleep apnea)     Pulmonary embolism     Venous stasis ulcers     left ankle     Past Surgical History:   Procedure Laterality Date    COLONOSCOPY  5/2/2011    Dr. Miller, 9 polyps, recheck 5 year    MULTIPLE TOOTH EXTRACTIONS      RADIOFREQUENCY ABLATION  7/2015    bilateral     TONSILLECTOMY       Family History   Problem Relation Name Age of Onset    Heart disease Mother      Heart disease Father      Gallbladder disease Sister 2     Cataracts Sister 2     Heart disease Brother      Stroke Brother      Emphysema Maternal Uncle       Social History      Tobacco Use    Smoking status: Every Day     Current packs/day: 0.00     Average packs/day: 1 pack/day for 20.0 years (20.0 ttl pk-yrs)     Types: Cigarettes     Start date: 1/3/1982     Last attempt to quit: 1/3/2002     Years since quittin.5    Smokeless tobacco: Never   Substance Use Topics    Alcohol use: Yes     Alcohol/week: 1.0 standard drink of alcohol     Types: 1 Cans of beer per week     Comment: seldom    Drug use: No     Review of Systems  ROS negative unless otherwise indicated.   Physical Exam     Initial Vitals [24]   BP Pulse Resp Temp SpO2   (!) 101/54 86 18 97.9 °F (36.6 °C) 99 %      MAP       --         Physical Exam    Nursing note and vitals reviewed.  Constitutional: He appears well-developed and well-nourished. He is not diaphoretic. No distress.   HENT:   Head: Normocephalic and atraumatic.   Right Ear: External ear normal.   Left Ear: External ear normal.   Nose: Nose normal.   Cardiovascular:  Normal rate, regular rhythm and normal heart sounds.           Pulmonary/Chest: Breath sounds normal.   Abdominal: Abdomen is soft. He exhibits distension (At baseline per pt). There is no abdominal tenderness. There is no rebound and no guarding.   Musculoskeletal:         General: Normal range of motion.     Skin: Skin is warm. Capillary refill takes less than 2 seconds.              ED Course   Procedures  Labs Reviewed   CBC W/ AUTO DIFFERENTIAL - Abnormal; Notable for the following components:       Result Value    RBC 3.70 (*)     Hemoglobin 11.0 (*)     Hematocrit 35.2 (*)     MCHC 31.3 (*)     Immature Granulocytes 0.8 (*)     Gran # (ANC) 8.9 (*)     Immature Grans (Abs) 0.09 (*)     Lymph # 0.7 (*)     Gran % 82.3 (*)     Lymph % 6.7 (*)     All other components within normal limits   COMPREHENSIVE METABOLIC PANEL - Abnormal; Notable for the following components:    Sodium 132 (*)     CO2 21 (*)     Glucose 164 (*)     BUN 72 (*)     Creatinine 2.4 (*)     Calcium 7.8  (*)     Albumin 3.1 (*)     eGFR 27.3 (*)     All other components within normal limits   URINALYSIS, REFLEX TO URINE CULTURE          Imaging Results              X-Ray Pelvis Routine AP (In process)  Result time 07/17/24 22:41:07                     Medications - No data to display  Medical Decision Making  Amount and/or Complexity of Data Reviewed  Labs:  Decision-making details documented in ED Course.  Radiology: ordered.               ED Course as of 07/17/24 2241 Wed Jul 17, 2024 2130 CBC auto differential(!)  Nml WBC, baseline hgb, nml plts [AT]   2151 Comprehensive metabolic panel(!)  Hypona at 132, bicarb low at 21, nml K and Cl, Cr elevated at 2.4 with baseline around 1.2-1.4. [AT]   2200 Update:  Pt with ASHLEY. Given hx of HF will admit for gentle fluid resuscitation. [AT]   2241 Admitted to medicine    Tod Kirkpatrick MD  LT., [facility] Emergency Medicine PGY-3  [AT]      ED Course User Index  [AT] Tod Kirkpatrick MD            74M BIBEMS after he fell while reaching for his remote at his NH. Hit his stomach. No pain, no concerns, no LOC. Came to ED because EMS/NH recommended it. Initially hypotn, received fluids with EMS en route. Vitals now BP 120s systolic, HR 80s, RR18@909%RA, AF. Exam remarkable for bed bound male who is alert, speaking in full sentences with CV RRR w/o MRG, lungs CTAB, ABD S, NT, ND. Good strength and sensation in all 4 extremities. Not ttp. Less c/f head bleed given no LOC and pt did not hit head. Less c/f surgical abd b/c pt with benign abd exam. Although I am uncertain of why pt was initially hypotensive, it is possible he was dehydrated. Pt moves around with assistance from NH staff and uses a wheelchair otherwise. Likely can d/c back to SNF. C/D/W Dr. Kapadia.    Tod Kirkpatrick MD  LT., [facility] Emergency Medicine PGY-3              I have personally seen and examined the patient.  I have reviewed and agree  with the resident's findings, including all diagnostic interpretations and treatment plans as documented.  Presented after a fall laboratory evaluation showed acute kidney injury.  Medicine to admit        Mack Kapadia MD MPH          Clinical Impression:  Final diagnoses:  [W19.XXXA] Fall          ED Disposition Condition    Observation                 Tod Kirkpatrick MD  Resident  07/17/24 7833       Mack Kapadia Jr., MD  07/18/24 9199

## 2024-07-18 NOTE — PROGRESS NOTES
[facility] Medicine  Progress Note    Patient Name: Lukas Chance  MRN: [id number]  Patient Class: IP- Inpatient   Admission Date: 7/17/2024  Length of Stay: 0 days  Attending Physician: Morgan Garces MD  Primary Care Provider: Solitario Mendez MD        Subjective:     Principal Problem:ASHLEY (acute kidney injury)        HPI:  74 year old male with a past medical history of pulmonary embolism, sleep apnea, obesity, lymphedema, venous insufficiency with venous statis ulcers, DVT on Eliquis, cellulitis, CHF, diabetes, acute kidney injury whom presents to the emergency room via EMS from a nursing home (patient is bed bound) after he fell out of his bed while reaching for a remote. Reports falling on his stomach. Denied pain at that time but NH required a lift assist and EMS recommended pt come to ED for work up. During initial assessment pt found to be hypotensive at 80s systolic and EMS started IVF.  BP improved 120s systolic,         Overview/Hospital Course:  Lukas Chance was closely monitored hospital.  Patient was admitted to Hospital Medicine for acute kidney injury status post fall at nursing home.  When EMT to assist staff with getting patient off the floor patient was found to be hypotensive.  Gentle hydration initiated with improvement in blood pressure.  ASHLEY with only slight improvement.  UA pending collection.  Nursing staff to collect today via in and out catheterization.  Avoid nephrotoxic medications.  If no significant improvement in ASHLEY will consider consulting Nephrology    Interval History:   ASHLEY with only slight improvement.  UA pending.  CT abdomen pending.  Continue gentle hydration  Review of Systems   Constitutional:  Negative for activity change, appetite change, chills and fever.   Respiratory: Negative.     Cardiovascular: Negative.    Gastrointestinal:  Positive for constipation. Negative for abdominal distention, abdominal pain, nausea and vomiting.    Genitourinary:         Incontinence   Musculoskeletal: Negative.      Objective:     Vital Signs (Most Recent):  Temp: 97.9 °F (36.6 °C) (07/18/24 0711)  Pulse: 87 (07/18/24 0711)  Resp: 20 (07/18/24 0711)  BP: (!) 110/54 (07/18/24 0711)  SpO2: (!) 94 % (07/18/24 0711) Vital Signs (24h Range):  Temp:  [97.6 °F (36.4 °C)-97.9 °F (36.6 °C)] 97.9 °F (36.6 °C)  Pulse:  [74-87] 87  Resp:  [18-21] 20  SpO2:  [94 %-99 %] 94 %  BP: (101-128)/(52-76) 110/54     Weight: (!) 166 kg (365 lb 15.4 oz)  Body mass index is 51.04 kg/m².    Intake/Output Summary (Last 24 hours) at 7/18/2024 1125  Last data filed at 7/18/2024 0950  Gross per 24 hour   Intake 240 ml   Output 200 ml   Net 40 ml         Physical Exam  Vitals reviewed.   Constitutional:       General: He is not in acute distress.     Appearance: He is obese. He is not ill-appearing.   HENT:      Head: Normocephalic and atraumatic.   Eyes:      Pupils: Pupils are equal, round, and reactive to light.   Cardiovascular:      Rate and Rhythm: Normal rate and regular rhythm.      Pulses: Normal pulses.      Heart sounds: Normal heart sounds.   Pulmonary:      Effort: Pulmonary effort is normal.      Breath sounds: Normal breath sounds.   Abdominal:      General: There is distension.      Tenderness: There is no abdominal tenderness.   Musculoskeletal:      Right lower leg: Edema present.      Left lower leg: Edema present.   Skin:     General: Skin is warm.      Comments: Discoloration bilateral lower extremities. Pitting edema. Patient with history of lympedema  Scratch marks on abdomen and left elbow.    Neurological:      Mental Status: He is alert. Mental status is at baseline.   Psychiatric:         Mood and Affect: Mood normal.             Significant Labs: All pertinent labs within the past 24 hours have been reviewed.    CBC:   Recent Labs   Lab 07/17/24  2121 07/18/24  0521   WBC 10.84 8.73   HGB 11.0* 11.9*   HCT 35.2* 38.2*    241     CMP:   Recent Labs    Lab 07/17/24 2121 07/18/24  0521   * 135*   K 5.0 5.1   CL 98 99   CO2 21* 29   * 97   BUN 72* 68*   CREATININE 2.4* 2.3*   CALCIUM 7.8* 8.0*   PROT 6.5 6.4   ALBUMIN 3.1* 3.2*   BILITOT 0.3 0.4   ALKPHOS 71 76   AST 31 30   ALT 29 34   ANIONGAP 13 7*     Magnesium:   Recent Labs   Lab 07/18/24  0521   MG 2.5     TSH:   Recent Labs   Lab 07/18/24  0521   TSH 1.775         Significant Imaging: I have reviewed all pertinent imaging results/findings within the past 24 hours.  Imaging Results              X-Ray Pelvis Routine AP (Final result)  Result time 07/17/24 22:43:19      Final result by Eli Willis MD (07/17/24 22:43:19)                   Impression:      No acute trauma.      Electronically signed by: Eli Willis  Date:    07/17/2024  Time:    22:43               Narrative:    EXAMINATION:  XR PELVIS ROUTINE AP    CLINICAL HISTORY:  Unspecified fall, initial encounter    TECHNIQUE:  AP view of the pelvis was performed.    COMPARISON:  None.    FINDINGS:  Limited by osteopenia.  No detectable fracture.  Moderate bilateral hip osteoarthritis.                                       Assessment/Plan:      * ASHLEY (acute kidney injury)  Patient with acute kidney injury/acute renal failure likely due to pre-renal azotemia due to dehydration ASHLEY is currently worsening. Baseline creatinine  1.4  - Labs reviewed- Renal function/electrolytes with Estimated Creatinine Clearance: 43.1 mL/min (A) (based on SCr of 2.3 mg/dL (H)). according to latest data. Monitor urine output and serial BMP and adjust therapy as needed. Avoid nephrotoxins and renally dose meds for GFR listed above    Bladder scan: r/o urinary retention, place ryder cath if patient has >400 ml on PVR  KUB: r/o constipation, patient reports last bowel movement with hard stool.  Unable to perform KUB due to weight limit.    CT abdomen pending  Trend renal function    Hypotension  Improved  Cautious with Gentle IV hydration.   Hold  "antihypertensives,   Monitor.   Consider midodrine if no improvement in blood pressure with IV hydration      Hyponatremia  Patient has hyponatremia which is worsening,We will aim to correct the sodium by 4-6mEq in 24 hours. We will monitor sodium Daily. The hyponatremia is due to Dehydration/hypovolemia, Heart Failure, and renal insufficiency. We will obtain the following studies: Urine sodium, urine osmolality, serum osmolality, AM cortisol, or TSH, T4. We will treat the hyponatremia with IV fluids as follows: NS gentle hydration. The patient's sodium results have been reviewed and are listed below.  Recent Labs   Lab 07/18/24  0521   *         Chronic diastolic heart failure   Echo pending  CXR reviewed  Gentle hydration   Monitor for fluid overload.   Holding diuretic due to hyponatremia and ASHLEY. Re-evaluate in am.      Last echo: 2021:  The left ventricle is normal in size with concentric remodeling and normal systolic function. Lumason contrast was used to confirm normal ejection fraction left ventricle  The estimated ejection fraction is 60%.  Normal left ventricular diastolic function.  Atrial fibrillation not observed.  Normal right ventricular size with normal right ventricular systolic function.  Mild tricuspid regurgitation.  Normal central venous pressure (3 mmHg).  The estimated PA systolic pressure is 26 mmHg.    History of pulmonary embolism  Chronic. Continue eliquis      Hypertension associated with diabetes        Type 2 diabetes mellitus with circulatory disorder, without long-term current use of insulin  Patient's FSGs are uncontrolled due to hyperglycemia on current medication regimen.  Last A1c reviewed-   Lab Results   Component Value Date    HGBA1C 6.1 05/12/2023     Most recent fingerstick glucose reviewed- No results for input(s): "POCTGLUCOSE" in the last 24 hours.  Current correctional scale  Low  Maintain anti-hyperglycemic dose as follows-   Antihyperglycemics (From admission, " onward)      Start     Stop Route Frequency Ordered    07/17/24 2357  insulin aspart U-100 pen 0-5 Units         -- SubQ Before meals & nightly PRN 07/17/24 2258          Hold Oral hypoglycemics while patient is in the hospital.       Sleep apnea  Continue home regimen  Oxygen as needed        VTE Risk Mitigation (From admission, onward)           Ordered     apixaban tablet 5 mg  2 times daily         07/18/24 0409     Reason for No Pharmacological VTE Prophylaxis  Once        Question:  Reasons:  Answer:  Already adequately anticoagulated on oral Anticoagulants    07/17/24 2258     IP VTE HIGH RISK PATIENT  Once         07/17/24 2258     Place sequential compression device  Until discontinued         07/17/24 2258                    Discharge Planning   NELI: 7/19/2024     Code Status: Full Code   Is the patient medically ready for discharge?:     Reason for patient still in hospital (select all that apply): Patient trending condition, Laboratory test, and Treatment                     Leigh Luis NP  Department of Hospital Medicine   [facility]

## 2024-07-18 NOTE — ASSESSMENT & PLAN NOTE
Patient has hyponatremia which is worsening,We will aim to correct the sodium by 4-6mEq in 24 hours. We will monitor sodium Daily. The hyponatremia is due to Dehydration/hypovolemia, Heart Failure, and renal insufficiency. We will obtain the following studies: Urine sodium, urine osmolality, serum osmolality, AM cortisol, or TSH, T4. We will treat the hyponatremia with IV fluids as follows: NS gentle hydration. The patient's sodium results have been reviewed and are listed below.  Recent Labs   Lab 07/18/24  0521   *

## 2024-07-18 NOTE — ASSESSMENT & PLAN NOTE
Echo pending  CXR reviewed  Gentle hydration   Monitor for fluid overload.   Holding diuretic due to hyponatremia and ASHLEY. Re-evaluate in am.      Last echo: 2021:  The left ventricle is normal in size with concentric remodeling and normal systolic function. Lumason contrast was used to confirm normal ejection fraction left ventricle  The estimated ejection fraction is 60%.  Normal left ventricular diastolic function.  Atrial fibrillation not observed.  Normal right ventricular size with normal right ventricular systolic function.  Mild tricuspid regurgitation.  Normal central venous pressure (3 mmHg).  The estimated PA systolic pressure is 26 mmHg.

## 2024-07-18 NOTE — ASSESSMENT & PLAN NOTE
Patient with acute kidney injury/acute renal failure likely due to pre-renal azotemia due to dehydration ASHLEY is currently worsening. Baseline creatinine  1.4  - Labs reviewed- Renal function/electrolytes with Estimated Creatinine Clearance: 36.4 mL/min (A) (based on SCr of 2.4 mg/dL (H)). according to latest data. Monitor urine output and serial BMP and adjust therapy as needed. Avoid nephrotoxins and renally dose meds for GFR listed above    Bladder scan: r/o urinary retention, place ryder cath if patient has >400 ml on PVR  KUB: r/o constipation, patient reports last bowel movement with hard stool.   Trend renal function

## 2024-07-18 NOTE — SUBJECTIVE & OBJECTIVE
Past Medical History:   Diagnosis Date    Anticoagulant long-term use 03/01/2013    Antithrombin 3 deficiency     Cellulitis     lower legs, venous stasis    CHF (congestive heart failure)     Coronary artery disease     Deep vein thrombosis (DVT) 2012    left leg    Diabetes mellitus type 2 in obese 05/15/2013    Hx of colonic polyps     Hypertension     Lymphedema     Obesity     LAKHWINDER (obstructive sleep apnea)     Pulmonary embolism     Venous stasis ulcers     left ankle       Past Surgical History:   Procedure Laterality Date    COLONOSCOPY  5/2/2011    Dr. Miller, 9 polyps, recheck 5 year    MULTIPLE TOOTH EXTRACTIONS      RADIOFREQUENCY ABLATION  7/2015    bilateral     TONSILLECTOMY         Review of patient's allergies indicates:   Allergen Reactions    Bactroban [mupirocin calcium] Other (See Comments)     Burning feeling on open sore     Metoprolol Other (See Comments)     dizzy       No current facility-administered medications on file prior to encounter.     Current Outpatient Medications on File Prior to Encounter   Medication Sig    acetaminophen (TYLENOL) 500 MG tablet Take 1,000 mg by mouth 2 (two) times daily as needed for Pain.    buPROPion (WELLBUTRIN XL) 150 MG TB24 tablet Take 150 mg by mouth once daily.    DULoxetine (CYMBALTA) 30 MG capsule Take 1 capsule (30 mg total) by mouth once daily.    ELIQUIS 5 mg Tab Take 1 tablet (5 mg total) by mouth 2 (two) times daily.    furosemide (LASIX) 40 MG tablet Take 1 tablet (40 mg total) by mouth 2 (two) times daily before meals.    gabapentin (NEURONTIN) 300 MG capsule Take 300 mg by mouth 2 (two) times daily.    multivitamin (THERAGRAN) per tablet Take 1 tablet by mouth once daily.    semaglutide (OZEMPIC) 0.25 mg or 0.5 mg(2 mg/1.5 mL) pen injector Inject 0.25 mg into the skin every 7 days. For four weeks, then increase to 0.5mg every seven days for four weeks.    spironolactone (ALDACTONE) 50 MG tablet Take 1 tablet (50 mg total) by mouth once daily.     tamsulosin (FLOMAX) 0.4 mg Cap Take 1 capsule (0.4 mg total) by mouth once daily.     Family History       Problem Relation (Age of Onset)    Cataracts Sister    Emphysema Maternal Uncle    Gallbladder disease Sister    Heart disease Mother, Father, Brother    Stroke Brother          Tobacco Use    Smoking status: Every Day     Current packs/day: 0.00     Average packs/day: 1 pack/day for 20.0 years (20.0 ttl pk-yrs)     Types: Cigarettes     Start date: 1/3/1982     Last attempt to quit: 1/3/2002     Years since quittin.5    Smokeless tobacco: Never   Substance and Sexual Activity    Alcohol use: Yes     Alcohol/week: 1.0 standard drink of alcohol     Types: 1 Cans of beer per week     Comment: seldom    Drug use: No    Sexual activity: Yes     Partners: Female     Review of Systems   Constitutional: Negative.    Respiratory: Negative.     Cardiovascular: Negative.    Gastrointestinal:  Positive for constipation.   Genitourinary: Negative.    Objective:     Vital Signs (Most Recent):  Temp: 97.9 °F (36.6 °C) (24)  Pulse: 74 (24)  Resp: 18 (24)  BP: 109/76 (24)  SpO2: 99 % (24) Vital Signs (24h Range):  Temp:  [97.9 °F (36.6 °C)] 97.9 °F (36.6 °C)  Pulse:  [74-86] 74  Resp:  [18] 18  SpO2:  [99 %] 99 %  BP: (101-109)/(54-76) 109/76     Weight: 136.1 kg (300 lb)  Body mass index is 43.05 kg/m².     Physical Exam  Vitals reviewed.   Constitutional:       General: He is not in acute distress.     Appearance: He is obese. He is not ill-appearing.   HENT:      Head: Normocephalic and atraumatic.   Eyes:      Pupils: Pupils are equal, round, and reactive to light.   Cardiovascular:      Rate and Rhythm: Normal rate and regular rhythm.      Pulses: Normal pulses.      Heart sounds: Normal heart sounds.   Pulmonary:      Effort: Pulmonary effort is normal.      Breath sounds: Normal breath sounds.   Abdominal:      General: There is distension.      Tenderness:  There is no abdominal tenderness.   Musculoskeletal:      Right lower leg: 3+ Pitting Edema present.      Left lower leg: 3+ Pitting Edema present.   Skin:     General: Skin is warm.      Comments: Discoloration bilateral lower extremities. Pitting edema. Patient with history of lympedema  Scratch marks on abdomen and left elbow.    Neurological:      Mental Status: He is alert. Mental status is at baseline.   Psychiatric:         Mood and Affect: Mood normal.          CRANIAL NERVES     CN III, IV, VI   Pupils are equal, round, and reactive to light.     Significant Labs: All pertinent labs within the past 24 hours have been reviewed.  Recent Lab Results         07/17/24 2121        Albumin 3.1       ALP 71       ALT 29       Anion Gap 13       AST 31       Baso # 0.06       Basophil % 0.6       BILIRUBIN TOTAL 0.3  Comment: For infants and newborns, interpretation of results should be based  on gestational age, weight and in agreement with clinical  observations.    Premature Infant recommended reference ranges:  Up to 24 hours.............<8.0 mg/dL  Up to 48 hours............<12.0 mg/dL  3-5 days..................<15.0 mg/dL  6-29 days.................<15.0 mg/dL         BUN 72       Calcium 7.8       Chloride 98       CO2 21       Creatinine 2.4       Differential Method Automated       eGFR 27.3       Eos # 0.3       Eos % 2.5       Glucose 164       Gran # (ANC) 8.9       Gran % 82.3       Hematocrit 35.2       Hemoglobin 11.0       Immature Grans (Abs) 0.09  Comment: Mild elevation in immature granulocytes is non specific and   can be seen in a variety of conditions including stress response,   acute inflammation, trauma and pregnancy. Correlation with other   laboratory and clinical findings is essential.         Immature Granulocytes 0.8       Lymph # 0.7       Lymph % 6.7       MCH 29.7       MCHC 31.3       MCV 95       Mono # 0.8       Mono % 7.1       MPV 9.9       nRBC 0       Platelet Count 215        Potassium 5.0       PROTEIN TOTAL 6.5       RBC 3.70       RDW 13.4       Sodium 132       WBC 10.84               Significant Imaging: I have reviewed all pertinent imaging results/findings within the past 24 hours.  Imaging Results              X-Ray Pelvis Routine AP (Final result)  Result time 07/17/24 22:43:19      Final result by Eli Willis MD (07/17/24 22:43:19)                   Impression:      No acute trauma.      Electronically signed by: Eli Willis  Date:    07/17/2024  Time:    22:43               Narrative:    EXAMINATION:  XR PELVIS ROUTINE AP    CLINICAL HISTORY:  Unspecified fall, initial encounter    TECHNIQUE:  AP view of the pelvis was performed.    COMPARISON:  None.    FINDINGS:  Limited by osteopenia.  No detectable fracture.  Moderate bilateral hip osteoarthritis.

## 2024-07-18 NOTE — HOSPITAL COURSE
Lukas CHRISTINA Meron was closely monitored hospital.  Patient was admitted to Hospital Medicine for acute kidney injury status post fall at nursing home.  When EMT to assist staff with getting patient off the floor patient was found to be hypotensive.  Gentle hydration initiated with improvement in blood pressure.  ASHLEY with only slight improvement.  UA pending collection.  Nursing staff to collect today via in and out catheterization.  UA negative for infection.  Echocardiogram ejection fraction of 60-65% with grade 1 diastolic dysfunction.  07/19/2024 ASHLEY improved with serum creatinine 1.7.  Patient's blood pressure remain soft blood pressure medications adjusted.  Patient is to follow up outpatient with Cardiology for further evaluation.    Patient seen and examined on day of discharge.  Patient deemed appropriate for discharge.  Case management following for discharge planning.  Patient will be discharging to [facility] where he was correction.  Patient is to have CMP drawn on Monday and checked weekly with results reported to attending physician facility.  Patient is to follow with Cardiology in 1-2 weeks.  Facility transfer orders sent to [facility], [place].  Transportation arranged by case management.

## 2024-07-18 NOTE — NURSING
Nurses Note -- 4 Eyes      7/18/2024   4:01 AM      Skin assessed during: Admit      [x] No Altered Skin Integrity Present    []Prevention Measures Documented      [x] Yes- Altered Skin Integrity Present or Discovered   [] LDA Added if Not in Epic (Describe Wound)   [] New Altered Skin Integrity was Present on Admit and Documented in LDA   [] Wound Image Taken    Wound Care Consulted? Yes    Attending Nurse:  Yasmine Szymanski RN/Staff Member:    Misty

## 2024-07-18 NOTE — ASSESSMENT & PLAN NOTE
Improved  Cautious with Gentle IV hydration.   Hold antihypertensives,   Monitor.   Consider midodrine if no improvement in blood pressure with IV hydration

## 2024-07-18 NOTE — SUBJECTIVE & OBJECTIVE
Interval History:   ASHLEY with only slight improvement.  UA pending.  CT abdomen pending.  Continue gentle hydration  Review of Systems   Constitutional:  Negative for activity change, appetite change, chills and fever.   Respiratory: Negative.     Cardiovascular: Negative.    Gastrointestinal:  Positive for constipation. Negative for abdominal distention, abdominal pain, nausea and vomiting.   Genitourinary:         Incontinence   Musculoskeletal: Negative.      Objective:     Vital Signs (Most Recent):  Temp: 97.9 °F (36.6 °C) (07/18/24 0711)  Pulse: 87 (07/18/24 0711)  Resp: 20 (07/18/24 0711)  BP: (!) 110/54 (07/18/24 0711)  SpO2: (!) 94 % (07/18/24 0711) Vital Signs (24h Range):  Temp:  [97.6 °F (36.4 °C)-97.9 °F (36.6 °C)] 97.9 °F (36.6 °C)  Pulse:  [74-87] 87  Resp:  [18-21] 20  SpO2:  [94 %-99 %] 94 %  BP: (101-128)/(52-76) 110/54     Weight: (!) 166 kg (365 lb 15.4 oz)  Body mass index is 51.04 kg/m².    Intake/Output Summary (Last 24 hours) at 7/18/2024 1125  Last data filed at 7/18/2024 0950  Gross per 24 hour   Intake 240 ml   Output 200 ml   Net 40 ml         Physical Exam  Vitals reviewed.   Constitutional:       General: He is not in acute distress.     Appearance: He is obese. He is not ill-appearing.   HENT:      Head: Normocephalic and atraumatic.   Eyes:      Pupils: Pupils are equal, round, and reactive to light.   Cardiovascular:      Rate and Rhythm: Normal rate and regular rhythm.      Pulses: Normal pulses.      Heart sounds: Normal heart sounds.   Pulmonary:      Effort: Pulmonary effort is normal.      Breath sounds: Normal breath sounds.   Abdominal:      General: There is distension.      Tenderness: There is no abdominal tenderness.   Musculoskeletal:      Right lower leg: Edema present.      Left lower leg: Edema present.   Skin:     General: Skin is warm.      Comments: Discoloration bilateral lower extremities. Pitting edema. Patient with history of lympedema  Scratch marks on abdomen  and left elbow.    Neurological:      Mental Status: He is alert. Mental status is at baseline.   Psychiatric:         Mood and Affect: Mood normal.             Significant Labs: All pertinent labs within the past 24 hours have been reviewed.    CBC:   Recent Labs   Lab 07/17/24 2121 07/18/24  0521   WBC 10.84 8.73   HGB 11.0* 11.9*   HCT 35.2* 38.2*    241     CMP:   Recent Labs   Lab 07/17/24 2121 07/18/24  0521   * 135*   K 5.0 5.1   CL 98 99   CO2 21* 29   * 97   BUN 72* 68*   CREATININE 2.4* 2.3*   CALCIUM 7.8* 8.0*   PROT 6.5 6.4   ALBUMIN 3.1* 3.2*   BILITOT 0.3 0.4   ALKPHOS 71 76   AST 31 30   ALT 29 34   ANIONGAP 13 7*     Magnesium:   Recent Labs   Lab 07/18/24  0521   MG 2.5     TSH:   Recent Labs   Lab 07/18/24  0521   TSH 1.775         Significant Imaging: I have reviewed all pertinent imaging results/findings within the past 24 hours.  Imaging Results              X-Ray Pelvis Routine AP (Final result)  Result time 07/17/24 22:43:19      Final result by Eli Willis MD (07/17/24 22:43:19)                   Impression:      No acute trauma.      Electronically signed by: Eli Willis  Date:    07/17/2024  Time:    22:43               Narrative:    EXAMINATION:  XR PELVIS ROUTINE AP    CLINICAL HISTORY:  Unspecified fall, initial encounter    TECHNIQUE:  AP view of the pelvis was performed.    COMPARISON:  None.    FINDINGS:  Limited by osteopenia.  No detectable fracture.  Moderate bilateral hip osteoarthritis.

## 2024-07-18 NOTE — ED NOTES
Patient presents to the ED after a fall at the nursing home. Patient states that the side rails were removed from his bed and he was trying to grab his remote and slid out the bed. Patient denies any pain at this time. Patient identifiers checked and are accurate with the patient. Bed locked and in lowest position. Side rails raised x 2. Call light left in reach of patient and patient instructed to call staff for assistance.

## 2024-07-18 NOTE — ASSESSMENT & PLAN NOTE
Patient has hyponatremia which is worsening,We will aim to correct the sodium by 4-6mEq in 24 hours. We will monitor sodium Daily. The hyponatremia is due to Dehydration/hypovolemia, Heart Failure, and renal insufficiency. We will obtain the following studies: Urine sodium, urine osmolality, serum osmolality, AM cortisol, or TSH, T4. We will treat the hyponatremia with IV fluids as follows: NS over 10 hours, gentle hydration. The patient's sodium results have been reviewed and are listed below.  Recent Labs   Lab 07/17/24 2121   *

## 2024-07-18 NOTE — ASSESSMENT & PLAN NOTE
Cautious with Gentle IV hydration.   Hold antihypertensives,   Monitor.   Consider midodrine if no improvement in blood pressure with IV hydration

## 2024-07-19 VITALS
HEIGHT: 71 IN | RESPIRATION RATE: 18 BRPM | OXYGEN SATURATION: 96 % | BODY MASS INDEX: 44.1 KG/M2 | SYSTOLIC BLOOD PRESSURE: 118 MMHG | TEMPERATURE: 98 F | HEART RATE: 96 BPM | WEIGHT: 315 LBS | DIASTOLIC BLOOD PRESSURE: 55 MMHG

## 2024-07-19 LAB
ALBUMIN SERPL BCP-MCNC: 3.2 G/DL (ref 3.5–5.2)
ALP SERPL-CCNC: 68 U/L (ref 55–135)
ALT SERPL W/O P-5'-P-CCNC: 30 U/L (ref 10–44)
ANION GAP SERPL CALC-SCNC: 7 MMOL/L (ref 8–16)
AST SERPL-CCNC: 26 U/L (ref 10–40)
BASOPHILS # BLD AUTO: 0.04 K/UL (ref 0–0.2)
BASOPHILS NFR BLD: 0.6 % (ref 0–1.9)
BILIRUB SERPL-MCNC: 0.4 MG/DL (ref 0.1–1)
BUN SERPL-MCNC: 51 MG/DL (ref 8–23)
CALCIUM SERPL-MCNC: 8.2 MG/DL (ref 8.7–10.5)
CHLORIDE SERPL-SCNC: 102 MMOL/L (ref 95–110)
CO2 SERPL-SCNC: 26 MMOL/L (ref 23–29)
CREAT SERPL-MCNC: 1.7 MG/DL (ref 0.5–1.4)
DIFFERENTIAL METHOD BLD: ABNORMAL
EOSINOPHIL # BLD AUTO: 0.4 K/UL (ref 0–0.5)
EOSINOPHIL NFR BLD: 6.3 % (ref 0–8)
ERYTHROCYTE [DISTWIDTH] IN BLOOD BY AUTOMATED COUNT: 13.2 % (ref 11.5–14.5)
EST. GFR  (NO RACE VARIABLE): 41.3 ML/MIN/1.73 M^2
GLUCOSE SERPL-MCNC: 114 MG/DL (ref 70–110)
GLUCOSE SERPL-MCNC: 115 MG/DL (ref 70–110)
HCT VFR BLD AUTO: 34.8 % (ref 40–54)
HGB BLD-MCNC: 11.2 G/DL (ref 14–18)
IMM GRANULOCYTES # BLD AUTO: 0.02 K/UL (ref 0–0.04)
IMM GRANULOCYTES NFR BLD AUTO: 0.3 % (ref 0–0.5)
LYMPHOCYTES # BLD AUTO: 0.9 K/UL (ref 1–4.8)
LYMPHOCYTES NFR BLD: 14.1 % (ref 18–48)
MAGNESIUM SERPL-MCNC: 2.4 MG/DL (ref 1.6–2.6)
MCH RBC QN AUTO: 29.9 PG (ref 27–31)
MCHC RBC AUTO-ENTMCNC: 32.2 G/DL (ref 32–36)
MCV RBC AUTO: 93 FL (ref 82–98)
MONOCYTES # BLD AUTO: 0.7 K/UL (ref 0.3–1)
MONOCYTES NFR BLD: 10.8 % (ref 4–15)
NEUTROPHILS # BLD AUTO: 4.5 K/UL (ref 1.8–7.7)
NEUTROPHILS NFR BLD: 67.9 % (ref 38–73)
NRBC BLD-RTO: 0 /100 WBC
PLATELET # BLD AUTO: 261 K/UL (ref 150–450)
PMV BLD AUTO: 10 FL (ref 9.2–12.9)
POTASSIUM SERPL-SCNC: 4.7 MMOL/L (ref 3.5–5.1)
PROT SERPL-MCNC: 6.7 G/DL (ref 6–8.4)
RBC # BLD AUTO: 3.75 M/UL (ref 4.6–6.2)
SODIUM SERPL-SCNC: 135 MMOL/L (ref 136–145)
WBC # BLD AUTO: 6.67 K/UL (ref 3.9–12.7)

## 2024-07-19 PROCEDURE — 85025 COMPLETE CBC W/AUTO DIFF WBC: CPT | Performed by: NURSE PRACTITIONER

## 2024-07-19 PROCEDURE — 25000003 PHARM REV CODE 250: Performed by: NURSE PRACTITIONER

## 2024-07-19 PROCEDURE — 80053 COMPREHEN METABOLIC PANEL: CPT | Performed by: NURSE PRACTITIONER

## 2024-07-19 PROCEDURE — 25000003 PHARM REV CODE 250

## 2024-07-19 PROCEDURE — 36415 COLL VENOUS BLD VENIPUNCTURE: CPT | Performed by: NURSE PRACTITIONER

## 2024-07-19 PROCEDURE — 83735 ASSAY OF MAGNESIUM: CPT | Performed by: NURSE PRACTITIONER

## 2024-07-19 RX ORDER — SPIRONOLACTONE 50 MG/1
25 TABLET, FILM COATED ORAL DAILY
Qty: 30 TABLET | Refills: 5 | Status: SHIPPED | OUTPATIENT
Start: 2024-07-19 | End: 2025-07-19

## 2024-07-19 RX ORDER — DULOXETIN HYDROCHLORIDE 30 MG/1
30 CAPSULE, DELAYED RELEASE ORAL DAILY
Status: DISCONTINUED | OUTPATIENT
Start: 2024-07-19 | End: 2024-07-19 | Stop reason: HOSPADM

## 2024-07-19 RX ORDER — DOCUSATE SODIUM 100 MG/1
100 CAPSULE, LIQUID FILLED ORAL 2 TIMES DAILY
Qty: 60 CAPSULE | Refills: 0 | Status: SHIPPED | OUTPATIENT
Start: 2024-07-19 | End: 2024-08-18

## 2024-07-19 RX ORDER — GABAPENTIN 300 MG/1
300 CAPSULE ORAL 2 TIMES DAILY
Status: DISCONTINUED | OUTPATIENT
Start: 2024-07-19 | End: 2024-07-19 | Stop reason: HOSPADM

## 2024-07-19 RX ADMIN — TAMSULOSIN HYDROCHLORIDE 0.4 MG: 0.4 CAPSULE ORAL at 10:07

## 2024-07-19 RX ADMIN — MULTIVITAMIN TABLET 1 TABLET: TABLET at 10:07

## 2024-07-19 RX ADMIN — APIXABAN 5 MG: 5 TABLET, FILM COATED ORAL at 10:07

## 2024-07-19 RX ADMIN — GABAPENTIN 300 MG: 300 CAPSULE ORAL at 10:07

## 2024-07-19 RX ADMIN — SENNOSIDES AND DOCUSATE SODIUM 1 TABLET: 50; 8.6 TABLET ORAL at 10:07

## 2024-07-19 RX ADMIN — DULOXETINE HYDROCHLORIDE 30 MG: 30 CAPSULE, DELAYED RELEASE ORAL at 10:07

## 2024-07-19 NOTE — DISCHARGE SUMMARY
formerly [facility] Medicine  Discharge Summary      Patient Name: Lukas Chance  MRN: [id number]  ARTURO: [id number]  Patient Class: IP- Inpatient  Admission Date: 7/17/2024  Hospital Length of Stay: 1 days  Discharge Date and Time:  07/19/2024   Attending Physician: Morgan Gacres MD   Discharging Provider: Leigh Luis NP  Primary Care Provider: Solitario Mendez MD    Primary Care Team: Networked reference to record PCT     HPI:   74 year old male with a past medical history of pulmonary embolism, sleep apnea, obesity, lymphedema, venous insufficiency with venous statis ulcers, DVT on Eliquis, cellulitis, CHF, diabetes, acute kidney injury whom presents to the emergency room via EMS from a nursing home (patient is bed bound) after he fell out of his bed while reaching for a remote. Reports falling on his stomach. Denied pain at that time but NH required a lift assist and EMS recommended pt come to ED for work up. During initial assessment pt found to be hypotensive at 80s systolic and EMS started IVF.  BP improved 120s systolic,         * No surgery found *      Hospital Course:   Lukas Chance was closely monitored hospital.  Patient was admitted to Hospital Medicine for acute kidney injury status post fall at nursing home.  When EMT to assist staff with getting patient off the floor patient was found to be hypotensive.  Gentle hydration initiated with improvement in blood pressure.  ASHLEY with only slight improvement.  UA pending collection.  Nursing staff to collect today via in and out catheterization.  UA negative for infection.  Echocardiogram ejection fraction of 60-65% with grade 1 diastolic dysfunction.  07/19/2024 ASHLEY improved with serum creatinine 1.7.  Patient's blood pressure remain soft blood pressure medications adjusted.  Patient is to follow up outpatient with Cardiology for further evaluation.    Patient seen and examined on day of discharge.  Patient deemed appropriate for  discharge.  Case management following for discharge planning.  Patient will be discharging to [facility] where he was half-way.  Patient is to have CMP drawn on Monday and checked weekly with results reported to attending physician facility.  Patient is to follow with Cardiology in 1-2 weeks.  Facility transfer orders sent to [facility].  Transportation arranged by case management.         Goals of Care Treatment Preferences:  Code Status: Full Code    Health care agent: Eliza Chance (wife)  Health care agent number: [phone] or [phone]                   Consults:     Renal/  * ASHLEY (acute kidney injury)  Patient with acute kidney injury/acute renal failure likely due to pre-renal azotemia due to dehydration ASHLEY is currently worsening. Baseline creatinine  1.4  - Labs reviewed- Renal function/electrolytes with Estimated Creatinine Clearance: 58.4 mL/min (A) (based on SCr of 1.7 mg/dL (H)). according to latest data. Monitor urine output and serial BMP and adjust therapy as needed. Avoid nephrotoxins and renally dose meds for GFR listed above    Bladder scan: r/o urinary retention, place ryder cath if patient has >400 ml on PVR  KUB: r/o constipation, patient reports last bowel movement with hard stool.  Unable to perform KUB due to weight limit.    CT abdomen pending  Trend renal function      Final Active Diagnoses:    Diagnosis Date Noted POA    PRINCIPAL PROBLEM:  ASHLEY (acute kidney injury) [N17.9] 12/10/2012 Yes    Hyponatremia [E87.1] 07/17/2024 Yes    Hypotension [I95.9] 07/17/2024 Yes    Chronic diastolic heart failure [I50.32] 10/28/2019 Yes    History of pulmonary embolism [Z86.711] 01/21/2016 Yes    Type 2 diabetes mellitus with circulatory disorder, without long-term current use of insulin [E11.59] 01/04/2013 Yes     Chronic    Sleep apnea [G47.30] 01/03/2013 Yes      Problems Resolved During this Admission:       Discharged Condition: stable    Disposition: Home or Self  Care    Follow Up:   Follow-up Information       Solitario Mendez MD Follow up.    Specialty: Family Medicine  Contact information:  [address]  [address]  [place] [address] [phone]               David Patton MD Follow up in 1 week(s).    Specialties: Cardiology, Interventional Cardiology  Contact information:  [address] [place] [address]  [phone]                           Patient Instructions:   No discharge procedures on file.    Significant Diagnostic Studies: Labs: CMP   Recent Labs   Lab 07/17/24 2121 07/18/24 0521 07/19/24  0754   * 135* 135*   K 5.0 5.1 4.7   CL 98 99 102   CO2 21* 29 26   * 97 115*   BUN 72* 68* 51*   CREATININE 2.4* 2.3* 1.7*   CALCIUM 7.8* 8.0* 8.2*   PROT 6.5 6.4 6.7   ALBUMIN 3.1* 3.2* 3.2*   BILITOT 0.3 0.4 0.4   ALKPHOS 71 76 68   AST 31 30 26   ALT 29 34 30   ANIONGAP 13 7* 7*   , CBC   Recent Labs   Lab 07/17/24 2121 07/18/24 0521 07/19/24  0754   WBC 10.84 8.73 6.67   HGB 11.0* 11.9* 11.2*   HCT 35.2* 38.2* 34.8*    241 261   , and All labs within the past 24 hours have been reviewed    Pending Diagnostic Studies:       None           Medications:  Reconciled Home Medications:      Medication List        START taking these medications      docusate sodium 100 MG capsule  Commonly known as: COLACE  Take 1 capsule (100 mg total) by mouth 2 (two) times daily.            CHANGE how you take these medications      spironolactone 50 MG tablet  Commonly known as: ALDACTONE  Take 0.5 tablets (25 mg total) by mouth once daily.  What changed: how much to take            CONTINUE taking these medications      acetaminophen 325 MG tablet  Commonly known as: TYLENOL  Take 325 mg by mouth every 6 (six) hours as needed for Pain.     DULoxetine 30 MG capsule  Commonly known as: CYMBALTA  Take 1 capsule (30 mg total) by mouth once daily.     ELIQUIS 5 mg Tab  Generic drug: apixaban  Take 1 tablet (5 mg total) by mouth 2 (two) times daily.     gabapentin  300 MG capsule  Commonly known as: NEURONTIN  Take 300 mg by mouth 2 (two) times daily. 0900 2100     LIDOcaine 4 % Ptmd  Apply 1 patch topically once daily.     multivitamin per tablet  Commonly known as: THERAGRAN  Take 1 tablet by mouth once daily.     PROTEIN ORAL  Take 30 mLs by mouth 2 (two) times a day. 0900 2100     semaglutide 0.25 mg or 0.5 mg(2 mg/1.5 mL) pen injector  Commonly known as: OZEMPIC  Inject 0.25 mg into the skin every 7 days. For four weeks, then increase to 0.5mg every seven days for four weeks.     triamcinolone acetonide 0.1% 0.1 % ointment  Commonly known as: KENALOG  Apply 1 g topically 2 (two) times daily. 0800 2000     VITAMIN C 500 MG tablet  Generic drug: ascorbic acid (vitamin C)  Take 500 mg by mouth once daily. 0900 2100            STOP taking these medications      clindamycin 300 MG capsule  Commonly known as: CLEOCIN     doxycycline 100 MG tablet  Commonly known as: VIBRA-TABS     furosemide 40 MG tablet  Commonly known as: LASIX     lisinopriL 10 MG tablet              Indwelling Lines/Drains at time of discharge:   Lines/Drains/Airways       Drain  Duration             Male External Urinary Catheter 07/17/24 4494 1 day                    Time spent on the discharge of patient: 35 minutes         Leigh Luis NP  Department of Hospital Medicine  [facility]

## 2024-07-19 NOTE — PLAN OF CARE
Reardon completed with Pt at bedside. Pt verbalized understanding and signed REARDON. Reardon scanned into media.      07/19/24 1545   REARDON Message   Medicare Outpatient and Observation Notification regarding financial responsibility Given to patient/caregiver;Explained to patient/caregiver;Signed/date by patient/caregiver   Date REARDON was signed 07/19/24   Time REARDON was signed 2105

## 2024-07-19 NOTE — ASSESSMENT & PLAN NOTE
Patient with acute kidney injury/acute renal failure likely due to pre-renal azotemia due to dehydration ASHLEY is currently worsening. Baseline creatinine  1.4  - Labs reviewed- Renal function/electrolytes with Estimated Creatinine Clearance: 58.4 mL/min (A) (based on SCr of 1.7 mg/dL (H)). according to latest data. Monitor urine output and serial BMP and adjust therapy as needed. Avoid nephrotoxins and renally dose meds for GFR listed above    Bladder scan: r/o urinary retention, place ryder cath if patient has >400 ml on PVR  KUB: r/o constipation, patient reports last bowel movement with hard stool.  Unable to perform KUB due to weight limit.    CT abdomen pending  Trend renal function

## 2024-07-19 NOTE — PLAN OF CARE
Discharge orders and chart reviewed with no further post-acute discharge needs identified at this time.     Pt will discharge to [facility]. [facility] will transport. Transportation will be sent when call to report is made. Call to report is [address], nurse Suri, [phone]. Floor nurse notified.    At this time, patient is cleared for discharge from Case Management standpoint.        07/19/24 1512   Final Note   Assessment Type Final Discharge Note   Anticipated Discharge Disposition SNF   What phone number can be called within the next 1-3 days to see how you are doing after discharge? [phone]   Post-Acute Status   Post-Acute Authorization Placement   Post-Acute Placement Status Set-up Complete/Auth obtained   Coverage MEDICARE - MEDICARE PART A & B   Discharge Delays None known at this time

## 2024-07-19 NOTE — PLAN OF CARE
1:24  KIERSTEN sent Pt's discharge orders to [facility] through [employer] fax.    1:48  KIERSTEN called Caroline who stated she received the discharge summary but some information was missing. KIERSTEN sent discharge orders again in [facility].    2:47  KIERSTEN called Caroline again and she stated she had still not received discharge orders. KIERSTEN sent again through email.

## 2024-07-19 NOTE — PLAN OF CARE
[facility]  Initial Discharge Assessment    Assessment completed at bedside with Pt, and all information on FaceSheet confirmed, including demographics, PCP, pharmacy and insurance. Pt has addressed advance directives and Eliza Chance (Spouse)  [phone] (Work Phone) is POA. He currently lives at [facility] verified by Caroline. His PCP is Solitario Mendez MD, and last appointment was six months ago. His preferred pharmacy is [facility] on [address]. He denies any HH/HD/Blood Thinners but does use a wheelchair and requests a walker upon discharge.  [facility] will transport back to the facility. He denies any recent hospitalizations. Plan for discharge is to return home. Case Management to continue to follow for discharge planning needs.          Primary Care Provider: Solitario Mendez MD    Admission Diagnosis: Fall [W19.XXXA]  ASHLEY (acute kidney injury) [N17.9]    Admission Date: 7/17/2024  Expected Discharge Date: 7/20/2024    Transition of Care Barriers: None    Payor: MEDICARE / Plan: MEDICARE PART A & B / Product Type: Government /     Extended Emergency Contact Information  Primary Emergency Contact: Eliza Chance  Address: [address] [place], [place] [address]            [place] [address] [place]  Home Phone: [phone]  Work Phone: [phone]  Relation: Spouse  Preferred language: English   needed? No    Discharge plan A: [facility]  Discharge Plan B: Home      [facility] [place] - [address]  [address]  [facility] [address]  Phone: [phone] Fax: [phone]      Initial Assessment (most recent)       Adult Discharge Assessment - 07/19/24 1044          Discharge Assessment    Assessment Type Discharge Planning Assessment     Confirmed/corrected address, phone number and insurance Yes     Confirmed Demographics Correct on Facesheet     Source of Information patient;family     When was your last doctors appointment? --   Six  months    Does patient/caregiver understand observation status Yes     Reason For Admission Fall     People in Home spouse     Do you expect to return to your current living situation? Yes     Do you have help at home or someone to help you manage your care at home? Yes     Who are your caregiver(s) and their phone number(s)? Eliza Chance (Spouse)  [phone] (Work Phone)     Prior to hospitilization cognitive status: Alert/Oriented     Current cognitive status: Alert/Oriented     Walking or Climbing Stairs Difficulty no     Dressing/Bathing Difficulty no     Home Accessibility wheelchair accessible     Home Layout Able to live on 1st floor     Equipment Currently Used at Home wheelchair     Readmission within 30 days? No     Patient currently being followed by outpatient case management? No     Do you currently have service(s) that help you manage your care at home? No     Do you take prescription medications? Yes     Do you have prescription coverage? Yes     Coverage MEDICARE - MEDICARE PART A & B     Do you have any problems affording any of your prescribed medications? No     Is the patient taking medications as prescribed? yes     Who is going to help you get home at discharge? Eliza Chance (Spouse)  [phone] (Work Phone)     How do you get to doctors appointments? car, drives self     Are you on dialysis? No     Do you take coumadin? No     Discharge Plan A Home with family     Discharge Plan B Home     DME Needed Upon Discharge  walker, standard     Discharge Plan discussed with: Patient     Transition of Care Barriers None

## 2024-07-19 NOTE — DISCHARGE INSTRUCTIONS
[facility]  Facility Transfer Orders        Admit to: Nursing Home     Diagnoses:   Active Hospital Problems    Diagnosis  POA    *ASHLEY (acute kidney injury) [N17.9]  Yes    Hyponatremia [E87.1]  Yes    Hypotension [I95.9]  Yes    Chronic diastolic heart failure [I50.32]  Yes    History of pulmonary embolism [Z86.711]  Yes    Type 2 diabetes mellitus with circulatory disorder, without long-term current use of insulin [E11.59]  Yes     Chronic     A1c of 6.6 1/4/2013-Dr. Rodriguez      Sleep apnea [G47.30]  Yes      Resolved Hospital Problems   No resolved problems to display.     Allergies:   Review of patient's allergies indicates:   Allergen Reactions    Bactroban [mupirocin calcium] Other (See Comments)     Burning feeling on open sore     Metoprolol Other (See Comments)     dizzy       Code Status: Full    Vitals: Routine       Diet: diabetic diet: 1800 calorie    Activity: Activity as tolerated    Nursing Precautions: Fall and Pressure ulcer prevention    Bed/Surface: Bariatric Low Air Loss    Consults: PT to evaluate and treat- 5 times a week and OT to evaluate and treat- 5 times a week    Oxygen: room air    Dialysis: Patient is not on dialysis.     Labs: First blood draw on 07/22/2024.              BMP Weekly x2 weeks report results to the facility attending physician   Pending Diagnostic Studies:       None          Imaging:  N/A    Miscellaneous Care:   Routine Skin for Bedridden Patients:  Apply moisture barrier cream to all  Wound Care: yes:  Pressure Ulcer(s) Stage I :   Location: Multiple   Apply Miconzazole:  Barrier ointment                       Frequency:  Daily                             If incontinent of stool or urine, apply thin layer Barrier cream                   twice daily and PRN to wound         Pressure relief measure:  for pressure redistribution and A/C Boots            Diabetes Care: Diabetes: Check blood sugar. Fingerstick blood sugar A.M and p.m.  CHF Care:  Daily Weight with notification of MD/NP of 2lb or > increase in 24 hours    v/s and O2 sat every shift    Oxygen as needed for sats <90%    Report abnormal breath sounds to MD/NP    Edema checks q shift- notify MD/NP of increased edema    Task segmentation by nursing for daily care to decrease exertion  i  Schedule appointment with cardiologist, Dr. Patton in 1-2 Weeks    CHF education to include diet ,medication, and CHF flags for MD notification    IV Access: N/A     Medications: Discontinue all previous medication orders, if any. See new list below.  Current Discharge Medication List        START taking these medications    Details   docusate sodium (COLACE) 100 MG capsule Take 1 capsule (100 mg total) by mouth 2 (two) times daily.  Qty: 60 capsule, Refills: 0           CONTINUE these medications which have CHANGED    Details   spironolactone (ALDACTONE) 50 MG tablet Take 0.5 tablets (25 mg total) by mouth once daily.  Qty: 30 tablet, Refills: 5    Comments: .           CONTINUE these medications which have NOT CHANGED    Details   acetaminophen (TYLENOL) 325 MG tablet Take 325 mg by mouth every 6 (six) hours as needed for Pain.      ascorbic acid, vitamin C, (VITAMIN C) 500 MG tablet Take 500 mg by mouth once daily. 0900  2100      DULoxetine (CYMBALTA) 30 MG capsule Take 1 capsule (30 mg total) by mouth once daily.  Qty: 30 capsule, Refills: 11    Associated Diagnoses: Neuropathic pain; Depression, unspecified depression type      ELIQUIS 5 mg Tab Take 1 tablet (5 mg total) by mouth 2 (two) times daily.  Qty: 180 tablet, Refills: 3    Associated Diagnoses: Acute deep vein thrombosis (DVT) of distal end of left lower extremity      gabapentin (NEURONTIN) 300 MG capsule Take 300 mg by mouth 2 (two) times daily. 0900  2100      LIDOcaine 4 % PtMd Apply 1 patch topically once daily.      multivitamin (THERAGRAN) per tablet Take 1 tablet by mouth once daily.      protein supplement (PROTEIN ORAL) Take 30 mLs by  mouth 2 (two) times a day. 0900  2100      semaglutide (OZEMPIC) 0.25 mg or 0.5 mg(2 mg/1.5 mL) pen injector Inject 0.25 mg into the skin every 7 days. For four weeks, then increase to 0.5mg every seven days for four weeks.  Qty: 1 each, Refills: 1    Associated Diagnoses: Type 2 diabetes mellitus with other circulatory complication, without long-term current use of insulin      triamcinolone acetonide 0.1% (KENALOG) 0.1 % ointment Apply 1 g topically 2 (two) times daily. 0800  2000           STOP taking these medications       furosemide (LASIX) 40 MG tablet Comments:   Reason for Stopping:         lisinopriL 10 MG tablet Comments:   Reason for Stopping:         clindamycin (CLEOCIN) 300 MG capsule Comments:   Reason for Stopping:         doxycycline (VIBRA-TABS) 100 MG tablet Comments:   Reason for Stopping:         tamsulosin (FLOMAX) 0.4 mg Cap Comments:   Reason for Stopping:             Follow up:    Follow-up Information       Solitario Mendez MD Follow up.    Specialty: Family Medicine  Contact information:  [address] [address] [phone]               David Patton MD Follow up in 1 week(s).    Specialties: Cardiology, Interventional Cardiology  Contact information:  [address] [facility] [address] [phone]                               Immunizations Administered as of 7/19/2024       Name Date Dose VIS Date Route Exp Date    COVID-19, MRNA, LN-S, PF (Pfizer) (Purple Cap) 3/5/2021 10:43 AM 0.3 mL 12/12/2020 Intramuscular 6/30/2021    Site: Right deltoid     Given By: Kristen Hernandez LPN     : [employer]     Lot: [id number]     COVID-19, MRNA, LN-S, PF (Pfizer) (Purple Cap) 2/12/2021 11:00 AM 0.3 mL 12/12/2020 Intramuscular 6/30/2021    Site: Left deltoid     Given By: Kristen Hernandez LPN     : [employer]     Lot: [id number]                 Leigh Luis NP

## 2024-07-20 NOTE — NURSING
Pt discharged via [facility]. Person items collected and transported with patient. IV and tele monitor removed by previous shift. .     Vinita Henao

## 2024-08-13 NOTE — PROGRESS NOTES
[facility] Hospital Medicine  Progress Note    Patient Name: Lukas Chance  MRN: [id number]  Patient Class: IP- Inpatient   Admission Date: 10/25/2019  Length of Stay: 3 days  Attending Physician: No att. providers found  Primary Care Provider: Solitario Mendez MD        Subjective:     Principal Problem:Sepsis        HPI:  Lukas Chance is a 68 y/o M with a past medical history significant for  HTN, Type 2 DM, obesity, CAD, and PE who presented to the ED via EMS with c/o continued SOB and weakness.  Pt was seen in this ED earlier today for SOB which was determined to be a viral pneumonitis.  He was discharged home with nebulizer treatments and home health.  Reportedly, pt's SOB is about the same, but his weakness has progressed.  He was unable to rise out of he chair this evening and slid down to the floor.  CXR this evening is indicative of RLL pneumonia and he meets sepsis criteria.  He will be admitted to inpatient under the service of hospital medicine for further treatment.    Overview/Hospital Course:  Patient monitored closely during hospitalization. Patient noted to have possible pneumonia and was initiated on ceftriaxone and azithromycin.  Chest xray obtained.Wound care consulted for large wound to left lower extremity with cellulitis. Wound culture obtained. Abx changed to IV clindamycin.  He is noted have improvement of erythema to left lower extremity.  He was initiated on IV Lasix for peripheral edema and acute diastolic heart failure.  Responded well to diuretics and is feeling better.  Denies shortness of breath.  He had a home O2 evaluation which demonstrated hypoxemia with activity and qualified for home oxygen.  He is stable for discharge from a medical standpoint.    No new subjective & objective note has been filed under this hospital service since the last note was generated.      Assessment/Plan:      * Sepsis  This patient does have evidence of infective focus  My  Stage II blood pressure elevation acutely, satisfactory control chronically by history taking diltiazem  mg daily, losartan 50 mg daily, and metoprolol XL 50 mg daily.  Very mild asymptomatic bradycardia undoubtedly due to the combination of diltiazem and metoprolol, this prescribed given history of paroxysmal atrial fibrillation.  Continue current regimen with monitoring.   overall impression is sepsis.  Antibiotics given-   Antibiotics (From admission, onward)    Start     Stop Route Frequency Ordered    10/26/19 1530  clindamycin 600 MG/50 ML D5W 600 mg/50 mL IVPB 600 mg      -- IV Every 8 hours (non-standard times) 10/26/19 1420    10/26/19 0015  azithromycin 500 mg in dextrose 5 % 250 mL IVPB (ready to mix system)      -- IV Every 24 hours (non-standard times) 10/25/19 2305          Latest lactate reviewed, they are-  Recent Labs   Lab 10/25/19  0946 10/26/19  0047   LACTATE 1.4 1.1       Organ dysfunction indicated by tachycardia and tachypnea  Source- respiratory  Source control Achieved by- IV antibiotics        Cellulitis of left lower extremity  Wound care consulted. Daily dressing changes. Wound culture ordered. Change abx to clindamycin IV.      Centrilobular emphysema  Due to tobacco dependency in remission.  Continue home bronchodilator.      Acute on chronic diastolic heart failure  Scheduled lasix 60 mg bid. Last ef 55%      Old MI (myocardial infarction)  Continue OAC and statin      Pneumonia due to infectious organism  IV rocephin, IV azithromycin  Sputum culture and gram stain  Blood cultures- negative day 2  Supplemental oxygen as needed  Nebulizer tx  Check procalcitonin- 0.38  D/C IV rocephin     Venous insufficiency of lower extremity  Patient was severe insufficiency and ulcer development.  Consult wound care.      Body mass index 50.0-59.9, adult  Body mass index is 54.89 kg/m². Morbid obesity complicates all aspects of disease management from diagnostic modalities to treatment. Weight loss encouraged and health benefits explained to patient.        Hypertension associated with diabetes  Chronic; stable. Monitor BP and treat as indicated for sustained control.      Diabetes mellitus type 2 with neurological manifestations  Chronic; not on chronic medication.  Check hgb a1c to assess control.  Accuchecks ac/hs with ssi coverage as needed.    Anticoagulant  long-term use  Unlikely SOB due to pulmonary embolism as pt is anticoagulated with apixaban and reports compliance.  Continue apixaban acutely.        VTE Risk Mitigation (From admission, onward)         Ordered     IP VTE HIGH RISK PATIENT  Once      10/26/19 0008                      Caroline Bruno NP  Department of Hospital Medicine   [facility]

## 2024-09-13 NOTE — PLAN OF CARE
Problem: Adult Inpatient Plan of Care  Goal: Plan of Care Review  POC reviewed with pt who verbalized understanding, pt AAOX4, PIV 22G to right upper arm and left forearm, sites clean dry and intact no redness or swelling noted, IVF as ordered, remains afebrile during shift no s/s of infection noted, ryder in place to gravity draining clear yellow urine, remains free of falls/injury during shift, pain controlled with pain meds, bed in low and locked position with side rails X2, safety maintained, personal items and call light in reach at bedside, will cont to monitor for safety           Medications

## 2024-11-17 ENCOUNTER — HOSPITAL ENCOUNTER (EMERGENCY)
Facility: HOSPITAL | Age: 74
Discharge: LEFT BEFORE DISCHARGE | End: 2024-11-17
Attending: EMERGENCY MEDICINE
Payer: MEDICARE

## 2024-11-17 VITALS
RESPIRATION RATE: 18 BRPM | DIASTOLIC BLOOD PRESSURE: 66 MMHG | HEART RATE: 104 BPM | WEIGHT: 315 LBS | BODY MASS INDEX: 44.1 KG/M2 | HEIGHT: 71 IN | TEMPERATURE: 99 F | SYSTOLIC BLOOD PRESSURE: 156 MMHG | OXYGEN SATURATION: 92 %

## 2024-11-17 DIAGNOSIS — R47.1 DYSARTHRIA: ICD-10-CM

## 2024-11-17 LAB
ANION GAP SERPL CALC-SCNC: 3 MMOL/L (ref 8–16)
BASOPHILS # BLD AUTO: 0.04 K/UL (ref 0–0.2)
BASOPHILS NFR BLD: 0.6 % (ref 0–1.9)
BILIRUB UR QL STRIP: NEGATIVE
BUN SERPL-MCNC: 9 MG/DL (ref 8–23)
CALCIUM SERPL-MCNC: 8.8 MG/DL (ref 8.7–10.5)
CHLORIDE SERPL-SCNC: 101 MMOL/L (ref 95–110)
CLARITY UR: CLEAR
CO2 SERPL-SCNC: 32 MMOL/L (ref 23–29)
COLOR UR: YELLOW
CREAT SERPL-MCNC: 0.9 MG/DL (ref 0.5–1.4)
DIFFERENTIAL METHOD BLD: ABNORMAL
EOSINOPHIL # BLD AUTO: 0.5 K/UL (ref 0–0.5)
EOSINOPHIL NFR BLD: 6.5 % (ref 0–8)
ERYTHROCYTE [DISTWIDTH] IN BLOOD BY AUTOMATED COUNT: 14.6 % (ref 11.5–14.5)
EST. GFR  (NO RACE VARIABLE): >60 ML/MIN/1.73 M^2
GLUCOSE SERPL-MCNC: 105 MG/DL (ref 70–110)
GLUCOSE UR QL STRIP: NEGATIVE
HCT VFR BLD AUTO: 44.7 % (ref 40–54)
HGB BLD-MCNC: 13.3 G/DL (ref 14–18)
HGB UR QL STRIP: NEGATIVE
IMM GRANULOCYTES # BLD AUTO: 0.01 K/UL (ref 0–0.04)
IMM GRANULOCYTES NFR BLD AUTO: 0.1 % (ref 0–0.5)
KETONES UR QL STRIP: NEGATIVE
LEUKOCYTE ESTERASE UR QL STRIP: NEGATIVE
LYMPHOCYTES # BLD AUTO: 1.9 K/UL (ref 1–4.8)
LYMPHOCYTES NFR BLD: 27.1 % (ref 18–48)
MCH RBC QN AUTO: 26.6 PG (ref 27–31)
MCHC RBC AUTO-ENTMCNC: 29.8 G/DL (ref 32–36)
MCV RBC AUTO: 89 FL (ref 82–98)
MONOCYTES # BLD AUTO: 0.6 K/UL (ref 0.3–1)
MONOCYTES NFR BLD: 8 % (ref 4–15)
NEUTROPHILS # BLD AUTO: 4.1 K/UL (ref 1.8–7.7)
NEUTROPHILS NFR BLD: 57.7 % (ref 38–73)
NITRITE UR QL STRIP: NEGATIVE
NRBC BLD-RTO: 0 /100 WBC
PH UR STRIP: 6 [PH] (ref 5–8)
PLATELET # BLD AUTO: 316 K/UL (ref 150–450)
PMV BLD AUTO: 9.4 FL (ref 9.2–12.9)
POTASSIUM SERPL-SCNC: 4.6 MMOL/L (ref 3.5–5.1)
PROT UR QL STRIP: ABNORMAL
RBC # BLD AUTO: 5 M/UL (ref 4.6–6.2)
SODIUM SERPL-SCNC: 136 MMOL/L (ref 136–145)
SP GR UR STRIP: 1.02 (ref 1–1.03)
URN SPEC COLLECT METH UR: ABNORMAL
UROBILINOGEN UR STRIP-ACNC: NEGATIVE EU/DL
WBC # BLD AUTO: 7.11 K/UL (ref 3.9–12.7)

## 2024-11-17 PROCEDURE — 93010 ELECTROCARDIOGRAM REPORT: CPT | Mod: ,,, | Performed by: INTERNAL MEDICINE

## 2024-11-17 PROCEDURE — 93005 ELECTROCARDIOGRAM TRACING: CPT | Performed by: INTERNAL MEDICINE

## 2024-11-17 PROCEDURE — 99285 EMERGENCY DEPT VISIT HI MDM: CPT | Mod: 25

## 2024-11-17 PROCEDURE — 81003 URINALYSIS AUTO W/O SCOPE: CPT | Performed by: EMERGENCY MEDICINE

## 2024-11-17 PROCEDURE — 80048 BASIC METABOLIC PNL TOTAL CA: CPT | Performed by: EMERGENCY MEDICINE

## 2024-11-17 PROCEDURE — 85025 COMPLETE CBC W/AUTO DIFF WBC: CPT | Performed by: EMERGENCY MEDICINE

## 2024-11-17 NOTE — LETTER
Patient: Lukas Chance  YOB: 1950  Date: 11/17/2024 Time: 9:33 PM  Location: [facility] Emergency [facility]    Leaving the Hospital Against Medical Advice    Chart #:[id number]    This will certify that I, the undersigned,    ______________________________________________________________________    A patient in the above named medical center, having requested discharge and removal from the medical center against the advice of my attending physician(s), hereby release On license of [facility], its physicians, officers and employees, severally and individually, from any and all liability of any nature whatsoever for any injury or harm or complication of any kind that may result directly or indirectly, by reason of my terminating my stay as a patient at [facility] and my departure from [facility], and hereby waive any and all rights of action I may now have or later acquire as a result of my voluntary departure from [facility] and the termination of my stay as a patient therein.    This release is made with the full knowledge of the danger that may result from the action which I am taking.      Date:_______________________                         ___________________________                                                                                    Patient/Legal Representative    Witness:        ____________________________                          ___________________________  Nurse                                                                        Physician

## 2024-11-17 NOTE — ED PROVIDER NOTES
Chief complaint:  Dizziness (Patient is from [facility] with reports of dizziness starting yesterday around new. Denies any one sided weakness or visual disturbances. )      HPI:  Lukas Chance is a 74 y.o. 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  He endorses noticing this shortly after awaiting yesterday.  He does also endorse getting flu and COVID vaccination yesterday and questions whether this could be the problem.  He denies difficulty with word finding or understanding spoken speech.  He denies difficulty reading.  To clarify triage note he does not typically walk but endorses walking yesterday without difficulty.  He denies any lightheadedness, syncope, presyncope to clarify triage note.  He denies difficulty swallowing or visual disturbances.  He denies any new numbness or weakness.    ROS: As per HPI and below:  No headache, neck pain, chest pain, dyspnea, cough, congestion, fever, abdominal pain, urinary frequency, urgency, dysuria, vomiting, diarrhea, blood in the stools, dark stools, new rash or swelling.    Review of patient's allergies indicates:   Allergen Reactions    Bactroban [mupirocin calcium] Other (See Comments)     Burning feeling on open sore     Metoprolol Other (See Comments)     dizzy       Discharge Medication List as of 11/17/2024  9:33 PM        CONTINUE these medications which have NOT CHANGED    Details   acetaminophen (TYLENOL) 325 MG tablet Take 325 mg by mouth every 6 (six) hours as needed for Pain., Historical Med      ascorbic acid, vitamin C, (VITAMIN C) 500 MG tablet Take 500 mg by mouth once daily. 0900  2100, Historical Med      DULoxetine (CYMBALTA) 30 MG capsule Take 1 capsule (30 mg total) by mouth once daily., Starting Thu 2/24/2022,  Until Wed 7/17/2024, Normal      ELIQUIS 5 mg Tab Take 1 tablet (5 mg total) by mouth 2 (two) times daily., Starting Thu 2/24/2022, Normal      gabapentin (NEURONTIN) 300 MG capsule Take 300 mg by mouth 2 (two) times daily. 0900 2100, Historical Med      LIDOcaine 4 % PtMd Apply 1 patch topically once daily., Historical Med      multivitamin (THERAGRAN) per tablet Take 1 tablet by mouth once daily., Historical Med      protein supplement (PROTEIN ORAL) Take 30 mLs by mouth 2 (two) times a day. 0900 2100, Historical Med      semaglutide (OZEMPIC) 0.25 mg or 0.5 mg(2 mg/1.5 mL) pen injector Inject 0.25 mg into the skin every 7 days. For four weeks, then increase to 0.5mg every seven days for four weeks., Starting Tue 7/18/2023, Normal      spironolactone (ALDACTONE) 50 MG tablet Take 0.5 tablets (25 mg total) by mouth once daily., Starting Fri 7/19/2024, Until Sat 7/19/2025, Normal      triamcinolone acetonide 0.1% (KENALOG) 0.1 % ointment Apply 1 g topically 2 (two) times daily. 0800  2000, Starting Mon 7/15/2024, Historical Med             PMH:  As per HPI and below:  Past Medical History:   Diagnosis Date    Anticoagulant long-term use 03/01/2013    Antithrombin 3 deficiency     Cellulitis     lower legs, venous stasis    CHF (congestive heart failure)     Coronary artery disease     Deep vein thrombosis (DVT) 2012    left leg    Diabetes mellitus type 2 in obese 05/15/2013    Hx of colonic polyps     Hypertension     Lymphedema     Obesity     LAKHWINDER (obstructive sleep apnea)     Pulmonary embolism     Venous stasis ulcers     left ankle     Past Surgical History:   Procedure Laterality Date    COLONOSCOPY  5/2/2011    Dr. Miller, 9 polyps, recheck 5 year    MULTIPLE TOOTH EXTRACTIONS      RADIOFREQUENCY ABLATION  7/2015    bilateral     TONSILLECTOMY         Social History     Socioeconomic History    Marital status:    Tobacco Use    Smoking status: Every Day     Current packs/day: 0.00     Average  packs/day: 1 pack/day for 20.0 years (20.0 ttl pk-yrs)     Types: Cigarettes     Start date: 1/3/1982     Last attempt to quit: 1/3/2002     Years since quittin.8    Smokeless tobacco: Never   Substance and Sexual Activity    Alcohol use: Yes     Alcohol/week: 1.0 standard drink of alcohol     Types: 1 Cans of beer per week     Comment: seldom    Drug use: No    Sexual activity: Yes     Partners: Female     Social Drivers of Health     Financial Resource Strain: Low Risk  (2022)    Overall Financial Resource Strain (CARDIA)     Difficulty of Paying Living Expenses: Not very hard   Food Insecurity: No Food Insecurity (2022)    Hunger Vital Sign     Worried About Running Out of Food in the Last Year: Never true     Ran Out of Food in the Last Year: Never true   Transportation Needs: Unmet Transportation Needs (2022)    PRAPARE - Transportation     Lack of Transportation (Medical): Yes     Lack of Transportation (Non-Medical): No   Physical Activity: Unknown (2022)    Exercise Vital Sign     Days of Exercise per Week: 2 days   Stress: No Stress Concern Present (2022)    [employer] - Occupational Stress Questionnaire     Feeling of Stress : Only a little   Housing Stability: Low Risk  (2022)    Housing Stability Vital Sign     Unable to Pay for Housing in the Last Year: No     Number of Places Lived in the Last Year: 1     Unstable Housing in the Last Year: No       Family History   Problem Relation Name Age of Onset    Heart disease Mother      Heart disease Father      Gallbladder disease Sister 2     Cataracts Sister 2     Heart disease Brother      Stroke Brother      Emphysema Maternal Uncle         Physical Exam:    Vitals:    24 2243   BP:    Pulse:    Resp: 18   Temp:      GENERAL:  No apparent distress.  Alert.  Large body habitus.  HEENT:  Moist mucous membranes.  Normocephalic and atraumatic.    NECK:  No swelling.  Midline trachea.   Supple.  CARDIOVASCULAR:  Regular rate and rhythm.  2+ radial pulses.  No murmur  PULMONARY:  Lungs clear to auscultation bilaterally.  No wheezes, rales, or rhonci.    ABDOMEN:  Non-tender and non-distended.    EXTREMITIES:  Warm and well perfused.  Brisk capillary refill.  3+ pitting edema with leg symmetric.  NEUROLOGICAL:  Normal mental status.  Appropriate and conversant.  5/5 strength with no pronator drift and 5/5 strength in the lower extremities as well.  Equal sensation to light touch bilaterally upper and lower extremities.  Cranial nerves 3-12 are intact.  There was no definitive dysarthria although patient feels as if speech may be different.  There is no aphasia with excellent fluency of speech and no difficulty with word finding on identification of objects at the bedside.  SKIN:  No rashes or ecchymoses.    BACK:  Atraumatic.  No CVA tenderness to palpation.      Labs Reviewed   CBC W/ AUTO DIFFERENTIAL - Abnormal       Result Value    WBC 7.11      RBC 5.00      Hemoglobin 13.3 (*)     Hematocrit 44.7      MCV 89      MCH 26.6 (*)     MCHC 29.8 (*)     RDW 14.6 (*)     Platelets 316      MPV 9.4      Immature Granulocytes 0.1      Gran # (ANC) 4.1      Immature Grans (Abs) 0.01      Lymph # 1.9      Mono # 0.6      Eos # 0.5      Baso # 0.04      nRBC 0      Gran % 57.7      Lymph % 27.1      Mono % 8.0      Eosinophil % 6.5      Basophil % 0.6      Differential Method Automated     BASIC METABOLIC PANEL - Abnormal    Sodium 136      Potassium 4.6      Chloride 101      CO2 32 (*)     Glucose 105      BUN 9      Creatinine 0.9      Calcium 8.8      Anion Gap 3 (*)     eGFR >60.0     URINALYSIS, REFLEX TO URINE CULTURE - Abnormal    Specimen UA Urine, Clean Catch      Color, UA Yellow      Appearance, UA Clear      pH, UA 6.0      Specific Gravity, UA 1.025      Protein, UA Trace (*)     Glucose, UA Negative      Ketones, UA Negative      Bilirubin (UA) Negative      Occult Blood UA Negative       Nitrite, UA Negative      Urobilinogen, UA Negative      Leukocytes, UA Negative      Narrative:     Specimen Source->Urine       Discharge Medication List as of 11/17/2024  9:33 PM        CONTINUE these medications which have NOT CHANGED    Details   acetaminophen (TYLENOL) 325 MG tablet Take 325 mg by mouth every 6 (six) hours as needed for Pain., Historical Med      ascorbic acid, vitamin C, (VITAMIN C) 500 MG tablet Take 500 mg by mouth once daily. 0900 2100, Historical Med      DULoxetine (CYMBALTA) 30 MG capsule Take 1 capsule (30 mg total) by mouth once daily., Starting Thu 2/24/2022, Until Wed 7/17/2024, Normal      ELIQUIS 5 mg Tab Take 1 tablet (5 mg total) by mouth 2 (two) times daily., Starting Thu 2/24/2022, Normal      gabapentin (NEURONTIN) 300 MG capsule Take 300 mg by mouth 2 (two) times daily. 0900 2100, Historical Med      LIDOcaine 4 % PtMd Apply 1 patch topically once daily., Historical Med      multivitamin (THERAGRAN) per tablet Take 1 tablet by mouth once daily., Historical Med      protein supplement (PROTEIN ORAL) Take 30 mLs by mouth 2 (two) times a day. 0900 2100, Historical Med      semaglutide (OZEMPIC) 0.25 mg or 0.5 mg(2 mg/1.5 mL) pen injector Inject 0.25 mg into the skin every 7 days. For four weeks, then increase to 0.5mg every seven days for four weeks., Starting Tue 7/18/2023, Normal      spironolactone (ALDACTONE) 50 MG tablet Take 0.5 tablets (25 mg total) by mouth once daily., Starting Fri 7/19/2024, Until Sat 7/19/2025, Normal      triamcinolone acetonide 0.1% (KENALOG) 0.1 % ointment Apply 1 g topically 2 (two) times daily. 0800 2000, Starting Mon 7/15/2024, Historical Med             Orders Placed This Encounter   Procedures    MRI Brain Without Contrast    CBC auto differential    Basic metabolic panel (BMP)    Urinalysis, Reflex to Urine Culture Urine, Clean Catch    Straight cath    EKG 12-lead    Possible Hospitalization    [facility] Facilitated Transportation Request        Imaging Results              MRI Brain Without Contrast (Final result)  Result time 11/17/24 20:22:51      Final result by Mariann Miller MD (11/17/24 20:22:51)                   Impression:      1. Punctate focus of increased diffusivity in the peripheral paramedian left posterior parietal lobe without definite corresponding signal abnormality on ADC and T2/FLAIR images, likely artifactual although correlation with clinical presentation/physical exam is advised.  No large region of abnormal restricted diffusion to suggest acute major vascular territory distribution infarct.  2. Generalized cerebral volume loss, findings of chronic microvascular ischemic change and remote right frontal opercular infarct.  3. Mild prominence of the supratentorial ventricular system which can be seen with central predominant volume loss or normal pressure hydrocephalus in the appropriate clinical setting.  Clinical correlation advised.  4. Bilateral mastoid air cell effusions.      Electronically signed by: Mariann Miller MD  Date:    11/17/2024  Time:    20:22               Narrative:    EXAMINATION:  MRI BRAIN WITHOUT CONTRAST    CLINICAL HISTORY:  Mental status change, unknown cause;Dysarthria starting yesterday;    TECHNIQUE:  Multiplanar multisequence MR imaging of the brain was performed without contrast.    COMPARISON:  None.    FINDINGS:  There is a punctate focus of increased diffusivity within the posterior left parietal lobe without definite corresponding decreased signal on ADC images or definite abnormal signal intensity on T2 or FLAIR images.  Findings are likely artifactual although clinical correlation with patient's symptomatology advised.  No large region of abnormal restricted diffusion to suggest acute major vascular territory distribution infarct.  There is generalized cerebral volume loss with compensatory prominence of cerebral sulci and the ventricular system.  There is slight asymmetric prominence of the  supratentorial ventricular system which can be seen with central predominant volume loss or normal pressure hydrocephalus in the appropriate clinical setting.  There is a region of encephalomalacia involving the right frontal operculum suggestive of sequela of prior infarct.  No abnormal gradient susceptibility to suggest remote or acute intraparenchymal hemorrhage.  No extra-axial hemorrhage or fluid collection.  There is T2/FLAIR hyperintensity in the supratentorial white matter, nonspecific but likely reflecting sequela of chronic microvascular ischemic change.  The craniocervical junction and sellar region are within normal limits.  There are bilateral mastoid air cell effusions.                                      ED Course as of 11/18/24 0122   Sun Nov 17, 2024 2056 I discussed MRI findings and presentation with Dr. Jimenez who recommends admission for observation and medical management for potential CVA. [MR]      ED Course User Index  [MR] Moiz Joshi MD       MDM:    74 y.o. male with possible dysarthria starting at some point yesterday.  He is well outside any treatment window for thrombolytics or endovascular therapy based on time of onset.  Additionally, it is unclear whether there is any objective deficit.  Patient questions potential malaise from recent vaccination.  I am unable to easily identify any dysarthria here.  I will attempt to obtain MRI to exclude any sign of CVA with low suspicion.  This is not consistent with TIA as symptoms have been present since yesterday.  Additional labs done to exclude any other potential contributing metabolic or infectious etiology with low suspicion.  He does not have lightheadedness.  Very low suspicion for cardiac process such as ischemia or arrhythmia.      MRI does show abnormal focus in the left posterior parietal area, possibly artifact, although discussed with neurology who recommends admission for observation and medical  optimization.  Patient initially agreeable to admission but then insists on discharge against medical advice when hospital medicine comes down to admit the patient.      Advised patient that they need admission to the hospital.  Patient refuses admission to the hospital.  Patient is alert and oriented to person, place, and situation.  I explained the risks of worsening condition possibly leading to death or permanent disability in layman's terms to the patient.  Patient voices these risks back to me.   Patient is not altered and is not under the influence.  Patient is welcome to return to the hospital at any time if he chooses to do so.  I will discharge the patient AGAINST MEDICAL ADVICE.  Outpatient follow-up neurology recommended with return precautions reviewed in detail.    Diagnoses:    1. Speech disturbance       Moiz Joshi MD  11/18/24 0122

## 2024-11-18 NOTE — ED NOTES
Patient resting in bed. Patient does not express any needs at this time. Bed is locked and in lowest position. Side rails raised x 2. Call light left in reach of patient and patient instructed to call staff for assistance.

## 2024-11-18 NOTE — DISCHARGE INSTRUCTIONS
As we discussed, your workup today is concerning for stroke and you have elected not to remain as recommended by Neurology.

## 2024-11-20 ENCOUNTER — TELEPHONE (OUTPATIENT)
Dept: DIABETES | Facility: CLINIC | Age: 74
End: 2024-11-20
Payer: MEDICARE

## 2024-11-20 NOTE — TELEPHONE ENCOUNTER
Spoke with Ana Luisa at [facility].  She is wanting patient to see a dietitian/nutritionist for general nutrition.  Explained that our department is diabetes education.  Patient located in [facility] so provided name and contact information for general dietitian Pratima Abebe with [facility].

## 2024-11-20 NOTE — TELEPHONE ENCOUNTER
----- Message from Roc sent at 11/20/2024 11:21 AM CST -----  Regarding: appt  Type:  Sooner Apoointment Request      Name of Caller:[facility] with [facility]     When is the first available appointment?dept booked     Symptoms:over eating       Best Call Back Number:[phone]    Additional Information: long st that they need to get pt schedule.  please call to discuss.

## 2024-11-23 LAB
OHS QRS DURATION: 68 MS
OHS QTC CALCULATION: 457 MS

## (undated) DEVICE — GUIDEWIRE STRGHT TIP 260CMX0.035 GS3504

## (undated) DEVICE — INTRODUCER SUPER SHEATH L11CM OD10F

## (undated) DEVICE — CATHETER IVUS VISIONS PV 035 DIGITAL

## (undated) DEVICE — KIT INFLATION MERIT (IN8152, HP9200E)

## (undated) DEVICE — PATCH CELOX VASCULAR HEMOSTATIC

## (undated) DEVICE — SHEATH PINNACLE 6FRX10CM W/GUIDEWIRE

## (undated) DEVICE — SHEATH PINNACLE 8FRX10CM W/GUIDEWIRE